# Patient Record
Sex: FEMALE | Race: WHITE | NOT HISPANIC OR LATINO | Employment: FULL TIME | URBAN - METROPOLITAN AREA
[De-identification: names, ages, dates, MRNs, and addresses within clinical notes are randomized per-mention and may not be internally consistent; named-entity substitution may affect disease eponyms.]

---

## 2017-01-13 RX ORDER — ESTRADIOL 0.1 MG/G
CREAM VAGINAL
Qty: 42.5 G | Refills: 0 | Status: SHIPPED | OUTPATIENT
Start: 2017-01-13 | End: 2017-02-08 | Stop reason: SDUPTHER

## 2017-01-18 DIAGNOSIS — J45.31 ASTHMA WITH ALLERGIC RHINITIS, MILD PERSISTENT, WITH ACUTE EXACERBATION: Chronic | ICD-10-CM

## 2017-01-18 RX ORDER — MONTELUKAST SODIUM 10 MG/1
TABLET ORAL
Qty: 30 TABLET | Refills: 11 | Status: SHIPPED | OUTPATIENT
Start: 2017-01-18 | End: 2017-12-18 | Stop reason: SDUPTHER

## 2017-02-06 ENCOUNTER — OFFICE VISIT (OUTPATIENT)
Dept: OBSTETRICS AND GYNECOLOGY | Facility: CLINIC | Age: 47
End: 2017-02-06
Payer: COMMERCIAL

## 2017-02-06 VITALS
HEIGHT: 64 IN | HEART RATE: 85 BPM | DIASTOLIC BLOOD PRESSURE: 82 MMHG | BODY MASS INDEX: 42.51 KG/M2 | SYSTOLIC BLOOD PRESSURE: 144 MMHG | WEIGHT: 249 LBS

## 2017-02-06 DIAGNOSIS — N76.3 CHRONIC VULVITIS: Primary | ICD-10-CM

## 2017-02-06 PROCEDURE — 99999 PR PBB SHADOW E&M-EST. PATIENT-LVL III: CPT | Mod: PBBFAC,,, | Performed by: OBSTETRICS & GYNECOLOGY

## 2017-02-06 PROCEDURE — 3077F SYST BP >= 140 MM HG: CPT | Mod: S$GLB,,, | Performed by: OBSTETRICS & GYNECOLOGY

## 2017-02-06 PROCEDURE — 3079F DIAST BP 80-89 MM HG: CPT | Mod: S$GLB,,, | Performed by: OBSTETRICS & GYNECOLOGY

## 2017-02-06 PROCEDURE — 99213 OFFICE O/P EST LOW 20 MIN: CPT | Mod: S$GLB,,, | Performed by: OBSTETRICS & GYNECOLOGY

## 2017-02-06 RX ORDER — MUPIROCIN 20 MG/G
OINTMENT TOPICAL
COMMUNITY
Start: 2016-11-07 | End: 2017-07-17 | Stop reason: SDUPTHER

## 2017-02-06 RX ORDER — TRIAMCINOLONE ACETONIDE 1 MG/G
OINTMENT TOPICAL 2 TIMES DAILY
Qty: 80 G | Refills: 0 | Status: SHIPPED | OUTPATIENT
Start: 2017-02-06 | End: 2017-02-16

## 2017-02-06 RX ORDER — EPINEPHRINE 0.3 MG/.3ML
INJECTION INTRAMUSCULAR
COMMUNITY
Start: 2016-11-07 | End: 2021-07-20 | Stop reason: SDUPTHER

## 2017-02-06 NOTE — LETTER
February 19, 2017      Sonny Nunez MD  [address]  [place] [address]           [facility] - OB/ GYN  [address] [place] [address] [address]  [place] [address]  Phone: [phone]          Patient: Ijeoma Issa   MR Number: [id number]   YOB: 1970   Date of Visit: 2/6/2017       Dear Dr. Sonny Nunez:    Thank you for referring Ijeoma Issa to me for evaluation. Attached you will find relevant portions of my assessment and plan of care.    If you have questions, please do not hesitate to call me. I look forward to following Ijeoma Issa along with you.    Sincerely,    Grace Encinas MD    Enclosure  CC:  No Recipients    If you would like to receive this communication electronically, please contact [email or web address] or [phone] to request more information on [employer] Link access.    For providers and/or their staff who would like to refer a patient to [facility], please contact us through our one-stop-shop provider referral line, [facility], at [phone].    If you feel you have received this communication in error or would no longer like to receive these types of communications, please e-mail [email or web address]

## 2017-02-08 RX ORDER — ESTRADIOL 0.1 MG/G
CREAM VAGINAL
Qty: 42.5 G | Refills: 0 | Status: SHIPPED | OUTPATIENT
Start: 2017-02-08 | End: 2017-07-05 | Stop reason: SDUPTHER

## 2017-04-12 RX ORDER — DOXEPIN HYDROCHLORIDE 25 MG/1
CAPSULE ORAL
Qty: 30 CAPSULE | Refills: 0 | Status: SHIPPED | OUTPATIENT
Start: 2017-04-12 | End: 2017-05-06 | Stop reason: SDUPTHER

## 2017-04-20 ENCOUNTER — DOCUMENTATION ONLY (OUTPATIENT)
Dept: FAMILY MEDICINE | Facility: CLINIC | Age: 47
End: 2017-04-20

## 2017-04-20 NOTE — PROGRESS NOTES
Pre-Visit Chart Review  For Appointment Scheduled on 04/25/2017    There are no preventive care reminders to display for this patient.

## 2017-04-25 ENCOUNTER — LAB VISIT (OUTPATIENT)
Dept: LAB | Facility: HOSPITAL | Age: 47
End: 2017-04-25
Attending: FAMILY MEDICINE
Payer: COMMERCIAL

## 2017-04-25 ENCOUNTER — OFFICE VISIT (OUTPATIENT)
Dept: FAMILY MEDICINE | Facility: CLINIC | Age: 47
End: 2017-04-25
Payer: COMMERCIAL

## 2017-04-25 ENCOUNTER — TELEPHONE (OUTPATIENT)
Dept: FAMILY MEDICINE | Facility: CLINIC | Age: 47
End: 2017-04-25

## 2017-04-25 VITALS
HEIGHT: 64 IN | HEART RATE: 91 BPM | SYSTOLIC BLOOD PRESSURE: 112 MMHG | DIASTOLIC BLOOD PRESSURE: 78 MMHG | WEIGHT: 242.5 LBS | BODY MASS INDEX: 41.4 KG/M2 | TEMPERATURE: 98 F

## 2017-04-25 DIAGNOSIS — R73.02 GLUCOSE INTOLERANCE (IMPAIRED GLUCOSE TOLERANCE): Chronic | ICD-10-CM

## 2017-04-25 DIAGNOSIS — Z71.85 IMMUNIZATION COUNSELING: ICD-10-CM

## 2017-04-25 DIAGNOSIS — J30.9 CHRONIC ALLERGIC RHINITIS: ICD-10-CM

## 2017-04-25 DIAGNOSIS — Z71.85 IMMUNIZATION COUNSELING: Primary | ICD-10-CM

## 2017-04-25 DIAGNOSIS — E66.01 MORBID OBESITY, UNSPECIFIED OBESITY TYPE: ICD-10-CM

## 2017-04-25 DIAGNOSIS — E28.2 POLYCYSTIC OVARIAN DISEASE: Chronic | ICD-10-CM

## 2017-04-25 PROCEDURE — 3074F SYST BP LT 130 MM HG: CPT | Mod: S$GLB,,, | Performed by: FAMILY MEDICINE

## 2017-04-25 PROCEDURE — 86765 RUBEOLA ANTIBODY: CPT

## 2017-04-25 PROCEDURE — 3078F DIAST BP <80 MM HG: CPT | Mod: S$GLB,,, | Performed by: FAMILY MEDICINE

## 2017-04-25 PROCEDURE — 99213 OFFICE O/P EST LOW 20 MIN: CPT | Mod: S$GLB,,, | Performed by: FAMILY MEDICINE

## 2017-04-25 PROCEDURE — 1160F RVW MEDS BY RX/DR IN RCRD: CPT | Mod: S$GLB,,, | Performed by: FAMILY MEDICINE

## 2017-04-25 PROCEDURE — 86735 MUMPS ANTIBODY: CPT

## 2017-04-25 PROCEDURE — 99999 PR PBB SHADOW E&M-EST. PATIENT-LVL III: CPT | Mod: PBBFAC,,, | Performed by: FAMILY MEDICINE

## 2017-04-25 PROCEDURE — 36415 COLL VENOUS BLD VENIPUNCTURE: CPT | Mod: PO

## 2017-04-25 PROCEDURE — 86762 RUBELLA ANTIBODY: CPT

## 2017-04-25 PROCEDURE — 86706 HEP B SURFACE ANTIBODY: CPT

## 2017-04-25 RX ORDER — MINOCYCLINE HYDROCHLORIDE 50 MG/1
50 TABLET ORAL DAILY
Qty: 30 TABLET | Refills: 3 | Status: SHIPPED | OUTPATIENT
Start: 2017-04-25 | End: 2017-07-17 | Stop reason: SDUPTHER

## 2017-04-25 RX ORDER — PHENTERMINE HYDROCHLORIDE 37.5 MG/1
37.5 TABLET ORAL
Qty: 30 TABLET | Refills: 4 | Status: SHIPPED | OUTPATIENT
Start: 2017-05-25 | End: 2017-07-17 | Stop reason: SDUPTHER

## 2017-04-25 RX ORDER — LEVOCETIRIZINE DIHYDROCHLORIDE 5 MG/1
5 TABLET, FILM COATED ORAL NIGHTLY
Qty: 90 TABLET | Refills: 6 | Status: SHIPPED | OUTPATIENT
Start: 2017-04-25 | End: 2018-02-15 | Stop reason: SDUPTHER

## 2017-04-25 RX ORDER — AZELASTINE HYDROCHLORIDE, FLUTICASONE PROPIONATE 137; 50 UG/1; UG/1
1 SPRAY, METERED NASAL 2 TIMES DAILY
Qty: 1 BOTTLE | Refills: 3 | Status: SHIPPED | OUTPATIENT
Start: 2017-04-25 | End: 2019-08-22

## 2017-04-25 RX ORDER — PHENTERMINE HYDROCHLORIDE 37.5 MG/1
37.5 TABLET ORAL
Qty: 30 TABLET | Refills: 4 | Status: SHIPPED | OUTPATIENT
Start: 2017-06-25 | End: 2017-07-17

## 2017-04-25 RX ORDER — PHENTERMINE HYDROCHLORIDE 37.5 MG/1
37.5 TABLET ORAL
Qty: 30 TABLET | Refills: 4 | Status: SHIPPED | OUTPATIENT
Start: 2017-04-25 | End: 2017-04-25 | Stop reason: SDUPTHER

## 2017-04-25 NOTE — TELEPHONE ENCOUNTER
Received call from pharmacy requesting clarification of Phentermine. Spoke to pharmacist who states she received prescriptions for patient for the months of April, May, and June with 4 refills on each prescriptions. Order clarified as 30 tablets 0 refills on each prescription.

## 2017-04-25 NOTE — PROGRESS NOTES
SUBJECTIVE:   47 y.o. female for annual routine care of her POC's and checkup.She has lost 10 pounds, more active, no dyspnea, No hemoptysis.  Current Outpatient Prescriptions   Medication Sig Dispense Refill    ADVAIR DISKUS 250-50 mcg/dose diskus inhaler INHALE 1 PUFF BY MOUTH TWICE DAILY 60 each 11    aspirin (ECOTRIN) 81 MG EC tablet Take 81 mg by mouth once daily.      azelastine (ASTELIN) 137 mcg (0.1 %) nasal spray 1 spray (137 mcg total) by Nasal route 2 (two) times daily. 30 mL 3    b complex vitamins capsule Take 1 capsule by mouth once daily.      biotin 1 mg tablet Take 1,000 mcg by mouth 3 (three) times daily.        CALCIUM CARBONATE/VITAMIN D3 (VITAMIN D-3 ORAL) Take by mouth.        CHROM VINCENT/BRINDALL CARTER (GARCINIA CAMBOGIA ORAL) Take by mouth.      DOC-Q-LACE 100 mg capsule TAKE ONE CAPSULE BY MOUTH TWICE DAILY (Patient taking differently: TAKE ONE CAPSULE BY MOUTH TWICE DAILY AS NEEDED) 30 capsule 0    doxepin (SINEQUAN) 25 MG capsule TAKE 1 CAPSULE(25 MG) BY MOUTH EVERY EVENING 30 capsule 0    EPIPEN 2-LUCIANO 0.3 mg/0.3 mL AtIn       ESTRACE 0.01 % (0.1 mg/gram) vaginal cream INSERT 1 GRAM VAGINALLY EVERY DAY FOR 2 WEEKS THEN USE VAGINALLY 2 TIMES A WEEK 42.5 g 0    estradiol (ESTRACE) 1 MG tablet Take 1 tablet (1 mg total) by mouth once daily. 30 tablet 11    etodolac (LODINE) 400 MG tablet Take 1 tablet (400 mg total) by mouth 2 (two) times daily. 30 tablet 0    FERROUS FUMARATE (IRON ORAL) Take by mouth once daily.      ketoconazole (NIZORAL) 2 % shampoo       levocetirizine (XYZAL) 5 MG tablet Take 1 tablet (5 mg total) by mouth every evening. 90 tablet 6    montelukast (SINGULAIR) 10 mg tablet TAKE 1 TABLET BY MOUTH DAILY 30 tablet 11    mupirocin (BACTROBAN) 2 % ointment       mv-min-FA-Yp-Lo-sdpxirm-lutein (CENTRUM) 0.4-162-18 mg Tab Take 1 tablet by mouth once daily.      omega-3 fatty acids-fish oil (FISH OIL OMEGA 3-6-9) 300-1,000 mg CpDR       omeprazole (PRILOSEC)  "40 MG capsule Take 1 capsule (40 mg total) by mouth every morning. 90 capsule 4    [START ON 5/25/2017] phentermine (ADIPEX-P) 37.5 mg tablet Take 1 tablet (37.5 mg total) by mouth before breakfast. 30 tablet 4    [START ON 6/25/2017] phentermine (ADIPEX-P) 37.5 mg tablet Take 1 tablet (37.5 mg total) by mouth before breakfast. 30 tablet 4    spironolactone (ALDACTONE) 25 MG tablet TAKE 1 TABLET(25 MG) BY MOUTH DAILY AS NEEDED 30 tablet 3    triamcinolone acetonide 0.1% (KENALOG) 0.1 % cream Apply topically 2 (two) times daily.      albuterol (PROAIR HFA) 90 mcg/actuation inhaler INHALE ONE PUFF BY MOUTH (INTO LUNGS) EVERY SIX HOURS AS NEEDED 8.5 g 3    azelastine-fluticasone 137-50 mcg/spray Spry nassal spray 1 spray by Each Nare route 2 (two) times daily. 1 Bottle 3    minocycline (DYNACIN) 50 MG Tab Take 1 tablet (50 mg total) by mouth once daily. 30 tablet 3     No current facility-administered medications for this visit.      Allergies: Nut flavor; Adhesive tape-silicones; and Latex   No LMP recorded. Patient has had a hysterectomy.    ROS:  Feeling well. No dyspnea or chest pain on exertion.  No abdominal pain, change in bowel habits, black or bloody stools.  No urinary tract symptoms. GYN ROS: no breast pain or new or enlarging lumps on self exam, no side effects of hormonal medications. No neurological complaints.    OBJECTIVE:   The patient appears well, alert, oriented x 3, in no distress.  /78 (BP Location: Right arm, Patient Position: Sitting, BP Method: Automatic)  Pulse 91  Temp 98.2 °F (36.8 °C) (Oral)   Ht 5' 4" (1.626 m)  Wt 110 kg (242 lb 8.1 oz)  BMI 41.63 kg/m2  ENT normal.  Neck supple. No adenopathy or thyromegaly. MICHELLE. Lungs are clear, good air entry, no wheezes, rhonchi or rales. S1 and S2 normal, no murmurs, regular rate and rhythm. Abdomen soft without tenderness, guarding, mass or organomegaly. Extremities show no edema, normal peripheral pulses. Neurological is normal, " no focal findings.      ASSESSMENT:   well woman  Immunization counseling  -     Rubella antibody, IgG; Future  -     Rubeola antibody IgG; Future  -     Mumps, IgG Screen; Future  -     Hepatitis B Surface Antibody, Qual/Quant; Future    Polycystic ovarian disease  -     Discontinue: phentermine (ADIPEX-P) 37.5 mg tablet; Take 1 tablet (37.5 mg total) by mouth before breakfast.  Dispense: 30 tablet; Refill: 4  -     phentermine (ADIPEX-P) 37.5 mg tablet; Take 1 tablet (37.5 mg total) by mouth before breakfast.  Dispense: 30 tablet; Refill: 4  -     phentermine (ADIPEX-P) 37.5 mg tablet; Take 1 tablet (37.5 mg total) by mouth before breakfast.  Dispense: 30 tablet; Refill: 4    Chronic allergic rhinitis  -     levocetirizine (XYZAL) 5 MG tablet; Take 1 tablet (5 mg total) by mouth every evening.  Dispense: 90 tablet; Refill: 6    Morbid obesity, unspecified obesity type  -     Discontinue: phentermine (ADIPEX-P) 37.5 mg tablet; Take 1 tablet (37.5 mg total) by mouth before breakfast.  Dispense: 30 tablet; Refill: 4  -     phentermine (ADIPEX-P) 37.5 mg tablet; Take 1 tablet (37.5 mg total) by mouth before breakfast.  Dispense: 30 tablet; Refill: 4  -     phentermine (ADIPEX-P) 37.5 mg tablet; Take 1 tablet (37.5 mg total) by mouth before breakfast.  Dispense: 30 tablet; Refill: 4    Glucose intolerance (impaired glucose tolerance)  -     Discontinue: phentermine (ADIPEX-P) 37.5 mg tablet; Take 1 tablet (37.5 mg total) by mouth before breakfast.  Dispense: 30 tablet; Refill: 4  -     phentermine (ADIPEX-P) 37.5 mg tablet; Take 1 tablet (37.5 mg total) by mouth before breakfast.  Dispense: 30 tablet; Refill: 4  -     phentermine (ADIPEX-P) 37.5 mg tablet; Take 1 tablet (37.5 mg total) by mouth before breakfast.  Dispense: 30 tablet; Refill: 4    Other orders  -     azelastine-fluticasone 137-50 mcg/spray Spry nassal spray; 1 spray by Each Nare route 2 (two) times daily.  Dispense: 1 Bottle; Refill: 3  -      minocycline (DYNACIN) 50 MG Tab; Take 1 tablet (50 mg total) by mouth once daily.  Dispense: 30 tablet; Refill: 3        PLAN: Patient readiness: acceptance and barriers:none    During the course of the visit the patient was educated and counseled about the following:     Obesity:   General weight loss/lifestyle modification strategies discussed (elicit support from others; identify saboteurs; non-food rewards, etc).    Goals: Obesity: Reduce calorie intake and BMI    Did patient meet goals/outcomes: Yes    The following self management tools provided: excercise log    Patient Instructions (the written plan) was given to the patient/family.     Time spent with patient: 30 minutes            mammogram  return annually or prn

## 2017-04-25 NOTE — TELEPHONE ENCOUNTER
----- Message from Jud Rosales sent at 4/25/2017  1:12 PM CDT -----  [facility]  Calling to verify refill on Rx Phentermine.     [id number] - [place], [place] - [address] AT [facility] [address]  [address]  [place] [address]  Phone: [phone] Fax: [phone]

## 2017-04-25 NOTE — MR AVS SNAPSHOT
[facility]  [address]  [place] [address]  Phone: [phone]  Fax: [phone]                  Ijeoma Issa   2017 11:20 AM   Office Visit    Description:  Female : 1970   Provider:  Sonny Nunez MD   Department:  [facility]           Reason for Visit     Medication Refill           Diagnoses this Visit        Comments    Immunization counseling    -  Primary     Polycystic ovarian disease         Chronic allergic rhinitis         Morbid obesity, unspecified obesity type         Glucose intolerance (impaired glucose tolerance)                To Do List           Future Appointments        Provider Department Dept Phone    2017 2:45 PM LAB, [facility] - [facility] [phone]    2017 8:40 AM Sonny Nunez MD [facility] [phone]      Goals (5 Years of Data)     None      Follow-Up and Disposition     Return in about 3 months (around 2017).       These Medications        Disp Refills Start End    levocetirizine (XYZAL) 5 MG tablet 90 tablet 6 2017     Take 1 tablet (5 mg total) by mouth every evening. - Oral    [facility] [address] [place] [address] [address] AT [facility] & [facility] Ph #: [phone]       azelastine-fluticasone 137-50 mcg/spray Spry nassal spray 1 Bottle 3 2017     1 spray by Each Nare route 2 (two) times daily. - Each Nare    [facility] [address] [place] [address] [facility] [address] AT [facility] Ph #: [phone]       minocycline (DYNACIN) 50 MG Tab 30 tablet 3 2017     Take 1 tablet (50 mg total) by mouth once daily. - Oral    Pharmacy: [facility] [address] [place] [address], [place] - [address] AT [facility] & [facility] Ph #: [phone]       phentermine (ADIPEX-P) 37.5 mg tablet 30 tablet 4 2017     Take 1 tablet (37.5 mg total) by mouth before breakfast. - Oral     Pharmacy: [facility] [address] [place], [place] - [address] AT [facility] & [facility] 43 Ph #: [phone]       phentermine (ADIPEX-P) 37.5 mg tablet 30 tablet 4 2017     Take 1 tablet (37.5 mg total) by mouth before breakfast. - Oral    Pharmacy: [facility] [address] [place], [place]  [address] AT [facility] & [facility] [address] Ph #: [phone]         [facility]     [facility] On Call Nurse Care Line -  Assistance  Unless otherwise directed by your provider, please contact [facility], our nurse care line that is available for  assistance.     Registered nurses in the [facility] provide: appointment scheduling, clinical advisement, health education, and other advisory services.  Call: [phone] (toll free)               Medications           Message regarding Medications     Verify the changes and/or additions to your medication regime listed below are the same as discussed with your clinician today.  If any of these changes or additions are incorrect, please notify your healthcare provider.        START taking these NEW medications        Refills    azelastine-fluticasone 137-50 mcg/spray Spry nassal spray 3    Si spray by Each Nare route 2 (two) times daily.    Class: Normal    Route: Each Nare    minocycline (DYNACIN) 50 MG Tab 3    Sig: Take 1 tablet (50 mg total) by mouth once daily.    Class: Normal    Route: Oral    phentermine (ADIPEX-P) 37.5 mg tablet 4    Starting on: 2017    Sig: Take 1 tablet (37.5 mg total) by mouth before breakfast.    Class: Print    Route: Oral      STOP taking these medications     triamcinolone (NASACORT) 55 mcg nasal inhaler 2 sprays by Nasal route once daily.           Verify that the below list of medications is an accurate representation of the medications you are currently taking.  If none reported, the list may be blank. If incorrect, please contact your healthcare provider. Carry this list  with you in case of emergency.           Current Medications     ADVAIR DISKUS 250-50 mcg/dose diskus inhaler INHALE 1 PUFF BY MOUTH TWICE DAILY    aspirin (ECOTRIN) 81 MG EC tablet Take 81 mg by mouth once daily.    azelastine (ASTELIN) 137 mcg (0.1 %) nasal spray 1 spray (137 mcg total) by Nasal route 2 (two) times daily.    b complex vitamins capsule Take 1 capsule by mouth once daily.    biotin 1 mg tablet Take 1,000 mcg by mouth 3 (three) times daily.      CALCIUM CARBONATE/VITAMIN D3 (VITAMIN D-3 ORAL) Take by mouth.      CHROM VINCENT/BRINDALL CARTER (GARCINIA CAMBOGIA ORAL) Take by mouth.    DOC-Q-LACE 100 mg capsule TAKE ONE CAPSULE BY MOUTH TWICE DAILY    doxepin (SINEQUAN) 25 MG capsule TAKE 1 CAPSULE(25 MG) BY MOUTH EVERY EVENING    EPIPEN 2-LUCIANO 0.3 mg/0.3 mL AtIn     ESTRACE 0.01 % (0.1 mg/gram) vaginal cream INSERT 1 GRAM VAGINALLY EVERY DAY FOR 2 WEEKS THEN USE VAGINALLY 2 TIMES A WEEK    estradiol (ESTRACE) 1 MG tablet Take 1 tablet (1 mg total) by mouth once daily.    etodolac (LODINE) 400 MG tablet Take 1 tablet (400 mg total) by mouth 2 (two) times daily.    FERROUS FUMARATE (IRON ORAL) Take by mouth once daily.    ketoconazole (NIZORAL) 2 % shampoo     levocetirizine (XYZAL) 5 MG tablet Take 1 tablet (5 mg total) by mouth every evening.    montelukast (SINGULAIR) 10 mg tablet TAKE 1 TABLET BY MOUTH DAILY    mupirocin (BACTROBAN) 2 % ointment     mv-min-FA-Tk-Yj-apjhvkx-lutein (CENTRUM) 0.4-162-18 mg Tab Take 1 tablet by mouth once daily.    omega-3 fatty acids-fish oil (FISH OIL OMEGA 3-6-9) 300-1,000 mg CpDR     omeprazole (PRILOSEC) 40 MG capsule Take 1 capsule (40 mg total) by mouth every morning.    phentermine (ADIPEX-P) 37.5 mg tablet Starting on May 25, 2017. Take 1 tablet (37.5 mg total) by mouth before breakfast.    phentermine (ADIPEX-P) 37.5 mg tablet Starting on Jun 25, 2017. Take 1 tablet (37.5 mg total) by mouth before breakfast.    spironolactone (ALDACTONE) 25 MG tablet TAKE 1  "TABLET(25 MG) BY MOUTH DAILY AS NEEDED    triamcinolone acetonide 0.1% (KENALOG) 0.1 % cream Apply topically 2 (two) times daily.    albuterol (PROAIR HFA) 90 mcg/actuation inhaler INHALE ONE PUFF BY MOUTH (INTO LUNGS) EVERY SIX HOURS AS NEEDED    azelastine-fluticasone 137-50 mcg/spray Spry nassal spray 1 spray by Each Nare route 2 (two) times daily.    minocycline (DYNACIN) 50 MG Tab Take 1 tablet (50 mg total) by mouth once daily.           Clinical Reference Information           Your Vitals Were     BP Pulse Temp Height Weight BMI    112/78 (BP Location: Right arm, Patient Position: Sitting, BP Method: Automatic) 91 98.2 °F (36.8 °C) (Oral) 5' 4" (1.626 m) 110 kg (242 lb 8.1 oz) 41.63 kg/m2      Blood Pressure          Most Recent Value    BP  112/78      Allergies as of 4/25/2017     Nut Flavor    Adhesive Tape-silicones    Latex      Immunizations Administered on Date of Encounter - 4/25/2017     None      Orders Placed During Today's Visit     Future Labs/Procedures Expected by Expires    Hepatitis B Surface Antibody, Qual/Quant  4/25/2017 6/24/2018    Mumps, IgG Screen  4/25/2017 6/24/2018    Rubella antibody, IgG  4/25/2017 6/24/2018    Rubeola antibody IgG  4/25/2017 6/24/2018      Language Assistance Services     ATTENTION: Language assistance services are available, free of charge. Please call [phone].      ATENCIÓN: Si habla best, tiene a nava disposición servicios gratuitos de asistencia lingüística. Llame al [phone].     [facility] Ý: N?u b?n nói Ti?ng Vi?t, có các d?ch v? h? tr? ngôn ng? mi?n phí dành cho b?n. G?i s? [phone].         [facility] complies with applicable Federal civil rights laws and does not discriminate on the basis of race, color, national origin, age, disability, or sex.        "

## 2017-04-26 LAB
MUMPS IGG INTERPRETATION: NEGATIVE
MUMPS IGG SCREEN: 0.82 ISR
RUBEOLA IGG ANTIBODY: 1.64 ISR
RUBEOLA INTERPRETATION: POSITIVE
RUBV IGG SER-ACNC: 13.2 IU/ML
RUBV IGG SER-IMP: REACTIVE

## 2017-04-27 LAB
HEP. B SURF AB, QUAL: NEGATIVE
HEP. B SURF AB, QUANT.: <3 MIU/ML

## 2017-05-04 ENCOUNTER — TELEPHONE (OUTPATIENT)
Dept: FAMILY MEDICINE | Facility: CLINIC | Age: 47
End: 2017-05-04

## 2017-05-04 DIAGNOSIS — Z23 NEED FOR HEPATITIS A AND B VACCINATION: Primary | ICD-10-CM

## 2017-05-04 NOTE — TELEPHONE ENCOUNTER
Received message from patient stating she received an e-mail stating she needed to call and schedule Hep injections. Writer confirmed with patient Hep vaccination is desired. Please place orders.

## 2017-05-04 NOTE — TELEPHONE ENCOUNTER
----- Message from Kathy Hinson sent at 5/3/2017  1:43 PM CDT -----  Contact: self  Patient called stating she received a message on her portal to schedule a Hep injection  Please call her at [phone] to schedule     Thanks

## 2017-05-04 NOTE — TELEPHONE ENCOUNTER
Lab appointment for Hep vaccine scheduled for 5-5-17. Patient agreed to appointment date and time.

## 2017-05-04 NOTE — TELEPHONE ENCOUNTER
----- Message from Wilmer Martinez sent at 5/4/2017  4:04 PM CDT -----  Contact: same  Unsuccessful call placed to pod.  Patient called in and stated she was returning a call to office.  Patient call back number is [phone]

## 2017-05-05 ENCOUNTER — CLINICAL SUPPORT (OUTPATIENT)
Dept: INTERNAL MEDICINE | Facility: CLINIC | Age: 47
End: 2017-05-05
Payer: COMMERCIAL

## 2017-05-05 DIAGNOSIS — Z23 NEED FOR HEPATITIS A AND B VACCINATION: ICD-10-CM

## 2017-05-05 PROCEDURE — 90471 IMMUNIZATION ADMIN: CPT | Mod: S$GLB,,, | Performed by: FAMILY MEDICINE

## 2017-05-05 PROCEDURE — 90746 HEPB VACCINE 3 DOSE ADULT IM: CPT | Mod: S$GLB,,, | Performed by: FAMILY MEDICINE

## 2017-05-05 NOTE — PATIENT INSTRUCTIONS

## 2017-05-05 NOTE — PROGRESS NOTES
Two person identification name, d.o.b with verbal feedback.  Aseptic technique used.  Administration #1 Hep B  vaccine to the  R Deltoid IM given.  Tolerated well.  waited 15 min.  VIS 2/2/12 given./mp

## 2017-05-06 RX ORDER — DOXEPIN HYDROCHLORIDE 25 MG/1
CAPSULE ORAL
Qty: 30 CAPSULE | Refills: 3 | Status: SHIPPED | OUTPATIENT
Start: 2017-05-06 | End: 2017-08-24 | Stop reason: SDUPTHER

## 2017-05-10 ENCOUNTER — OFFICE VISIT (OUTPATIENT)
Dept: FAMILY MEDICINE | Facility: CLINIC | Age: 47
End: 2017-05-10
Payer: COMMERCIAL

## 2017-05-10 VITALS
HEART RATE: 79 BPM | HEIGHT: 64 IN | WEIGHT: 242.75 LBS | TEMPERATURE: 99 F | BODY MASS INDEX: 41.44 KG/M2 | DIASTOLIC BLOOD PRESSURE: 79 MMHG | RESPIRATION RATE: 16 BRPM | SYSTOLIC BLOOD PRESSURE: 124 MMHG | OXYGEN SATURATION: 95 %

## 2017-05-10 DIAGNOSIS — Z28.39 IMMUNIZATION DEFICIENCY: ICD-10-CM

## 2017-05-10 DIAGNOSIS — Z02.1 PHYSICAL EXAM, PRE-EMPLOYMENT: Primary | ICD-10-CM

## 2017-05-10 DIAGNOSIS — Z11.1 SCREENING-PULMONARY TB: ICD-10-CM

## 2017-05-10 PROCEDURE — 99999 PR PBB SHADOW E&M-EST. PATIENT-LVL III: CPT | Mod: PBBFAC,,, | Performed by: PHYSICIAN ASSISTANT

## 2017-05-10 PROCEDURE — 90471 IMMUNIZATION ADMIN: CPT | Mod: S$GLB,,, | Performed by: PHYSICIAN ASSISTANT

## 2017-05-10 PROCEDURE — 90707 MMR VACCINE SC: CPT | Mod: S$GLB,,, | Performed by: PHYSICIAN ASSISTANT

## 2017-05-10 PROCEDURE — 1160F RVW MEDS BY RX/DR IN RCRD: CPT | Mod: S$GLB,,, | Performed by: PHYSICIAN ASSISTANT

## 2017-05-10 PROCEDURE — 90472 IMMUNIZATION ADMIN EACH ADD: CPT | Mod: S$GLB,,, | Performed by: PHYSICIAN ASSISTANT

## 2017-05-10 PROCEDURE — 3078F DIAST BP <80 MM HG: CPT | Mod: S$GLB,,, | Performed by: PHYSICIAN ASSISTANT

## 2017-05-10 PROCEDURE — 3074F SYST BP LT 130 MM HG: CPT | Mod: S$GLB,,, | Performed by: PHYSICIAN ASSISTANT

## 2017-05-10 PROCEDURE — 86580 TB INTRADERMAL TEST: CPT | Mod: S$GLB,,, | Performed by: PHYSICIAN ASSISTANT

## 2017-05-10 PROCEDURE — 99213 OFFICE O/P EST LOW 20 MIN: CPT | Mod: 25,S$GLB,, | Performed by: PHYSICIAN ASSISTANT

## 2017-05-10 PROCEDURE — 90716 VAR VACCINE LIVE SUBQ: CPT | Mod: S$GLB,,, | Performed by: PHYSICIAN ASSISTANT

## 2017-05-10 NOTE — PROGRESS NOTES
Subjective:       Patient ID: Ijeoma Issa is a 47 y.o. female.    Chief Complaint: Physical    HPI Comments: Ms. Issa comes to clinic today for pre employment physical. She recently had titers that revealed she did not have immunity to Hepatitis B nor mumps. The patient is also needing a varicella vaccine and Tb skin test required by her future employer. The patient denies recent illness and reports she has been feeling well. She has no complaints today. 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   HENT: Negative for postnasal drip, rhinorrhea and sinus pressure.    Eyes: Negative for visual disturbance.   Respiratory: Negative for cough and shortness of breath.    Cardiovascular: Negative for chest pain.   Gastrointestinal: Negative for abdominal distention and abdominal pain.   Genitourinary: Negative for difficulty urinating and dysuria.   Musculoskeletal: Negative for arthralgias and myalgias.   Neurological: Negative for headaches.   Hematological: Negative for adenopathy.   Psychiatric/Behavioral: The patient is not nervous/anxious.        Objective:      Physical Exam   Constitutional: She is oriented to person, place, and time.   HENT:   Mouth/Throat: Oropharynx is clear and moist. No oropharyngeal exudate.   Eyes: Conjunctivae are normal. Pupils are equal, round, and reactive to light.   Cardiovascular: Normal rate and regular rhythm.    Pulmonary/Chest: Effort normal and breath sounds normal. She has no wheezes.   Abdominal: Soft. Bowel sounds are normal. There is no tenderness.   Musculoskeletal: She exhibits no edema.   Lymphadenopathy:     She has no cervical adenopathy.   Neurological: She is alert and oriented to person, place, and time.   Skin: No erythema.   Psychiatric: Her behavior is normal.       Assessment:       1. Physical exam, pre-employment    2.  Need for MMRV (measles-mumps-rubella-varicella) vaccine    3. Screening-pulmonary TB        Plan:     Ijeoma was seen today for physical.    Diagnoses and all orders for this visit:    Physical exam, pre-employment  -     POCT TB Skin Test  -     Cancel: MMR / Varicella Combined Vaccine (SQ)    Screening-pulmonary TB  -     POCT TB Skin Test    Immunization deficiency  -     MMR Vaccine (SQ)  -     Varicella Vaccine (SQ)    Patient reports latex allergy is a localized rash where the latex touches her skin. Patient advised to contact clinic immediately if rash develops. Patient also advised to take benadryl if any rash or itching develops. Follow up in 2 days for Tb skin test read

## 2017-05-10 NOTE — PROGRESS NOTES
Two Patient identifiers used, Name and . VIS information given to patient and procedure was explained. Patient verbalized understanding. MMR administered SQ in the right posterior arm, Varivax administered SQ in the posterior left arm and Tubersol 0.01 ml applied intradermally in the right ventral forearm all using sterile technique.  No residual bleeding noted. Patient tolerated procedure well. Patient was scheduled to come in Friday afternoon to have her PPD interpreted. She was instructed to bring her paperwork back with her to have the PPD portion completed and signed. Patient verbalized understanding of all instructions.

## 2017-05-10 NOTE — MR AVS SNAPSHOT
[facility]  [address]  [place] [address]  Phone: [phone]  Fax: [phone]                  Ijeoma Issa   5/10/2017 4:40 PM   Office Visit    Description:  Female : 1970   Provider:  Disha Shaffer PA-C   Department:  [facility]           Reason for Visit     Physical           Diagnoses this Visit        Comments    Physical exam, pre-employment    -  Primary     Screening-pulmonary TB         Immunization deficiency                To Do List           Future Appointments        Provider Department Dept Phone    2017 3:00 PM Disha Shaffer PA-C [facility] [phone]    2017 9:00 AM ROSA RODRÍGUEZ MED [facility] [phone]    2017 8:40 AM Sonny Nunez MD [facility] [phone]      Goals (5 Years of Data)     None      [facility]     [facility] Care Line -  Assistance  Unless otherwise directed by your provider, please contact [facility], our nurse care line that is available for  assistance.     Registered nurses in the [facility] On Call Center provide: appointment scheduling, clinical advisement, health education, and other advisory services.  Call: [phone] (toll free)               Medications           Message regarding Medications     Verify the changes and/or additions to your medication regime listed below are the same as discussed with your clinician today.  If any of these changes or additions are incorrect, please notify your healthcare provider.             Verify that the below list of medications is an accurate representation of the medications you are currently taking.  If none reported, the list may be blank. If incorrect, please contact your healthcare provider. Carry this list with you in case of emergency.           Current Medications     ADVAIR DISKUS 250-50 mcg/dose diskus inhaler INHALE 1 PUFF BY MOUTH TWICE DAILY     albuterol (PROAIR HFA) 90 mcg/actuation inhaler INHALE ONE PUFF BY MOUTH (INTO LUNGS) EVERY SIX HOURS AS NEEDED    aspirin (ECOTRIN) 81 MG EC tablet Take 81 mg by mouth once daily.    azelastine (ASTELIN) 137 mcg (0.1 %) nasal spray 1 spray (137 mcg total) by Nasal route 2 (two) times daily.    azelastine-fluticasone 137-50 mcg/spray Spry nassal spray 1 spray by Each Nare route 2 (two) times daily.    b complex vitamins capsule Take 1 capsule by mouth once daily.    biotin 1 mg tablet Take 1,000 mcg by mouth 3 (three) times daily.      CALCIUM CARBONATE/VITAMIN D3 (VITAMIN D-3 ORAL) Take by mouth.      CHROM VINCENT/BRINDALL CARTER (GARCINIA CAMBOGIA ORAL) Take by mouth.    DOC-Q-LACE 100 mg capsule TAKE ONE CAPSULE BY MOUTH TWICE DAILY    doxepin (SINEQUAN) 25 MG capsule TAKE 1 CAPSULE(25 MG) BY MOUTH EVERY EVENING    EPIPEN 2-LUCIANO 0.3 mg/0.3 mL AtIn     ESTRACE 0.01 % (0.1 mg/gram) vaginal cream INSERT 1 GRAM VAGINALLY EVERY DAY FOR 2 WEEKS THEN USE VAGINALLY 2 TIMES A WEEK    estradiol (ESTRACE) 1 MG tablet Take 1 tablet (1 mg total) by mouth once daily.    etodolac (LODINE) 400 MG tablet Take 1 tablet (400 mg total) by mouth 2 (two) times daily.    FERROUS FUMARATE (IRON ORAL) Take by mouth once daily.    ketoconazole (NIZORAL) 2 % shampoo     levocetirizine (XYZAL) 5 MG tablet Take 1 tablet (5 mg total) by mouth every evening.    minocycline (DYNACIN) 50 MG Tab Take 1 tablet (50 mg total) by mouth once daily.    montelukast (SINGULAIR) 10 mg tablet TAKE 1 TABLET BY MOUTH DAILY    mupirocin (BACTROBAN) 2 % ointment     mv-min-FA-Ni-Ef-skqfzcx-lutein (CENTRUM) 0.4-162-18 mg Tab Take 1 tablet by mouth once daily.    omega-3 fatty acids-fish oil (FISH OIL OMEGA 3-6-9) 300-1,000 mg CpDR     omeprazole (PRILOSEC) 40 MG capsule Take 1 capsule (40 mg total) by mouth every morning.    phentermine (ADIPEX-P) 37.5 mg tablet Starting on May 25, 2017. Take 1 tablet (37.5 mg total) by mouth before breakfast.    phentermine  "(ADIPEX-P) 37.5 mg tablet Starting on Jun 25, 2017. Take 1 tablet (37.5 mg total) by mouth before breakfast.    spironolactone (ALDACTONE) 25 MG tablet TAKE 1 TABLET(25 MG) BY MOUTH DAILY AS NEEDED    triamcinolone acetonide 0.1% (KENALOG) 0.1 % cream Apply topically 2 (two) times daily.           Clinical Reference Information           Your Vitals Were     BP Pulse Temp Resp Height Weight    124/79 (BP Location: Right arm, Patient Position: Sitting, BP Method: Automatic) 79 98.8 °F (37.1 °C) (Oral) 16 5' 4" (1.626 m) 110.1 kg (242 lb 11.6 oz)    SpO2 BMI             95% 41.66 kg/m2         Blood Pressure          Most Recent Value    BP  124/79      Allergies as of 5/10/2017     Nut Flavor    Adhesive Tape-silicones    Latex      Immunizations Administered on Date of Encounter - 5/10/2017     Name Date Dose VIS Date Route    MMR 5/10/2017 0.5 mL 4/20/2012 Subcutaneous    PPD Test 5/10/2017 0.1 mL N/A Intradermal    Varicella  Incomplete 0.5 mL 3/13/2008 Subcutaneous      Orders Placed During Today's Visit      Normal Orders This Visit    MMR Vaccine (SQ)     POCT TB Skin Test     Varicella Vaccine (SQ)       Instructions      Walking for Fitness  Fitness walking has something for everyone, even people who are already fit. Walking is one of the safest ways to condition your body aerobically. It can boost energy, help you lose weight, and reduce stress.    Physical benefits  · Walking strengthens your heart and lungs, and tones your muscles.  · When walking, your feet land with less impact than in other sports. This reduces chances of muscle, bone, and joint injury.  · Regular walking improves your cholesterol levels and lowers your risk of heart disease. And it helps you control your blood sugar if you have diabetes.  · Walking is a weight-bearing activity, which helps maintain bone density. This can help prevent osteoporosis.  Personal rewards  · Taking walks can help you relax and manage stress. And fitness " walking may make you feel better about yourself.  · Walking can help you sleep better at night and make you less likely to be depressed.  · Regular walking may help maintain your memory as you get older.  · Walking is a great way to spend extra time with friends and family members. Be sure to invite your dog along!  Q&A about fitness walking  Q: Will walking keep me fit?  A: Yes. Regular walking at the right pace gives you all the benefits of other aerobic activities, such as jogging and swimming.  Q: Will walking help me lose weight and keep it off?  A: Yes. Per mile, walking can burn as many calories as jogging. Your health care provider can help work walking into your weight-loss plan.  Q: Is walking safe for my health?  A: Yes. Walking is safe if you have high blood pressure, diabetes, heart disease, or other conditions. Talk to your health care provider before you start.  Date Last Reviewed: 5/9/2015  © 5403-6292 [employer]. [address] [place] [address]. All rights reserved. This information is not intended as a substitute for professional medical care. Always follow your healthcare professional's instructions.            Weight Management: Getting Started  Healthy bodies come in all shapes and sizes. Not all bodies are made to be thin. For some people, a healthy weight is higher than the average weight listed on weight charts. Your healthcare provider can help you decide on a healthy weight for you.    Reasons to lose weight  Losing weight can help with some health problems, such as high blood pressure, heart disease, diabetes, sleep apnea, and arthritis. You may also feel more energy.  Set your long-term goal  Your goal doesn't even have to be a specific weight. You may decide on a fitness goal (such as being able to walk 10 miles a week), or a health goal (such as lowering your blood pressure). Choose a goal that is measurable and reasonable, so you know when you've reached it. A  goal of reaching a BMI of less than 25 is not always reasonable (or possible).   Make an action plan  Habits dont change overnight. Setting your goals too high can leave you feeling discouraged if you cant reach them. Be realistic. Choose one or two small changes you can make now. Set an action plan for how you are going to make these changes. When you can stick to this plan, keep making a few more small changes. Taking small steps will help you stay on the path to success.  Track your progress  Write down your goals. Then, keep a daily record of your progress. Write down what you eat and how active you are. This record lets you look back on how much youve done. It may also help when youre feeling frustrated. Reward yourself for success. Even if you dont reach every goal, give yourself credit for what you do get done.  Get support  Encouragement from others can help make losing weight easier. Ask your family members and friends for support. They may even want to join you. Also look to your healthcare provider, registered dietitian, and  for help. Your local hospital can give you more information about nutrition, exercise, and weight loss.  Date Last Reviewed: 1/31/2016  © 7768-5599 [employer]. [address] [place] [address], [place] [address]. All rights reserved. This information is not intended as a substitute for professional medical care. Always follow your healthcare professional's instructions.                 Language Assistance Services     ATTENTION: Language assistance services are available, free of charge. Please call [phone].      ATENCIÓN: Si habla español, tiene a nava disposición servicios gratuitos de asistencia lingüística. Llame al [phone].     [facility] Ý: N?u b?n nói Ti?ng Vi?t, có các d?ch v? h? tr? ngôn ng? mi?n phí dành cho b?n. G?i s? [phone].         [facility] complies with applicable Federal civil rights laws and does not  discriminate on the basis of race, color, national origin, age, disability, or sex.

## 2017-05-12 ENCOUNTER — CLINICAL SUPPORT (OUTPATIENT)
Dept: FAMILY MEDICINE | Facility: CLINIC | Age: 47
End: 2017-05-12
Payer: COMMERCIAL

## 2017-05-12 DIAGNOSIS — T50.Z95A IMMUNIZATION REACTION, INITIAL ENCOUNTER: Primary | ICD-10-CM

## 2017-05-12 DIAGNOSIS — Z11.1 SCREENING-PULMONARY TB: ICD-10-CM

## 2017-05-12 LAB
TB INDURATION - 48 HR READ: NORMAL 0MM
TB INDURATION - 72 HR READ: NORMAL 0MM
TB SKIN TEST - 48 HR READ: NORMAL
TB SKIN TEST - 72 HR READ: NORMAL

## 2017-05-12 PROCEDURE — 96372 THER/PROPH/DIAG INJ SC/IM: CPT | Mod: S$GLB,,, | Performed by: FAMILY MEDICINE

## 2017-05-12 PROCEDURE — 99999 PR PBB SHADOW E&M-EST. PATIENT-LVL II: CPT | Mod: PBBFAC,,, | Performed by: PHYSICIAN ASSISTANT

## 2017-05-12 RX ORDER — SULFAMETHOXAZOLE AND TRIMETHOPRIM 800; 160 MG/1; MG/1
1 TABLET ORAL 2 TIMES DAILY
Qty: 20 TABLET | Refills: 0 | Status: SHIPPED | OUTPATIENT
Start: 2017-05-12 | End: 2018-02-15

## 2017-05-12 RX ORDER — BETAMETHASONE SODIUM PHOSPHATE AND BETAMETHASONE ACETATE 3; 3 MG/ML; MG/ML
6 INJECTION, SUSPENSION INTRA-ARTICULAR; INTRALESIONAL; INTRAMUSCULAR; SOFT TISSUE
Status: COMPLETED | OUTPATIENT
Start: 2017-05-12 | End: 2017-05-12

## 2017-05-12 RX ADMIN — BETAMETHASONE SODIUM PHOSPHATE AND BETAMETHASONE ACETATE 6 MG: 3; 3 INJECTION, SUSPENSION INTRA-ARTICULAR; INTRALESIONAL; INTRAMUSCULAR; SOFT TISSUE at 02:05

## 2017-05-12 NOTE — NURSING
2 patient identifiers used (name & ). TB skin test placed to R FA.  .1cc intradermal with wheel present upon administration. Aseptic technique used. Slight spot bleeding noted at injection site. Read at 48 72 hours with 0mm induration, negative reading. 2 Patient identifiers used (name & ). Administered 6 mg Celestone IM. Patient tolerated well. No bleeding at insertion site noted. Pain scale 0/10. Allergies reviewed. Aseptic technique maintained.

## 2017-05-12 NOTE — PROGRESS NOTES
Subjective:       Patient ID: Ijeoma Issa is a 47 y.o. female.    Chief Complaint: PPD Reading    HPI  Review of Systems    Objective:      Physical Exam    Assessment:       1. Immunization reaction, initial encounter    2. Screening-pulmonary TB        Plan:   Ijeoma was seen today for ppd reading.    Diagnoses and all orders for this visit:    Immunization reaction, initial encounter  -     betamethasone acetate-betamethasone sodium phosphate injection 6 mg; Inject 1 mL (6 mg total) into the muscle one time.  -     sulfamethoxazole-trimethoprim 800-160mg (BACTRIM DS) 800-160 mg Tab; Take 1 tablet by mouth 2 (two) times daily.  Patient developed a reaction to immunization placed in the left arm (Varicella). Patient has been using ice backs and benadryl. Patient given celestone shot today and prescribed abx for possibly secondary bacterial infection. Patient encouraged to apply ice packs to area. Continue benadryl use and take ibuprofen for pain and swelling. Hold lodine if taking ibuprofen. Patient to follow up for wound check on Sunday.   Screening-pulmonary TB

## 2017-05-12 NOTE — MR AVS SNAPSHOT
[facility]  [address]  [place] [address]  Phone: [phone]  Fax: [phone]                  Ijeoma Issa   2017 3:00 PM   Clinical Support    Description:  Female : 1970   Provider:  Disha Shaffer PA-C   Department:  [facility]           Diagnoses this Visit        Comments    Immunization reaction, initial encounter    -  Primary            To Do List           Future Appointments        Provider Department Dept Phone    2017 3:00 PM Disha Shaffer PA-C [facility] [phone]    2017 9:00 AM M[facility] INT [facility] [phone]    2017 8:40 AM Sonny Nunez MD [facility] [phone]      Goals (5 Years of Data)     None       These Medications        Disp Refills Start End    sulfamethoxazole-trimethoprim 800-160mg (BACTRIM DS) 800-160 mg Tab 20 tablet 0 2017     Take 1 tablet by mouth 2 (two) times daily. - Oral    Pharmacy: [id number] - [place], [address] AT [facility] [address] Ph #: [phone]         [facility]     [facility] Care [facility] -  Assistance  Unless otherwise directed by your provider, please contact [facility], our nurse care line that is available for  assistance.     Registered nurses in the [facility] provide: appointment scheduling, clinical advisement, health education, and other advisory services.  Call: [phone] (toll free)               Medications           Message regarding Medications     Verify the changes and/or additions to your medication regime listed below are the same as discussed with your clinician today.  If any of these changes or additions are incorrect, please notify your healthcare provider.        START taking these NEW medications        Refills    sulfamethoxazole-trimethoprim 800-160mg (BACTRIM DS) 800-160 mg Tab 0    Sig:  Take 1 tablet by mouth 2 (two) times daily.    Class: Normal    Route: Oral      These medications were administered today        Dose Freq    betamethasone acetate-betamethasone sodium phosphate injection 6 mg 6 mg Clinic/HOD 1 time    Sig: Inject 1 mL (6 mg total) into the muscle one time.    Class: Normal    Route: Intramuscular    Cosign for Ordering: Required by Sonny Nunez MD           Verify that the below list of medications is an accurate representation of the medications you are currently taking.  If none reported, the list may be blank. If incorrect, please contact your healthcare provider. Carry this list with you in case of emergency.           Current Medications     ADVAIR DISKUS 250-50 mcg/dose diskus inhaler INHALE 1 PUFF BY MOUTH TWICE DAILY    albuterol (PROAIR HFA) 90 mcg/actuation inhaler INHALE ONE PUFF BY MOUTH (INTO LUNGS) EVERY SIX HOURS AS NEEDED    aspirin (ECOTRIN) 81 MG EC tablet Take 81 mg by mouth once daily.    azelastine (ASTELIN) 137 mcg (0.1 %) nasal spray 1 spray (137 mcg total) by Nasal route 2 (two) times daily.    azelastine-fluticasone 137-50 mcg/spray Spry nassal spray 1 spray by Each Nare route 2 (two) times daily.    b complex vitamins capsule Take 1 capsule by mouth once daily.    biotin 1 mg tablet Take 1,000 mcg by mouth 3 (three) times daily.      CALCIUM CARBONATE/VITAMIN D3 (VITAMIN D-3 ORAL) Take by mouth.      CHROM VINCENT/BRINDALL CARTER (GARCINIA CAMBOGIA ORAL) Take by mouth.    DOC-Q-LACE 100 mg capsule TAKE ONE CAPSULE BY MOUTH TWICE DAILY    doxepin (SINEQUAN) 25 MG capsule TAKE 1 CAPSULE(25 MG) BY MOUTH EVERY EVENING    EPIPEN 2-LUCIANO 0.3 mg/0.3 mL AtIn     ESTRACE 0.01 % (0.1 mg/gram) vaginal cream INSERT 1 GRAM VAGINALLY EVERY DAY FOR 2 WEEKS THEN USE VAGINALLY 2 TIMES A WEEK    estradiol (ESTRACE) 1 MG tablet Take 1 tablet (1 mg total) by mouth once daily.    etodolac (LODINE) 400 MG tablet Take 1 tablet (400 mg total) by mouth 2 (two) times daily.    FERROUS  FUMARATE (IRON ORAL) Take by mouth once daily.    ketoconazole (NIZORAL) 2 % shampoo     levocetirizine (XYZAL) 5 MG tablet Take 1 tablet (5 mg total) by mouth every evening.    minocycline (DYNACIN) 50 MG Tab Take 1 tablet (50 mg total) by mouth once daily.    montelukast (SINGULAIR) 10 mg tablet TAKE 1 TABLET BY MOUTH DAILY    mupirocin (BACTROBAN) 2 % ointment     mv-min-FA-Wr-Xp-rjtjtmw-lutein (CENTRUM) 0.4-162-18 mg Tab Take 1 tablet by mouth once daily.    omega-3 fatty acids-fish oil (FISH OIL OMEGA 3-6-9) 300-1,000 mg CpDR     omeprazole (PRILOSEC) 40 MG capsule Take 1 capsule (40 mg total) by mouth every morning.    phentermine (ADIPEX-P) 37.5 mg tablet Starting on May 25, 2017. Take 1 tablet (37.5 mg total) by mouth before breakfast.    phentermine (ADIPEX-P) 37.5 mg tablet Starting on Jun 25, 2017. Take 1 tablet (37.5 mg total) by mouth before breakfast.    spironolactone (ALDACTONE) 25 MG tablet TAKE 1 TABLET(25 MG) BY MOUTH DAILY AS NEEDED    sulfamethoxazole-trimethoprim 800-160mg (BACTRIM DS) 800-160 mg Tab Take 1 tablet by mouth 2 (two) times daily.    triamcinolone acetonide 0.1% (KENALOG) 0.1 % cream Apply topically 2 (two) times daily.           Clinical Reference Information           Allergies as of 5/12/2017     Nut Flavor    Latex    Adhesive Tape-silicones      Immunizations Administered on Date of Encounter - 5/12/2017     None      Administrations This Visit     betamethasone acetate-betamethasone sodium phosphate injection 6 mg     Admin Date Action Dose Route Administered By             05/12/2017 Given 6 mg Intramuscular Ermelinda Freeman LPN                      Instructions    Apply ice over the area of redness  Take ibuprofen and benadryl  Follow up on Sunday       Language Assistance Services     ATTENTION: Language assistance services are available, free of charge. Please call [phone].      ATENCIÓN: Si habla español, tiene a nava disposición servicios gratuitos de asistencia  lingüística. Daryl al [phone].     [facility] Ý: N?u b?n nói Ti?ng Vi?t, có các d?ch v? h? tr? ngôn ng? mi?n phí dành cho b?n. G?i s? [phone].         [place] complies with applicable Federal civil rights laws and does not discriminate on the basis of race, color, national origin, age, disability, or sex.

## 2017-05-14 ENCOUNTER — OFFICE VISIT (OUTPATIENT)
Dept: FAMILY MEDICINE | Facility: CLINIC | Age: 47
End: 2017-05-14
Payer: COMMERCIAL

## 2017-05-14 ENCOUNTER — TELEPHONE (OUTPATIENT)
Dept: FAMILY MEDICINE | Facility: CLINIC | Age: 47
End: 2017-05-14

## 2017-05-14 ENCOUNTER — DOCUMENTATION ONLY (OUTPATIENT)
Dept: FAMILY MEDICINE | Facility: CLINIC | Age: 47
End: 2017-05-14

## 2017-05-14 VITALS
HEART RATE: 68 BPM | SYSTOLIC BLOOD PRESSURE: 105 MMHG | HEIGHT: 64 IN | DIASTOLIC BLOOD PRESSURE: 74 MMHG | WEIGHT: 242.5 LBS | BODY MASS INDEX: 41.4 KG/M2 | TEMPERATURE: 98 F

## 2017-05-14 DIAGNOSIS — T78.40XS: Primary | ICD-10-CM

## 2017-05-14 PROCEDURE — 99213 OFFICE O/P EST LOW 20 MIN: CPT | Mod: S$GLB,,, | Performed by: PHYSICIAN ASSISTANT

## 2017-05-14 PROCEDURE — 1160F RVW MEDS BY RX/DR IN RCRD: CPT | Mod: S$GLB,,, | Performed by: PHYSICIAN ASSISTANT

## 2017-05-14 PROCEDURE — 3078F DIAST BP <80 MM HG: CPT | Mod: S$GLB,,, | Performed by: PHYSICIAN ASSISTANT

## 2017-05-14 PROCEDURE — 99999 PR PBB SHADOW E&M-EST. PATIENT-LVL III: CPT | Mod: PBBFAC,,, | Performed by: PHYSICIAN ASSISTANT

## 2017-05-14 PROCEDURE — 3074F SYST BP LT 130 MM HG: CPT | Mod: S$GLB,,, | Performed by: PHYSICIAN ASSISTANT

## 2017-05-14 RX ORDER — METHYLPREDNISOLONE 4 MG/1
TABLET ORAL
Qty: 1 PACKAGE | Refills: 0 | Status: SHIPPED | OUTPATIENT
Start: 2017-05-14 | End: 2017-07-17 | Stop reason: ALTCHOICE

## 2017-05-14 NOTE — PATIENT INSTRUCTIONS
Weight Management: Getting Started  Healthy bodies come in all shapes and sizes. Not all bodies are made to be thin. For some people, a healthy weight is higher than the average weight listed on weight charts. Your healthcare provider can help you decide on a healthy weight for you.    Reasons to lose weight  Losing weight can help with some health problems, such as high blood pressure, heart disease, diabetes, sleep apnea, and arthritis. You may also feel more energy.  Set your long-term goal  Your goal doesn't even have to be a specific weight. You may decide on a fitness goal (such as being able to walk 10 miles a week), or a health goal (such as lowering your blood pressure). Choose a goal that is measurable and reasonable, so you know when you've reached it. A goal of reaching a BMI of less than 25 is not always reasonable (or possible).   Make an action plan  Habits dont change overnight. Setting your goals too high can leave you feeling discouraged if you cant reach them. Be realistic. Choose one or two small changes you can make now. Set an action plan for how you are going to make these changes. When you can stick to this plan, keep making a few more small changes. Taking small steps will help you stay on the path to success.  Track your progress  Write down your goals. Then, keep a daily record of your progress. Write down what you eat and how active you are. This record lets you look back on how much youve done. It may also help when youre feeling frustrated. Reward yourself for success. Even if you dont reach every goal, give yourself credit for what you do get done.  Get support  Encouragement from others can help make losing weight easier. Ask your family members and friends for support. They may even want to join you. Also look to your healthcare provider, registered dietitian, and  for help. Your local hospital can give you more information about nutrition, exercise, and  weight loss.  Date Last Reviewed: 1/31/2016 © 2000-2016 [employer]. [address] [place] [address], [place] [address]. All rights reserved. This information is not intended as a substitute for professional medical care. Always follow your healthcare professional's instructions.        Walking for Fitness  Fitness walking has something for everyone, even people who are already fit. Walking is one of the safest ways to condition your body aerobically. It can boost energy, help you lose weight, and reduce stress.    Physical benefits  · Walking strengthens your heart and lungs, and tones your muscles.  · When walking, your feet land with less impact than in other sports. This reduces chances of muscle, bone, and joint injury.  · Regular walking improves your cholesterol levels and lowers your risk of heart disease. And it helps you control your blood sugar if you have diabetes.  · Walking is a weight-bearing activity, which helps maintain bone density. This can help prevent osteoporosis.  Personal rewards  · Taking walks can help you relax and manage stress. And fitness walking may make you feel better about yourself.  · Walking can help you sleep better at night and make you less likely to be depressed.  · Regular walking may help maintain your memory as you get older.  · Walking is a great way to spend extra time with friends and family members. Be sure to invite your dog along!  Q&A about fitness walking  Q: Will walking keep me fit?  A: Yes. Regular walking at the right pace gives you all the benefits of other aerobic activities, such as jogging and swimming.  Q: Will walking help me lose weight and keep it off?  A: Yes. Per mile, walking can burn as many calories as jogging. Your health care provider can help work walking into your weight-loss plan.  Q: Is walking safe for my health?  A: Yes. Walking is safe if you have high blood pressure, diabetes, heart disease, or other conditions. Talk to your health  care provider before you start.  Date Last Reviewed: 5/9/2015  © 0177-2297 [employer]. [address] [place] [address], [place] [address]. All rights reserved. This information is not intended as a substitute for professional medical care. Always follow your healthcare professional's instructions.

## 2017-05-14 NOTE — PROGRESS NOTES
Pre-Visit Chart Review  For Appointment Scheduled on 05/14/2017    There are no preventive care reminders to display for this patient.

## 2017-05-14 NOTE — PROGRESS NOTES
Subjective:       Patient ID: Ijeoma Issa is a 47 y.o. female.    Chief Complaint: Allergic Reaction    HPI   Patient is a 47  female presenting to the clinic for follow-up to allergic reaction to varicella vaccination on 5/10/17. Patient has been taking benadryl, bactrim ds, and was given a celestone injection on 5/12/17. She feels like the celestone shot helped, but they swelling, redness, and warmth still persists. She has also been applying ice. She denies any fever, chills, difficulty breathing.  Review of Systems   Constitutional: Negative for activity change, appetite change, chills, diaphoresis, fatigue and fever.   HENT: Negative for congestion, postnasal drip and rhinorrhea.    Respiratory: Negative.  Negative for cough, shortness of breath and wheezing.    Cardiovascular: Negative.  Negative for chest pain.   Gastrointestinal: Negative for abdominal pain, blood in stool, constipation, diarrhea, nausea and vomiting.   Genitourinary: Negative for dysuria, frequency, hematuria and urgency.   Musculoskeletal: Negative.    Skin: Positive for color change and rash. Negative for pallor and wound.   Neurological: Negative for dizziness and syncope.   Psychiatric/Behavioral: Negative for agitation, behavioral problems and confusion.       Objective:      Physical Exam   Constitutional: Vital signs are normal. She appears well-developed and well-nourished. No distress.   Cardiovascular: Normal rate, regular rhythm, S1 normal, S2 normal and normal heart sounds.  Exam reveals no gallop.    No murmur heard.  Pulses:       Radial pulses are 2+ on the right side, and 2+ on the left side.   <2sec cap refill fingers bilat     Pulmonary/Chest: Effort normal and breath sounds normal. No respiratory distress. She has no wheezes. She has no rhonchi.   Skin: Skin is warm and dry. She is not diaphoretic.        Appropriate skin turgor   Psychiatric: She has a normal mood and affect. Her speech is normal and  behavior is normal. Judgment and thought content normal. Cognition and memory are normal.       Assessment:       1. Allergic reaction caused by a drug, sequela    2. Body mass index 40.0-44.9, adult        Plan:       Ijeoma was seen today for allergic reaction.    Diagnoses and all orders for this visit:    Allergic reaction caused by a drug, sequela  Continue benadryl, ice packs  -     methylPREDNISolone (MEDROL DOSEPACK) 4 mg tablet; use as directed  Return to clinic if symptoms worsen or do not improve with treatment    Body mass index 40.0-44.9, adult  Patient readiness: acceptance and barriers:none    During the course of the visit the patient was educated and counseled about the following:     Obesity:   Regular aerobic exercise program discussed.    Goals: Obesity: Reduce calorie intake and BMI    Did patient meet goals/outcomes: No    The following self management tools provided: declined    Patient Instructions (the written plan) was given to the patient/family.     Time spent with patient: 15 minutes

## 2017-05-17 ENCOUNTER — DOCUMENTATION ONLY (OUTPATIENT)
Dept: FAMILY MEDICINE | Facility: CLINIC | Age: 47
End: 2017-05-17

## 2017-05-17 NOTE — PROGRESS NOTES
Pre-Visit Chart Review  For Appointment Scheduled on 05/19/17    There are no preventive care reminders to display for this patient.

## 2017-05-19 ENCOUNTER — CLINICAL SUPPORT (OUTPATIENT)
Dept: FAMILY MEDICINE | Facility: CLINIC | Age: 47
End: 2017-05-19
Payer: COMMERCIAL

## 2017-05-19 NOTE — PATIENT INSTRUCTIONS
Diabetes (General Information)  Diabetes is a long-term health problem. It means your body does not make enough insulin. Or it may mean that your body cannot use the insulin it makes. Insulin is a hormone in your body. It lets blood sugar (glucose) reach the cells in your body. All of your cells need glucose for fuel.  When you have diabetes, the glucose in your blood builds up because it cannot get into the cells. This buildup is called high blood sugar (hyperglycemia).  Your blood sugar level depends on several things. It depends on what kind of food you eat and how much of it you eat. It also depends on how much exercise you get, and how much insulin you have in your body. Eating too much of the wrong kinds of food or not taking diabetes medicine on time can cause high blood sugar. Infections can cause high blood sugar even if you are taking medicines correctly.  These things can also cause low blood sugar:  · Missing meals  · Not eating enough food  · Taking too much diabetes medicine  Diabetes can cause serious problems over time if you do not get treated. These problems include heart disease, stroke, kidney failure, and blindness. They also include nerve pain or loss of feeling in your legs and feet, and gangrene of the feet. By keeping your blood sugar under control you can prevent or delay these problems.  Normal blood sugar levels are 80 to 100 before a meal and less than 180 in the 1 to 2 hours after a meal.  Home care  Follow these guidelines when caring for yourself at home:  · Follow the diet your healthcare provider gives you. Take insulin or other diabetes medicine exactly as told to.  · Watch your blood sugar as you are told to. Keep a log of your results. This will help your provider change your medicines to keep your blood sugar under control.  · Try to reach your ideal weight. You may be able to cut back on or not have to take diabetes medicine if you eat the right foods and get exercise.  · Do  not smoke. Smoking worsens the effects of diabetes on your circulation. You are much more likely to have a heart attack if you have diabetes and you smoke.  · Take good care of your feet. If you have lost feeling in your feet, you may not see an injury or infection. Check your feet and between your toes at least once a week.  · Wear a medical alert bracelet or necklace, or carry a card in your wallet that says you have diabetes. This will help healthcare providers give you the right care if you get very ill and cannot tell them that you have diabetes.  Sick day plan  If you get a cold, the flu, or a bacterial or viral infection, take these steps:  · Look at your diabetes sick plan and call your healthcare provider as you were told to. You may need to call your provider right away if:  ¨ Your blood sugar is above 240 while taking your diabetes medicine  ¨ Your urine ketone levels are above normal or high  ¨ You have been vomiting more than 6 hours  ¨ You have trouble breathing or your breath ha s a fruity smell  ¨ You have a high fever  ¨ You have a fever for several days and you are not getting better  ¨ You get light-headed and are sleepier than usual  · Keep taking your diabetes pills (oral medicine) even if you have been vomiting and are feeling sick. Call your provider right away because you may need insulin to lower your blood sugar until you recover from your illness.  · Keep taking your insulin even if you have been vomiting and are feeling sick. Call your provider right away to ask if you need to change your insulin dose. This will depend on your blood sugar results.  · Check your blood sugar every 2 to 4 hours, or at least 4 times a day.  · Check your ketones often. If you are vomiting and having diarrhea, watch them more often.  · Do not skip meals. Try to eat small meals on a regular schedule. Do this even if you do not feel like eating.  · Drink water or other liquids that do not have caffeine or  calories. This will keep you from getting dehydrated. If you are nauseated or vomiting, takes small sips every 5 minutes. To prevent dehydration try to drink a cup (8 ounces) of fluids every hour while you are awake.  General care  Always bring a source of fast-acting sugar with you in case you have symptoms of low blood sugar (below 70). At the first sign of low blood sugar, eat or drink 15 to 20 grams of fast-acting sugar to raise your blood sugar. Examples are:  · 3 to 4 glucose tablets. You can buy these at most [employer].  · 4 ounces (1/2 cup) of regular (not diet) soft drinks  · 4 ounces (1/2 cup) of any fruit juice  · 8 ounces (1 cup) of milk  · 5 to 6 pieces of hard candy  · 1 tablespoon of honey  Check your blood sugar 15 minutes after treating yourself. If it is still below 70, take 15 to 20 more grams of fast-acting sugar. Test again in 15 minutes. If it returns to normal (70 or above), eat a snack or meal to keep your blood sugar in a safe range. If it stays low, call your doctor or go to an emergency room.  Follow-up care  Follow-up with your healthcare provider, or as advised. For more information about diabetes, visit the American Diabetes Association website at www.diabetes.org or call 962-470-6762.  When to seek medical advice  Call your healthcare provider right away if you have any of these symptoms of high blood sugar:  · Frequent urination  · Dizziness  · Drowsiness  · Thirst  · Headache  · Nausea or vomiting  · Abdominal pain  · Eyesight changes  · Fast breathing  · Confusion or loss of consciousness  Also call your provider right away if you have any of these signs of low blood sugar:  · Fatigue  · Headache  · Shakes  · Excess sweating  · Hunger  · Feeling anxious or restless  · Eyesight changes  · Drowsiness  · Weakness  · Confusion or loss of consciousness  Call 911  Call for emergency help right away if any of these occur:  · Chest pain or shortness of breath  · Dizziness or  fainting  · Weakness of an arm or leg or one side of the face  · Trouble speaking or seeing   Date Last Reviewed: 6/1/2016  © 1778-0134 [employer], [employer]. [address] [place] [address], [place] [address]. All rights reserved. This information is not intended as a substitute for professional medical care. Always follow your healthcare professional's instructions.

## 2017-05-19 NOTE — PROGRESS NOTES
Injection area redness, swelling, warmth completely resolved. She reports minor bluish discoloration, but I do not appreciate this. She completed last steroid this morning. No further management needed. She will let us know if anything worsens.

## 2017-05-30 RX ORDER — ESTRADIOL 1 MG/1
TABLET ORAL
Qty: 30 TABLET | Refills: 0 | Status: SHIPPED | OUTPATIENT
Start: 2017-05-30 | End: 2017-06-27 | Stop reason: SDUPTHER

## 2017-06-02 ENCOUNTER — CLINICAL SUPPORT (OUTPATIENT)
Dept: INTERNAL MEDICINE | Facility: CLINIC | Age: 47
End: 2017-06-02
Payer: COMMERCIAL

## 2017-06-02 DIAGNOSIS — Z23 NEED FOR HEPATITIS A AND B VACCINATION: ICD-10-CM

## 2017-06-02 PROCEDURE — 90471 IMMUNIZATION ADMIN: CPT | Mod: S$GLB,,,

## 2017-06-02 PROCEDURE — 90746 HEPB VACCINE 3 DOSE ADULT IM: CPT | Mod: S$GLB,,,

## 2017-06-02 NOTE — PROGRESS NOTES
Two person identification name, d.o.b with verbal feedback.  Aseptic technique used.  Administration #2 Hep B  vaccine to the  L Deltoid IM given.  Tolerated well.  waited 15 min.  VIS 2/2/12 given./mp

## 2017-06-08 ENCOUNTER — TELEPHONE (OUTPATIENT)
Dept: OBSTETRICS AND GYNECOLOGY | Facility: CLINIC | Age: 47
End: 2017-06-08

## 2017-06-08 NOTE — TELEPHONE ENCOUNTER
Patient was seen in February. She had hysterectomy as well. Does she still need an annual appointment?

## 2017-06-27 RX ORDER — ESTRADIOL 1 MG/1
TABLET ORAL
Qty: 30 TABLET | Refills: 0 | Status: SHIPPED | OUTPATIENT
Start: 2017-06-27 | End: 2017-07-05 | Stop reason: SDUPTHER

## 2017-07-05 ENCOUNTER — OFFICE VISIT (OUTPATIENT)
Dept: OBSTETRICS AND GYNECOLOGY | Facility: CLINIC | Age: 47
End: 2017-07-05
Payer: COMMERCIAL

## 2017-07-05 VITALS
SYSTOLIC BLOOD PRESSURE: 133 MMHG | WEIGHT: 245.56 LBS | BODY MASS INDEX: 41.92 KG/M2 | HEIGHT: 64 IN | DIASTOLIC BLOOD PRESSURE: 86 MMHG | HEART RATE: 84 BPM

## 2017-07-05 DIAGNOSIS — N95.1 MENOPAUSAL SYMPTOMS: ICD-10-CM

## 2017-07-05 DIAGNOSIS — Z12.31 ENCOUNTER FOR SCREENING MAMMOGRAM FOR BREAST CANCER: ICD-10-CM

## 2017-07-05 DIAGNOSIS — Z01.419 WELL WOMAN EXAM WITH ROUTINE GYNECOLOGICAL EXAM: Primary | ICD-10-CM

## 2017-07-05 PROCEDURE — 99999 PR PBB SHADOW E&M-EST. PATIENT-LVL III: CPT | Mod: PBBFAC,,, | Performed by: OBSTETRICS & GYNECOLOGY

## 2017-07-05 PROCEDURE — 99396 PREV VISIT EST AGE 40-64: CPT | Mod: S$GLB,,, | Performed by: OBSTETRICS & GYNECOLOGY

## 2017-07-05 RX ORDER — ESTRADIOL 0.1 MG/G
CREAM VAGINAL
Qty: 42.5 G | Refills: 6 | Status: SHIPPED | OUTPATIENT
Start: 2017-07-05 | End: 2018-09-05

## 2017-07-05 RX ORDER — ESTRADIOL 1 MG/1
1 TABLET ORAL DAILY
Qty: 90 TABLET | Refills: 3 | Status: SHIPPED | OUTPATIENT
Start: 2017-07-05 | End: 2018-09-05

## 2017-07-05 NOTE — PROGRESS NOTES
SUBJECTIVE:   47 y.o. female   for annual routine Pap and checkup. No LMP recorded. Patient has had a hysterectomy..  She has no unusual complaints.        Past Medical History:   Diagnosis Date    Abnormal mammogram     neg biospy    Allergic asthma     Anxiety     Arthritis     Dermatitis     Dr. Weil, Extremities    Diabetes     Diverticulosis of colon     GERD (gastroesophageal reflux disease)     Mass of right breast     Postmenopausal hormone replacement therapy     Tubal pregnancy     Uterine fibroid      Past Surgical History:   Procedure Laterality Date    BREAST BIOPSY  2012    rioght breast stereo     BREAST SURGERY  2012    stero biopsy dr [facility]     SECTION, LOW TRANSVERSE      CHOLECYSTECTOMY      HYSTERECTOMY      re: benign tumors per the patient    right ankle  10/10/2014    TOTAL ABDOMINAL HYSTERECTOMY W/ BILATERAL SALPINGOOPHORECTOMY      c appendectomy    TUBAL LIGATION       Social History     Social History    Marital status:      Spouse name: N/A    Number of children: N/A    Years of education: N/A     Occupational History     [employer]     Social History Main Topics    Smoking status: Never Smoker    Smokeless tobacco: Never Used    Alcohol use No      Comment: very seldom    Drug use: No    Sexual activity: Yes     Partners: Male     Birth control/ protection: Surgical, See Surgical Hx      Comment: hyst     Other Topics Concern    Not on file     Social History Narrative    No narrative on file     Family History   Problem Relation Age of Onset    Cancer Father      colon    Colon cancer Father     Diabetes Sister     Cancer Brother      stomach    Heart disease Brother     Heart disease Mother     Breast cancer Neg Hx     Ovarian cancer Neg Hx      OB History    Para Term  AB Living   2 2 2 0 0 2   SAB TAB Ectopic Multiple Live Births   0 0 0 0 2      # Outcome Date GA Lbr  Josh/2nd Weight Sex Delivery Anes PTL Lv   2 Term      CS-LTranv   MELANIE   1 Term      Vag-Spont   MELANIE            Current Outpatient Prescriptions   Medication Sig Dispense Refill    ADVAIR DISKUS 250-50 mcg/dose diskus inhaler INHALE 1 PUFF BY MOUTH TWICE DAILY 60 each 11    albuterol (PROAIR HFA) 90 mcg/actuation inhaler INHALE ONE PUFF BY MOUTH (INTO LUNGS) EVERY SIX HOURS AS NEEDED 8.5 g 3    aspirin (ECOTRIN) 81 MG EC tablet Take 81 mg by mouth once daily.      azelastine (ASTELIN) 137 mcg (0.1 %) nasal spray 1 spray (137 mcg total) by Nasal route 2 (two) times daily. 30 mL 3    azelastine-fluticasone 137-50 mcg/spray Spry nassal spray 1 spray by Each Nare route 2 (two) times daily. 1 Bottle 3    b complex vitamins capsule Take 1 capsule by mouth once daily.      biotin 1 mg tablet Take 1,000 mcg by mouth 3 (three) times daily.        CALCIUM CARBONATE/VITAMIN D3 (VITAMIN D-3 ORAL) Take by mouth.        CHROM VINCENT/BRINDALL CARTER (GARCINIA CAMBOGIA ORAL) Take by mouth.      DOC-Q-LACE 100 mg capsule TAKE ONE CAPSULE BY MOUTH TWICE DAILY (Patient taking differently: TAKE ONE CAPSULE BY MOUTH TWICE DAILY AS NEEDED) 30 capsule 0    doxepin (SINEQUAN) 25 MG capsule TAKE 1 CAPSULE(25 MG) BY MOUTH EVERY EVENING 30 capsule 3    EPIPEN 2-LUCIANO 0.3 mg/0.3 mL AtIn       ESTRACE 0.01 % (0.1 mg/gram) vaginal cream INSERT 1 GRAM VAGINALLY EVERY DAY FOR 2 WEEKS THEN USE VAGINALLY 2 TIMES A WEEK 42.5 g 0    estradiol (ESTRACE) 1 MG tablet TAKE 1 TABLET(1 MG) BY MOUTH EVERY DAY 30 tablet 0    etodolac (LODINE) 400 MG tablet Take 1 tablet (400 mg total) by mouth 2 (two) times daily. 30 tablet 0    FERROUS FUMARATE (IRON ORAL) Take by mouth once daily.      ketoconazole (NIZORAL) 2 % shampoo       levocetirizine (XYZAL) 5 MG tablet Take 1 tablet (5 mg total) by mouth every evening. 90 tablet 6    methylPREDNISolone (MEDROL DOSEPACK) 4 mg tablet use as directed 1 Package 0    minocycline (DYNACIN) 50 MG Tab Take 1  tablet (50 mg total) by mouth once daily. 30 tablet 3    montelukast (SINGULAIR) 10 mg tablet TAKE 1 TABLET BY MOUTH DAILY 30 tablet 11    mupirocin (BACTROBAN) 2 % ointment       mv-min-FA-Vv-Pl-myanpwa-lutein (CENTRUM) 0.4-162-18 mg Tab Take 1 tablet by mouth once daily.      omega-3 fatty acids-fish oil (FISH OIL OMEGA 3-6-9) 300-1,000 mg CpDR       omeprazole (PRILOSEC) 40 MG capsule Take 1 capsule (40 mg total) by mouth every morning. 90 capsule 4    phentermine (ADIPEX-P) 37.5 mg tablet Take 1 tablet (37.5 mg total) by mouth before breakfast. 30 tablet 4    phentermine (ADIPEX-P) 37.5 mg tablet Take 1 tablet (37.5 mg total) by mouth before breakfast. 30 tablet 4    spironolactone (ALDACTONE) 25 MG tablet TAKE 1 TABLET(25 MG) BY MOUTH DAILY AS NEEDED 30 tablet 3    sulfamethoxazole-trimethoprim 800-160mg (BACTRIM DS) 800-160 mg Tab Take 1 tablet by mouth 2 (two) times daily. 20 tablet 0    triamcinolone acetonide 0.1% (KENALOG) 0.1 % cream Apply topically 2 (two) times daily.       No current facility-administered medications for this visit.      Allergies: Nut flavor; Latex; Varicella vaccines; and Adhesive tape-silicones     ROS:  Constitutional: no weight loss, weight gain, fever, fatigue  Eyes:  No vision changes, glasses/contacts  ENT/Mouth: No ulcers, sinus problems, ears ringing, headache  Cardiovascular: No inability to lie flat, chest pain, exercise intolerance, swelling, heart palpitations  Respiratory: No wheezing, coughing blood, shortness of breath, or cough  Gastrointestinal: No diarrhea, bloody stool, nausea/vomiting, constipation, gas, hemorrhoids  Genitourinary: No blood in urine, painful urination, urgency of urination, frequency of urination, incomplete emptying, incontinence, abnormal bleeding, painful periods, heavy periods, vaginal discharge, vaginal odor, painful intercourse, sexual problems, bleeding after intercourse.  Musculoskeletal: No muscle weakness  Skin/Breast: No  "painful breasts, nipple discharge, masses, rash, ulcers  Neurological: No passing out, seizures, numbness, headache  Endocrine: No diabetes, hypothyroid, hyperthyroid, hot flashes, hair loss, abnormal hair growth, ance  Psychiatric: No depression, crying  Hematologic: No bruises, bleeding, swollen lymph nodes, anemia.      OBJECTIVE:   The patient appears well, alert, oriented x 3, in no distress.  /86   Pulse 84   Ht 5' 4" (1.626 m)   Wt 111.4 kg (245 lb 9.5 oz)   BMI 42.16 kg/m²   NECK: no thyromegaly, trachea midline  SKIN: no acne, striae, hirsutism  BREAST EXAM: breasts appear normal, no suspicious masses, no skin or nipple changes or axillary nodes  ABDOMEN: no hernias, masses, or hepatosplenomegaly  GENITALIA: normal external genitalia, no erythema, no discharge  URETHRA: normal urethra, normal urethral meatus  VAGINA: mucosal atrophy  CERVIX:  absent  UTERUS: uterus absent  ADNEXA: no mass, fullness, tenderness    ASSESSMENT and PLAN    Encounter for screening mammogram for breast cancer  -     Mammo Digital Screening Bilat with CAD; Future; Expected date: 07/05/2017      "

## 2017-07-10 ENCOUNTER — HOSPITAL ENCOUNTER (OUTPATIENT)
Dept: RADIOLOGY | Facility: CLINIC | Age: 47
Discharge: HOME | End: 2017-07-10
Attending: OBSTETRICS & GYNECOLOGY
Payer: COMMERCIAL

## 2017-07-10 DIAGNOSIS — Z12.31 ENCOUNTER FOR SCREENING MAMMOGRAM FOR BREAST CANCER: ICD-10-CM

## 2017-07-10 PROCEDURE — 77067 SCR MAMMO BI INCL CAD: CPT | Mod: 26,,, | Performed by: RADIOLOGY

## 2017-07-10 PROCEDURE — 77067 SCR MAMMO BI INCL CAD: CPT | Mod: TC

## 2017-07-10 PROCEDURE — 77063 BREAST TOMOSYNTHESIS BI: CPT | Mod: 26,,, | Performed by: RADIOLOGY

## 2017-07-12 ENCOUNTER — TELEPHONE (OUTPATIENT)
Dept: RADIOLOGY | Facility: HOSPITAL | Age: 47
End: 2017-07-12

## 2017-07-14 ENCOUNTER — DOCUMENTATION ONLY (OUTPATIENT)
Dept: FAMILY MEDICINE | Facility: CLINIC | Age: 47
End: 2017-07-14

## 2017-07-14 NOTE — PROGRESS NOTES
Pre-Visit Chart Review  For Appointment Scheduled on 07/17/2017    There are no preventive care reminders to display for this patient.

## 2017-07-17 ENCOUNTER — OFFICE VISIT (OUTPATIENT)
Dept: FAMILY MEDICINE | Facility: CLINIC | Age: 47
End: 2017-07-17
Payer: COMMERCIAL

## 2017-07-17 VITALS
SYSTOLIC BLOOD PRESSURE: 135 MMHG | HEIGHT: 64 IN | BODY MASS INDEX: 42.53 KG/M2 | TEMPERATURE: 98 F | HEART RATE: 87 BPM | DIASTOLIC BLOOD PRESSURE: 85 MMHG | WEIGHT: 249.13 LBS

## 2017-07-17 DIAGNOSIS — R73.02 GLUCOSE INTOLERANCE (IMPAIRED GLUCOSE TOLERANCE): Primary | Chronic | ICD-10-CM

## 2017-07-17 DIAGNOSIS — M79.89 LEG SWELLING: ICD-10-CM

## 2017-07-17 DIAGNOSIS — E28.2 POLYCYSTIC OVARIAN DISEASE: Chronic | ICD-10-CM

## 2017-07-17 DIAGNOSIS — E66.01 MORBID OBESITY, UNSPECIFIED OBESITY TYPE: ICD-10-CM

## 2017-07-17 PROCEDURE — 99999 PR PBB SHADOW E&M-EST. PATIENT-LVL III: CPT | Mod: PBBFAC,,, | Performed by: FAMILY MEDICINE

## 2017-07-17 PROCEDURE — 99213 OFFICE O/P EST LOW 20 MIN: CPT | Mod: S$GLB,,, | Performed by: FAMILY MEDICINE

## 2017-07-17 RX ORDER — MINOCYCLINE HYDROCHLORIDE 50 MG/1
50 TABLET ORAL DAILY
Qty: 30 TABLET | Refills: 3 | Status: SHIPPED | OUTPATIENT
Start: 2017-07-17 | End: 2017-12-07 | Stop reason: SDUPTHER

## 2017-07-17 RX ORDER — SPIRONOLACTONE 25 MG/1
25 TABLET ORAL EVERY OTHER DAY
Qty: 30 TABLET | Refills: 3 | Status: SHIPPED | OUTPATIENT
Start: 2017-07-17 | End: 2017-07-17 | Stop reason: SDUPTHER

## 2017-07-17 RX ORDER — MUPIROCIN 20 MG/G
OINTMENT TOPICAL DAILY
Qty: 90 G | Refills: 3 | Status: SHIPPED | OUTPATIENT
Start: 2017-07-17 | End: 2018-06-12 | Stop reason: SDUPTHER

## 2017-07-17 RX ORDER — PHENTERMINE HYDROCHLORIDE 37.5 MG/1
37.5 TABLET ORAL
Qty: 30 TABLET | Refills: 4 | Status: SHIPPED | OUTPATIENT
Start: 2017-07-17 | End: 2017-11-17

## 2017-07-17 RX ORDER — SPIRONOLACTONE 25 MG/1
TABLET ORAL
Qty: 45 TABLET | Refills: 3 | Status: SHIPPED | OUTPATIENT
Start: 2017-07-17 | End: 2018-02-15 | Stop reason: SDUPTHER

## 2017-07-17 NOTE — PROGRESS NOTES
Obesity: Patient complains of obesity. Patient cites health as reasons for wanting to lose weight.    Obesity History    Period of greatest weight gain: 10 lb during early adult years  Lowest adult weight: 205  Highest adult weight: 235        Current Exercise Habits cardiovascular workout on exercise equipment    Current Eating Habits  Number of regular meals per day: 2  Number of snacking episodes per day: 2  Who shops for food? patient  Who prepares food? patient  Who eats with patient? patient and father  Binge behavior?: no  Purge behavior? no  Anorexic behavior? no  Eating precipitated by stress? yes -   Guilt feelings associated with eating? no    Other Potential Contributing Factors  Use of alcohol: average n/adrinks/week  Use of medications that may cause weight gain estrogens (cream)  History of past abuse? none  Psych History: anxiety  Comorbidities: hypertension  Patient Active Problem List   Diagnosis    Mammogram abnormal    OA (osteoarthritis)    Acne, adult    Asthma with allergic rhinitis    GERD (gastroesophageal reflux disease)    Family history of early CAD, brother  with MI, age 60    Fatigue    Sleep disorder    Migraine headache    Palpitations    Stress, at home and work    Bacterial vaginosis    Bullous impetigo    Morbid obesity    Body mass index 40.0-44.9, adult    Polycystic ovarian disease    Ankle sprain    Glucose intolerance (impaired glucose tolerance)    Menopausal symptoms    Vaginal dryness, menopausal    Umbilical hernia     The patient appears alert, well appearing, and in no distress, oriented to person, place, and time, obese, acyanotic, in no respiratory distress and playful, active. ENT: ENT exam normal, no neck nodes or sinus tenderness and bilateral TM normal without fluid or infection. Chest:clear to auscultation, no wheezes, rales or rhonchi, symmetric air entry, no tachypnea, retractions or cyanosis. Heart sounds are normal. Abdomen soft,  nontender, no masses or organomegaly.old skin lesions.  Lab Results   Component Value Date    HGBA1C 5.2 12/29/2016     Lab Results   Component Value Date    CHOL 153 12/29/2016    CHOL 188 04/09/2015    CHOL 186 02/08/2014     Lab Results   Component Value Date    HDL 46 12/29/2016    HDL 50 04/09/2015    HDL 54 02/08/2014     Lab Results   Component Value Date    LDLCALC 84.4 12/29/2016    LDLCALC 118.6 04/09/2015    LDLCALC 110.8 02/08/2014     Lab Results   Component Value Date    TRIG 113 12/29/2016    TRIG 97 04/09/2015    TRIG 106 02/08/2014     Lab Results   Component Value Date    CHOLHDL 30.1 12/29/2016    CHOLHDL 26.6 04/09/2015    CHOLHDL 29.0 02/08/2014       Glucose intolerance (impaired glucose tolerance)  -     phentermine (ADIPEX-P) 37.5 mg tablet; Take 1 tablet (37.5 mg total) by mouth before breakfast.  Dispense: 30 tablet; Refill: 4    Body mass index 40.0-44.9, adult    Morbid obesity, unspecified obesity type  -     phentermine (ADIPEX-P) 37.5 mg tablet; Take 1 tablet (37.5 mg total) by mouth before breakfast.  Dispense: 30 tablet; Refill: 4    Polycystic ovarian disease  -     phentermine (ADIPEX-P) 37.5 mg tablet; Take 1 tablet (37.5 mg total) by mouth before breakfast.  Dispense: 30 tablet; Refill: 4    Leg swelling  -     spironolactone (ALDACTONE) 25 MG tablet; Take 1 tablet (25 mg total) by mouth every other day.  Dispense: 30 tablet; Refill: 3    Other orders  -     mupirocin (BACTROBAN) 2 % ointment; Apply topically once daily.  Dispense: 90 g; Refill: 3  -     minocycline (DYNACIN) 50 MG Tab; Take 1 tablet (50 mg total) by mouth once daily.  Dispense: 30 tablet; Refill: 3        Patient readiness: acceptance and barriers:readiness    During the course of the visit the patient was educated and counseled about the following:     Hypertension:   Dietary sodium restriction.  Regular aerobic exercise.  Check blood pressures daily and record.  Obesity:   General weight loss/lifestyle  modification strategies discussed (elicit support from others; identify saboteurs; non-food rewards, etc).    Goals: Hypertension: Reduce Blood Pressure and Obesity: Reduce calorie intake and BMI    Did patient meet goals/outcomes: Yes    The following self management tools provided: blood pressure log  excercise log    Patient Instructions (the written plan) was given to the patient/family.     Time spent with patient: 30 minutes

## 2017-07-17 NOTE — PATIENT INSTRUCTIONS
"  Risk Factors for Heart Disease  A risk factor is something that increases your chance of having heart disease. Heart disease (also called coronary artery disease) involves damage to the heart arteries. These blood vessels need to work well to provide the oxygen your heart needs to pump blood to the rest of your body. Things like smoking or high cholesterol levels can damage arteries. You cant control some risk factors, such as age and a family history of heart disease. But there are many things you can control to reduce your risk for heart disease.        Unhealthy cholesterol levels  Cholesterol is a fat-like substance in your blood. It can build up along the artery walls. This is called plaque. Over time, plaque narrows the arteries and reduces blood flow to your heart or brain. If a blood clot forms or a piece of the plaque breaks off, it can completely block the artery and cause a heart attack or stroke. Your risk of heart disease goes up if you have high levels of LDL ("bad") cholesterol or triglycerides (another fatty substance that can build up). Youre also at risk if you have low HDL cholesterol ("good") cholesterol. HDL helps clear the bad cholesterol away. You're at risk if you have:  HDL cholesterol 50 mg/dL or lower; LDL cholesterol 100 mg/dL; or triglycerides of 150 mg/dL or higher.  Smoking  This is the most important risk factor you can change. Smoking causes inflammation and damages the smooth muscle that lines the arteries making them less flexible. It also raises your blood pressure, causing further damage to the artery lining. Smoking also increases your risk that your blood may clot, block an artery, and cause a heart attack or stroke. Smoking also damages your lungs, which affects the delivery of oxygen to the body. If you smoke, you are 2 to 4 times more likely to develop coronary artery disease. If you smoke, it's never too late to help your heart. Ask your doctor about nicotine " replacement products and smoking cessation support.  High blood pressure  High blood pressure occurs when blood pushes too hard against artery walls. This causes damage to the artery walls and the formation of scar tissue as it heals. This makes the arteries stiff and weak. Plaque sticks to the scarred tissue narrowing and hardening the arteries. High blood pressure also causes your heart to work harder to get blood out to the body. High blood pressure raises your risk of heart attack, also known as acute myocardial infarction, or AMI, and especially stroke. The brain tissue is especially sensitive to high blood pressure damage. You're at risk if your blood pressure is 120/80 or higher.  Negative emotions  Chronic stress, pent-up anger, and other negative emotions have been linked to heart disease. This occurs because stress increases the levels of a hormone that increase the demand on your heart. Over time, these emotions could raise your heart disease risk.  Metabolic syndrome  This is caused by a combination of certain risk factors. It puts you at extra high risk of heart disease, stroke, and diabetes. You have metabolic syndrome if you have 3 or more of the following: low HDL cholesterol; high triglycerides; high blood pressure; high blood sugar; extra weight around the waist.  Diabetes  Diabetes occurs when you have high levels of sugar (glucose) in your blood. This can damage arteries if not kept under control. Having diabetes also makes you more likely to have a silent heart attack--one without any symptoms.  Excess weight  Excess weight makes other risk factors, such as diabetes, more likely. Excess weight around the waist or stomach increases your heart disease risk the most.  Lack of physical activity  When youre not active, youre more likely to develop diabetes, high blood pressure, high cholesterol levels, and excess weight.     Most people with heart disease have more than one risk factor.   Date Last  Reviewed: 3/28/2016  © 9558-2066 [employer]. [address] [place] [address], [place] [address]. All rights reserved. This information is not intended as a substitute for professional medical care. Always follow your healthcare professional's instructions.        Identifying Your Heart Risks  What are your risk factors?  A risk factor increases your chance of having heart disease. Some risk factors cant be controlled, such as age or family history of heart disease. But most others can be managed by making lifestyle changes and taking medicine. For each risk factor you reduce, your chance of heart attack and stroke goes down. And, the length and quality of life may go up.  You can make changes to manage the following risk factors.  Abnormal cholesterol levels  Abnormal levels of cholesterol can increase your risk of developing atherosclerotic cardiovascular disease (ASCVD), which can lead to a heart, stroke, or other problems. If your cholesterol levels are of concern, your healthcare provider will work with you to improve your cholesterol level. Lifestyle changes such as diet, exercise, and weight management can help improve your cholesterol level, but you may also need medicine.    High blood pressure  High blood pressure (hypertension) occurs when blood pushes too hard against artery walls as it flows through them. This damages the artery lining. In general, youre at risk if you have:  · Blood pressure of 120/80 or higher. Your doctor may prescribe a personal goal.  · Blood pressure of 140/90 is high blood pressure.  Smoking  This is the most important risk factor you can change. Smoking damages arteries and makes it easier for plaque to build up. Smokers are also at higher risk for blood clots (which can block arteries) and stroke. Youre at risk if you use any kind of tobacco or nicotine. This means:  · Cigarettes  · E-cigarettes  · Chew tobacco  · Cigars  · Pipe  Diabetes  This health problem leads to a  high level of sugar in your blood. It can damage the arteries if not kept under control. Diabetes makes you more likely to have a silent heart attack (one without symptoms). Youre at risk if:  · Your A1C is between 5.7 and 6.4. Once it reaches 6.5, you have diabetes.  Your healthcare provider will help you figure out what your A1C should be. Your target number will depend on your age, general health, and other factors. Your treatment plan may need changes if your current number is too high.  Excess weight  Being overweight makes other risk factors, such as high blood pressure and diabetes, more likely. Excess weight around the waist or stomach increases your heart disease risk the most. Youre at risk if your:  · Waist circumference is more than 35 inches (women) or 40 inches (men).  · Body mass index (BMI) is greater than 25.  Lack of physical activity  If youre not active, problems with diabetes, blood pressure, cholesterol, and weight are more likely. Youre at risk if:  · You exercise less than 40 minutes per day, on fewer than 3 to 4 days a week.  Stress and strong emotions  Stressful events and feelings can raise heart rate and blood pressure. Stress can also bring on feelings of depression, anxiety, and anger. These feelings do not directly lead to heart disease, but they do affect overall health and make quality of life worse.  Date Last Reviewed: 6/1/2013 © 2000-2016 [employer]. [address] [place] [address]. All rights reserved. This information is not intended as a substitute for professional medical care. Always follow your healthcare professional's instructions.

## 2017-07-18 ENCOUNTER — HOSPITAL ENCOUNTER (OUTPATIENT)
Dept: RADIOLOGY | Facility: HOSPITAL | Age: 47
Discharge: HOME | End: 2017-07-18
Attending: OBSTETRICS & GYNECOLOGY
Payer: COMMERCIAL

## 2017-07-18 DIAGNOSIS — R92.8 ABNORMAL MAMMOGRAM OF RIGHT BREAST: ICD-10-CM

## 2017-07-18 PROCEDURE — 77065 DX MAMMO INCL CAD UNI: CPT | Mod: 26,,, | Performed by: RADIOLOGY

## 2017-07-18 PROCEDURE — 77061 BREAST TOMOSYNTHESIS UNI: CPT | Mod: 26,,, | Performed by: RADIOLOGY

## 2017-07-18 PROCEDURE — 77061 BREAST TOMOSYNTHESIS UNI: CPT | Mod: TC

## 2017-08-25 RX ORDER — DOXEPIN HYDROCHLORIDE 25 MG/1
CAPSULE ORAL
Qty: 30 CAPSULE | Refills: 11 | Status: SHIPPED | OUTPATIENT
Start: 2017-08-25 | End: 2018-11-05 | Stop reason: SDUPTHER

## 2017-09-21 DIAGNOSIS — J30.9 CHRONIC ALLERGIC RHINITIS: ICD-10-CM

## 2017-09-21 DIAGNOSIS — K21.9 GASTROESOPHAGEAL REFLUX DISEASE, ESOPHAGITIS PRESENCE NOT SPECIFIED: ICD-10-CM

## 2017-09-21 RX ORDER — LEVOCETIRIZINE DIHYDROCHLORIDE 5 MG/1
TABLET, FILM COATED ORAL
Qty: 90 TABLET | Refills: 0 | Status: SHIPPED | OUTPATIENT
Start: 2017-09-21 | End: 2017-11-17 | Stop reason: SDUPTHER

## 2017-09-21 RX ORDER — OMEPRAZOLE 40 MG/1
CAPSULE, DELAYED RELEASE ORAL
Qty: 90 CAPSULE | Refills: 0 | Status: SHIPPED | OUTPATIENT
Start: 2017-09-21 | End: 2017-12-19 | Stop reason: SDUPTHER

## 2017-10-09 DIAGNOSIS — M79.89 LEG SWELLING: ICD-10-CM

## 2017-10-09 RX ORDER — SPIRONOLACTONE 25 MG/1
TABLET ORAL
Qty: 30 TABLET | Refills: 3 | Status: SHIPPED | OUTPATIENT
Start: 2017-10-09 | End: 2017-11-17 | Stop reason: SDUPTHER

## 2017-10-10 RX ORDER — ESTRADIOL 1 MG/1
TABLET ORAL
Qty: 90 TABLET | Refills: 2 | Status: SHIPPED | OUTPATIENT
Start: 2017-10-10 | End: 2017-11-17 | Stop reason: SDUPTHER

## 2017-10-26 ENCOUNTER — TELEPHONE (OUTPATIENT)
Dept: FAMILY MEDICINE | Facility: CLINIC | Age: 47
End: 2017-10-26

## 2017-10-26 DIAGNOSIS — R09.82 POST-NASAL DRIP: ICD-10-CM

## 2017-10-26 RX ORDER — AZELASTINE 1 MG/ML
1 SPRAY, METERED NASAL 2 TIMES DAILY
Qty: 30 ML | Refills: 3 | Status: SHIPPED | OUTPATIENT
Start: 2017-10-26 | End: 2018-02-15 | Stop reason: ALTCHOICE

## 2017-10-26 NOTE — TELEPHONE ENCOUNTER
Received fax from pharmacy stating [facility] requires prior authorization. Please advise.

## 2017-11-15 ENCOUNTER — DOCUMENTATION ONLY (OUTPATIENT)
Dept: FAMILY MEDICINE | Facility: CLINIC | Age: 47
End: 2017-11-15

## 2017-11-15 NOTE — PROGRESS NOTES
Pre-Visit Chart Review  For Appointment Scheduled on 11/17/17    Health Maintenance Due   Topic Date Due    Influenza Vaccine  08/01/2017

## 2017-11-17 ENCOUNTER — OFFICE VISIT (OUTPATIENT)
Dept: FAMILY MEDICINE | Facility: CLINIC | Age: 47
End: 2017-11-17
Payer: COMMERCIAL

## 2017-11-17 VITALS
HEIGHT: 64 IN | BODY MASS INDEX: 43.59 KG/M2 | DIASTOLIC BLOOD PRESSURE: 82 MMHG | WEIGHT: 255.31 LBS | TEMPERATURE: 99 F | HEART RATE: 98 BPM | SYSTOLIC BLOOD PRESSURE: 127 MMHG

## 2017-11-17 DIAGNOSIS — Z23 NEED FOR HEPATITIS A AND B VACCINATION: ICD-10-CM

## 2017-11-17 DIAGNOSIS — E66.01 MORBID OBESITY WITH BMI OF 40.0-44.9, ADULT: ICD-10-CM

## 2017-11-17 DIAGNOSIS — K21.9 GASTROESOPHAGEAL REFLUX DISEASE, ESOPHAGITIS PRESENCE NOT SPECIFIED: Chronic | ICD-10-CM

## 2017-11-17 DIAGNOSIS — Z23 NEED FOR INFLUENZA VACCINATION: ICD-10-CM

## 2017-11-17 DIAGNOSIS — R73.02 GLUCOSE INTOLERANCE (IMPAIRED GLUCOSE TOLERANCE): Primary | Chronic | ICD-10-CM

## 2017-11-17 PROCEDURE — 99213 OFFICE O/P EST LOW 20 MIN: CPT | Mod: S$GLB,,, | Performed by: PHYSICIAN ASSISTANT

## 2017-11-17 PROCEDURE — 90746 HEPB VACCINE 3 DOSE ADULT IM: CPT | Mod: S$GLB,,, | Performed by: FAMILY MEDICINE

## 2017-11-17 PROCEDURE — 90471 IMMUNIZATION ADMIN: CPT | Mod: S$GLB,,, | Performed by: FAMILY MEDICINE

## 2017-11-17 PROCEDURE — 90686 IIV4 VACC NO PRSV 0.5 ML IM: CPT | Mod: S$GLB,,, | Performed by: FAMILY MEDICINE

## 2017-11-17 PROCEDURE — 99999 PR PBB SHADOW E&M-EST. PATIENT-LVL V: CPT | Mod: PBBFAC,,, | Performed by: PHYSICIAN ASSISTANT

## 2017-11-17 PROCEDURE — 90472 IMMUNIZATION ADMIN EACH ADD: CPT | Mod: S$GLB,,, | Performed by: FAMILY MEDICINE

## 2017-11-17 NOTE — PROGRESS NOTES
Subjective:       Patient ID: Ijeoma Issa is a 47 y.o. female.    Chief Complaint: Follow-up    Ms. Issa comes to clinic today for follow up. The patient continues to take adipex but she has gained weight and not lost weight. The patient reports that she has had increased stress in her life at this time due to the death and illness of some family members. The patient reports that she will also not be able to work toward losing weight over the holidays. The patient agrees to stop adipex at this time. The patient is due for flu shot at this time. She is also due for her final Hep C immunization.       Review of Systems   Constitutional: Negative for activity change, appetite change and fever.   HENT: Negative for postnasal drip, rhinorrhea and sinus pressure.    Eyes: Negative for visual disturbance.   Respiratory: Negative for cough and shortness of breath.    Cardiovascular: Negative for chest pain.   Gastrointestinal: Negative for abdominal distention and abdominal pain.   Genitourinary: Negative for difficulty urinating and dysuria.   Musculoskeletal: Negative for arthralgias and myalgias.   Neurological: Negative for headaches.   Hematological: Negative for adenopathy.   Psychiatric/Behavioral: The patient is not nervous/anxious.        Objective:      Physical Exam   Constitutional: She is oriented to person, place, and time.   HENT:   Mouth/Throat: Oropharynx is clear and moist. No oropharyngeal exudate.   Eyes: Conjunctivae are normal. Pupils are equal, round, and reactive to light.   Cardiovascular: Normal rate and regular rhythm.    Pulmonary/Chest: Effort normal and breath sounds normal. She has no wheezes.   Abdominal: Soft. Bowel sounds are normal. She exhibits no distension. There is no tenderness.   Musculoskeletal: She exhibits no edema.   Lymphadenopathy:     She has no cervical adenopathy.   Neurological: She is alert and oriented to person, place, and time.   Skin: No erythema.    Psychiatric: Her behavior is normal.       Assessment:       1. Glucose intolerance (impaired glucose tolerance)    2. Gastroesophageal reflux disease, esophagitis presence not specified    3. Morbid obesity with BMI of 40.0-44.9, adult    4. Need for influenza vaccination    5. Need for hepatitis A and B vaccination        Plan:     Ijeoma was seen today for follow-up.    Diagnoses and all orders for this visit:    Glucose intolerance (impaired glucose tolerance)  High fiber, low carbohydrate diet  Increase exercise  Gastroesophageal reflux disease, esophagitis presence not specified  Avoid trigger foods  Stop eating 2-3 hours before bed  Morbid obesity with BMI of 40.0-44.9, adult  High fiber, low carbohydrate diet  Increase exercise as able  Hold adipex at this time as patient is not losing weight.   Need for influenza vaccination  Flu shot given.   Need for hepatitis A and B vaccination  -     Hepatitis B Vaccine (Adult) (IM)    Other orders  -     Influenza - Quadrivalent (3 years & older) (PF)

## 2017-11-17 NOTE — PROGRESS NOTES
Administered flu and #3 Hep B vaccine IM. Patient tolerated well. No bleeding at insertion site noted. Pain scale 0/10 with injection. 2 patient identifiers used (name, ), aseptic technique maintained.

## 2017-12-07 RX ORDER — MINOCYCLINE HYDROCHLORIDE 50 MG/1
TABLET ORAL
Qty: 30 TABLET | Refills: 4 | Status: SHIPPED | OUTPATIENT
Start: 2017-12-07 | End: 2018-05-09

## 2017-12-18 DIAGNOSIS — J45.31 ASTHMA WITH ALLERGIC RHINITIS, MILD PERSISTENT, WITH ACUTE EXACERBATION: Chronic | ICD-10-CM

## 2017-12-18 RX ORDER — MONTELUKAST SODIUM 10 MG/1
TABLET ORAL
Qty: 30 TABLET | Refills: 4 | Status: SHIPPED | OUTPATIENT
Start: 2017-12-18 | End: 2018-07-02 | Stop reason: SDUPTHER

## 2017-12-19 DIAGNOSIS — K21.9 GASTROESOPHAGEAL REFLUX DISEASE, ESOPHAGITIS PRESENCE NOT SPECIFIED: ICD-10-CM

## 2017-12-20 RX ORDER — OMEPRAZOLE 40 MG/1
CAPSULE, DELAYED RELEASE ORAL
Qty: 90 CAPSULE | Refills: 0 | Status: SHIPPED | OUTPATIENT
Start: 2017-12-20 | End: 2018-03-10 | Stop reason: SDUPTHER

## 2018-02-15 ENCOUNTER — OFFICE VISIT (OUTPATIENT)
Dept: FAMILY MEDICINE | Facility: CLINIC | Age: 48
End: 2018-02-15
Payer: COMMERCIAL

## 2018-02-15 ENCOUNTER — HOSPITAL ENCOUNTER (OUTPATIENT)
Dept: RADIOLOGY | Facility: CLINIC | Age: 48
Discharge: HOME | End: 2018-02-15
Attending: FAMILY MEDICINE
Payer: COMMERCIAL

## 2018-02-15 VITALS
TEMPERATURE: 98 F | HEART RATE: 81 BPM | HEIGHT: 64 IN | BODY MASS INDEX: 44.04 KG/M2 | SYSTOLIC BLOOD PRESSURE: 117 MMHG | DIASTOLIC BLOOD PRESSURE: 77 MMHG | WEIGHT: 257.94 LBS

## 2018-02-15 DIAGNOSIS — L70.9 ACNE, UNSPECIFIED ACNE TYPE: Primary | Chronic | ICD-10-CM

## 2018-02-15 DIAGNOSIS — J45.21 MILD INTERMITTENT ASTHMA WITH ALLERGIC RHINITIS WITH ACUTE EXACERBATION: Chronic | ICD-10-CM

## 2018-02-15 DIAGNOSIS — E78.2 MIXED HYPERLIPIDEMIA: ICD-10-CM

## 2018-02-15 DIAGNOSIS — J30.1 CHRONIC SEASONAL ALLERGIC RHINITIS DUE TO POLLEN: ICD-10-CM

## 2018-02-15 DIAGNOSIS — M79.89 LEG SWELLING: ICD-10-CM

## 2018-02-15 PROCEDURE — 71046 X-RAY EXAM CHEST 2 VIEWS: CPT | Mod: 26,,, | Performed by: RADIOLOGY

## 2018-02-15 PROCEDURE — 71046 X-RAY EXAM CHEST 2 VIEWS: CPT | Mod: TC,FY,PO

## 2018-02-15 PROCEDURE — 3008F BODY MASS INDEX DOCD: CPT | Mod: S$GLB,,, | Performed by: FAMILY MEDICINE

## 2018-02-15 PROCEDURE — 99999 PR PBB SHADOW E&M-EST. PATIENT-LVL III: CPT | Mod: PBBFAC,,, | Performed by: FAMILY MEDICINE

## 2018-02-15 PROCEDURE — 99214 OFFICE O/P EST MOD 30 MIN: CPT | Mod: S$GLB,,, | Performed by: FAMILY MEDICINE

## 2018-02-15 RX ORDER — ETODOLAC 400 MG/1
400 TABLET, FILM COATED ORAL 2 TIMES DAILY
Qty: 30 TABLET | Refills: 4 | Status: SHIPPED | OUTPATIENT
Start: 2018-02-15 | End: 2018-02-15 | Stop reason: SDUPTHER

## 2018-02-15 RX ORDER — ETODOLAC 400 MG/1
TABLET, FILM COATED ORAL
Qty: 180 TABLET | Refills: 4 | Status: SHIPPED | OUTPATIENT
Start: 2018-02-15 | End: 2019-06-26 | Stop reason: ALTCHOICE

## 2018-02-15 RX ORDER — SPIRONOLACTONE 25 MG/1
TABLET ORAL
Qty: 45 TABLET | Refills: 3 | Status: SHIPPED | OUTPATIENT
Start: 2018-02-15 | End: 2019-06-26

## 2018-02-15 RX ORDER — ALBUTEROL SULFATE 90 UG/1
AEROSOL, METERED RESPIRATORY (INHALATION)
Qty: 8.5 G | Refills: 3 | Status: SHIPPED | OUTPATIENT
Start: 2018-02-15 | End: 2019-06-26 | Stop reason: ALTCHOICE

## 2018-02-15 RX ORDER — LEVOCETIRIZINE DIHYDROCHLORIDE 5 MG/1
5 TABLET, FILM COATED ORAL NIGHTLY
Qty: 90 TABLET | Refills: 6 | Status: SHIPPED | OUTPATIENT
Start: 2018-02-15 | End: 2022-07-01 | Stop reason: ALTCHOICE

## 2018-02-16 RX ORDER — ESTRADIOL 0.1 MG/G
CREAM VAGINAL
Qty: 42.5 G | Refills: 1 | Status: SHIPPED | OUTPATIENT
Start: 2018-02-16 | End: 2018-04-07 | Stop reason: SDUPTHER

## 2018-02-24 ENCOUNTER — TELEPHONE (OUTPATIENT)
Dept: FAMILY MEDICINE | Facility: CLINIC | Age: 48
End: 2018-02-24

## 2018-02-24 DIAGNOSIS — J30.1 CHRONIC SEASONAL ALLERGIC RHINITIS DUE TO POLLEN: ICD-10-CM

## 2018-02-24 NOTE — TELEPHONE ENCOUNTER
Received fax from pharmacy stating insurance no longer covers Levocetirizine. Call placed to [facility] [employer] [facility] ([phone]) to initiate prior authorization. Advised this medication doesn't need prior authorization and is not covered. Alternatives are as follows: OTC Benadryl, Carbinoxamine, and Chlorpheniramine. Please advise.

## 2018-02-25 NOTE — PROGRESS NOTES
Subjective:       Patient ID: Ijeoma Issa is a 47 y.o. female.    Chief Complaint: post nasal drip and Sore Throat    Sore Throat    This is a new problem. The problem has been waxing and waning. There has been no fever. The pain is at a severity of 4/10. Associated symptoms include congestion, coughing, headaches and a hoarse voice. Pertinent negatives include no abdominal pain, ear discharge, ear pain, neck pain or shortness of breath.     Review of Systems   Constitutional: Positive for fatigue. Negative for chills, fever and unexpected weight change.   HENT: Positive for congestion, hoarse voice, postnasal drip, rhinorrhea, sneezing and sore throat. Negative for ear discharge, ear pain and sinus pressure.    Respiratory: Positive for cough. Negative for chest tightness, shortness of breath and wheezing.         HX asthma, in remission.   Cardiovascular: Negative for chest pain, palpitations and leg swelling.   Gastrointestinal: Negative for abdominal pain.   Musculoskeletal: Negative for arthralgias and neck pain.   Neurological: Positive for headaches. Negative for dizziness, syncope and light-headedness.       Patient Active Problem List   Diagnosis    Mammogram abnormal    OA (osteoarthritis)    Acne, adult    Asthma with allergic rhinitis    GERD (gastroesophageal reflux disease)    Family history of early CAD, brother  with MI, age 60    Fatigue    Sleep disorder    Migraine headache    Palpitations    Stress, at home and work    Bacterial vaginosis    Bullous impetigo    Morbid obesity    Body mass index 40.0-44.9, adult    Polycystic ovarian disease    Ankle sprain    Glucose intolerance (impaired glucose tolerance)    Menopausal symptoms    Vaginal dryness, menopausal    Umbilical hernia       Objective:      Physical Exam   Constitutional: She appears well-developed and well-nourished.   HENT:   Right Ear: Tympanic membrane and ear canal normal.   Left Ear: Tympanic  membrane and ear canal normal.   Nose: Mucosal edema and rhinorrhea present. Right sinus exhibits no maxillary sinus tenderness and no frontal sinus tenderness. Left sinus exhibits no maxillary sinus tenderness and no frontal sinus tenderness.   Mouth/Throat: Posterior oropharyngeal erythema present. No oropharyngeal exudate, posterior oropharyngeal edema or tonsillar abscesses.   Cardiovascular: Normal rate, regular rhythm and normal heart sounds.    Pulmonary/Chest: Effort normal and breath sounds normal.   Musculoskeletal:        Right ankle: She exhibits swelling.        Left ankle: She exhibits swelling.   Skin: Skin is warm and dry.   Psychiatric: She has a normal mood and affect.   Nursing note and vitals reviewed.      Lab Results   Component Value Date    WBC 7.66 09/08/2016    HGB 14.1 09/08/2016    HCT 42.2 09/08/2016     09/08/2016    CHOL 153 12/29/2016    TRIG 113 12/29/2016    HDL 46 12/29/2016    ALT 29 12/29/2016    AST 26 12/29/2016     12/29/2016    K 3.7 12/29/2016     12/29/2016    CREATININE 0.8 12/29/2016    BUN 11 12/29/2016    CO2 28 12/29/2016    TSH 2.625 12/29/2016    HGBA1C 5.2 12/29/2016    MICROALBUR 0.32 07/20/2012     The 10-year ASCVD risk score ([place] VAUGHN Jr., et al., 2013) is: 0.6%    Values used to calculate the score:      Age: 47 years      Sex: Female      Is Non- : No      Diabetic: No      Tobacco smoker: No      Systolic Blood Pressure: 117 mmHg      Is BP treated: No      HDL Cholesterol: 46 mg/dL      Total Cholesterol: 153 mg/dL    Assessment:       1. Acne, unspecified acne type    2. Mild intermittent asthma with allergic rhinitis with acute exacerbation    3. Chronic seasonal allergic rhinitis due to pollen    4. Leg swelling    5. Mixed hyperlipidemia        Plan:       Acne, unspecified acne type  -     spironolactone (ALDACTONE) 25 MG tablet; TAKE 1 TABLET(25 MG) BY MOUTH EVERY OTHER DAY  Dispense: 45 tablet; Refill:  3    Mild intermittent asthma with allergic rhinitis with acute exacerbation  -     albuterol (PROAIR HFA) 90 mcg/actuation inhaler; INHALE ONE PUFF BY MOUTH (INTO LUNGS) EVERY SIX HOURS AS NEEDED  Dispense: 8.5 g; Refill: 3  -     X-Ray Chest PA And Lateral; Future; Expected date: 02/15/2018  -     Comprehensive metabolic panel; Future; Expected date: 02/15/2018  -     CBC auto differential; Future; Expected date: 02/15/2018    Chronic seasonal allergic rhinitis due to pollen  -     levocetirizine (XYZAL) 5 MG tablet; Take 1 tablet (5 mg total) by mouth every evening.  Dispense: 90 tablet; Refill: 6    Leg swelling  -     spironolactone (ALDACTONE) 25 MG tablet; TAKE 1 TABLET(25 MG) BY MOUTH EVERY OTHER DAY  Dispense: 45 tablet; Refill: 3  -     Discontinue: etodolac (LODINE) 400 MG tablet; Take 1 tablet (400 mg total) by mouth 2 (two) times daily.  Dispense: 30 tablet; Refill: 4    Mixed hyperlipidemia  -     Lipid panel; Future; Expected date: 02/15/2018

## 2018-03-10 DIAGNOSIS — K21.9 GASTROESOPHAGEAL REFLUX DISEASE, ESOPHAGITIS PRESENCE NOT SPECIFIED: ICD-10-CM

## 2018-03-10 RX ORDER — OMEPRAZOLE 40 MG/1
CAPSULE, DELAYED RELEASE ORAL
Qty: 90 CAPSULE | Refills: 0 | Status: SHIPPED | OUTPATIENT
Start: 2018-03-10 | End: 2018-06-07 | Stop reason: SDUPTHER

## 2018-04-08 RX ORDER — ESTRADIOL 0.1 MG/G
CREAM VAGINAL
Qty: 42.5 G | Refills: 0 | Status: SHIPPED | OUTPATIENT
Start: 2018-04-08 | End: 2018-05-06 | Stop reason: SDUPTHER

## 2018-05-06 RX ORDER — ESTRADIOL 0.1 MG/G
CREAM VAGINAL
Qty: 42.5 G | Refills: 0 | Status: SHIPPED | OUTPATIENT
Start: 2018-05-06 | End: 2018-05-09 | Stop reason: SDUPTHER

## 2018-05-09 ENCOUNTER — TELEPHONE (OUTPATIENT)
Dept: FAMILY MEDICINE | Facility: CLINIC | Age: 48
End: 2018-05-09

## 2018-05-09 ENCOUNTER — LAB VISIT (OUTPATIENT)
Dept: LAB | Facility: HOSPITAL | Age: 48
End: 2018-05-09
Attending: FAMILY MEDICINE
Payer: COMMERCIAL

## 2018-05-09 ENCOUNTER — OFFICE VISIT (OUTPATIENT)
Dept: FAMILY MEDICINE | Facility: CLINIC | Age: 48
End: 2018-05-09
Payer: COMMERCIAL

## 2018-05-09 VITALS
HEART RATE: 81 BPM | WEIGHT: 261.69 LBS | BODY MASS INDEX: 44.68 KG/M2 | DIASTOLIC BLOOD PRESSURE: 75 MMHG | RESPIRATION RATE: 18 BRPM | SYSTOLIC BLOOD PRESSURE: 113 MMHG | HEIGHT: 64 IN | TEMPERATURE: 98 F

## 2018-05-09 DIAGNOSIS — E78.2 MIXED HYPERLIPIDEMIA: ICD-10-CM

## 2018-05-09 DIAGNOSIS — M79.604 RIGHT LEG PAIN: ICD-10-CM

## 2018-05-09 DIAGNOSIS — J45.21 MILD INTERMITTENT ASTHMA WITH ALLERGIC RHINITIS WITH ACUTE EXACERBATION: Chronic | ICD-10-CM

## 2018-05-09 DIAGNOSIS — M54.31 SCIATICA OF RIGHT SIDE: Primary | ICD-10-CM

## 2018-05-09 LAB
ALBUMIN SERPL BCP-MCNC: 3.6 G/DL
ALP SERPL-CCNC: 91 U/L
ALT SERPL W/O P-5'-P-CCNC: 27 U/L
ANION GAP SERPL CALC-SCNC: 11 MMOL/L
AST SERPL-CCNC: 28 U/L
BASOPHILS # BLD AUTO: 0.06 K/UL
BASOPHILS NFR BLD: 0.8 %
BILIRUB SERPL-MCNC: 0.6 MG/DL
BUN SERPL-MCNC: 14 MG/DL
CALCIUM SERPL-MCNC: 9.5 MG/DL
CHLORIDE SERPL-SCNC: 105 MMOL/L
CHOLEST SERPL-MCNC: 186 MG/DL
CHOLEST/HDLC SERPL: 3.7 {RATIO}
CO2 SERPL-SCNC: 26 MMOL/L
CREAT SERPL-MCNC: 0.9 MG/DL
D DIMER PPP IA.FEU-MCNC: 0.44 MG/L FEU
DIFFERENTIAL METHOD: ABNORMAL
EOSINOPHIL # BLD AUTO: 0.2 K/UL
EOSINOPHIL NFR BLD: 2.2 %
ERYTHROCYTE [DISTWIDTH] IN BLOOD BY AUTOMATED COUNT: 12.4 %
EST. GFR  (AFRICAN AMERICAN): >60 ML/MIN/1.73 M^2
EST. GFR  (NON AFRICAN AMERICAN): >60 ML/MIN/1.73 M^2
GLUCOSE SERPL-MCNC: 87 MG/DL
HCT VFR BLD AUTO: 44.2 %
HDLC SERPL-MCNC: 50 MG/DL
HDLC SERPL: 26.9 %
HGB BLD-MCNC: 13.7 G/DL
IMM GRANULOCYTES # BLD AUTO: 0.02 K/UL
IMM GRANULOCYTES NFR BLD AUTO: 0.3 %
LDLC SERPL CALC-MCNC: 110 MG/DL
LYMPHOCYTES # BLD AUTO: 2.8 K/UL
LYMPHOCYTES NFR BLD: 35.7 %
MCH RBC QN AUTO: 28.7 PG
MCHC RBC AUTO-ENTMCNC: 31 G/DL
MCV RBC AUTO: 93 FL
MONOCYTES # BLD AUTO: 0.6 K/UL
MONOCYTES NFR BLD: 7.7 %
NEUTROPHILS # BLD AUTO: 4.2 K/UL
NEUTROPHILS NFR BLD: 53.3 %
NONHDLC SERPL-MCNC: 136 MG/DL
NRBC BLD-RTO: 0 /100 WBC
PLATELET # BLD AUTO: 329 K/UL
PMV BLD AUTO: 11 FL
POTASSIUM SERPL-SCNC: 4.1 MMOL/L
PROT SERPL-MCNC: 7 G/DL
RBC # BLD AUTO: 4.77 M/UL
SODIUM SERPL-SCNC: 142 MMOL/L
TRIGL SERPL-MCNC: 130 MG/DL
WBC # BLD AUTO: 7.81 K/UL

## 2018-05-09 PROCEDURE — 99999 PR PBB SHADOW E&M-EST. PATIENT-LVL III: CPT | Mod: PBBFAC,,, | Performed by: FAMILY MEDICINE

## 2018-05-09 PROCEDURE — 99214 OFFICE O/P EST MOD 30 MIN: CPT | Mod: S$GLB,,, | Performed by: FAMILY MEDICINE

## 2018-05-09 PROCEDURE — 85025 COMPLETE CBC W/AUTO DIFF WBC: CPT

## 2018-05-09 PROCEDURE — 36415 COLL VENOUS BLD VENIPUNCTURE: CPT | Mod: PO

## 2018-05-09 PROCEDURE — 80061 LIPID PANEL: CPT

## 2018-05-09 PROCEDURE — 36415 COLL VENOUS BLD VENIPUNCTURE: CPT

## 2018-05-09 PROCEDURE — 85379 FIBRIN DEGRADATION QUANT: CPT

## 2018-05-09 PROCEDURE — 80053 COMPREHEN METABOLIC PANEL: CPT

## 2018-05-09 RX ORDER — METHYLPREDNISOLONE 4 MG/1
TABLET ORAL
Qty: 1 PACKAGE | Refills: 0 | Status: SHIPPED | OUTPATIENT
Start: 2018-05-09 | End: 2018-05-15 | Stop reason: ALTCHOICE

## 2018-05-09 RX ORDER — HYDROCODONE BITARTRATE AND ACETAMINOPHEN 7.5; 325 MG/1; MG/1
1 TABLET ORAL EVERY 4 HOURS PRN
Qty: 15 TABLET | Refills: 0 | Status: SHIPPED | OUTPATIENT
Start: 2018-05-09 | End: 2018-05-15 | Stop reason: ALTCHOICE

## 2018-05-09 NOTE — TELEPHONE ENCOUNTER
----- Message from Jodie Sotelo sent at 5/9/2018  3:52 PM CDT -----  Contact: Pt  Name of Who is Calling: Ijeoma      What is the request in detail: returning a call in regards to results      Can the clinic reply by [facility]:yes      What [facility] to [facility] [phone] (home)

## 2018-05-09 NOTE — TELEPHONE ENCOUNTER
Patient given results of D dimer, quantitative. Verbalized understanding. Patient requesting to know if she needed testing related to shingle/chicken pox. States during office visit she was advised her symptoms may be related to Shingles. Please advise.

## 2018-05-09 NOTE — PROGRESS NOTES
Subjective:       Patient ID: Ijeoma Issa is a 48 y.o. female.    Chief Complaint: No chief complaint on file.    Old rash, no new rash.  She has had sciatica in the past.  No CP or palpitations.  She did feel SOB for a short time a few days ago.      Leg Pain    There was no injury mechanism. The pain is present in the right thigh (Lateral aspect). The quality of the pain is described as burning and aching (and tingling). The pain is severe. The pain has been fluctuating (Less severe today than this AM) since onset. Pertinent negatives include no muscle weakness. The symptoms are aggravated by movement and palpation (Hurt a lot trying to get out of bed).     Review of Systems   Constitutional: Negative for fever.   Respiratory: Negative for shortness of breath.    Cardiovascular: Negative for chest pain.   Gastrointestinal: Negative for abdominal pain and nausea.   Skin: Negative for rash.   All other systems reviewed and are negative.      Objective:      Physical Exam   Constitutional: She appears well-developed. No distress.   Cardiovascular: Normal rate and regular rhythm.    No murmur heard.  Pulmonary/Chest: Effort normal and breath sounds normal.   Musculoskeletal:   EHL is intact but weaker on left than on right.  SLR - positive pain on right, not left.  Negative Errol's on right.  Trace edema on right lower leg.   Neurological:   Reflex Scores:       Patellar reflexes are 2+ on the right side and 2+ on the left side.     Assessment:       1. Sciatica of right side    2. Right leg pain        Plan:         Diagnoses and all orders for this visit:    Sciatica of right side    Right leg pain  -     D dimer, quantitative; Future    Other orders  -     methylPREDNISolone (MEDROL DOSEPACK) 4 mg tablet; use as directed  -     hydrocodone-acetaminophen 7.5-325mg (NORCO) 7.5-325 mg per tablet; Take 1 tablet by mouth every 4 (four) hours as needed for Pain.    She voiced concern re DVT so will check a  D-dimer.

## 2018-05-15 ENCOUNTER — OFFICE VISIT (OUTPATIENT)
Dept: FAMILY MEDICINE | Facility: CLINIC | Age: 48
End: 2018-05-15
Payer: COMMERCIAL

## 2018-05-15 VITALS
WEIGHT: 260.38 LBS | DIASTOLIC BLOOD PRESSURE: 93 MMHG | HEART RATE: 83 BPM | RESPIRATION RATE: 12 BRPM | HEIGHT: 64 IN | TEMPERATURE: 98 F | BODY MASS INDEX: 44.45 KG/M2 | OXYGEN SATURATION: 97 % | SYSTOLIC BLOOD PRESSURE: 140 MMHG

## 2018-05-15 DIAGNOSIS — M54.31 RIGHT SIDED SCIATICA: Primary | ICD-10-CM

## 2018-05-15 DIAGNOSIS — I10 ESSENTIAL HYPERTENSION: ICD-10-CM

## 2018-05-15 DIAGNOSIS — M79.2 NEUROPATHIC PAIN: ICD-10-CM

## 2018-05-15 DIAGNOSIS — E66.01 MORBID OBESITY WITH BMI OF 40.0-44.9, ADULT: ICD-10-CM

## 2018-05-15 PROCEDURE — 99214 OFFICE O/P EST MOD 30 MIN: CPT | Mod: S$GLB,,, | Performed by: PHYSICIAN ASSISTANT

## 2018-05-15 PROCEDURE — 99999 PR PBB SHADOW E&M-EST. PATIENT-LVL V: CPT | Mod: PBBFAC,,, | Performed by: PHYSICIAN ASSISTANT

## 2018-05-15 RX ORDER — GABAPENTIN 100 MG/1
100 CAPSULE ORAL 2 TIMES DAILY
Qty: 60 CAPSULE | Refills: 0 | Status: SHIPPED | OUTPATIENT
Start: 2018-05-15 | End: 2018-06-07 | Stop reason: SDUPTHER

## 2018-05-15 NOTE — PROGRESS NOTES
Subjective:       Patient ID: Ijeoma Issa is a 48 y.o. female.    Chief Complaint: Follow-up (3mth f/u)    Ms. Issa comes to clinic today for 3 month follow up. The patient continues to attempt to lose weight. The patient reports that she has cut out carbohydrates but she is not counting calories. The patient reports she was exercising before injuring her leg. She was recently treated for right sided sciatica by Dr. Zhong. The patient's blood pressure is elevated today. She reports that she ate crawfish yesterday. The patient has no additional complaints today.      Review of Systems   Constitutional: Negative for activity change, appetite change and fever.   HENT: Negative for postnasal drip, rhinorrhea and sinus pressure.    Eyes: Negative for visual disturbance.   Respiratory: Negative for cough and shortness of breath.    Cardiovascular: Negative for chest pain.   Gastrointestinal: Negative for abdominal distention and abdominal pain.   Genitourinary: Negative for difficulty urinating and dysuria.   Musculoskeletal: Positive for arthralgias and myalgias.   Neurological: Negative for headaches.   Hematological: Negative for adenopathy.   Psychiatric/Behavioral: The patient is not nervous/anxious.        Objective:      Physical Exam   Constitutional: She is oriented to person, place, and time.   Cardiovascular: Normal rate and regular rhythm.    Pulmonary/Chest: Effort normal and breath sounds normal. She has no wheezes.   Abdominal: Soft. Bowel sounds are normal. There is no tenderness.   Musculoskeletal: She exhibits no edema.   Neurological: She is alert and oriented to person, place, and time.   Skin: No erythema.   Psychiatric: Her behavior is normal.       Assessment:       1. Right sided sciatica    2. Neuropathic pain    3. Morbid obesity with BMI of 40.0-44.9, adult    4. Essential hypertension        Plan:       Ijeoma was seen today for follow-up.    Diagnoses and all orders for this  visit:    Right sided sciatica  -     gabapentin (NEURONTIN) 100 MG capsule; Take 1 capsule (100 mg total) by mouth 2 (two) times daily.  Consider PT  Neuropathic pain  -     gabapentin (NEURONTIN) 100 MG capsule; Take 1 capsule (100 mg total) by mouth 2 (two) times daily.    Morbid obesity with BMI of 40.0-44.9, adult  -     Ambulatory Referral to Bariatric Medicine  -     Ambulatory Referral to Nutrition Services    Essential hypertension  -     Ambulatory Referral to Bariatric Medicine  -     Ambulatory Referral to Nutrition Services  Not well controlled  Low salt diet  Follow up in 4 weeks or sooner if needed    Patient readiness: acceptance and barriers:none    During the course of the visit the patient was educated and counseled about the following:     Hypertension:   Medication: no change.  Dietary sodium restriction.  Regular aerobic exercise.  Obesity:   General weight loss/lifestyle modification strategies discussed (elicit support from others; identify saboteurs; non-food rewards, etc).  Informal exercise measures discussed, e.g. taking stairs instead of elevator.  Regular aerobic exercise program discussed.    Goals: Hypertension: Reduce Blood Pressure and Obesity: Reduce calorie intake and BMI    Did patient meet goals/outcomes: No    The following self management tools provided: declined    Patient Instructions (the written plan) was given to the patient/family.     Time spent with patient: 15 minutes    Barriers to medications present (no )    Adverse reactions to current medications (no)    Over the counter medications reviewed (Yes)

## 2018-05-15 NOTE — PATIENT INSTRUCTIONS

## 2018-05-18 ENCOUNTER — CLINICAL SUPPORT (OUTPATIENT)
Dept: BARIATRICS | Facility: CLINIC | Age: 48
End: 2018-05-18
Payer: COMMERCIAL

## 2018-05-18 DIAGNOSIS — E66.01 MORBID OBESITY WITH BMI OF 40.0-44.9, ADULT: ICD-10-CM

## 2018-05-18 DIAGNOSIS — E66.01 MORBID OBESITY: ICD-10-CM

## 2018-05-18 DIAGNOSIS — Z71.3 DIETARY COUNSELING: ICD-10-CM

## 2018-05-18 DIAGNOSIS — I10 ESSENTIAL HYPERTENSION: ICD-10-CM

## 2018-05-18 DIAGNOSIS — K21.9 GASTROESOPHAGEAL REFLUX DISEASE, ESOPHAGITIS PRESENCE NOT SPECIFIED: Chronic | ICD-10-CM

## 2018-05-18 PROCEDURE — 97802 MEDICAL NUTRITION INDIV IN: CPT | Mod: S$GLB,,, | Performed by: DIETITIAN, REGISTERED

## 2018-05-18 PROCEDURE — 99999 PR PBB SHADOW E&M-EST. PATIENT-LVL II: CPT | Mod: PBBFAC,,, | Performed by: DIETITIAN, REGISTERED

## 2018-05-21 VITALS — HEIGHT: 64 IN | WEIGHT: 258.38 LBS | BODY MASS INDEX: 44.11 KG/M2

## 2018-05-21 NOTE — PROGRESS NOTES
"Nutrition Assessment for Medical Nutrition Therapy    Referring professional: Dr. Nunez    Reason for MNT visit: Pt in for education and nutrition counseling regarding HTN, Obesity and Impaired glucose tolerance.     Medical History: Prediabetes, HTN and Obesity         Pertinent Medications: no DM meds    Labs: Reviewed     Ht: 5' 4" (1.626 m)     Wt: 117.2 kg (258 lb 6.1 oz)    BMI: Body mass index is 44.35 kg/m².    Estimated energy requirements: kcals:~ 1750 (25 kcal/ kg   of 70 kg ADJ BW) Protein:  56-70 grm protein( .8-1.0 g/kg ADJ BW)      Nutrition History       Weight status: currently stable    Meal patterns: 3 meals daily and 1-2 snacks daily    Carbohydrate: large intake of starches, large intake of sugars    Protein: inadequate,  lean sources and high-fat sources    Fruit: decreased volume, drinking juices    Vegetables: mostly starchy servings    Meal preparation/shopping: self    Nutrition Na: Significant sources, processed meats, snack foods, canned goods, restaurant meals    Nutrition beverages: mainly drinks water, drinks juices, drinks caffeine    Dining out: twice/ week     Smoking/alcohol: nonsmoker    Psychosocial/Economic:n/a    Motivation to change: High    MNT Recommendations:    · 30 grams carbohydrates per meal  · 15 grams carbohydrates per snack  · 7-8 oz protein daily  · Increase intake of fruits and vegetables  · Low fat diet  · Increase activity  · Limit snacking  · limit sodium    Plan:  · Reviewed  carbohydrate foods, portions and goals per meal and snack, food label strategies, low-fat guidelines, low-sodium guidelines, principles of energy metabolism with strategies to assist weight management, protein foods and portion goals  · Discussed  acceptable salt free seasonings list, basic carbohydrate counting pamphlet, importance of physical activity  · Provided  alternative meal planning resources, dining out strategies, food preparation strategies, grocery shopping guidelines, meal " plan and meal planning strategies, non-calorie containing beverage choices, snack suggestions, strategies for increasing fruits and vegetables at meals and snacks  · Referred to Baraitric Surgeon  For consult    Behavior goals: portion control, weight loss, label reading, sugar free drinks    Nutrition follow-up: written materials provided: My plate guidelines, Meal planning, label reading, will follow up as needed    Comprehension: good     Counseling time: 1 Hour

## 2018-06-04 RX ORDER — ESTRADIOL 0.1 MG/G
CREAM VAGINAL
Qty: 42.5 G | Refills: 0 | Status: SHIPPED | OUTPATIENT
Start: 2018-06-04 | End: 2018-07-02 | Stop reason: SDUPTHER

## 2018-06-07 DIAGNOSIS — M79.2 NEUROPATHIC PAIN: ICD-10-CM

## 2018-06-07 DIAGNOSIS — M54.31 RIGHT SIDED SCIATICA: ICD-10-CM

## 2018-06-07 DIAGNOSIS — K21.9 GASTROESOPHAGEAL REFLUX DISEASE, ESOPHAGITIS PRESENCE NOT SPECIFIED: ICD-10-CM

## 2018-06-07 RX ORDER — OMEPRAZOLE 40 MG/1
CAPSULE, DELAYED RELEASE ORAL
Qty: 90 CAPSULE | Refills: 0 | Status: SHIPPED | OUTPATIENT
Start: 2018-06-07 | End: 2018-09-02 | Stop reason: SDUPTHER

## 2018-06-07 RX ORDER — GABAPENTIN 100 MG/1
CAPSULE ORAL
Qty: 60 CAPSULE | Refills: 0 | Status: SHIPPED | OUTPATIENT
Start: 2018-06-07 | End: 2018-07-02 | Stop reason: SDUPTHER

## 2018-06-12 ENCOUNTER — OFFICE VISIT (OUTPATIENT)
Dept: FAMILY MEDICINE | Facility: CLINIC | Age: 48
End: 2018-06-12
Payer: COMMERCIAL

## 2018-06-12 VITALS
HEIGHT: 64 IN | SYSTOLIC BLOOD PRESSURE: 126 MMHG | HEART RATE: 81 BPM | DIASTOLIC BLOOD PRESSURE: 77 MMHG | OXYGEN SATURATION: 96 % | BODY MASS INDEX: 44.53 KG/M2 | WEIGHT: 260.81 LBS | TEMPERATURE: 98 F | RESPIRATION RATE: 144 BRPM

## 2018-06-12 DIAGNOSIS — R03.0 ELEVATED BLOOD PRESSURE READING WITHOUT DIAGNOSIS OF HYPERTENSION: Primary | ICD-10-CM

## 2018-06-12 DIAGNOSIS — M54.31 RIGHT SIDED SCIATICA: ICD-10-CM

## 2018-06-12 DIAGNOSIS — E66.01 MORBID OBESITY WITH BMI OF 40.0-44.9, ADULT: ICD-10-CM

## 2018-06-12 PROCEDURE — 99213 OFFICE O/P EST LOW 20 MIN: CPT | Mod: S$GLB,,, | Performed by: PHYSICIAN ASSISTANT

## 2018-06-12 PROCEDURE — 99999 PR PBB SHADOW E&M-EST. PATIENT-LVL V: CPT | Mod: PBBFAC,,, | Performed by: PHYSICIAN ASSISTANT

## 2018-06-12 RX ORDER — MUPIROCIN 20 MG/G
OINTMENT TOPICAL DAILY
Qty: 90 G | Refills: 3 | Status: SHIPPED | OUTPATIENT
Start: 2018-06-12 | End: 2023-04-03 | Stop reason: ALTCHOICE

## 2018-06-12 NOTE — PATIENT INSTRUCTIONS
Established High Blood Pressure    High blood pressure (hypertension) is a chronic disease. Often, healthcare providers dont know what causes it. But it can be caused by certain health conditions and medicines.  If you have high blood pressure, you may not have any symptoms. If you do have symptoms, they may include headache, dizziness, changes in your vision, chest pain, and shortness of breath. But even without symptoms, high blood pressure thats not treated raises your risk for heart attack and stroke. High blood pressure is a serious health risk and shouldnt be ignored.  A blood pressure reading is made up of two numbers: a higher number over a lower number. The top number is the systolic pressure. The bottom number is the diastolic pressure. A normal blood pressure is a systolic pressure of  less than 120 over a diastolic pressure of less than 80. You will see your blood pressure readings written together. For example, a person with a systolic pressure of 188 and a diastolic pressure of 78 will have 118/78 written in the medical record.  High blood pressure is when either the top number is 140 or higher, or the bottom number is 90 or higher. This must be the result when taking your blood pressure a number of times. The blood pressures between normal and high are called prehypertension.  Home care  If you have high blood pressure, you should do what is listed below to lower your blood pressure. If you are taking medicines for high blood pressure, these methods may reduce or end your need for medicines in the future.  · Begin a weight-loss program if you are overweight.  · Cut back on how much salt you get in your diet. Heres how to do this:  ¨ Dont eat foods that have a lot of salt. These include olives, pickles, smoked meats, and salted potato chips.  ¨ Dont add salt to your food at the table.  ¨ Use only small amounts of salt when cooking.  · Start an exercise program. Talk with your healthcare  provider about the type of exercise program that would be best for you. It doesn't have to be hard. Even brisk walking for 20 minutes 3 times a week is a good form of exercise.  · Dont take medicines that stimulate the heart. This includes many over-the-counter cold and sinus decongestant pills and sprays, as well as diet pills. Check the warnings about hypertension on the label. Before buying any over-the-counter medicines or supplements, always ask the pharmacist about the product's potential interaction with your high blood pressure and your high blood pressure medicines.  · Stimulants such as amphetamine or cocaine could be deadly for someone with high blood pressure. Never take these.  · Limit how much caffeine you get in your diet. Switch to caffeine-free products.  · Stop smoking. If you are a long-time smoker, this can be hard. Talk to your healthcare provider about medicines and nicotine replacement options to help you. Also, enroll in a stop-smoking program to make it more likely that you will quit for good.  · Learn how to handle stress. This is an important part of any program to lower blood pressure. Learn about relaxation methods like meditation, yoga, or biofeedback.  · If your provider prescribed medicines, take them exactly as directed. Missing doses may cause your blood pressure get out of control.  · If you miss a dose or doses, check with your healthcare provider or pharmacist about what to do.  · Consider buying an automatic blood pressure machine. Ask your provider for a recommendation. You can get one of these at most pharmacies.     The American Heart Association recommends the following guidelines for home blood pressure monitoring:  · Don't smoke or drink coffee for 30 minutes before taking your blood pressure.  · Go to the bathroom before the test.  · Relax for 5 minutes before taking the measurement.  · Sit with your back supported (don't sit on a couch or soft chair); keep your feet on  the floor uncrossed. Place your arm on a solid flat surface (like a table) with the upper part of the arm at heart level. Place the middle of the cuff directly above the eye of the elbow. Check the monitor's instruction manual for an illustration.  · Take multiple readings. When you measure, take 2 to 3 readings one minute apart and record all of the results.  · Take your blood pressure at the same time every day, or as your healthcare provider recommends.  · Record the date, time, and blood pressure reading.  · Take the record with you to your next medical appointment. If your blood pressure monitor has a built-in memory, simply take the monitor with you to your next appointment.  · Call your provider if you have several high readings. Don't be frightened by a single high blood pressure reading, but if you get several high readings, check in with your healthcare provider.  · Note: When blood pressure reaches a systolic (top number) of 180 or higher OR diastolic (bottom number) of 110 or higher, seek emergency medical treatment.  Follow-up care  You will need to see your healthcare provider regularly. This is to check your blood pressure and to make changes to your medicines. Make a follow-up appointment as directed. Bring the record of your home blood pressure readings to the appointment.  When to seek medical advice  Call your healthcare provider right away if any of these occur:  · Blood pressure reaches a systolic (upper number) of 180 or higher OR a diastolic (bottom number) of 110 or higher  · Chest pain or shortness of breath  · Severe headache  · Throbbing or rushing sound in the ears  · Nosebleed  · Sudden severe pain in your belly (abdomen)  · Extreme drowsiness, confusion, or fainting  · Dizziness or spinning sensation (vertigo)  · Weakness of an arm or leg or one side of the face  · You have problems speaking or seeing   Date Last Reviewed: 12/1/2016  © 8930-7116 [employer]. [address] [place] [address]  [place] [address]. All rights reserved. This information is not intended as a substitute for professional medical care. Always follow your healthcare professional's instructions.

## 2018-06-12 NOTE — PROGRESS NOTES
"Subjective:       Patient ID: Ijeoma Issa is a 48 y.o. female.    Chief Complaint: Follow-up (4wk f/u) and Sinus Problem    Ms. Issa comes to clinic today for 1 month follow up. Her blood pressure is now well controlled. The patient recently met with bariatric dietician. She was given diet recommendations. She was not able to see the bariatric surgeon due to insurance reasons. The patient reports that she is exercising regularly The patient does request a refill of mupirocin at this time. The patient reports that she is currently having a "break out". The patient continues to have some pain with her right sciatic nerve pain. This pain comes and goes. It is more severe when she is more active during the day.       Review of Systems   Constitutional: Negative for activity change, appetite change and fever.   HENT: Negative for postnasal drip, rhinorrhea and sinus pressure.    Eyes: Negative for visual disturbance.   Respiratory: Negative for cough and shortness of breath.    Cardiovascular: Negative for chest pain.   Gastrointestinal: Negative for abdominal distention and abdominal pain.   Genitourinary: Negative for difficulty urinating and dysuria.   Musculoskeletal: Positive for arthralgias and myalgias.   Skin: Positive for color change and wound.   Neurological: Negative for headaches.   Hematological: Negative for adenopathy.   Psychiatric/Behavioral: The patient is not nervous/anxious.        Objective:      Physical Exam   Constitutional: She is oriented to person, place, and time.   Cardiovascular: Normal rate and regular rhythm.    Pulmonary/Chest: Effort normal and breath sounds normal. She has no wheezes.   Abdominal: Soft. Bowel sounds are normal. There is no tenderness.   Musculoskeletal: She exhibits no edema.   Neurological: She is alert and oriented to person, place, and time.   Skin: There is erythema.   Erythematous open wounds present on chest and upper extremities    Psychiatric: " Her behavior is normal.       Assessment:       1. Elevated blood pressure reading without diagnosis of hypertension    2. Morbid obesity with BMI of 40.0-44.9, adult    3. Right sided sciatica        Plan:       Ijeoma was seen today for follow-up and sinus problem.    Diagnoses and all orders for this visit:    Elevated blood pressure reading without diagnosis of hypertension  Blood pressure now controlled  Low salt diet  Morbid obesity with BMI of 40.0-44.9, adult  Continue diet per dietician  Increase exercise  Right sided sciatica  Continue gabapentin  Patient advised that she should try physical therapy.   Other orders  -     mupirocin (BACTROBAN) 2 % ointment; Apply topically once daily.

## 2018-07-02 DIAGNOSIS — M79.2 NEUROPATHIC PAIN: ICD-10-CM

## 2018-07-02 DIAGNOSIS — J45.31 ASTHMA WITH ALLERGIC RHINITIS, MILD PERSISTENT, WITH ACUTE EXACERBATION: Chronic | ICD-10-CM

## 2018-07-02 DIAGNOSIS — M54.31 RIGHT SIDED SCIATICA: ICD-10-CM

## 2018-07-02 RX ORDER — GABAPENTIN 100 MG/1
CAPSULE ORAL
Qty: 60 CAPSULE | Refills: 0 | Status: SHIPPED | OUTPATIENT
Start: 2018-07-02 | End: 2018-07-29 | Stop reason: SDUPTHER

## 2018-07-04 RX ORDER — ESTRADIOL 0.1 MG/G
CREAM VAGINAL
Qty: 42.5 G | Refills: 4 | Status: SHIPPED | OUTPATIENT
Start: 2018-07-04 | End: 2018-12-27 | Stop reason: SDUPTHER

## 2018-07-04 RX ORDER — MONTELUKAST SODIUM 10 MG/1
TABLET ORAL
Qty: 30 TABLET | Refills: 11 | Status: SHIPPED | OUTPATIENT
Start: 2018-07-04 | End: 2019-07-02 | Stop reason: SDUPTHER

## 2018-07-29 DIAGNOSIS — M54.31 RIGHT SIDED SCIATICA: ICD-10-CM

## 2018-07-29 DIAGNOSIS — M79.2 NEUROPATHIC PAIN: ICD-10-CM

## 2018-07-30 RX ORDER — GABAPENTIN 100 MG/1
CAPSULE ORAL
Qty: 60 CAPSULE | Refills: 0 | Status: SHIPPED | OUTPATIENT
Start: 2018-07-30 | End: 2018-08-29 | Stop reason: SDUPTHER

## 2018-08-29 DIAGNOSIS — M79.2 NEUROPATHIC PAIN: ICD-10-CM

## 2018-08-29 DIAGNOSIS — M54.31 RIGHT SIDED SCIATICA: ICD-10-CM

## 2018-08-29 RX ORDER — GABAPENTIN 100 MG/1
CAPSULE ORAL
Qty: 60 CAPSULE | Refills: 0 | Status: SHIPPED | OUTPATIENT
Start: 2018-08-29 | End: 2018-10-02 | Stop reason: SDUPTHER

## 2018-08-31 ENCOUNTER — DOCUMENTATION ONLY (OUTPATIENT)
Dept: FAMILY MEDICINE | Facility: CLINIC | Age: 48
End: 2018-08-31

## 2018-08-31 NOTE — PROGRESS NOTES
Pre-Visit Chart Review  For Appointment Scheduled on 09/05/2018    Health Maintenance Due   Topic Date Due    Influenza Vaccine  08/01/2018

## 2018-09-02 DIAGNOSIS — K21.9 GASTROESOPHAGEAL REFLUX DISEASE, ESOPHAGITIS PRESENCE NOT SPECIFIED: ICD-10-CM

## 2018-09-04 RX ORDER — OMEPRAZOLE 40 MG/1
CAPSULE, DELAYED RELEASE ORAL
Qty: 90 CAPSULE | Refills: 0 | Status: SHIPPED | OUTPATIENT
Start: 2018-09-04 | End: 2018-11-30 | Stop reason: SDUPTHER

## 2018-09-05 ENCOUNTER — OFFICE VISIT (OUTPATIENT)
Dept: FAMILY MEDICINE | Facility: CLINIC | Age: 48
End: 2018-09-05
Payer: COMMERCIAL

## 2018-09-05 VITALS
BODY MASS INDEX: 44.72 KG/M2 | WEIGHT: 261.94 LBS | RESPIRATION RATE: 16 BRPM | HEART RATE: 82 BPM | HEIGHT: 64 IN | SYSTOLIC BLOOD PRESSURE: 125 MMHG | TEMPERATURE: 98 F | DIASTOLIC BLOOD PRESSURE: 78 MMHG

## 2018-09-05 DIAGNOSIS — N95.1 MENOPAUSAL VASOMOTOR SYNDROME: ICD-10-CM

## 2018-09-05 DIAGNOSIS — Z00.00 ROUTINE ADULT HEALTH MAINTENANCE: ICD-10-CM

## 2018-09-05 DIAGNOSIS — Z12.39 SCREENING FOR BREAST CANCER: ICD-10-CM

## 2018-09-05 DIAGNOSIS — M54.31 RIGHT SIDED SCIATICA: Primary | ICD-10-CM

## 2018-09-05 PROCEDURE — 99999 PR PBB SHADOW E&M-EST. PATIENT-LVL V: CPT | Mod: PBBFAC,,, | Performed by: PHYSICIAN ASSISTANT

## 2018-09-05 PROCEDURE — 99213 OFFICE O/P EST LOW 20 MIN: CPT | Mod: S$GLB,,, | Performed by: PHYSICIAN ASSISTANT

## 2018-09-05 RX ORDER — ESTRADIOL 1 MG/1
1 TABLET ORAL DAILY
Qty: 30 TABLET | Refills: 0 | Status: SHIPPED | OUTPATIENT
Start: 2018-09-05 | End: 2018-10-02 | Stop reason: SDUPTHER

## 2018-09-05 NOTE — PROGRESS NOTES
Subjective:       Patient ID: Ijeoma Issa is a 48 y.o. female.    Chief Complaint: Medication Refill (discomfort in back of throat swollen/redness )     The patient with right-sided sciatica diagnosed approximately 4 months ago presents for follow-up.  Patient states that she is taking gabapentin 100 mg once every 2-3 days as needed for increased pain.  She has been seeing a chiropractor and has had significant improvement in her pain.   She is requesting refill of estradiol for vasomotor syndrome since her total hysterectomy and bilateral oophorectomy.  She is due for well women exam and mammogram.  She has been without her HRT for about 3 months and is having increased hot flashes and mind fog.    Today she is complaining of   Throat discomfort.   She has not tried anything for the pain.  She denies fever.        Review of Systems   HENT: Positive for sore throat. Negative for congestion, ear pain, postnasal drip, rhinorrhea, sinus pressure and sinus pain.    Respiratory: Negative for cough, chest tightness, shortness of breath and wheezing.    Cardiovascular: Negative for chest pain, palpitations and leg swelling.   Psychiatric/Behavioral: Positive for decreased concentration. Negative for dysphoric mood and sleep disturbance. The patient is not nervous/anxious.        Objective:      Physical Exam   Constitutional: She is cooperative. She does not appear ill. No distress.   HENT:   Head: Normocephalic and atraumatic.   Right Ear: Tympanic membrane, external ear and ear canal normal.   Left Ear: Tympanic membrane, external ear and ear canal normal.   Mouth/Throat: Oropharynx is clear and moist and mucous membranes are normal.   Neck: No thyromegaly present.   Cardiovascular: Normal rate, regular rhythm and normal heart sounds.   Pulmonary/Chest: Effort normal and breath sounds normal.   Lymphadenopathy:        Head (right side): No tonsillar adenopathy present.        Head (left side): No tonsillar  adenopathy present.     She has no cervical adenopathy.   Neurological: She is alert.   Vitals reviewed.      Assessment:       1. Right sided sciatica    2. Menopausal vasomotor syndrome    3. Screening for breast cancer    4. Routine adult health maintenance        Plan:       Ijeoma was seen today for medication refill.    Diagnoses and all orders for this visit:    Right sided sciatica  Continue with Chiropractor  Gabapentin prn as prescribed    Menopausal vasomotor syndrome  30 day supply for estradiol   Screening for breast cancer  -     Mammo Digital Screening Bilat with CAD; Future    Routine adult health maintenance  -     Mammo Digital Screening Bilat with CAD; Future  -     Ambulatory referral to Gynecology

## 2018-09-06 RX ORDER — ESTRADIOL 1 MG/1
TABLET ORAL
Qty: 90 TABLET | Refills: 0 | OUTPATIENT
Start: 2018-09-06

## 2018-09-10 ENCOUNTER — TELEPHONE (OUTPATIENT)
Dept: FAMILY MEDICINE | Facility: CLINIC | Age: 48
End: 2018-09-10

## 2018-09-11 ENCOUNTER — OFFICE VISIT (OUTPATIENT)
Dept: FAMILY MEDICINE | Facility: CLINIC | Age: 48
End: 2018-09-11
Payer: COMMERCIAL

## 2018-09-11 ENCOUNTER — HOSPITAL ENCOUNTER (OUTPATIENT)
Dept: RADIOLOGY | Facility: CLINIC | Age: 48
Discharge: HOME | End: 2018-09-11
Attending: PHYSICIAN ASSISTANT
Payer: COMMERCIAL

## 2018-09-11 VITALS
DIASTOLIC BLOOD PRESSURE: 81 MMHG | SYSTOLIC BLOOD PRESSURE: 135 MMHG | WEIGHT: 263.69 LBS | BODY MASS INDEX: 45.02 KG/M2 | HEART RATE: 80 BPM | HEIGHT: 64 IN

## 2018-09-11 DIAGNOSIS — Z12.39 SCREENING FOR BREAST CANCER: ICD-10-CM

## 2018-09-11 DIAGNOSIS — N89.8 VAGINA ITCHING: ICD-10-CM

## 2018-09-11 DIAGNOSIS — N89.8 VAGINAL DISCHARGE: ICD-10-CM

## 2018-09-11 DIAGNOSIS — Z00.00 ROUTINE ADULT HEALTH MAINTENANCE: ICD-10-CM

## 2018-09-11 DIAGNOSIS — Z01.419 ENCOUNTER FOR WELL WOMAN EXAM: Primary | ICD-10-CM

## 2018-09-11 PROCEDURE — 77063 BREAST TOMOSYNTHESIS BI: CPT | Mod: 26,,, | Performed by: RADIOLOGY

## 2018-09-11 PROCEDURE — 77067 SCR MAMMO BI INCL CAD: CPT | Mod: 26,,, | Performed by: RADIOLOGY

## 2018-09-11 PROCEDURE — 87660 TRICHOMONAS VAGIN DIR PROBE: CPT

## 2018-09-11 PROCEDURE — 77063 BREAST TOMOSYNTHESIS BI: CPT | Mod: TC,PO

## 2018-09-11 PROCEDURE — 99999 PR PBB SHADOW E&M-EST. PATIENT-LVL IV: CPT | Mod: PBBFAC,,, | Performed by: NURSE PRACTITIONER

## 2018-09-11 PROCEDURE — 99396 PREV VISIT EST AGE 40-64: CPT | Mod: S$GLB,,, | Performed by: NURSE PRACTITIONER

## 2018-09-11 NOTE — LETTER
September 17, 2018      [facility]  [address] [place] [address]           [facility]  [address] [place] [address]  Phone: [phone]  Fax: [phone]          Patient: Ijeoma Issa   MR Number: [id number]   YOB: 1970   Date of Visit: 9/11/2018       Dear Gina Pritchard:    Thank you for referring Ijeoma Issa to me for evaluation. Attached you will find relevant portions of my assessment and plan of care.    If you have questions, please do not hesitate to call me. I look forward to following Ijeoma Issa along with you.    Sincerely,    Aleksandra Mtz, NP    Enclosure  CC:  No Recipients    If you would like to receive this communication electronically, please contact [email or web address] or [phone] to request more information on [employer] access.    For providers and/or their staff who would like to refer a patient to [facility], please contact us through our one-stop-shop provider referral line, [facility], at [phone].    If you feel you have received this communication in error or would no longer like to receive these types of communications, please e-mail [email or web address]

## 2018-09-12 ENCOUNTER — PATIENT MESSAGE (OUTPATIENT)
Dept: FAMILY MEDICINE | Facility: CLINIC | Age: 48
End: 2018-09-12

## 2018-09-12 LAB
CANDIDA RRNA VAG QL PROBE: NEGATIVE
G VAGINALIS RRNA GENITAL QL PROBE: NEGATIVE
T VAGINALIS RRNA GENITAL QL PROBE: NEGATIVE

## 2018-09-13 DIAGNOSIS — R92.8 ABNORMAL MAMMOGRAM: Primary | ICD-10-CM

## 2018-09-17 ENCOUNTER — TELEPHONE (OUTPATIENT)
Dept: RADIOLOGY | Facility: HOSPITAL | Age: 48
End: 2018-09-17

## 2018-09-17 NOTE — PROGRESS NOTES
Chief Complaint   Patient presents with    Well Woman     History of Present Illness: Ijeoma Issa is a 48 y.o. female that presents today 2018 for well gyn visit.  Pt presents today for well woman exam. ; /vaginal. Pt reports having hysterectomy/oophorectomy in  for fibroids. She is currently on estrace 1mg and using estrace vaginal cream as well. She does not need a refill at this time. Pt had mammogram done today. Pt also c/o vaginal itching x 4 days, as well as a yellow vaginal discharge. She denies any burning or odor. No other complaints or concerns noted.       Past Medical History:   Diagnosis Date    Abnormal mammogram     neg biospy    Allergic asthma     Anxiety     Arthritis     Dermatitis     Dr. Weil, Extremities    Diabetes     Diverticulosis of colon     GERD (gastroesophageal reflux disease)     Mass of right breast     Postmenopausal hormone replacement therapy     Tubal pregnancy     Uterine fibroid        Past Surgical History:   Procedure Laterality Date    BREAST BIOPSY Right 2012    benign     SECTION, LOW TRANSVERSE      CHOLECYSTECTOMY      COLONOSCOPY N/A 2014    [facility]    HYSTERECTOMY      re: benign tumors per the patient    REPAIR-HERNIA-UMBILICAL N/A 2015    [facility] OR    right ankle  10/10/2014    TOTAL ABDOMINAL HYSTERECTOMY W/ BILATERAL SALPINGOOPHORECTOMY      c appendectomy    TUBAL LIGATION         Family History   Problem Relation Age of Onset    Cancer Father         colon    Colon cancer Father     Diabetes Sister     Breast cancer Sister 28    Cancer Brother         stomach    Heart disease Brother     Heart disease Mother     Ovarian cancer Neg Hx        Social History     Socioeconomic History    Marital status:      Spouse name: None    Number of children: None    Years of education: None     Highest education level: None   Social Needs    Financial resource strain: None    Food insecurity - worry: None    Food insecurity - inability: None    Transportation needs - medical: None    Transportation needs - non-medical: None   Occupational History     Employer: [employer]   Tobacco Use    Smoking status: Never Smoker    Smokeless tobacco: Never Used   Substance and Sexual Activity    Alcohol use: Yes     Alcohol/week: 0.0 oz     Comment: very seldom    Drug use: No    Sexual activity: Yes     Partners: Male     Birth control/protection: Surgical, See Surgical Hx     Comment: hyst   Other Topics Concern    None   Social History Narrative    None       OB History    Para Term  AB Living   2 2 2 0 0 2   SAB TAB Ectopic Multiple Live Births   0 0 0 0 2      # Outcome Date GA Lbr Josh/2nd Weight Sex Delivery Anes PTL Lv   2 Term      CS-LTranv   MELANIE   1 Term      Vag-Spont   MELANIE          Current Outpatient Medications   Medication Sig Dispense Refill    ADVAIR DISKUS 250-50 mcg/dose diskus inhaler INHALE 1 PUFF BY MOUTH TWICE DAILY 60 each 11    albuterol (PROAIR HFA) 90 mcg/actuation inhaler INHALE ONE PUFF BY MOUTH (INTO LUNGS) EVERY SIX HOURS AS NEEDED 8.5 g 3    aspirin (ECOTRIN) 81 MG EC tablet Take 81 mg by mouth once daily.      azelastine-fluticasone 137-50 mcg/spray Spry nassal spray 1 spray by Each Nare route 2 (two) times daily. 1 Bottle 3    b complex vitamins capsule Take 1 capsule by mouth once daily.      CALCIUM CARBONATE/VITAMIN D3 (VITAMIN D-3 ORAL) Take by mouth.        CHROM VINCENT/BRINDALL CARTER (GARCINIA CAMBOGIA ORAL) Take by mouth.      DOC-Q-LACE 100 mg capsule TAKE ONE CAPSULE BY MOUTH TWICE DAILY (Patient taking differently: TAKE ONE CAPSULE BY MOUTH TWICE DAILY AS NEEDED) 30 capsule 0    doxepin (SINEQUAN) 25 MG capsule TAKE 1 CAPSULE(25 MG) BY MOUTH EVERY EVENING 30 capsule 11    EPIPEN 2-LUCIANO 0.3 mg/0.3 mL AtIn       estradiol (ESTRACE) 0.01  "% (0.1 mg/gram) vaginal cream INSERT 1 GRAM VAGINALLY EVERY DAY FOR 2 WEEKS THEN USE VAGINALLY 2 TIMES A WEEK 42.5 g 4    estradiol (ESTRACE) 1 MG tablet Take 1 tablet (1 mg total) by mouth once daily. 30 tablet 0    etodolac (LODINE) 400 MG tablet TAKE 1 TABLET(400 MG) BY MOUTH TWICE DAILY 180 tablet 4    FERROUS FUMARATE (IRON ORAL) Take by mouth once daily.      gabapentin (NEURONTIN) 100 MG capsule TAKE 1 CAPSULE(100 MG) BY MOUTH TWICE DAILY 60 capsule 0    ketoconazole (NIZORAL) 2 % shampoo       levocetirizine (XYZAL) 5 MG tablet Take 1 tablet (5 mg total) by mouth every evening. 90 tablet 6    montelukast (SINGULAIR) 10 mg tablet TAKE 1 TABLET BY MOUTH DAILY 30 tablet 11    mupirocin (BACTROBAN) 2 % ointment Apply topically once daily. 90 g 3    mv-min-FA-Ew-Ra-bnqumgn-lutein (CENTRUM) 0.4-162-18 mg Tab Take 1 tablet by mouth once daily.      omega-3 fatty acids-fish oil (FISH OIL OMEGA 3-6-9) 300-1,000 mg CpDR       omeprazole (PRILOSEC) 40 MG capsule TAKE 1 CAPSULE(40 MG) BY MOUTH EVERY MORNING 90 capsule 0    spironolactone (ALDACTONE) 25 MG tablet TAKE 1 TABLET(25 MG) BY MOUTH EVERY OTHER DAY 45 tablet 3    triamcinolone acetonide 0.1% (KENALOG) 0.1 % cream Apply topically 2 (two) times daily.       No current facility-administered medications for this visit.        Review of patient's allergies indicates:   Allergen Reactions    Nut flavor Anaphylaxis     Allergic to Cashew nuts    Latex Rash     Including in injections    Varicella vaccines      Red swelling/ lump develops at injection site    Adhesive tape-silicones Rash         Review of Symptoms:  GENERAL: Denies weight gain or weight loss. Feeling well overall.   SKIN: Denies rash or lesions.   HEAD: Denies head injury or headache.   ABDOMEN: No abdominal pain, constipation, diarrhea, nausea, vomiting or rectal bleeding.   URINARY: No frequency, dysuria, hematuria, or burning on urination.      /81   Pulse 80   Ht 5' 4" " (1.626 m)   Wt 119.6 kg (263 lb 10.7 oz)     Physical Exam:  APPEARANCE: Well nourished, well developed, in no acute distress.  SKIN: Normal skin turgor, no lesions.  RESPIRATORY: Normal respiratory effort with no retractions or use of accessory muscles  ABDOMEN: Soft. No tenderness or masses. No hepatosplenomegaly. No hernias.  BREASTS: Symmetrical, no skin changes or visible lesions. No palpable masses, nipple discharge or adenopathy bilaterally.  PELVIC: Normal external female genitalia without lesions. Normal hair distribution. Adequate perineal body, normal urethral meatus. Urethra with no masses.  Bladder nontender. Vagina moist and well rugated without lesions or discharge. No significant cystocele or rectocele.  Adnexa without masses or tenderness. Urethra and bladder normal.     ASSESSMENT/PLAN:  Encounter for well woman exam    Vagina itching  -     Vaginosis Screen by DNA Probe    Vaginal discharge      -Reinforced to avoid any scented products in the vaginal area such as bubble baths, bath bombs, scented soaps/body washes, douches, feminine washes, etc... Also wear cotton underwear and change out of wet/sweaty clothing as soon as possible. Pt verbalized understanding.      Patient was counseled today on Pap guidelines, recommendation for yearly exams, mammograms starting annually at age 40, Colonoscopy after the age of 50, Dexa Bone Scan and calcium and vitamin D supplementation in menopause and to see her PCP for other health maintenance.       Follow-up  RTC if symptoms worsen or do not improve  RTC in 1 year for well woman exam or as needed

## 2018-09-24 ENCOUNTER — HOSPITAL ENCOUNTER (OUTPATIENT)
Dept: RADIOLOGY | Facility: HOSPITAL | Age: 48
Discharge: HOME | End: 2018-09-24
Attending: PHYSICIAN ASSISTANT
Payer: COMMERCIAL

## 2018-09-24 DIAGNOSIS — R92.8 ABNORMAL MAMMOGRAM: ICD-10-CM

## 2018-09-24 PROCEDURE — 76642 ULTRASOUND BREAST LIMITED: CPT | Mod: 26,RT,, | Performed by: RADIOLOGY

## 2018-09-24 PROCEDURE — 77065 DX MAMMO INCL CAD UNI: CPT | Mod: TC

## 2018-09-24 PROCEDURE — 76642 ULTRASOUND BREAST LIMITED: CPT | Mod: TC,RT

## 2018-09-24 PROCEDURE — 77061 BREAST TOMOSYNTHESIS UNI: CPT | Mod: TC

## 2018-09-24 PROCEDURE — 77061 BREAST TOMOSYNTHESIS UNI: CPT | Mod: 26,,, | Performed by: RADIOLOGY

## 2018-09-24 PROCEDURE — 77065 DX MAMMO INCL CAD UNI: CPT | Mod: 26,,, | Performed by: RADIOLOGY

## 2018-10-02 DIAGNOSIS — M54.31 RIGHT SIDED SCIATICA: ICD-10-CM

## 2018-10-02 DIAGNOSIS — M79.2 NEUROPATHIC PAIN: ICD-10-CM

## 2018-10-02 RX ORDER — ESTRADIOL 1 MG/1
TABLET ORAL
Qty: 30 TABLET | Refills: 11 | Status: SHIPPED | OUTPATIENT
Start: 2018-10-02 | End: 2019-10-07 | Stop reason: SDUPTHER

## 2018-10-02 RX ORDER — GABAPENTIN 100 MG/1
CAPSULE ORAL
Qty: 60 CAPSULE | Refills: 0 | Status: SHIPPED | OUTPATIENT
Start: 2018-10-02 | End: 2018-11-05 | Stop reason: SDUPTHER

## 2018-11-05 DIAGNOSIS — M79.2 NEUROPATHIC PAIN: ICD-10-CM

## 2018-11-05 DIAGNOSIS — M54.31 RIGHT SIDED SCIATICA: ICD-10-CM

## 2018-11-05 RX ORDER — GABAPENTIN 100 MG/1
CAPSULE ORAL
Qty: 60 CAPSULE | Refills: 0 | Status: SHIPPED | OUTPATIENT
Start: 2018-11-05 | End: 2018-11-30 | Stop reason: SDUPTHER

## 2018-11-06 RX ORDER — DOXEPIN HYDROCHLORIDE 25 MG/1
CAPSULE ORAL
Qty: 30 CAPSULE | Refills: 1 | Status: SHIPPED | OUTPATIENT
Start: 2018-11-06 | End: 2018-12-27 | Stop reason: SDUPTHER

## 2018-11-30 DIAGNOSIS — M79.2 NEUROPATHIC PAIN: ICD-10-CM

## 2018-11-30 DIAGNOSIS — M54.31 RIGHT SIDED SCIATICA: ICD-10-CM

## 2018-11-30 DIAGNOSIS — K21.9 GASTROESOPHAGEAL REFLUX DISEASE, ESOPHAGITIS PRESENCE NOT SPECIFIED: ICD-10-CM

## 2018-11-30 RX ORDER — GABAPENTIN 100 MG/1
CAPSULE ORAL
Qty: 60 CAPSULE | Refills: 0 | Status: SHIPPED | OUTPATIENT
Start: 2018-11-30 | End: 2018-12-27 | Stop reason: SDUPTHER

## 2018-11-30 RX ORDER — OMEPRAZOLE 40 MG/1
CAPSULE, DELAYED RELEASE ORAL
Qty: 90 CAPSULE | Refills: 2 | Status: SHIPPED | OUTPATIENT
Start: 2018-11-30 | End: 2019-08-31 | Stop reason: SDUPTHER

## 2018-12-26 ENCOUNTER — NURSE TRIAGE (OUTPATIENT)
Dept: ADMINISTRATIVE | Facility: CLINIC | Age: 48
End: 2018-12-26

## 2018-12-26 ENCOUNTER — TELEPHONE (OUTPATIENT)
Dept: FAMILY MEDICINE | Facility: CLINIC | Age: 48
End: 2018-12-26

## 2018-12-26 ENCOUNTER — HOSPITAL ENCOUNTER (EMERGENCY)
Facility: HOSPITAL | Age: 48
Discharge: HOME | End: 2018-12-26
Attending: EMERGENCY MEDICINE
Payer: COMMERCIAL

## 2018-12-26 VITALS
WEIGHT: 235 LBS | BODY MASS INDEX: 40.34 KG/M2 | HEART RATE: 87 BPM | OXYGEN SATURATION: 98 % | DIASTOLIC BLOOD PRESSURE: 88 MMHG | SYSTOLIC BLOOD PRESSURE: 124 MMHG | TEMPERATURE: 99 F | RESPIRATION RATE: 18 BRPM

## 2018-12-26 DIAGNOSIS — J40 BRONCHITIS: ICD-10-CM

## 2018-12-26 DIAGNOSIS — R05.9 COUGH: ICD-10-CM

## 2018-12-26 DIAGNOSIS — J32.9 SINUSITIS, UNSPECIFIED CHRONICITY, UNSPECIFIED LOCATION: Primary | ICD-10-CM

## 2018-12-26 LAB
FLUAV AG SPEC QL IA: NEGATIVE
FLUBV AG SPEC QL IA: NEGATIVE
SPECIMEN SOURCE: NORMAL

## 2018-12-26 PROCEDURE — 87400 INFLUENZA A/B EACH AG IA: CPT

## 2018-12-26 PROCEDURE — 99284 EMERGENCY DEPT VISIT MOD MDM: CPT | Mod: 25

## 2018-12-26 PROCEDURE — 25000242 PHARM REV CODE 250 ALT 637 W/ HCPCS: Performed by: PHYSICIAN ASSISTANT

## 2018-12-26 PROCEDURE — 94640 AIRWAY INHALATION TREATMENT: CPT

## 2018-12-26 RX ORDER — IPRATROPIUM BROMIDE AND ALBUTEROL SULFATE 2.5; .5 MG/3ML; MG/3ML
3 SOLUTION RESPIRATORY (INHALATION)
Status: COMPLETED | OUTPATIENT
Start: 2018-12-26 | End: 2018-12-26

## 2018-12-26 RX ORDER — PREDNISONE 50 MG/1
50 TABLET ORAL DAILY
Qty: 5 TABLET | Refills: 0 | Status: SHIPPED | OUTPATIENT
Start: 2018-12-26 | End: 2018-12-31

## 2018-12-26 RX ORDER — AMOXICILLIN AND CLAVULANATE POTASSIUM 875; 125 MG/1; MG/1
1 TABLET, FILM COATED ORAL 2 TIMES DAILY
Qty: 14 TABLET | Refills: 0 | Status: SHIPPED | OUTPATIENT
Start: 2018-12-26 | End: 2019-06-26

## 2018-12-26 RX ADMIN — IPRATROPIUM BROMIDE AND ALBUTEROL SULFATE 3 ML: .5; 3 SOLUTION RESPIRATORY (INHALATION) at 06:12

## 2018-12-26 NOTE — TELEPHONE ENCOUNTER
Patient has existing appointment for date of 12-28-18 related to flu like symptoms. No earlier appointment noted in [facility]. Offered appointment on 12-27-18 in [facility], patient declined to schedule. States she may go to ER. Existing appointment for the date of 12-28-18 remains. Advised patient to call and cancel if she is seen at ER and appointment no longer needed.

## 2018-12-26 NOTE — TELEPHONE ENCOUNTER
----- Message from Madi Kenney sent at 12/26/2018  2:03 PM CST -----  Contact: Patient  Type:  Sooner Apoointment Request    Caller is requesting a sooner appointment.    Name of Caller:  Patient  When is the first available appointment?  12/28/18  Symptoms:  very bad cough/congestion; body aches  Best Call Back Number:  [phone]  Additional Information:  Inhaler seems to make coughing worse.

## 2018-12-26 NOTE — ED TRIAGE NOTES
Cough and chest burning since November. Reports symptoms are relieved with dayquil and nyquil but reports days after stopping symptoms return. Reports fever of 102 last night. Reports sore throat, body aches, and feels as if her inhaler doesn't help at all.

## 2018-12-26 NOTE — TELEPHONE ENCOUNTER
Pt reported sx's since Thanksgiving. Sx's will subside for a couple of days with use of otc meds but return. Has continued frequent cough productive of small amounts of thick green phlegm, has clear to green nasal discharge. Some SOB. Cough and sob seem worse with use of inhalers. Temp last pm 102.7 po. Body aches/pain, headache.     Reason for Disposition   Known COPD or other severe lung disease (i.e., bronchiectasis, cystic fibrosis, lung surgery) and worsening symptoms (i.e., increased sputum purulence or amount, increased breathing difficulty)    Protocols used: ST COUGH-A-[place]

## 2018-12-27 DIAGNOSIS — M54.31 RIGHT SIDED SCIATICA: ICD-10-CM

## 2018-12-27 DIAGNOSIS — M79.2 NEUROPATHIC PAIN: ICD-10-CM

## 2018-12-27 RX ORDER — GABAPENTIN 100 MG/1
CAPSULE ORAL
Qty: 60 CAPSULE | Refills: 0 | Status: SHIPPED | OUTPATIENT
Start: 2018-12-27 | End: 2019-01-20 | Stop reason: SDUPTHER

## 2018-12-27 NOTE — ED PROVIDER NOTES
"Encounter Date: 2018    SCRIBE #1 NOTE: IFrancia, am scribing for, and in the presence of, Kandice Romero PA-C.       History     Chief Complaint   Patient presents with    Cough     since thanksgiving    Nasal Congestion       Time seen by provider: 6:10 PM on 2018    Ijeoma Issa is a 48 y.o. female with pmhx of Psoriasis, Anxiety, DM, GERD, Allergic Asthma, and Arthritis who presents to the ED for evaluation of a cough. For about a month, the patient has been intermittently experiencing nasal congestion, headache, postnasal drip, and a cough. The patient has been taking Dayquil and Nyquil for about a week at a time. Whenever she stops taking these medications, her symptoms return within 2-3 days. She has also been compliant with her regimen of Singulair, Inhalers, and breathing treatments. The patient also endorses a few bouts of nausea, vomiting, and diarrhea throughout the month. Last night, she had a fever of 102.7F. She called her PCP, Dr. Nunez, today, who referred her to the ED so she could be seen more quickly. The patient reports that she has not had an "asthma episode" in months.   The patient denies any other symptoms at this time. SHx: Tubal Ligation, , Cholecystectomy, Hysterectomy, Right Breast Biopsy. Never Smoker. Allergies: Nut Flavor, Latex, Varicella Vaccines, Adhesive Tape.       The history is provided by the patient.     Review of patient's allergies indicates:   Allergen Reactions    Nut flavor Anaphylaxis     Allergic to Cashew nuts    Latex Rash     Including in injections    Varicella vaccines      Red swelling/ lump develops at injection site    Adhesive tape-silicones Rash     Past Medical History:   Diagnosis Date    Abnormal mammogram     neg biospy    Allergic asthma     Anxiety     Arthritis     Dermatitis     Dr. Weil, Extremities    Diabetes     Diverticulosis of colon     GERD (gastroesophageal reflux disease)     Mass " of right breast     Postmenopausal hormone replacement therapy     Tubal pregnancy     Uterine fibroid      Past Surgical History:   Procedure Laterality Date    BREAST BIOPSY Right 2012    benign     SECTION, LOW TRANSVERSE      CHOLECYSTECTOMY      COLONOSCOPY N/A 2014    [facility]    HYSTERECTOMY      re: benign tumors per the patient    REPAIR-HERNIA-UMBILICAL N/A 2015    [facility] OR    right ankle  10/10/2014    TOTAL ABDOMINAL HYSTERECTOMY W/ BILATERAL SALPINGOOPHORECTOMY      c appendectomy    TUBAL LIGATION       Family History   Problem Relation Age of Onset    Cancer Father         colon    Colon cancer Father     Diabetes Sister     Breast cancer Sister 28    Cancer Brother         stomach    Heart disease Brother     Heart disease Mother     Ovarian cancer Neg Hx      Social History     Tobacco Use    Smoking status: Never Smoker    Smokeless tobacco: Never Used   Substance Use Topics    Alcohol use: Yes     Alcohol/week: 0.0 oz     Comment: very seldom    Drug use: No     Review of Systems   Constitutional: Positive for fever. Negative for chills.   HENT: Positive for congestion and postnasal drip. Negative for facial swelling and trouble swallowing.    Eyes: Negative for discharge.   Respiratory: Positive for cough. Negative for chest tightness, shortness of breath and wheezing.    Cardiovascular: Negative for chest pain and palpitations.   Gastrointestinal: Positive for diarrhea, nausea and vomiting. Negative for abdominal pain.   Genitourinary: Negative for dysuria and hematuria.   Musculoskeletal: Negative for arthralgias, back pain, joint swelling, myalgias, neck pain and neck stiffness.   Skin: Negative for color change, pallor, rash and wound.   Neurological: Positive for headaches. Negative for dizziness, syncope, weakness, light-headedness and numbness.    Hematological: Does not bruise/bleed easily.   Psychiatric/Behavioral: The patient is not nervous/anxious.    All other systems reviewed and are negative.      Physical Exam     Initial Vitals [12/26/18 1522]   BP Pulse Resp Temp SpO2   (!) 146/69 93 20 98.6 °F (37 °C) 96 %      MAP       --         Physical Exam    Nursing note and vitals reviewed.  Constitutional: She appears well-developed and well-nourished. She is not diaphoretic. No distress.   HENT:   Head: Normocephalic and atraumatic.   Right Ear: External ear normal.   Left Ear: External ear normal.   Nose: Rhinorrhea present.   Nasal congestion and rhinorrhea noted.  Mild erythema noted to posterior oropharynx without edema or exudate. TTP noted to bilateral maxillary sinuses.     Eyes: Conjunctivae and EOM are normal. Pupils are equal, round, and reactive to light.   Neck: Normal range of motion. Neck supple.   Cardiovascular: Normal rate, regular rhythm, normal heart sounds and intact distal pulses. Exam reveals no gallop and no friction rub.    No murmur heard.  Pulmonary/Chest: Breath sounds normal. No respiratory distress. She has no wheezes. She has no rhonchi. She has no rales.   No wheezing noted upon auscultation bilaterally.    Abdominal: Soft. She exhibits no distension and no mass. There is no tenderness.   Musculoskeletal: Normal range of motion. She exhibits no edema or tenderness.   Lymphadenopathy:     She has no cervical adenopathy.   Neurological: She is alert and oriented to person, place, and time. She has normal strength. No cranial nerve deficit or sensory deficit.   Skin: Skin is warm and dry. No rash and no abscess noted. No erythema.   Psychiatric: She has a normal mood and affect.         ED Course   Procedures  Labs Reviewed   INFLUENZA A AND B ANTIGEN          Imaging Results          X-Ray Chest PA And Lateral (Final result)  Result time 12/26/18 15:44:15    Final result by Marcelina Bal MD (12/26/18 15:44:15)                  Impression:      No acute cardiopulmonary abnormality appreciated radiographically.  No interval detrimental change in the radiographic appearance of the chest when compared to the prior study.      Electronically signed by: Denton Bal MD  Date:    12/26/2018  Time:    15:44             Narrative:    EXAMINATION:  XR CHEST PA AND LATERAL    CLINICAL HISTORY:  Cough    TECHNIQUE:  PA and lateral views of the chest were performed.    COMPARISON:  Chest x-ray-02/15/2018    FINDINGS:  The cardiomediastinal silhouette is normal in appearance.  No pulmonary vascular congestion appreciated. No airspace consolidation or pulmonary mass. No significant volume of pleural fluid or pneumothorax. The bones are unremarkable for age.                                 Medical Decision Making:   History:   Old Medical Records: I decided to obtain old medical records.  Differential Diagnosis:   Influenza  Pneumonia  Strep pharyngitis  Meningitis  Viral syndrome  Sinusitis    Clinical Tests:   Lab Tests: Ordered and Reviewed  Radiological Study: Ordered and Reviewed       APC / Resident Notes:   Chest xray negative.  Influenza negative.  Symptoms consistent with bronchitis and sinusitis.  Will treat with Augmentin, since it's been persistent for a few weeks.  She is discharged home to follow-up with her PCP for re-evaluation and further treatment options.  Mom voices understanding and is agreeable to the plan.  She is given specific return precautions.          Scribe Attestation:   Scribe #1: I performed the above scribed service and the documentation accurately describes the services I performed. I attest to the accuracy of the note.    I, Kandice Romero PA-C, personally performed the services described in this documentation. All medical record entries made by the scribe were at my direction and in my presence.  I have reviewed the chart and agree that the record reflects my personal performance and is accurate and  complete. Kandice Romero PA-C.  12:49 AM 12/27/2018             Clinical Impression:     1. Sinusitis, unspecified chronicity, unspecified location    2. Cough    3. Bronchitis        Disposition:   Disposition: Discharged  Condition: Stable                        Kandice Romero PA-C  12/27/18 0049

## 2018-12-28 ENCOUNTER — OFFICE VISIT (OUTPATIENT)
Dept: FAMILY MEDICINE | Facility: CLINIC | Age: 48
End: 2018-12-28
Payer: COMMERCIAL

## 2018-12-28 VITALS
HEART RATE: 80 BPM | RESPIRATION RATE: 18 BRPM | DIASTOLIC BLOOD PRESSURE: 70 MMHG | TEMPERATURE: 99 F | OXYGEN SATURATION: 95 % | WEIGHT: 268.75 LBS | SYSTOLIC BLOOD PRESSURE: 109 MMHG | BODY MASS INDEX: 45.88 KG/M2 | HEIGHT: 64 IN

## 2018-12-28 DIAGNOSIS — J45.20 MILD INTERMITTENT ASTHMA WITHOUT COMPLICATION: ICD-10-CM

## 2018-12-28 DIAGNOSIS — H65.93 MIDDLE EAR EFFUSION, BILATERAL: ICD-10-CM

## 2018-12-28 DIAGNOSIS — J01.01 ACUTE RECURRENT MAXILLARY SINUSITIS: ICD-10-CM

## 2018-12-28 DIAGNOSIS — E66.01 MORBID OBESITY: ICD-10-CM

## 2018-12-28 DIAGNOSIS — R05.9 COUGH IN ADULT: ICD-10-CM

## 2018-12-28 DIAGNOSIS — J40 BRONCHITIS: Primary | ICD-10-CM

## 2018-12-28 PROCEDURE — 99214 OFFICE O/P EST MOD 30 MIN: CPT | Mod: S$GLB,,, | Performed by: NURSE PRACTITIONER

## 2018-12-28 PROCEDURE — 99999 PR PBB SHADOW E&M-EST. PATIENT-LVL V: CPT | Mod: PBBFAC,,, | Performed by: NURSE PRACTITIONER

## 2018-12-28 RX ORDER — BENZONATATE 100 MG/1
100 CAPSULE ORAL 3 TIMES DAILY PRN
Qty: 60 CAPSULE | Refills: 1 | Status: SHIPPED | OUTPATIENT
Start: 2018-12-28 | End: 2019-01-07

## 2018-12-28 RX ORDER — NEBULIZER AND COMPRESSOR
EACH MISCELLANEOUS
COMMUNITY
End: 2019-06-26

## 2018-12-28 RX ORDER — IPRATROPIUM BROMIDE AND ALBUTEROL SULFATE 2.5; .5 MG/3ML; MG/3ML
3 SOLUTION RESPIRATORY (INHALATION) EVERY 6 HOURS PRN
Qty: 1 BOX | Refills: 0 | Status: SHIPPED | OUTPATIENT
Start: 2018-12-28 | End: 2019-12-28

## 2018-12-28 RX ORDER — LORATADINE 10 MG/1
10 TABLET ORAL DAILY
Refills: 0 | COMMUNITY
Start: 2018-12-28 | End: 2019-06-26

## 2018-12-28 NOTE — PATIENT INSTRUCTIONS
Understanding Asthma    Asthma causes swelling and narrowing of the airways in your lungs. Medical experts are not exactly sure what causes asthma. It is believed to be caused by a mix of inherited and environmental factors.  Healthy lungs  Inside the lungs there are branching airways made of stretchy tissue. Each airway is wrapped with bands of muscle. The airways become more narrow as they go deeper into the lungs. The smallest airways end in clusters of tiny balloon-like air sacs (alveoli). These clusters are surrounded by blood vessels. When you breathe in (inhale), air enters the lungs. It travels down through the airways until it reaches the air sacs. When you breathe out (exhale), air travels up through the airways and out of the lungs. The airways produce mucus that traps particles you breathe in. Normally, the mucus is then swept out of the lungs by tiny hairs (cilia) that line the airways. The mucus is swallowed or coughed up.  What the lungs do  The air you inhale contains oxygen. When oxygen reaches the air sacs, it passes into the blood vessels surrounding the sacs. Your blood then delivers oxygen to all of your cells. As you exhale, carbon dioxide is removed in a similar way from the blood in the air sacs, and from the body.  When you have asthma  People with asthma have very sensitive airways. This means the airways react to certain things called triggers (such as pollen, dust, or smoke) and become swollen and narrowed. Inflammation makes the airways swollen and narrowed. This is a long-lasting (chronic) problem. The airways may not always be narrowed enough to notice breathing problems.  Symptoms of chronic inflammation:   · Coughing  · Chest tightness  · Shortness of breath  · Wheezing (a whistling noise, especially when breathing out)  · Low energy or feeling tired  In some people, over time chronic mild inflammation can lead to lasting (permanent) scarring of airways and loss of lung  function.  Moderate flare-ups  When sensitive airways are irritated by a trigger, the muscles around the airways tighten. The lining of the airways swells. Thick, sticky mucus increases and partly clogs the airways. All of this makes you work harder to keep breathing.  Symptoms of moderate flare-ups:  · Coughing, especially at night  · Getting tired or out of breath easily  · Wheezing  · Chest tightness  · Faster breathing when at rest  Severe flare-ups  Severe flare-ups are life-threatening. In a severe flare-up, the muscle tightening, swelling, and mucus production are even worse. Its very hard to breathe. Your body can't get enough oxygen and can't remove carbon dioxide. Waste gas is trapped in the alveoli, and gas exchange cant occur. The body is not getting enough oxygen. Without oxygen, body tissues, especially brain tissue, begin to get damaged. If this goes on for long, it can lead to severe brain damage or death.  Call 911 (or have someone call for you) if you have any of these symptoms and they are not relieved right away by taking your quick-relief medicine as prescribed:  · Severe trouble breathing  · Too short of breath to talk or walk  · Lips or fingers turning blue  · Feeling lightheaded or dizzy, as though you are about to pass out  · Peak flow less than 50% of your personal best, if you use peak flow monitoring  Asthma is a long-term condition. So its important to work with your healthcare provider to manage it. If you smoke, get help to quit. Know your triggers and figure out how to avoid them. Its also very important to take your medicines as directed. That means taking them even when you feel good.  Date Last Reviewed: 12/1/2016  © 6614-5328 [employer]. [address] [place] [address], [place] [address]. All rights reserved. This information is not intended as a substitute for professional medical care. Always follow your healthcare professional's instructions.

## 2018-12-28 NOTE — PROGRESS NOTES
Subjective:       Patient ID: Ijeoma Issa is a 48 y.o. female.    Chief Complaint: ER follow up    Patient is a 48 year old female who presents today for ER Follow-up. Patient was seen in the [facility] ER for Bronchitis and prescribed Augmentin for 7 days and prednisone for 5 days- mild relief. Patient has an history of Asthma since Childhood prescribed Albuterol inhaler and Advair Disk 1 puff at [facility]. Patient report symptoms started in late November. She had to use her rescue inhaler 6 times last week because of sickness. She has only taken prescribe medication for 2 today with mild relief. History and labs reviewed; questions and concerns addressed.      Cough   This is a new problem. The current episode started 1 to 4 weeks ago. The problem has been gradually improving. The cough is productive of sputum. Associated symptoms include ear congestion, headaches, nasal congestion, postnasal drip, a rash (chronic), rhinorrhea, shortness of breath and wheezing. Pertinent negatives include no chest pain, fever, heartburn or hemoptysis. Nothing aggravates the symptoms. Risk factors for lung disease include smoking/tobacco exposure and animal exposure. She has tried steroid inhaler, a beta-agonist inhaler and oral steroids for the symptoms. The treatment provided mild relief. Her past medical history is significant for asthma, bronchitis and environmental allergies.     Review of Systems   Constitutional: Negative for fever.   HENT: Positive for postnasal drip and rhinorrhea.    Respiratory: Positive for cough, shortness of breath and wheezing. Negative for hemoptysis.    Cardiovascular: Negative for chest pain.   Gastrointestinal: Positive for diarrhea. Negative for constipation and heartburn.   Genitourinary: Positive for frequency (chronic). Negative for difficulty urinating and urgency.   Musculoskeletal: Positive for back pain (chronic). Negative for neck pain.   Skin: Positive for rash  "(chronic).   Allergic/Immunologic: Positive for environmental allergies.   Neurological: Positive for headaches.   Hematological: Negative.    Psychiatric/Behavioral: Negative for agitation. The patient is not nervous/anxious.        Objective:      /70 (BP Location: Right arm, Patient Position: Sitting, BP Method: Large (Automatic))   Pulse 80   Temp 99 °F (37.2 °C) (Oral)   Resp 18   Ht 5' 4" (1.626 m)   Wt 121.9 kg (268 lb 11.9 oz)   SpO2 95%   BMI 46.13 kg/m²   Physical Exam   Constitutional: She is oriented to person, place, and time. She appears well-developed and well-nourished.   HENT:   Right Ear: A middle ear effusion is present.   Left Ear: A middle ear effusion is present.   Mouth/Throat: Posterior oropharyngeal erythema present.   Eyes: EOM are normal. Pupils are equal, round, and reactive to light.   Neck: Normal range of motion.   Cardiovascular: Normal rate, regular rhythm and normal heart sounds.   Pulmonary/Chest: Effort normal and breath sounds normal.   Abdominal: Soft. Bowel sounds are normal.   Musculoskeletal: Normal range of motion.   Neurological: She is alert and oriented to person, place, and time.   Skin: Skin is warm and dry.   Psychiatric: She has a normal mood and affect. Her behavior is normal. Judgment and thought content normal.       Assessment:       1. Bronchitis    2. Acute recurrent maxillary sinusitis    3. Mild intermittent asthma without complication    4. Morbid obesity    5. Body mass index 40.0-44.9, adult    6. Cough in adult    7. Middle ear effusion, bilateral        Plan:       Bronchitis  Continue Augmentin and prednison  Acute recurrent maxillary sinusitis  Continue Augmentin and prednison  Mild intermittent asthma without complication  -     albuterol-ipratropium (DUO-NEB) 2.5 mg-0.5 mg/3 mL nebulizer solution; Take 3 mLs by nebulization every 6 (six) hours as needed for Wheezing. Rescue  Dispense: 1 Box; Refill: 0    Morbid obesity   Low fat " diet   Regular excerise  Body mass index 40.0-44.9, adult   Low fat diet   Regular exercise  Cough in adult  -     benzonatate (TESSALON) 100 MG capsule; Take 1 capsule (100 mg total) by mouth 3 (three) times daily as needed for Cough.  Dispense: 60 capsule; Refill: 1    Middle ear effusion, bilateral  -     loratadine (CLARITIN) 10 mg tablet; Take 1 tablet (10 mg total) by mouth once daily.; Refill: 0      Patient readiness: acceptance and barriers:none    During the course of the visit the patient was educated and counseled about the following:     Hypertension:   Dietary sodium restriction.  Regular aerobic exercise.  Obesity:   Regular aerobic exercise program discussed.    Goals: Hypertension: Reduce Blood Pressure and Obesity: Reduce calorie intake and BMI    Did patient meet goals/outcomes: No    The following self management tools provided: declined    Patient Instructions (the written plan) was given to the patient/family.     Time spent with patient: 30 minutes    Barriers to medications present (no )    Adverse reactions to current medications (no)    Over the counter medications reviewed (yes)

## 2018-12-30 RX ORDER — DOXEPIN HYDROCHLORIDE 25 MG/1
CAPSULE ORAL
Qty: 30 CAPSULE | Refills: 0 | Status: SHIPPED | OUTPATIENT
Start: 2018-12-30 | End: 2019-01-25 | Stop reason: SDUPTHER

## 2018-12-30 RX ORDER — ESTRADIOL 0.1 MG/G
CREAM VAGINAL
Qty: 42.5 G | Refills: 0 | Status: SHIPPED | OUTPATIENT
Start: 2018-12-30 | End: 2019-01-25 | Stop reason: SDUPTHER

## 2019-01-20 DIAGNOSIS — M54.31 RIGHT SIDED SCIATICA: ICD-10-CM

## 2019-01-20 DIAGNOSIS — M79.2 NEUROPATHIC PAIN: ICD-10-CM

## 2019-01-21 RX ORDER — GABAPENTIN 100 MG/1
CAPSULE ORAL
Qty: 60 CAPSULE | Refills: 0 | Status: SHIPPED | OUTPATIENT
Start: 2019-01-21 | End: 2019-02-14 | Stop reason: SDUPTHER

## 2019-01-28 RX ORDER — ESTRADIOL 0.1 MG/G
CREAM VAGINAL
Qty: 42.5 G | Refills: 0 | Status: SHIPPED | OUTPATIENT
Start: 2019-01-28 | End: 2019-02-20 | Stop reason: SDUPTHER

## 2019-01-28 RX ORDER — DOXEPIN HYDROCHLORIDE 25 MG/1
CAPSULE ORAL
Qty: 30 CAPSULE | Refills: 11 | Status: SHIPPED | OUTPATIENT
Start: 2019-01-28 | End: 2019-12-29 | Stop reason: SDUPTHER

## 2019-02-14 DIAGNOSIS — M79.2 NEUROPATHIC PAIN: ICD-10-CM

## 2019-02-14 DIAGNOSIS — M54.31 RIGHT SIDED SCIATICA: ICD-10-CM

## 2019-02-14 RX ORDER — GABAPENTIN 100 MG/1
CAPSULE ORAL
Qty: 60 CAPSULE | Refills: 0 | Status: SHIPPED | OUTPATIENT
Start: 2019-02-14 | End: 2019-03-18 | Stop reason: SDUPTHER

## 2019-02-22 RX ORDER — ESTRADIOL 0.1 MG/G
CREAM VAGINAL
Qty: 42.5 G | Refills: 0 | Status: SHIPPED | OUTPATIENT
Start: 2019-02-22 | End: 2019-05-13 | Stop reason: SDUPTHER

## 2019-03-18 DIAGNOSIS — M79.2 NEUROPATHIC PAIN: ICD-10-CM

## 2019-03-18 DIAGNOSIS — M54.31 RIGHT SIDED SCIATICA: ICD-10-CM

## 2019-03-18 RX ORDER — GABAPENTIN 100 MG/1
CAPSULE ORAL
Qty: 60 CAPSULE | Refills: 0 | Status: SHIPPED | OUTPATIENT
Start: 2019-03-18 | End: 2019-04-17 | Stop reason: SDUPTHER

## 2019-04-17 DIAGNOSIS — M79.2 NEUROPATHIC PAIN: ICD-10-CM

## 2019-04-17 DIAGNOSIS — M54.31 RIGHT SIDED SCIATICA: ICD-10-CM

## 2019-04-17 RX ORDER — GABAPENTIN 100 MG/1
CAPSULE ORAL
Qty: 60 CAPSULE | Refills: 0 | Status: SHIPPED | OUTPATIENT
Start: 2019-04-17 | End: 2019-05-13 | Stop reason: SDUPTHER

## 2019-05-07 DIAGNOSIS — M54.31 RIGHT SIDED SCIATICA: ICD-10-CM

## 2019-05-07 DIAGNOSIS — M79.2 NEUROPATHIC PAIN: ICD-10-CM

## 2019-05-08 RX ORDER — GABAPENTIN 100 MG/1
CAPSULE ORAL
Qty: 60 CAPSULE | Refills: 0 | OUTPATIENT
Start: 2019-05-08

## 2019-05-13 DIAGNOSIS — M54.31 RIGHT SIDED SCIATICA: ICD-10-CM

## 2019-05-13 DIAGNOSIS — M79.2 NEUROPATHIC PAIN: ICD-10-CM

## 2019-05-13 DIAGNOSIS — N95.2 ATROPHY OF VAGINA: Primary | ICD-10-CM

## 2019-05-13 RX ORDER — ESTRADIOL 0.1 MG/G
1 CREAM VAGINAL
Qty: 8 G | Refills: 0 | Status: SHIPPED | OUTPATIENT
Start: 2019-05-13 | End: 2019-06-11 | Stop reason: SDUPTHER

## 2019-05-14 RX ORDER — GABAPENTIN 100 MG/1
100 CAPSULE ORAL 2 TIMES DAILY
Qty: 60 CAPSULE | Refills: 3 | Status: SHIPPED | OUTPATIENT
Start: 2019-05-14 | End: 2020-06-26

## 2019-06-11 DIAGNOSIS — N95.2 ATROPHY OF VAGINA: ICD-10-CM

## 2019-06-11 RX ORDER — ESTRADIOL 0.1 MG/G
CREAM VAGINAL
Qty: 42.5 G | Refills: 0 | Status: SHIPPED | OUTPATIENT
Start: 2019-06-11 | End: 2019-07-09 | Stop reason: SDUPTHER

## 2019-06-12 ENCOUNTER — PATIENT OUTREACH (OUTPATIENT)
Dept: ADMINISTRATIVE | Facility: HOSPITAL | Age: 49
End: 2019-06-12

## 2019-06-26 ENCOUNTER — OFFICE VISIT (OUTPATIENT)
Dept: FAMILY MEDICINE | Facility: CLINIC | Age: 49
End: 2019-06-26
Payer: COMMERCIAL

## 2019-06-26 VITALS
SYSTOLIC BLOOD PRESSURE: 120 MMHG | BODY MASS INDEX: 44.79 KG/M2 | DIASTOLIC BLOOD PRESSURE: 88 MMHG | OXYGEN SATURATION: 97 % | TEMPERATURE: 99 F | WEIGHT: 262.38 LBS | HEIGHT: 64 IN | HEART RATE: 92 BPM

## 2019-06-26 DIAGNOSIS — L70.9 ACNE, UNSPECIFIED ACNE TYPE: Chronic | ICD-10-CM

## 2019-06-26 DIAGNOSIS — L30.8 PRURITIC DERMATITIS: ICD-10-CM

## 2019-06-26 DIAGNOSIS — E66.01 SEVERE OBESITY (BMI 35.0-35.9 WITH COMORBIDITY): ICD-10-CM

## 2019-06-26 DIAGNOSIS — J40 BRONCHITIS: Primary | ICD-10-CM

## 2019-06-26 DIAGNOSIS — J45.901: ICD-10-CM

## 2019-06-26 DIAGNOSIS — M79.89 LEG SWELLING: ICD-10-CM

## 2019-06-26 PROCEDURE — 99999 PR PBB SHADOW E&M-EST. PATIENT-LVL IV: CPT | Mod: PBBFAC,,, | Performed by: FAMILY MEDICINE

## 2019-06-26 PROCEDURE — 99214 OFFICE O/P EST MOD 30 MIN: CPT | Mod: 25,S$GLB,, | Performed by: FAMILY MEDICINE

## 2019-06-26 PROCEDURE — 96372 PR INJECTION,THERAP/PROPH/DIAG2ST, IM OR SUBCUT: ICD-10-PCS | Mod: S$GLB,,, | Performed by: FAMILY MEDICINE

## 2019-06-26 PROCEDURE — 99214 PR OFFICE/OUTPT VISIT, EST, LEVL IV, 30-39 MIN: ICD-10-PCS | Mod: 25,S$GLB,, | Performed by: FAMILY MEDICINE

## 2019-06-26 PROCEDURE — 96372 THER/PROPH/DIAG INJ SC/IM: CPT | Mod: S$GLB,,, | Performed by: FAMILY MEDICINE

## 2019-06-26 PROCEDURE — 99999 PR PBB SHADOW E&M-EST. PATIENT-LVL IV: ICD-10-PCS | Mod: PBBFAC,,, | Performed by: FAMILY MEDICINE

## 2019-06-26 RX ORDER — PREDNISONE 10 MG/1
10 TABLET ORAL DAILY
Qty: 7 TABLET | Refills: 3 | Status: SHIPPED | OUTPATIENT
Start: 2019-06-26 | End: 2019-07-03

## 2019-06-26 RX ORDER — SPIRONOLACTONE 25 MG/1
TABLET ORAL
Qty: 90 TABLET | Refills: 3 | Status: SHIPPED | OUTPATIENT
Start: 2019-06-26 | End: 2020-04-21

## 2019-06-26 RX ORDER — ALBUTEROL SULFATE 90 UG/1
2 AEROSOL, METERED RESPIRATORY (INHALATION) EVERY 6 HOURS PRN
Qty: 18 G | Refills: 0 | Status: SHIPPED | OUTPATIENT
Start: 2019-06-26 | End: 2019-07-16 | Stop reason: SDUPTHER

## 2019-06-26 RX ORDER — LINCOMYCIN HYDROCHLORIDE 300 MG/ML
600 INJECTION, SOLUTION INTRAMUSCULAR; INTRAVENOUS; SUBCONJUNCTIVAL
Status: DISCONTINUED | OUTPATIENT
Start: 2019-06-26 | End: 2019-06-26

## 2019-06-26 RX ORDER — LINCOMYCIN HYDROCHLORIDE 300 MG/ML
600 INJECTION, SOLUTION INTRAMUSCULAR; INTRAVENOUS; SUBCONJUNCTIVAL ONCE
Status: COMPLETED | OUTPATIENT
Start: 2019-06-26 | End: 2019-06-26

## 2019-06-26 RX ORDER — DOXYCYCLINE HYCLATE 100 MG
100 TABLET ORAL 2 TIMES DAILY
Qty: 20 TABLET | Refills: 3 | Status: SHIPPED | OUTPATIENT
Start: 2019-06-26 | End: 2020-01-06

## 2019-06-26 RX ORDER — BETAMETHASONE SODIUM PHOSPHATE AND BETAMETHASONE ACETATE 3; 3 MG/ML; MG/ML
6 INJECTION, SUSPENSION INTRA-ARTICULAR; INTRALESIONAL; INTRAMUSCULAR; SOFT TISSUE ONCE
Status: COMPLETED | OUTPATIENT
Start: 2019-06-26 | End: 2019-06-26

## 2019-06-26 RX ORDER — AZELASTINE 1 MG/ML
1 SPRAY, METERED NASAL 2 TIMES DAILY
Qty: 30 ML | Refills: 3 | Status: SHIPPED | OUTPATIENT
Start: 2019-06-26 | End: 2020-01-06 | Stop reason: SDUPTHER

## 2019-06-26 RX ADMIN — LINCOMYCIN HYDROCHLORIDE 600 MG: 300 INJECTION, SOLUTION INTRAMUSCULAR; INTRAVENOUS; SUBCONJUNCTIVAL at 01:06

## 2019-06-26 RX ADMIN — BETAMETHASONE SODIUM PHOSPHATE AND BETAMETHASONE ACETATE 6 MG: 3; 3 INJECTION, SUSPENSION INTRA-ARTICULAR; INTRALESIONAL; INTRAMUSCULAR; SOFT TISSUE at 01:06

## 2019-06-26 NOTE — PROGRESS NOTES
2 Patent identifiers used (name and ). Administered 6mg Celestone IM. Patient tolerated well. No bleeding at insertion site noted. Pain scale 0/10. Aseptic technique maintained. 2 Patent identifiers used (name and ). Administered 600 mg Lincomycin IM. Patient tolerated well. No bleeding at insertion site noted. Pain scale 0/10. Aseptic technique maintained.

## 2019-06-26 NOTE — PATIENT INSTRUCTIONS

## 2019-06-26 NOTE — PROGRESS NOTES
Subjective:       Patient ID: Ijeoma Issa is a 49 y.o. female.    Chief Complaint: Cough and Rash  recurrent pustular rash, high estrogen/ partial response to antiandrogens.  She has a recurrent pruritus after more than 3 months of remission.    Asthma   She complains of chest tightness, cough, difficulty breathing, frequent throat clearing, hoarse voice, shortness of breath and wheezing. There is no sputum production. This is a recurrent problem. The current episode started 1 to 4 weeks ago. The problem has been gradually worsening. The cough is productive of sputum and barking. Associated symptoms include dyspnea on exertion, malaise/fatigue, myalgias and postnasal drip. Pertinent negatives include no appetite change, chest pain, ear congestion, ear pain, headaches, sneezing, sore throat or trouble swallowing. Her symptoms are aggravated by emotional stress, change in weather, pollen and strenuous activity. Her past medical history is significant for asthma.     Review of Systems   Constitutional: Positive for malaise/fatigue. Negative for activity change, appetite change, chills and diaphoresis.   HENT: Positive for hoarse voice and postnasal drip. Negative for ear pain, facial swelling, hearing loss, nosebleeds, sneezing, sore throat and trouble swallowing.    Eyes: Negative for photophobia, pain, discharge, redness, itching and visual disturbance.   Respiratory: Positive for cough, shortness of breath and wheezing. Negative for apnea, sputum production and chest tightness.    Cardiovascular: Positive for dyspnea on exertion. Negative for chest pain, palpitations and leg swelling.   Gastrointestinal: Negative.  Negative for abdominal distention, abdominal pain, anal bleeding, blood in stool and diarrhea.   Endocrine: Negative.  Negative for cold intolerance, heat intolerance, polydipsia, polyphagia and polyuria.   Genitourinary: Negative.  Negative for difficulty urinating, dysuria, frequency, genital  sores, hematuria, pelvic pain, urgency, vaginal bleeding and vaginal discharge.   Musculoskeletal: Positive for myalgias. Negative for back pain, gait problem, neck pain and neck stiffness.   Skin: Negative.  Negative for color change, pallor and rash.   Allergic/Immunologic: Negative.  Negative for environmental allergies and food allergies.   Neurological: Negative.  Negative for dizziness, tremors, seizures, syncope, speech difficulty, numbness and headaches.   Hematological: Negative for adenopathy.   Psychiatric/Behavioral: Negative for agitation, behavioral problems, confusion, decreased concentration, hallucinations, self-injury and sleep disturbance. The patient is not nervous/anxious and is not hyperactive.        Patient Active Problem List   Diagnosis    Mammogram abnormal    OA (osteoarthritis)    Acne, adult    Asthma with allergic rhinitis    GERD (gastroesophageal reflux disease)    Family history of early CAD, brother  with MI, age 60    Migraine headache    Palpitations    Stress, at home and work    Bacterial vaginosis    Bullous impetigo    Morbid obesity    Body mass index 40.0-44.9, adult    Polycystic ovarian disease    Glucose intolerance (impaired glucose tolerance)    Menopausal symptoms    Vaginal dryness, menopausal    Umbilical hernia       Objective:      Physical Exam   Constitutional: She is oriented to person, place, and time. She appears well-developed and well-nourished.   HENT:   Head: Normocephalic and atraumatic.   Right Ear: External ear normal.   Left Ear: External ear normal.   Nose: Mucosal edema and rhinorrhea present.   Mouth/Throat: No oropharyngeal exudate.   Eyes: Pupils are equal, round, and reactive to light. Conjunctivae and EOM are normal. Right eye exhibits no discharge. Left eye exhibits no discharge. No scleral icterus.   Neck: Normal range of motion. Neck supple. No JVD present. No tracheal deviation present. No thyromegaly present.    Cardiovascular: Normal rate, normal heart sounds and intact distal pulses. Exam reveals no gallop and no friction rub.   No murmur heard.  Pulmonary/Chest: No stridor. She is in respiratory distress. She has decreased breath sounds in the right lower field. She has wheezes in the right middle field. She has no rales. She exhibits no tenderness.   Abdominal: Soft. Bowel sounds are normal. She exhibits no distension and no mass. There is no tenderness. There is no rebound and no guarding.   Musculoskeletal: Normal range of motion. She exhibits no edema.   Lymphadenopathy:     She has no cervical adenopathy.   Neurological: She is alert and oriented to person, place, and time. She displays normal reflexes. No cranial nerve deficit. She exhibits normal muscle tone. Coordination normal.   Skin: Skin is dry. Rash noted. Rash is pustular and urticarial. She is not diaphoretic. There is erythema. No pallor.        Psychiatric: She has a normal mood and affect. Her behavior is normal. Judgment and thought content normal.   Vitals reviewed.      Lab Results   Component Value Date    WBC 7.81 05/09/2018    HGB 13.7 05/09/2018    HCT 44.2 05/09/2018     05/09/2018    CHOL 186 05/09/2018    TRIG 130 05/09/2018    HDL 50 05/09/2018    ALT 27 05/09/2018    AST 28 05/09/2018     05/09/2018    K 4.1 05/09/2018     05/09/2018    CREATININE 0.9 05/09/2018    BUN 14 05/09/2018    CO2 26 05/09/2018    TSH 2.625 12/29/2016    HGBA1C 5.2 12/29/2016    MICROALBUR 0.32 07/20/2012     The 10-year ASCVD risk score (Taco JOHANA Jr., et al., 2013) is: 1.3%    Values used to calculate the score:      Age: 49 years      Sex: Female      Is Non- : No      Diabetic: No      Tobacco smoker: No      Systolic Blood Pressure: 120 mmHg      Is BP treated: Yes      HDL Cholesterol: 50 mg/dL      Total Cholesterol: 186 mg/dL    Assessment:       1. Bronchitis    2. Moderate asthma with allergic rhinitis with acute  exacerbation, unspecified whether persistent    3. Leg swelling    4. Acne, unspecified acne type    5. Pruritic dermatitis        Plan:       Bronchitis  -     doxycycline (VIBRA-TABS) 100 MG tablet; Take 1 tablet (100 mg total) by mouth 2 (two) times daily.  Dispense: 20 tablet; Refill: 3  -     predniSONE (DELTASONE) 10 MG tablet; Take 1 tablet (10 mg total) by mouth once daily. for 7 days  Dispense: 7 tablet; Refill: 3  -     betamethasone acetate-betamethasone sodium phosphate injection 6 mg  -     VENTOLIN HFA 90 mcg/actuation inhaler; Inhale 2 puffs into the lungs every 6 (six) hours as needed for Wheezing. Rescue  Dispense: 18 g; Refill: 0    Moderate asthma with allergic rhinitis with acute exacerbation, unspecified whether persistent  -     doxycycline (VIBRA-TABS) 100 MG tablet; Take 1 tablet (100 mg total) by mouth 2 (two) times daily.  Dispense: 20 tablet; Refill: 3  -     predniSONE (DELTASONE) 10 MG tablet; Take 1 tablet (10 mg total) by mouth once daily. for 7 days  Dispense: 7 tablet; Refill: 3  -     betamethasone acetate-betamethasone sodium phosphate injection 6 mg  -     VENTOLIN HFA 90 mcg/actuation inhaler; Inhale 2 puffs into the lungs every 6 (six) hours as needed for Wheezing. Rescue  Dispense: 18 g; Refill: 0    Leg swelling  -     spironolactone (ALDACTONE) 25 MG tablet; TAKE 1 TABLET(25 MG) BY MOUTH daily  Dispense: 90 tablet; Refill: 3    Acne, unspecified acne type  -     spironolactone (ALDACTONE) 25 MG tablet; TAKE 1 TABLET(25 MG) BY MOUTH daily  Dispense: 90 tablet; Refill: 3    Pruritic dermatitis  -     spironolactone (ALDACTONE) 25 MG tablet; TAKE 1 TABLET(25 MG) BY MOUTH daily  Dispense: 90 tablet; Refill: 3  -     lincomycin injection 600 mg  -     Ambulatory referral to Dermatology    Other orders  -     azelastine (ASTELIN) 137 mcg (0.1 %) nasal spray; 1 spray (137 mcg total) by Nasal route 2 (two) times daily.  Dispense: 30 mL; Refill: 3      Patient readiness: acceptance  and barriers:none    PCM goal documentation:  Patient readiness: acceptance and barriers:readiness and social stressors  During the course of the visit the patient was educated and counseled about the following: :   diet  Obesity:   General weight loss/lifestyle modification strategies discussed (elicit support from others; identify saboteurs; non-food rewards, etc).  Behavioral treatment: Slim Fast.  Diet interventions: low calorie (1000 kCal/d) deficit diet, ovo-lactarian diet and qualitative changes (increase low-fat,  high-fiber foods).  Informal exercise measures discussed, e.g. taking stairs instead of elevator.  Regular aerobic exercise program discussed.  Medication: bulk-forming agents.  Goals: Obesity: Reduce calorie intake and BMI  Goal/Outcomes met:obesity  The following self management tools provided:none  Patient Instructions (the written plan) was given to the patient/family: Yes  Time spent with patient: 20 minutes    Patient with be reevaluated in 3 months or sooner prn    Greater than 50% of this visit was spent counseling as described in above documentation:Yes          30-minute visit. 35 minutes spent counseling patient on diet, exercise, and weight loss.  This has been fully explained to the patient, who indicates understanding.

## 2019-07-02 DIAGNOSIS — J45.31 ASTHMA WITH ALLERGIC RHINITIS, MILD PERSISTENT, WITH ACUTE EXACERBATION: Chronic | ICD-10-CM

## 2019-07-02 RX ORDER — MONTELUKAST SODIUM 10 MG/1
TABLET ORAL
Qty: 30 TABLET | Refills: 3 | Status: SHIPPED | OUTPATIENT
Start: 2019-07-02 | End: 2019-08-22 | Stop reason: SDUPTHER

## 2019-07-09 DIAGNOSIS — N95.2 ATROPHY OF VAGINA: ICD-10-CM

## 2019-07-10 RX ORDER — ESTRADIOL 0.1 MG/G
CREAM VAGINAL
Qty: 42.5 G | Refills: 0 | Status: SHIPPED | OUTPATIENT
Start: 2019-07-10 | End: 2019-08-11 | Stop reason: SDUPTHER

## 2019-07-15 ENCOUNTER — INITIAL CONSULT (OUTPATIENT)
Dept: DERMATOLOGY | Facility: CLINIC | Age: 49
End: 2019-07-15
Payer: COMMERCIAL

## 2019-07-15 ENCOUNTER — TELEPHONE (OUTPATIENT)
Dept: PSYCHIATRY | Facility: CLINIC | Age: 49
End: 2019-07-15

## 2019-07-15 VITALS — HEIGHT: 64 IN | WEIGHT: 262.38 LBS | BODY MASS INDEX: 44.79 KG/M2

## 2019-07-15 DIAGNOSIS — T14.8XXA EXCORIATION: Primary | ICD-10-CM

## 2019-07-15 DIAGNOSIS — D17.9 LIPOMA, UNSPECIFIED SITE: ICD-10-CM

## 2019-07-15 DIAGNOSIS — L90.5 SCARS: ICD-10-CM

## 2019-07-15 DIAGNOSIS — L73.9 FOLLICULITIS: ICD-10-CM

## 2019-07-15 DIAGNOSIS — L70.9 ACNE, UNSPECIFIED ACNE TYPE: ICD-10-CM

## 2019-07-15 DIAGNOSIS — L29.9 ITCH: ICD-10-CM

## 2019-07-15 PROCEDURE — 99203 OFFICE O/P NEW LOW 30 MIN: CPT | Mod: S$GLB,,, | Performed by: DERMATOLOGY

## 2019-07-15 PROCEDURE — 99999 PR PBB SHADOW E&M-EST. PATIENT-LVL III: ICD-10-PCS | Mod: PBBFAC,,, | Performed by: DERMATOLOGY

## 2019-07-15 PROCEDURE — 99999 PR PBB SHADOW E&M-EST. PATIENT-LVL III: CPT | Mod: PBBFAC,,, | Performed by: DERMATOLOGY

## 2019-07-15 PROCEDURE — 99203 PR OFFICE/OUTPT VISIT, NEW, LEVL III, 30-44 MIN: ICD-10-PCS | Mod: S$GLB,,, | Performed by: DERMATOLOGY

## 2019-07-15 RX ORDER — PERMETHRIN 50 MG/G
CREAM TOPICAL ONCE
Qty: 60 G | Refills: 1 | Status: SHIPPED | OUTPATIENT
Start: 2019-07-15 | End: 2019-07-15

## 2019-07-15 NOTE — PROGRESS NOTES
Subjective:       Patient ID:  Ijeoma Issa is a 49 y.o. female who presents for   Chief Complaint   Patient presents with    Rash     diffuse     New Pt here to day for diffuse rash. Pt states that it usually starts with a fever blister on her lip and then the rash spreads to the rest of her body. She has treated with clindamycin, TAC, voltarec gel, bactriban, doxycycline, prednisolne and lincomycin.     Saw Matty and derm in [place].    Had skin bx done before.        Rash         Review of Systems   Constitutional: Negative for fever, chills and fatigue.   HENT: Positive for congestion. Negative for sore throat.    Respiratory: Positive for cough. Negative for shortness of breath.    Gastrointestinal: Negative for nausea and vomiting.   Musculoskeletal: Negative for arthralgias.   Skin: Positive for itching (sometimes), rash and wears hat. Negative for daily sunscreen use and activity-related sunscreen use.   Psychiatric/Behavioral: Positive for anxiety and high stress.   Hematologic/Lymphatic: Bruises/bleeds easily.        Objective:    Physical Exam   Constitutional: She appears well-developed and well-nourished.   Eyes: No conjunctival no injection.   Cardiovascular: There is no local extremity swelling.     Neurological: She is alert and oriented to person, place, and time.   Psychiatric: She has a normal mood and affect.   Skin:   Areas Examined (abnormalities noted in diagram):   Head / Face Inspection Performed  Neck Inspection Performed  Abdomen Inspection Performed  RUE Inspected  LUE Inspection Performed  RLE Inspected  LLE Inspection Performed  Nails and Digits Inspection Performed              Diagram Legend     Erythematous scaling macule/papule c/w actinic keratosis       Vascular papule c/w angioma      Pigmented verrucoid papule/plaque c/w seborrheic keratosis      Yellow umbilicated papule c/w sebaceous hyperplasia      Irregularly shaped tan macule c/w lentigo     1-2 mm smooth  white papules consistent with Milia      Movable subcutaneous cyst with punctum c/w epidermal inclusion cyst      Subcutaneous movable cyst c/w pilar cyst      Firm pink to brown papule c/w dermatofibroma      Pedunculated fleshy papule(s) c/w skin tag(s)      Evenly pigmented macule c/w junctional nevus     Mildly variegated pigmented, slightly irregular-bordered macule c/w mildly atypical nevus      Flesh colored to evenly pigmented papule c/w intradermal nevus       Pink pearly papule/plaque c/w basal cell carcinoma      Erythematous hyperkeratotic cursted plaque c/w SCC      Surgical scar with no sign of skin cancer recurrence      Open and closed comedones      Inflammatory papules and pustules      Verrucoid papule consistent consistent with wart     Erythematous eczematous patches and plaques     Dystrophic onycholytic nail with subungual debris c/w onychomycosis     Umbilicated papule    Erythematous-base heme-crusted tan verrucoid plaque consistent with inflamed seborrheic keratosis     Erythematous Silvery Scaling Plaque c/w Psoriasis     See annotation      Assessment / Plan:        Excoriation  Discussed with patient the etiology and pathogenesis of the disease or skin lesion(s) and possible treatments and aggravators.    Reviewed with patient different treatment options and associated risks.  Old records and/or outside records, notes, or pathology reports reviewed and discussed with the patient.  Biopsy from 2017:    A - RIGHT FOREARM  DERMAL SCAR; CHRONICALLY INFLAMED HAIR FOLLICLE; FEATURES OF PRURIGO NODULARIs     Told patient to stop all products and make up.  Discussed that less is more right now.  Patient to wash with glycerin bar soap and avoid hot water.  Avoid fragrances.  Patient may need patch testing if index of suspicion is high and patient continues to flare with rashes.  No hot water bathing reviewed.  Discussed with patient to use organic coconut oil or pure shea butter at least daily  for moisturization for the body and organic jojoba oil at least daily for the face.    Need to address stress!  Suspect underlying stress and anxiety.    Folliculitis  No active signs now.    Acne, unspecified acne type  On aldactone per primary.    Lipoma, unspecified site  Discussed with patient the etiology and pathogenesis of the disease or skin lesion(s) and possible treatments and aggravators.      Scars   Discussed with the patient the risk of color scars or hyperpigmentation that could take months to resolve.    Itch  Minimal per pt but can do optional elimite x 1 as this was never done.  Proper application of medications and or care for affected area(s) and condition(s) reviewed.  Reviewed with patient different treatment options and associated risks.    Other orders  -     Ambulatory Referral to Psychiatry  -     permethrin (ELIMITE) 5 % cream; Apply topically once. Apply neck to toes-- rinse off 8 hours later (in morning).Refill with one tube to apply week later for 1 dose  Dispense: 60 g; Refill: 1             Follow up if symptoms worsen or fail to improve.

## 2019-07-15 NOTE — LETTER
July 15, 2019      Sonny Nunez MD  [address]  [facility] [address]           [address], [address]  [facility] [address]  Phone: [phone]          Patient: Ijeoma Issa   MR Number: [id number]   YOB: 1970   Date of Visit: 7/15/2019       Dear Dr. Sonny Nunez:    Thank you for referring Ijeoma Issa to me for evaluation. Attached you will find relevant portions of my assessment and plan of care.    If you have questions, please do not hesitate to call me. I look forward to following Ijeoma Issa along with you.    Sincerely,    Marvin Stevenson MD    Enclosure  CC:  No Recipients    If you would like to receive this communication electronically, please contact [email or web address] or [phone] to request more information on [email or web address] Link access.    For providers and/or their staff who would like to refer a patient to [facility], please contact us through our one-stop-shop provider referral line, [facility], at [phone].    If you feel you have received this communication in error or would no longer like to receive these types of communications, please e-mail [email or web address]

## 2019-07-15 NOTE — TELEPHONE ENCOUNTER
----- Message from Agueda Christina LPN sent at 7/15/2019 11:02 AM CDT -----  Referral in for psych.  Patient needs appt for first available for counseling for stress management.

## 2019-07-16 ENCOUNTER — OFFICE VISIT (OUTPATIENT)
Dept: PSYCHIATRY | Facility: CLINIC | Age: 49
End: 2019-07-16
Payer: COMMERCIAL

## 2019-07-16 DIAGNOSIS — J40 BRONCHITIS: ICD-10-CM

## 2019-07-16 DIAGNOSIS — F43.9 STRESS: Primary | Chronic | ICD-10-CM

## 2019-07-16 DIAGNOSIS — J45.901: ICD-10-CM

## 2019-07-16 PROCEDURE — 90791 PSYCH DIAGNOSTIC EVALUATION: CPT | Mod: S$GLB,,, | Performed by: SOCIAL WORKER

## 2019-07-16 PROCEDURE — 90791 PR PSYCHIATRIC DIAGNOSTIC EVALUATION: ICD-10-PCS | Mod: S$GLB,,, | Performed by: SOCIAL WORKER

## 2019-07-16 PROCEDURE — 99999 PR PBB SHADOW E&M-EST. PATIENT-LVL III: ICD-10-PCS | Mod: PBBFAC,,, | Performed by: SOCIAL WORKER

## 2019-07-16 PROCEDURE — 99999 PR PBB SHADOW E&M-EST. PATIENT-LVL III: CPT | Mod: PBBFAC,,, | Performed by: SOCIAL WORKER

## 2019-07-16 RX ORDER — ALBUTEROL SULFATE 90 UG/1
AEROSOL, METERED RESPIRATORY (INHALATION)
Qty: 18 G | Refills: 0 | Status: SHIPPED | OUTPATIENT
Start: 2019-07-16 | End: 2019-08-11 | Stop reason: SDUPTHER

## 2019-07-16 NOTE — PROGRESS NOTES
"Psychiatry Initial Visit (PhD/LCSW)  Diagnostic Interview - CPT 66136    Date: 2019    Site: [place] [id number] - PSYCHIATRY  [facility] [place]    Referral source: Marvin Stevenson MD    Clinical status of patient: Outpatient    Ijeoma Issa, a 49 y.o. female, for initial evaluation visit.  Met with patient.    Chief complaint/reason for encounter: anxiety    History of present illness: Reviewed chart. Referred by dermatologist for possible psychosomatic skin lesions secondary to stress in life. Mom  two years ago. Last of 8 sibs, 4 boys, 4 girls. Executor, with sister, of estate, which is quite complicated, and source of stress. Trying to be "good girl," make everyone happy, essesntially impossible. Delivery Agent works offshore following  career in . Kids 25 and son 22, just , trying to get him set up in household. Discussed ways of handling estate, mainly to let herself off hook of making everyone happy.    Pain: noncontributory    Symptoms:   · Mood: depressed mood, weight gain, worthlessness/guilt and poor concentration  · Anxiety: excessive anxiety/worry, restlessness/keyed up and irritability  · Substance abuse: denied  · Cognitive functioning: denied  · Health behaviors: noncontributory    Psychiatric history: none     Medical history:   Past Medical History:   Diagnosis Date    Abnormal mammogram     neg biospy    Allergic asthma     Anxiety     Arthritis     Dermatitis     Dr. Weil, Extremities    Diabetes     Diverticulosis of colon     GERD (gastroesophageal reflux disease)     Mass of right breast     Postmenopausal hormone replacement therapy     Tubal pregnancy     Uterine fibroid        Family history of psychiatric illness:   Family History   Problem Relation Age of Onset    Cancer Father         colon    Colon cancer Father     Diabetes Sister     Breast cancer Sister 28    Cancer Brother         stomach    Heart disease Brother  "    Heart disease Mother     Ovarian cancer Neg Hx        Social history (marriage, employment, etc.):   Social History     Tobacco Use    Smoking status: Never Smoker    Smokeless tobacco: Never Used   Substance Use Topics    Alcohol use: Yes     Alcohol/week: 0.0 oz     Comment: very seldom    Drug use: No       Current medications and drug reactions (include OTC, herbal): see medication list     Strengths and liabilities: Strength: Patient accepts guidance/feedback, Strength: Patient is expressive/articulate., Strength: Patient is intelligent., Strength: Patient is motivated for change., Strength: Patient has positive support network., Strength: Patient has reasonable judgment., Strength: Patient is stable.    Current Evaluation:     Mental Status Exam:  General Appearance:  unremarkable, age appropriate, overweight   Speech: normal tone, normal rate, normal pitch, normal volume      Level of Cooperation: cooperative      Thought Processes: normal and logical   Mood: anxious      Thought Content: normal, no suicidality, no homicidality, delusions, or paranoia   Affect: congruent and appropriate   Orientation: Oriented x3   Memory: recent >  intact   Attention Span & Concentration: intact   Fund of General Knowledge: intact and appropriate to age and level of education   Abstract Reasoning: interpretation of similarities was abstract   Judgment & Insight: good     Language  intact     Diagnostic Impression - Plan:       ICD-10-CM ICD-9-CM   1. Stress, at home and work F43.9 V62.89       Plan:individual psychotherapy and medication management by physician    Return to Clinic: 1 week    Length of Service (minutes): 45

## 2019-07-30 ENCOUNTER — OFFICE VISIT (OUTPATIENT)
Dept: PSYCHIATRY | Facility: CLINIC | Age: 49
End: 2019-07-30
Payer: COMMERCIAL

## 2019-07-30 DIAGNOSIS — F43.9 STRESS: Primary | Chronic | ICD-10-CM

## 2019-07-30 PROCEDURE — 90834 PR PSYCHOTHERAPY W/PATIENT, 45 MIN: ICD-10-PCS | Mod: S$GLB,,, | Performed by: SOCIAL WORKER

## 2019-07-30 PROCEDURE — 99999 PR PBB SHADOW E&M-EST. PATIENT-LVL III: CPT | Mod: PBBFAC,,, | Performed by: SOCIAL WORKER

## 2019-07-30 PROCEDURE — 99999 PR PBB SHADOW E&M-EST. PATIENT-LVL III: ICD-10-PCS | Mod: PBBFAC,,, | Performed by: SOCIAL WORKER

## 2019-07-30 PROCEDURE — 90834 PSYTX W PT 45 MINUTES: CPT | Mod: S$GLB,,, | Performed by: SOCIAL WORKER

## 2019-07-30 NOTE — PROGRESS NOTES
Individual Psychotherapy (PhD/LCSW)    7/30/2019    Site:  [place] [address] - PSYCHIATRY  [facility] [place]          Therapeutic Intervention: Met with patient.  Outpatient - Supportive psychotherapy 45 min - CPT Code 92258 and Outpatient - Interactive psychotherapy 45 min - CPT code 31849    Chief complaint/reason for encounter: anxiety     Interval history and content of current session: Reviewed chart. Sister worse, in SNF in [place], 3-6 mos to live. Feeling reduced stress, ^exercise, much better. Reviewed areas of life. Will in hands of , will take 6-12 mos for probate to happen, not worried about it. Not working now, discussed what she might like to do, but happy taking time off to relax, deal with loss and stress. Skin better. Wants to leave door open to return if needed, but sees no need now.    Treatment plan:  · Target symptoms: adjustment  · Why chosen therapy is appropriate versus another modality: relevant to diagnosis, patient responds to this modality, evidence based practice  · Outcome monitoring methods: self-report, observation, checklist/rating scale  · Therapeutic intervention type: insight oriented psychotherapy, supportive psychotherapy, interactive psychotherapy    Risk parameters:  Patient reports no suicidal ideation  Patient reports no homicidal ideation  Patient reports no self-injurious behavior  Patient reports no violent behavior    Verbal deficits: None    Patient's response to intervention:  The patient's response to intervention is accepting.    Progress toward goals and other mental status changes:  The patient's progress toward goals is excellent.    Diagnosis:   1. Stress, at home and work        Plan:  Patient will continue to reduce stress, return as needed.    Return to clinic: as needed    Length of Service (minutes): 45 minutes

## 2019-08-11 DIAGNOSIS — J45.901: ICD-10-CM

## 2019-08-11 DIAGNOSIS — J40 BRONCHITIS: ICD-10-CM

## 2019-08-11 DIAGNOSIS — N95.2 ATROPHY OF VAGINA: ICD-10-CM

## 2019-08-13 RX ORDER — ALBUTEROL SULFATE 90 UG/1
AEROSOL, METERED RESPIRATORY (INHALATION)
Qty: 18 G | Refills: 0 | Status: SHIPPED | OUTPATIENT
Start: 2019-08-13 | End: 2022-05-13

## 2019-08-13 RX ORDER — ESTRADIOL 0.1 MG/G
CREAM VAGINAL
Qty: 42.5 G | Refills: 0 | Status: SHIPPED | OUTPATIENT
Start: 2019-08-13 | End: 2019-09-26 | Stop reason: SDUPTHER

## 2019-08-22 ENCOUNTER — OFFICE VISIT (OUTPATIENT)
Dept: FAMILY MEDICINE | Facility: CLINIC | Age: 49
End: 2019-08-22
Payer: COMMERCIAL

## 2019-08-22 VITALS
TEMPERATURE: 99 F | HEIGHT: 66 IN | DIASTOLIC BLOOD PRESSURE: 74 MMHG | OXYGEN SATURATION: 97 % | HEART RATE: 73 BPM | WEIGHT: 267 LBS | SYSTOLIC BLOOD PRESSURE: 134 MMHG | BODY MASS INDEX: 42.91 KG/M2 | RESPIRATION RATE: 17 BRPM

## 2019-08-22 DIAGNOSIS — R73.02 GLUCOSE INTOLERANCE (IMPAIRED GLUCOSE TOLERANCE): Primary | Chronic | ICD-10-CM

## 2019-08-22 DIAGNOSIS — R05.9 COUGH: ICD-10-CM

## 2019-08-22 DIAGNOSIS — E28.2 POLYCYSTIC OVARIAN DISEASE: Chronic | ICD-10-CM

## 2019-08-22 DIAGNOSIS — J45.31 ASTHMA WITH ALLERGIC RHINITIS, MILD PERSISTENT, WITH ACUTE EXACERBATION: Chronic | ICD-10-CM

## 2019-08-22 DIAGNOSIS — E66.01 MORBID OBESITY WITH BMI OF 40.0-44.9, ADULT: ICD-10-CM

## 2019-08-22 DIAGNOSIS — J45.20 MILD INTERMITTENT ASTHMA WITH ALLERGIC RHINITIS WITHOUT COMPLICATION: Chronic | ICD-10-CM

## 2019-08-22 PROCEDURE — 99214 PR OFFICE/OUTPT VISIT, EST, LEVL IV, 30-39 MIN: ICD-10-PCS | Mod: S$GLB,,, | Performed by: PHYSICIAN ASSISTANT

## 2019-08-22 PROCEDURE — 99999 PR PBB SHADOW E&M-EST. PATIENT-LVL V: ICD-10-PCS | Mod: PBBFAC,,, | Performed by: PHYSICIAN ASSISTANT

## 2019-08-22 PROCEDURE — 99214 OFFICE O/P EST MOD 30 MIN: CPT | Mod: S$GLB,,, | Performed by: PHYSICIAN ASSISTANT

## 2019-08-22 PROCEDURE — 99999 PR PBB SHADOW E&M-EST. PATIENT-LVL V: CPT | Mod: PBBFAC,,, | Performed by: PHYSICIAN ASSISTANT

## 2019-08-22 RX ORDER — MONTELUKAST SODIUM 10 MG/1
10 TABLET ORAL DAILY
Qty: 30 TABLET | Refills: 3 | Status: SHIPPED | OUTPATIENT
Start: 2019-08-22 | End: 2022-05-11 | Stop reason: SDUPTHER

## 2019-08-22 RX ORDER — BENZONATATE 200 MG/1
200 CAPSULE ORAL 3 TIMES DAILY PRN
Qty: 30 CAPSULE | Refills: 0 | Status: SHIPPED | OUTPATIENT
Start: 2019-08-22 | End: 2019-09-01

## 2019-08-22 NOTE — PROGRESS NOTES
Subjective:       Patient ID: Ijeoma Issa is a 49 y.o. female.    Chief Complaint: Follow-up and Cough (1 week )    Ms. Issa comes to clinic today for routine follow up. The patient reports overall she is doing well. She does complain of dry cough. She denies fever, chills, or sinus pressure. The patient has been taking her asthma medication for the cough, but otherwise, she has not been taking any medication the cough.     Cough   This is a recurrent problem. The current episode started in the past 7 days. The problem has been waxing and waning. The problem occurs every few minutes. The cough is non-productive and productive of purulent sputum. Associated symptoms include postnasal drip and a sore throat. Pertinent negatives include no chest pain, ear pain, fever, headaches, myalgias, rash, rhinorrhea, shortness of breath or wheezing. The symptoms are aggravated by nothing, dust, exercise and lying down. She has tried OTC cough suppressant, a beta-agonist inhaler, body position changes, cool air, leukotriene antagonists, oral steroids, rest and steroid inhaler for the symptoms. The treatment provided moderate relief. Her past medical history is significant for asthma, bronchitis and environmental allergies. There is no history of bronchiectasis, COPD, emphysema or pneumonia.     Review of Systems   Constitutional: Negative for activity change, appetite change, fatigue and fever.   HENT: Positive for postnasal drip and sore throat. Negative for congestion, ear pain and rhinorrhea.    Eyes: Negative for pain, itching and visual disturbance.   Respiratory: Positive for cough. Negative for shortness of breath and wheezing.    Cardiovascular: Negative for chest pain.   Gastrointestinal: Negative for abdominal distention, abdominal pain, constipation, diarrhea and nausea.   Genitourinary: Negative for difficulty urinating, dysuria, frequency, hematuria and urgency.   Musculoskeletal: Negative for  arthralgias, back pain, myalgias and neck pain.   Skin: Negative for color change, pallor and rash.   Allergic/Immunologic: Positive for environmental allergies.   Neurological: Negative for dizziness, syncope and headaches.   Hematological: Negative for adenopathy.   Psychiatric/Behavioral: Negative for behavioral problems. The patient is not nervous/anxious.        Objective:      Physical Exam   Constitutional: She is oriented to person, place, and time. Vital signs are normal. She appears well-nourished. No distress.   HENT:   Head: Normocephalic.   Mouth/Throat: Oropharynx is clear and moist. No oropharyngeal exudate.   Eyes: Pupils are equal, round, and reactive to light. Conjunctivae are normal.   Neck: Normal range of motion. No thyromegaly present.   Cardiovascular: Normal rate and regular rhythm. Exam reveals no gallop and no friction rub.   No murmur heard.  Pulmonary/Chest: Effort normal and breath sounds normal. No respiratory distress. She has no wheezes.   Abdominal: Soft. Bowel sounds are normal. There is no tenderness.   Musculoskeletal: Normal range of motion. She exhibits no edema.   Lymphadenopathy:     She has no cervical adenopathy.   Neurological: She is alert and oriented to person, place, and time.   Skin: Skin is warm and dry.   Psychiatric: She has a normal mood and affect.       Assessment:       1. Glucose intolerance (impaired glucose tolerance)    2. Polycystic ovarian disease    3. Mild intermittent asthma with allergic rhinitis without complication    4. Asthma with allergic rhinitis, mild persistent, with acute exacerbation    5. Cough    6. Morbid obesity with BMI of 40.0-44.9, adult        Plan:   Ijeoma was seen today for follow-up and cough.    Diagnoses and all orders for this visit:    Glucose intolerance (impaired glucose tolerance)  -     CBC auto differential; Future  -     Hemoglobin A1c; Future  -     Comprehensive metabolic panel; Future  -     Lipid panel; Future  Low  carbohydrate, high fiber diet  Increase exercise as able  Polycystic ovarian disease  Continue current medication  Mild intermittent asthma with allergic rhinitis without complication  -     montelukast (SINGULAIR) 10 mg tablet; Take 1 tablet (10 mg total) by mouth once daily.    Asthma with allergic rhinitis, mild persistent, with acute exacerbation  -     montelukast (SINGULAIR) 10 mg tablet; Take 1 tablet (10 mg total) by mouth once daily.    Cough  -     benzonatate (TESSALON) 200 MG capsule; Take 1 capsule (200 mg total) by mouth 3 (three) times daily as needed.    Morbid obesity with BMI of 40.0-44.9, adult  Low carbohydrate, high fiber diet  Increase exercise as able    Patient readiness: acceptance and barriers:none    During the course of the visit the patient was educated and counseled about the following:     Obesity:   General weight loss/lifestyle modification strategies discussed (elicit support from others; identify saboteurs; non-food rewards, etc).  Informal exercise measures discussed, e.g. taking stairs instead of elevator.  Regular aerobic exercise program discussed.    Goals: Obesity: Reduce calorie intake and BMI    Did patient meet goals/outcomes: Yes    The following self management tools provided: declined    Patient Instructions (the written plan) was given to the patient/family.     Time spent with patient: 30 minutes    Barriers to medications present (no )    Adverse reactions to current medications (no)    Over the counter medications reviewed (Yes)

## 2019-08-31 DIAGNOSIS — K21.9 GASTROESOPHAGEAL REFLUX DISEASE, ESOPHAGITIS PRESENCE NOT SPECIFIED: ICD-10-CM

## 2019-09-02 RX ORDER — OMEPRAZOLE 40 MG/1
CAPSULE, DELAYED RELEASE ORAL
Qty: 90 CAPSULE | Refills: 3 | Status: SHIPPED | OUTPATIENT
Start: 2019-09-02 | End: 2020-08-20

## 2019-09-26 DIAGNOSIS — N95.2 ATROPHY OF VAGINA: ICD-10-CM

## 2019-09-26 RX ORDER — ESTRADIOL 0.1 MG/G
CREAM VAGINAL
Qty: 42.5 G | Refills: 4 | Status: SHIPPED | OUTPATIENT
Start: 2019-09-26 | End: 2023-06-30

## 2019-10-07 RX ORDER — ESTRADIOL 1 MG/1
TABLET ORAL
Qty: 30 TABLET | Refills: 0 | Status: SHIPPED | OUTPATIENT
Start: 2019-10-07 | End: 2019-11-05 | Stop reason: SDUPTHER

## 2019-11-04 ENCOUNTER — HOSPITAL ENCOUNTER (EMERGENCY)
Facility: HOSPITAL | Age: 49
Discharge: HOME | End: 2019-11-04
Attending: EMERGENCY MEDICINE
Payer: COMMERCIAL

## 2019-11-04 VITALS
HEART RATE: 92 BPM | SYSTOLIC BLOOD PRESSURE: 126 MMHG | DIASTOLIC BLOOD PRESSURE: 71 MMHG | RESPIRATION RATE: 18 BRPM | BODY MASS INDEX: 42.75 KG/M2 | HEIGHT: 66 IN | TEMPERATURE: 98 F | WEIGHT: 266 LBS | OXYGEN SATURATION: 95 %

## 2019-11-04 DIAGNOSIS — J06.9 VIRAL URI WITH COUGH: Primary | ICD-10-CM

## 2019-11-04 LAB
INFLUENZA A, MOLECULAR: NEGATIVE
INFLUENZA B, MOLECULAR: NEGATIVE
SPECIMEN SOURCE: NORMAL

## 2019-11-04 PROCEDURE — 87502 INFLUENZA DNA AMP PROBE: CPT

## 2019-11-04 PROCEDURE — 99283 EMERGENCY DEPT VISIT LOW MDM: CPT | Mod: 25

## 2019-11-04 PROCEDURE — 25000003 PHARM REV CODE 250: Performed by: PHYSICIAN ASSISTANT

## 2019-11-04 RX ORDER — IBUPROFEN 600 MG/1
600 TABLET ORAL
Status: COMPLETED | OUTPATIENT
Start: 2019-11-04 | End: 2019-11-04

## 2019-11-04 RX ORDER — FLUTICASONE PROPIONATE 50 MCG
1 SPRAY, SUSPENSION (ML) NASAL 2 TIMES DAILY PRN
Qty: 15 G | Refills: 0 | Status: SHIPPED | OUTPATIENT
Start: 2019-11-04 | End: 2020-01-06 | Stop reason: SDUPTHER

## 2019-11-04 RX ADMIN — IBUPROFEN 600 MG: 600 TABLET ORAL at 11:11

## 2019-11-04 NOTE — ED PROVIDER NOTES
"Encounter Date: 11/4/2019    SCRIBE #1 NOTE: I, Dang Coon, am scribing for, and in the presence of, Francia Louie PA-C.       History     Chief Complaint   Patient presents with    Cough     " my son has flu b "    Chills       Time seen by provider: 10:52 AM on 11/04/2019    Ijeoma Issa is a 49 y.o. female who presents to the ED with complaints of cough, congestion, runny nose, chills, fever, and SOB. Congestion started x2 weeks ago. Other symptoms started a few days ago. She endorses son recently tested positive for flu B. She also reports having sick contact with other kids due to being a . The patient reports cough is occasionally productive with green phlegm. She reports having a fever of 100.2 TMAXF recently. She last took Tylenol Cold & Flu containing Dextromethorphan HBr 10 mg, Guaifenesin 200 mg, and Phenylephrine HCI 5 mg. The patient denies fever this morning. She has used an inhaler this morning with little to no relief. The patient also used a breathing treatment last night and this morning with little to no relief. The patient has no other medical concerns or complaints at this moment. She denies onset of any other new symptoms currently. PMHx includes DM and GERD. SHx includes hysterectomy. NKDA.    The history is provided by the patient.     Review of patient's allergies indicates:   Allergen Reactions    Cashew nut Anaphylaxis     Allergic to Cashew nuts    Nut flavor Anaphylaxis     Allergic to Cashew nuts    Latex Rash     Including in injections    Varicella vaccines      Red swelling/ lump develops at injection site    Adhesive tape-silicones Rash    Tapentadol Rash     Past Medical History:   Diagnosis Date    Abnormal mammogram     neg biospy    Allergic asthma     Anxiety     Arthritis     Dermatitis     Dr. Weil, Extremities    Diabetes     Diverticulosis of colon     GERD (gastroesophageal reflux disease)     Mass of right breast     " Postmenopausal hormone replacement therapy     Tubal pregnancy     Uterine fibroid      Past Surgical History:   Procedure Laterality Date    BREAST BIOPSY Right 2012    benign     SECTION, LOW TRANSVERSE      CHOLECYSTECTOMY      HYSTERECTOMY      re: benign tumors per the patient    right ankle  10/10/2014    TOTAL ABDOMINAL HYSTERECTOMY W/ BILATERAL SALPINGOOPHORECTOMY      c appendectomy    TUBAL LIGATION       Family History   Problem Relation Age of Onset    Cancer Father         colon    Colon cancer Father     Diabetes Sister     Breast cancer Sister 28    Cancer Brother         stomach    Heart disease Brother     Heart disease Mother     Ovarian cancer Neg Hx      Social History     Tobacco Use    Smoking status: Never Smoker    Smokeless tobacco: Never Used   Substance Use Topics    Alcohol use: Yes     Alcohol/week: 0.0 standard drinks     Frequency: Never     Comment: very seldom    Drug use: No     Review of Systems   Constitutional: Positive for chills and fever. Negative for activity change and fatigue.   HENT: Positive for congestion, rhinorrhea and sore throat.    Respiratory: Positive for cough and shortness of breath.    Cardiovascular: Negative for chest pain, palpitations and leg swelling.   Gastrointestinal: Negative for abdominal pain, nausea and vomiting.   Musculoskeletal: Negative for joint swelling and neck pain.   Skin: Negative for pallor and rash.   Neurological: Negative for syncope and headaches.   Hematological: Does not bruise/bleed easily.   Psychiatric/Behavioral: The patient is not nervous/anxious.        Physical Exam     Initial Vitals [19 0954]   BP Pulse Resp Temp SpO2   (!) 147/84 (!) 117 18 98 °F (36.7 °C) 98 %      MAP       --         Vitals:    19 0954 19 1126   BP: (!) 147/84 126/71   Pulse: (!) 117 92   Resp: 18    Temp: 98 °F (36.7 °C)    TempSrc: Oral    SpO2: 98% 95%   Weight: 120.7 kg (266 lb)    Height:  "5' 6" (1.676 m)        Physical Exam    Nursing note and vitals reviewed.  Constitutional: She appears well-developed and well-nourished.  Non-toxic appearance.   HENT:   Head: Atraumatic.   Right Ear: External ear normal.   Left Ear: External ear normal.   Nose: No epistaxis.   Mouth/Throat: Oropharynx is clear and moist. No oropharyngeal exudate.   Eyes: Conjunctivae and EOM are normal. Pupils are equal, round, and reactive to light.   Neck: Normal range of motion. Neck supple.   Cardiovascular: Normal rate, regular rhythm and normal heart sounds.   Pulmonary/Chest: Breath sounds normal. She has no wheezes. She has no rhonchi.   Lymphadenopathy:     She has no cervical adenopathy.   Neurological: She is alert and oriented to person, place, and time.   Skin: No rash noted.         ED Course   Procedures  Labs Reviewed   INFLUENZA A & B BY MOLECULAR          Imaging Results          X-Ray Chest PA And Lateral (Final result)  Result time 11/04/19 11:15:50    Final result by Calvin Valdovinos MD (11/04/19 11:15:50)                 Impression:      Negative chest.  No significant change.      Electronically signed by: Calvin Valdovinos MD  Date:    11/04/2019  Time:    11:15             Narrative:    EXAMINATION:  XR CHEST PA AND LATERAL    CLINICAL HISTORY:  productive cough;    TECHNIQUE:  PA and lateral views of the chest were performed.    COMPARISON:  12/26/2018    FINDINGS:  The cardiomediastinal silhouette is within normal limits.  The lungs are well expanded without consolidation or pleural effusion.                                 Medical Decision Making:   History:   Old Medical Records: I decided to obtain old medical records.  Clinical Tests:   Lab Tests: Reviewed and Ordered  Radiological Study: Reviewed and Ordered        Medications   ibuprofen tablet 600 mg (600 mg Oral Given 11/4/19 1104)       APC / Resident Notes:   The patient appears to have the flu or viral URI.  Influenza swab is negative.   " Based upon the history and physical exam the patient does not appear to have a serious bacterial infection such as pneumonia, otitis media, bacterial sinusitis, strep pharyngitis, parapharyngeal or peritonsillar abscess, meningitis.  Chest x-ray is negative for acute or infectious process.  I do not think the patient needs antibiotics as her illness likely has a viral etiology.    Patient appears very well and is stable for discharge.  I have prescribed flonase for her to take in addition to over the counter medications for symptom control.   I have instructed the patient to hydrate.   She is given ER return precautions and advised to follow up with PCP within 1 week for ER follow exam.     I discussed the care of this pt with my supervising MD.         Scribe Attestation:   Scribe #1: I performed the above scribed service and the documentation accurately describes the services I performed. I attest to the accuracy of the note.    Attending Attestation:     Physician Attestation Statement for NP/PA:   I discussed this assessment and plan of this patient with the NP/PA, but I did not personally examine the patient. The face to face encounter was performed by the NP/PA.    Other NP/PA Attestation Additions:      Medical Decision Making: Ijeoma Issa is a 49 y.o. female presenting with likely viral URI.  Influenza testing and chest x-ray done at patient's request.  No sign of pneumonia.  I do not think antibiotics are indicated.  No indication for antivirals.  No increased work of breathing with her low suspicion for other emergent, life-threatening process such as ACS, CHF, PE.  I not think other ED diagnostic testing is indicated.  Follow up with PCP.           I, Francia Louie, personally performed the services described in this documentation. All medical record entries made by the scribe were at my direction and in my presence.  I have reviewed the chart and agree that the record reflects my personal  performance and is accurate and complete. Francia Louie, SHANTHI.  6:15 PM 11/04/2019               Clinical Impression:       ICD-10-CM ICD-9-CM   1. Viral URI with cough J06.9 465.9    B97.89          Disposition:   Disposition: Discharged  Condition: Stable                        GENESIS Enciso  11/04/19 1819

## 2019-11-05 RX ORDER — ESTRADIOL 1 MG/1
TABLET ORAL
Qty: 30 TABLET | Refills: 1 | Status: SHIPPED | OUTPATIENT
Start: 2019-11-05 | End: 2020-01-07 | Stop reason: SDUPTHER

## 2019-11-14 DIAGNOSIS — J45.31 ASTHMA WITH ALLERGIC RHINITIS, MILD PERSISTENT, WITH ACUTE EXACERBATION: Chronic | ICD-10-CM

## 2019-11-14 RX ORDER — MONTELUKAST SODIUM 10 MG/1
TABLET ORAL
Qty: 30 TABLET | Refills: 3 | Status: SHIPPED | OUTPATIENT
Start: 2019-11-14 | End: 2020-05-21

## 2019-11-16 ENCOUNTER — LAB VISIT (OUTPATIENT)
Dept: LAB | Facility: HOSPITAL | Age: 49
End: 2019-11-16
Attending: FAMILY MEDICINE
Payer: COMMERCIAL

## 2019-11-16 DIAGNOSIS — R73.02 GLUCOSE INTOLERANCE (IMPAIRED GLUCOSE TOLERANCE): Chronic | ICD-10-CM

## 2019-11-16 LAB
ALBUMIN SERPL BCP-MCNC: 3.6 G/DL (ref 3.5–5.2)
ALP SERPL-CCNC: 88 U/L (ref 55–135)
ALT SERPL W/O P-5'-P-CCNC: 14 U/L (ref 10–44)
ANION GAP SERPL CALC-SCNC: 7 MMOL/L (ref 8–16)
AST SERPL-CCNC: 16 U/L (ref 10–40)
BASOPHILS # BLD AUTO: 0.04 K/UL (ref 0–0.2)
BASOPHILS NFR BLD: 0.5 % (ref 0–1.9)
BILIRUB SERPL-MCNC: 0.4 MG/DL (ref 0.1–1)
BUN SERPL-MCNC: 16 MG/DL (ref 6–20)
CALCIUM SERPL-MCNC: 9 MG/DL (ref 8.7–10.5)
CHLORIDE SERPL-SCNC: 105 MMOL/L (ref 95–110)
CHOLEST SERPL-MCNC: 176 MG/DL (ref 120–199)
CHOLEST/HDLC SERPL: 3.5 {RATIO} (ref 2–5)
CO2 SERPL-SCNC: 28 MMOL/L (ref 23–29)
CREAT SERPL-MCNC: 0.7 MG/DL (ref 0.5–1.4)
DIFFERENTIAL METHOD: ABNORMAL
EOSINOPHIL # BLD AUTO: 0.1 K/UL (ref 0–0.5)
EOSINOPHIL NFR BLD: 1.7 % (ref 0–8)
ERYTHROCYTE [DISTWIDTH] IN BLOOD BY AUTOMATED COUNT: 12.2 % (ref 11.5–14.5)
EST. GFR  (AFRICAN AMERICAN): >60 ML/MIN/1.73 M^2
EST. GFR  (NON AFRICAN AMERICAN): >60 ML/MIN/1.73 M^2
ESTIMATED AVG GLUCOSE: 105 MG/DL (ref 68–131)
GLUCOSE SERPL-MCNC: 83 MG/DL (ref 70–110)
HBA1C MFR BLD HPLC: 5.3 % (ref 4–5.6)
HCT VFR BLD AUTO: 44.1 % (ref 37–48.5)
HDLC SERPL-MCNC: 50 MG/DL (ref 40–75)
HDLC SERPL: 28.4 % (ref 20–50)
HGB BLD-MCNC: 13.8 G/DL (ref 12–16)
IMM GRANULOCYTES # BLD AUTO: 0.02 K/UL (ref 0–0.04)
IMM GRANULOCYTES NFR BLD AUTO: 0.2 % (ref 0–0.5)
LDLC SERPL CALC-MCNC: 102.4 MG/DL (ref 63–159)
LYMPHOCYTES # BLD AUTO: 1.4 K/UL (ref 1–4.8)
LYMPHOCYTES NFR BLD: 17.2 % (ref 18–48)
MCH RBC QN AUTO: 29.1 PG (ref 27–31)
MCHC RBC AUTO-ENTMCNC: 31.3 G/DL (ref 32–36)
MCV RBC AUTO: 93 FL (ref 82–98)
MONOCYTES # BLD AUTO: 0.6 K/UL (ref 0.3–1)
MONOCYTES NFR BLD: 7.1 % (ref 4–15)
NEUTROPHILS # BLD AUTO: 5.9 K/UL (ref 1.8–7.7)
NEUTROPHILS NFR BLD: 73.3 % (ref 38–73)
NONHDLC SERPL-MCNC: 126 MG/DL
NRBC BLD-RTO: 0 /100 WBC
PLATELET # BLD AUTO: 268 K/UL (ref 150–350)
PMV BLD AUTO: 11.5 FL (ref 9.2–12.9)
POTASSIUM SERPL-SCNC: 3.9 MMOL/L (ref 3.5–5.1)
PROT SERPL-MCNC: 6.9 G/DL (ref 6–8.4)
RBC # BLD AUTO: 4.74 M/UL (ref 4–5.4)
SODIUM SERPL-SCNC: 140 MMOL/L (ref 136–145)
TRIGL SERPL-MCNC: 118 MG/DL (ref 30–150)
WBC # BLD AUTO: 8.03 K/UL (ref 3.9–12.7)

## 2019-11-16 PROCEDURE — 83036 HEMOGLOBIN GLYCOSYLATED A1C: CPT

## 2019-11-16 PROCEDURE — 80053 COMPREHEN METABOLIC PANEL: CPT

## 2019-11-16 PROCEDURE — 36415 COLL VENOUS BLD VENIPUNCTURE: CPT | Mod: PO

## 2019-11-16 PROCEDURE — 80061 LIPID PANEL: CPT

## 2019-11-16 PROCEDURE — 85025 COMPLETE CBC W/AUTO DIFF WBC: CPT

## 2019-11-17 ENCOUNTER — CLINICAL SUPPORT (OUTPATIENT)
Dept: URGENT CARE | Facility: CLINIC | Age: 49
End: 2019-11-17
Payer: COMMERCIAL

## 2019-11-17 VITALS
WEIGHT: 216 LBS | TEMPERATURE: 102 F | HEART RATE: 112 BPM | RESPIRATION RATE: 14 BRPM | BODY MASS INDEX: 34.86 KG/M2 | SYSTOLIC BLOOD PRESSURE: 147 MMHG | DIASTOLIC BLOOD PRESSURE: 97 MMHG | OXYGEN SATURATION: 94 %

## 2019-11-17 DIAGNOSIS — R50.9 FEVER, UNSPECIFIED FEVER CAUSE: Primary | ICD-10-CM

## 2019-11-17 DIAGNOSIS — J18.9 PNEUMONIA OF LEFT LUNG DUE TO INFECTIOUS ORGANISM, UNSPECIFIED PART OF LUNG: ICD-10-CM

## 2019-11-17 DIAGNOSIS — M54.9 BACK PAIN, UNSPECIFIED BACK LOCATION, UNSPECIFIED BACK PAIN LATERALITY, UNSPECIFIED CHRONICITY: ICD-10-CM

## 2019-11-17 LAB
BILIRUB UR QL STRIP: NEGATIVE
CTP QC/QA: YES
CTP QC/QA: YES
FLUAV AG NPH QL: NEGATIVE
FLUBV AG NPH QL: NEGATIVE
GLUCOSE UR QL STRIP: NEGATIVE
KETONES UR QL STRIP: POSITIVE
LEUKOCYTE ESTERASE UR QL STRIP: NEGATIVE
PH, POC UA: 5.5
POC BLOOD, URINE: POSITIVE
POC NITRATES, URINE: NEGATIVE
PROT UR QL STRIP: POSITIVE
S PYO RRNA THROAT QL PROBE: NEGATIVE
SP GR UR STRIP: 1.02 (ref 1–1.03)
UROBILINOGEN UR STRIP-ACNC: NORMAL (ref 0.1–1.1)

## 2019-11-17 PROCEDURE — 94640 AIRWAY INHALATION TREATMENT: CPT | Mod: S$GLB,,, | Performed by: NURSE PRACTITIONER

## 2019-11-17 PROCEDURE — 81003 URINALYSIS AUTO W/O SCOPE: CPT | Mod: QW,S$GLB,, | Performed by: NURSE PRACTITIONER

## 2019-11-17 PROCEDURE — 99204 PR OFFICE/OUTPT VISIT, NEW, LEVL IV, 45-59 MIN: ICD-10-PCS | Mod: 25,S$GLB,, | Performed by: NURSE PRACTITIONER

## 2019-11-17 PROCEDURE — 87880 STREP A ASSAY W/OPTIC: CPT | Mod: QW,,, | Performed by: NURSE PRACTITIONER

## 2019-11-17 PROCEDURE — 71046 X-RAY EXAM CHEST 2 VIEWS: CPT | Mod: S$GLB,,, | Performed by: NURSE PRACTITIONER

## 2019-11-17 PROCEDURE — 71046 PR XRAY, CHEST, 2 VIEWS: ICD-10-PCS | Mod: S$GLB,,, | Performed by: NURSE PRACTITIONER

## 2019-11-17 PROCEDURE — 87880 POCT RAPID STREP A: ICD-10-PCS | Mod: QW,,, | Performed by: NURSE PRACTITIONER

## 2019-11-17 PROCEDURE — 94640 PR INHAL RX, AIRWAY OBST/DX SPUTUM INDUCT: ICD-10-PCS | Mod: S$GLB,,, | Performed by: NURSE PRACTITIONER

## 2019-11-17 PROCEDURE — 87804 POCT INFLUENZA A/B: ICD-10-PCS | Mod: 59,QW,, | Performed by: NURSE PRACTITIONER

## 2019-11-17 PROCEDURE — 81003 POCT URINALYSIS, DIPSTICK, AUTOMATED, W/O SCOPE: ICD-10-PCS | Mod: QW,S$GLB,, | Performed by: NURSE PRACTITIONER

## 2019-11-17 PROCEDURE — 96372 THER/PROPH/DIAG INJ SC/IM: CPT | Mod: S$GLB,,, | Performed by: NURSE PRACTITIONER

## 2019-11-17 PROCEDURE — 99204 OFFICE O/P NEW MOD 45 MIN: CPT | Mod: 25,S$GLB,, | Performed by: NURSE PRACTITIONER

## 2019-11-17 PROCEDURE — 96372 PR INJECTION,THERAP/PROPH/DIAG2ST, IM OR SUBCUT: ICD-10-PCS | Mod: S$GLB,,, | Performed by: NURSE PRACTITIONER

## 2019-11-17 PROCEDURE — 87804 INFLUENZA ASSAY W/OPTIC: CPT | Mod: QW,,, | Performed by: NURSE PRACTITIONER

## 2019-11-17 RX ORDER — PREDNISONE 20 MG/1
40 TABLET ORAL DAILY
Qty: 10 TABLET | Refills: 0 | Status: SHIPPED | OUTPATIENT
Start: 2019-11-17 | End: 2019-11-22

## 2019-11-17 RX ORDER — IBUPROFEN 200 MG
400 TABLET ORAL
Status: COMPLETED | OUTPATIENT
Start: 2019-11-17 | End: 2019-11-17

## 2019-11-17 RX ORDER — ONDANSETRON 4 MG/1
4 TABLET, ORALLY DISINTEGRATING ORAL
Status: COMPLETED | OUTPATIENT
Start: 2019-11-17 | End: 2019-11-17

## 2019-11-17 RX ORDER — DEXAMETHASONE SODIUM PHOSPHATE 4 MG/ML
8 INJECTION, SOLUTION INTRA-ARTICULAR; INTRALESIONAL; INTRAMUSCULAR; INTRAVENOUS; SOFT TISSUE
Status: COMPLETED | OUTPATIENT
Start: 2019-11-17 | End: 2019-11-17

## 2019-11-17 RX ORDER — ALBUTEROL SULFATE 90 UG/1
2 AEROSOL, METERED RESPIRATORY (INHALATION) EVERY 6 HOURS PRN
Qty: 18 G | Refills: 0 | Status: SHIPPED | OUTPATIENT
Start: 2019-11-17 | End: 2022-05-13

## 2019-11-17 RX ORDER — ALBUTEROL SULFATE 0.83 MG/ML
2.5 SOLUTION RESPIRATORY (INHALATION)
Status: COMPLETED | OUTPATIENT
Start: 2019-11-17 | End: 2019-11-17

## 2019-11-17 RX ORDER — AZITHROMYCIN 250 MG/1
TABLET, FILM COATED ORAL
Qty: 6 TABLET | Refills: 0 | Status: SHIPPED | OUTPATIENT
Start: 2019-11-17 | End: 2020-01-06

## 2019-11-17 RX ORDER — IPRATROPIUM BROMIDE 0.5 MG/2.5ML
0.5 SOLUTION RESPIRATORY (INHALATION)
Status: COMPLETED | OUTPATIENT
Start: 2019-11-17 | End: 2019-11-17

## 2019-11-17 RX ADMIN — Medication 400 MG: at 01:11

## 2019-11-17 RX ADMIN — IPRATROPIUM BROMIDE 0.5 MG: 0.5 SOLUTION RESPIRATORY (INHALATION) at 01:11

## 2019-11-17 RX ADMIN — ONDANSETRON 4 MG: 4 TABLET, ORALLY DISINTEGRATING ORAL at 01:11

## 2019-11-17 RX ADMIN — DEXAMETHASONE SODIUM PHOSPHATE 8 MG: 4 INJECTION, SOLUTION INTRA-ARTICULAR; INTRALESIONAL; INTRAMUSCULAR; INTRAVENOUS; SOFT TISSUE at 01:11

## 2019-11-17 RX ADMIN — ALBUTEROL SULFATE 2.5 MG: 0.83 SOLUTION RESPIRATORY (INHALATION) at 01:11

## 2019-11-17 NOTE — PATIENT INSTRUCTIONS
Follow up with PCP this week as scheduled. Repeat chest Xray in 6-8 weeks.     Pneumonia (Adult)  Pneumonia is an infection deep within the lungs. It is in the small air sacs (alveoli). Pneumonia may be caused by a virus or bacteria. Pneumonia caused by bacteria is usually treated with an antibiotic. Severe cases may need to be treated in the hospital. Milder cases can be treated at home. Symptoms usually start to get better during the first 2 days of treatment.    Home care  Follow these guidelines when caring for yourself at home:  · Rest at home for the first 2 to 3 days, or until you feel stronger. Dont let yourself get overly tired when you go back to your activities.  · Stay away from cigarette smoke - yours or other peoples.  · You may use acetaminophen or ibuprofen to control fever or pain, unless another medicine was prescribed. If you have chronic liver or kidney disease, talk with your healthcare provider before using these medicines. Also talk with your provider if youve had a stomach ulcer or gastrointestinal bleeding. Dont give aspirin to anyone younger than 18 years of age who is ill with a fever. It may cause severe liver damage.  · Your appetite may be poor, so a light diet is fine.  · Drink 6 to 8 glasses of fluids every day to make sure you are getting enough fluids. Beverages can include water, sport drinks, sodas without caffeine, juices, tea, or soup. Fluids will help loosen secretions in the lung. This will make it easier for you to cough up the phlegm (sputum). If you also have heart or kidney disease, check with your healthcare provider before you drink extra fluids.  · Take antibiotic medicine prescribed until it is all gone, even if you are feeling better after a few days.  Follow-up care  Follow up with your healthcare provider in the next 2 to 3 days, or as advised. This is to be sure the medicine is helping you get better.  If you are 65 or older, you should get a pneumococcal  vaccine and a yearly flu (influenza) shot. You should also get these vaccines if you have chronic lung disease like asthma, emphysema, or COPD. Recently, a second type of pneumonia vaccine has become available for everyone over 65 years old. This is in addition to the previous vaccine. Ask your provider about this.  When to seek medical advice  Call your healthcare provider right away if any of these occur:  · You dont get better within the first 48 hours of treatment  · Shortness of breath gets worse  · Rapid breathing (more than 25 breaths per minute)  · Coughing up blood  · Chest pain gets worse with breathing  · Fever of 100.4°F (38°C) or higher that doesnt get better with fever medicine  · Weakness, dizziness, or fainting that gets worse  · Thirst or dry mouth that gets worse  · Sinus pain, headache, or a stiff neck  · Chest pain not caused by coughing  Date Last Reviewed: 1/1/2017  © 4396-1814 [employer]. [address] [place] [address]. All rights reserved. This information is not intended as a substitute for professional medical care. Always follow your healthcare professional's instructions.        Fever Control (Adult)  A fever is a normal reaction of your body to an illness. The temperature itself usually isnt harmful.  It actually helps your body fight infections. You dont need to treat a fever unless you feel very uncomfortable.   Home care  Follow these tips to take care of yourself at home:  · If you feel warm, check your temperature.  · Dress in light clothing. This will help you lose extra body heat through your skin. The fever will go up if you wear extra layers or wrap in blankets.  · Fever causes your body to lose water through evaporation. Drink plenty of fluids. These include water, juice, clear sodas, ginger ale, or lemonade.  Fever medicines  You can take acetaminophen every 4 to 6 hours if:  · You feel very uncomfortable  · Your oral temperature is 100.4ºF (38ºC)  or higher  If you cant take or keep down oral medicine, ask your pharmacist for acetaminophen suppositories. You dont need a prescription for these.  If the fever doesnt get better within 1 hour after you take acetaminophen, take ibuprofen. If this works, keep taking the ibuprofen every 6 to 8 hours.  Note: If you have chronic liver or kidney disease, talk with your healthcare provider before taking these medicines. Also talk with your provider if you ever had a stomach ulcer or GI (gastrointestinal) bleeding.  If either medicine alone doesnt keep the fever down, you may switch off between the 2 medicines every 3 to 4 hours. But do this only if your healthcare provider has told you to. For example, take ibuprofen. Wait 3 hours. Then take acetaminophen. Wait 3 hours. Take ibuprofen, and so on. Follow your providers instructions exactly.  Note: Do not give aspirin to anyone younger than age 19 who is ill with a fever. Aspirin can cause serious side effects such as liver damage and Reye syndrome. Although rare, Reye syndrome is a very serious illness usually found in children younger than age 15. The syndrome is closely linked to the use of aspirin or aspirin-containing medicine during viral infection.  Follow-up care  Follow up with your healthcare provider if you don't get better after 48 hours.  When to seek medical advice  Call your healthcare provider right away if any of these occur:  · Fever, as directed by your healthcare provider, or:  ¨ Fever of 100.4°F (38°C) or above lasting for 24 to 48 hours  ¨ Fever lasting more than 3 days, even without other symptoms  ¨ Fever that happens after visiting a foreign country  ¨ Fever that happens within a month after visiting a country with malaria. Malaria is a serious illness. A fever can still be malaria even if you took medicine to prevent it. The medicine does not work in all cases.  · Confusion or trouble thinking  · Headache or stiff neck  · Flat, small,  purplish red spots on your skin  · Low blood pressure  · Fast heart rate  · Fast (rapid) breathing  · You are pregnant  · You just had surgery, another medical procedure, or were just discharged from the hospital  · Use of medicines that suppress the immune system (immunosuppressants). These include Prednisone, cancer medicines, and organ transplant rejection medicines. If you are not sure about whether your medicines suppress your immune system, ask your healthcare provider.  Call 911  Someone should call 911 if you:  · Are having trouble breathing or shortness of breath  · Are unresponsive  Important reminder  Call your healthcare provider if you get a fever after visiting a place where infectious diseases are common. Many people  a cold or other virus while traveling. This usually goes away without a problem. But, some places have more serious diseases. Fever with certain other symptoms may mean you have a serious illness. Symptoms to watch for include diarrhea, skin rashes, insect bites, and skin boils, or infections. Your provider may ask you:  · What you did on your trip  · How long you were there  · Where you stayed (hotel, native house, tent)  · What you ate and drank  · If you were bitten by insects or other bugs  · If you swam in freshwater  · If you had sex or got a tattoo or piercing while you were there  Check the Centers for Disease Control and Prevention to get more information about specific infectious diseases in the areas you have traveled.  Date Last Reviewed: 1/1/2017  © 1139-5550 [employer]. [address] [place] [address], [place] [address]. All rights reserved. This information is not intended as a substitute for professional medical care. Always follow your healthcare professional's instructions.

## 2019-11-17 NOTE — PROGRESS NOTES
"Subjective:       Patient ID: Ijeoma Issa is a 49 y.o. female.    Vitals:  weight is 98 kg (216 lb). Her temperature is 101.8 °F (38.8 °C) (abnormal). Her blood pressure is 147/97 (abnormal) and her pulse is 112 (abnormal). Her respiration is 14 and oxygen saturation is 94% (abnormal).     Chief Complaint: Follow-up    Patient complains of cough since 11/4/19. States she was seen in the ER on 11/4/19 for cough and diagnosed with URI. States yesterday she started having fever and right ear pain. 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 States she has been using her inhaler and albuterol neb at home. Patient also complains of right flank pain.         Follow-up   The current episode started 1 to 4 weeks ago (approx 2 weeks on and off, fever started yestersay ). The problem occurs constantly. Associated symptoms include congestion, coughing and a fever. Pertinent negatives include no abdominal pain, arthralgias, chest pain, chills, fatigue, headaches, joint swelling, myalgias, nausea, rash, sore throat, vertigo, vomiting or weakness. Associated symptoms comments: Lumbar Back pain, chills, non productive cough and productive cough ( thick ) and fever 101.8. Nothing aggravates the symptoms. Treatments tried: sudafed, tessolon perals and flonase nasal spray and albuerol nha  The treatment provided no relief.       Constitution: Positive for fever. Negative for chills and fatigue.   HENT: Positive for congestion. Negative for sore throat.    Neck: Negative for painful lymph nodes.   Cardiovascular: Negative for chest pain and leg swelling.   Eyes: Negative for double vision and blurred vision.   Respiratory: Positive for cough and wheezing. Negative for shortness of breath.    Gastrointestinal: Negative for abdominal pain, nausea, vomiting and diarrhea.   Endocrine: negative.   Genitourinary: Negative for dysuria, frequency, urgency and history of kidney " stones.   Musculoskeletal: Negative for joint pain, joint swelling, muscle cramps and muscle ache.   Skin: Negative for color change, pale, rash and bruising.   Allergic/Immunologic: Negative for seasonal allergies.   Neurological: Negative for dizziness, history of vertigo, light-headedness, passing out and headaches.   Hematologic/Lymphatic: Negative for swollen lymph nodes.   Psychiatric/Behavioral: Negative for nervous/anxious, sleep disturbance and depression. The patient is not nervous/anxious.        Objective:      Physical Exam   Constitutional: She is oriented to person, place, and time. She appears well-developed and well-nourished. She is cooperative.  Non-toxic appearance. She does not appear ill. No distress.   HENT:   Head: Normocephalic and atraumatic.   Right Ear: Hearing, tympanic membrane, external ear and ear canal normal.   Left Ear: Hearing, tympanic membrane, external ear and ear canal normal.   Nose: Nose normal. No mucosal edema, rhinorrhea or nasal deformity. No epistaxis. Right sinus exhibits no maxillary sinus tenderness and no frontal sinus tenderness. Left sinus exhibits no maxillary sinus tenderness and no frontal sinus tenderness.   Mouth/Throat: Uvula is midline, oropharynx is clear and moist and mucous membranes are normal. No trismus in the jaw. Normal dentition. No uvula swelling. No posterior oropharyngeal erythema.   Eyes: Conjunctivae and lids are normal. Right eye exhibits no discharge. Left eye exhibits no discharge. No scleral icterus.   Neck: Trachea normal, normal range of motion, full passive range of motion without pain and phonation normal. Neck supple.   Cardiovascular: Normal rate, regular rhythm, normal heart sounds, intact distal pulses and normal pulses.   Pulmonary/Chest: Effort normal and breath sounds normal. No respiratory distress.   Abdominal: Soft. Normal appearance and bowel sounds are normal. She exhibits no distension, no pulsatile midline mass and no  mass. There is no tenderness.   Musculoskeletal: Normal range of motion. She exhibits no edema or deformity.   Neurological: She is alert and oriented to person, place, and time. She exhibits normal muscle tone. Coordination normal.   Skin: Skin is warm, dry, intact, not diaphoretic and not pale.   Psychiatric: She has a normal mood and affect. Her speech is normal and behavior is normal. Judgment and thought content normal. Cognition and memory are normal.   Nursing note and vitals reviewed.        Assessment:       1. Fever, unspecified fever cause    2. Back pain, unspecified back location, unspecified back pain laterality, unspecified chronicity    3. Pneumonia of left lung due to infectious organism, unspecified part of lung        Plan:       Rapid strep negative. Influenza negative.     CXR: focal airspace opacity at the left hilum consistent with pneumonia. Advised patient for repeat CXR in 6-8 weeks.     Advised patient to go to the ER for any worsening of symptoms. Sats increased from 94% to 96% after the breathing treatment. Patient reports she was feeling better after the treatment.     Fever, unspecified fever cause  -     POCT rapid strep A  -     POCT Influenza A/B  -     X-Ray Chest PA And Lateral; Future; Expected date: 11/17/2019    Back pain, unspecified back location, unspecified back pain laterality, unspecified chronicity  -     POCT Urinalysis, Dipstick, Automated, W/O Scope    Pneumonia of left lung due to infectious organism, unspecified part of lung    Other orders  -     ibuprofen tablet 400 mg  -     dexamethasone injection 8 mg  -     albuterol nebulizer solution 2.5 mg  -     ipratropium 0.02 % nebulizer solution 0.5 mg  -     ondansetron disintegrating tablet 4 mg  -     azithromycin (ZITHROMAX) 250 MG tablet; Take 2 tablets (500 mg) on  Day 1,  followed by 1 tablet (250 mg) once daily on Days 2 through 5.  Dispense: 6 tablet; Refill: 0  -     albuterol (PROVENTIL HFA) 90 mcg/actuation  inhaler; Inhale 2 puffs into the lungs every 6 (six) hours as needed for Wheezing. Rescue  Dispense: 18 g; Refill: 0  -     predniSONE (DELTASONE) 20 MG tablet; Take 2 tablets (40 mg total) by mouth once daily. for 5 days  Dispense: 10 tablet; Refill: 0

## 2019-11-17 NOTE — LETTER
November 17, 2019      [facility]  [address]  [place] [address]  Phone: [phone]       Patient: Ijeoma Issa   YOB: 1970  Date of Visit: 11/17/2019    To Whom It May Concern:    Ronan Issa  was at [facility] on 11/17/2019. She may return to work/school on 11/22/19 with no restrictions. If you have any questions or concerns, or if I can be of further assistance, please do not hesitate to contact me.    Sincerely,    Malka Moulton, NP

## 2019-11-21 ENCOUNTER — OFFICE VISIT (OUTPATIENT)
Dept: FAMILY MEDICINE | Facility: CLINIC | Age: 49
End: 2019-11-21
Payer: COMMERCIAL

## 2019-11-21 VITALS
TEMPERATURE: 98 F | RESPIRATION RATE: 16 BRPM | HEART RATE: 82 BPM | DIASTOLIC BLOOD PRESSURE: 74 MMHG | OXYGEN SATURATION: 97 % | WEIGHT: 258.81 LBS | SYSTOLIC BLOOD PRESSURE: 134 MMHG | BODY MASS INDEX: 41.59 KG/M2 | HEIGHT: 66 IN

## 2019-11-21 DIAGNOSIS — J18.9 PNEUMONIA OF LEFT LUNG DUE TO INFECTIOUS ORGANISM, UNSPECIFIED PART OF LUNG: Primary | ICD-10-CM

## 2019-11-21 DIAGNOSIS — N39.3 STRESS INCONTINENCE: ICD-10-CM

## 2019-11-21 PROCEDURE — 99213 PR OFFICE/OUTPT VISIT, EST, LEVL III, 20-29 MIN: ICD-10-PCS | Mod: S$GLB,,, | Performed by: NURSE PRACTITIONER

## 2019-11-21 PROCEDURE — 99999 PR PBB SHADOW E&M-EST. PATIENT-LVL V: ICD-10-PCS | Mod: PBBFAC,,, | Performed by: NURSE PRACTITIONER

## 2019-11-21 PROCEDURE — 99999 PR PBB SHADOW E&M-EST. PATIENT-LVL V: CPT | Mod: PBBFAC,,, | Performed by: NURSE PRACTITIONER

## 2019-11-21 PROCEDURE — 99213 OFFICE O/P EST LOW 20 MIN: CPT | Mod: S$GLB,,, | Performed by: NURSE PRACTITIONER

## 2019-11-21 RX ORDER — IPRATROPIUM BROMIDE AND ALBUTEROL SULFATE 2.5; .5 MG/3ML; MG/3ML
3 SOLUTION RESPIRATORY (INHALATION) EVERY 6 HOURS PRN
Qty: 1 BOX | Refills: 0 | Status: SHIPPED | OUTPATIENT
Start: 2019-11-21 | End: 2022-05-13 | Stop reason: SDUPTHER

## 2019-11-21 RX ORDER — AMOXICILLIN AND CLAVULANATE POTASSIUM 875; 125 MG/1; MG/1
1 TABLET, FILM COATED ORAL EVERY 12 HOURS
Qty: 14 TABLET | Refills: 0 | Status: SHIPPED | OUTPATIENT
Start: 2019-11-21 | End: 2019-11-28

## 2019-11-21 NOTE — PROGRESS NOTES
Subjective:       Patient ID: Ijeoma Issa is a 49 y.o. female.    Chief Complaint: Follow-up (3 month )    HPI   Patient is a 49-year-old female who presents today for follow-up from ED visit an urgent care visits.  She presented to the ED on November 4th with complaints of cough,congestion, runny, nose, fever and shortness of breath that had been ongoing for 2 weeks.  Her son had recently tested positive for flu B at the time.  She is a  so is in contact with sick  Kids.  Flu swab and chest x-ray in the emergency room were negative.  Patient was diagnosed with a viral URI and sent home with supportive measures.  On 11/17 patient presented to [place] urgent care with continued shortness of breath, returned fever, and wheezing, and right ear pain.  T-max of 101.8°.  Chest x-ray demonstrated pneumonia of the left lung.  Her O2 sats improved following breathing treatments.  She was given azithromycin, nebulizer, steroid injections, prednisone, and asked to follow up with her PCP.  She is completing her antibiotics as of yesterday.  She is still taking the oral steroids and using nebulizer.  She still had a fever of 102 ° F yesterday.  She still reports some shortness of breath.  Motrin and Tylenol are helping to control her fever.  She is afebrile today, she is not taking Motrin or Tylenol as of yet today.  She still has cough that is productive of thick green mucus.    Vitals:    11/21/19 0957   BP: 134/74   Pulse: 82   Resp: 16   Temp: 97.9 °F (36.6 °C)     Review of Systems   Constitutional: Positive for chills, fatigue and fever.   HENT: Positive for ear pain. Negative for congestion, sinus pressure, sinus pain and sore throat.    Eyes: Negative for pain, discharge and itching.   Respiratory: Positive for cough, shortness of breath and wheezing.    Cardiovascular: Negative for chest pain and palpitations.   Gastrointestinal: Positive for vomiting (from coughing). Negative for diarrhea  and nausea.   Genitourinary: Negative for dysuria.   Musculoskeletal: Negative for myalgias.   Skin: Negative for color change, pallor and rash.   Neurological: Negative for dizziness, light-headedness and headaches.   Psychiatric/Behavioral: Negative for dysphoric mood.       Objective:      Physical Exam   Constitutional: She is oriented to person, place, and time. She appears well-developed and well-nourished. She appears ill.   HENT:   Head: Normocephalic and atraumatic.   Right Ear: A middle ear effusion is present.   Left Ear: A middle ear effusion is present.   Nose: Nose normal. Right sinus exhibits no maxillary sinus tenderness and no frontal sinus tenderness. Left sinus exhibits no maxillary sinus tenderness and no frontal sinus tenderness.   Mouth/Throat: No posterior oropharyngeal edema or posterior oropharyngeal erythema.   Eyes: Pupils are equal, round, and reactive to light. Conjunctivae and lids are normal.   Neck: Full passive range of motion without pain.   Cardiovascular: Normal rate, S1 normal, S2 normal and normal heart sounds.   Pulmonary/Chest: Effort normal. She has no decreased breath sounds. She has no wheezes. She has no rhonchi. She has no rales.   Lymphadenopathy:     She has no cervical adenopathy.   Neurological: She is alert and oriented to person, place, and time.   Skin: Skin is warm, dry and intact.   Psychiatric: She has a normal mood and affect. Her speech is normal and behavior is normal. Cognition and memory are normal.       Assessment & Plan:       Pneumonia of left lung due to infectious organism, unspecified part of lung  -     albuterol-ipratropium (DUO-NEB) 2.5 mg-0.5 mg/3 mL nebulizer solution; Take 3 mLs by nebulization every 6 (six) hours as needed for Wheezing. Rescue  Dispense: 1 Box; Refill: 0  -     amoxicillin-clavulanate 875-125mg (AUGMENTIN) 875-125 mg per tablet; Take 1 tablet by mouth every 12 (twelve) hours. for 7 days  Dispense: 14 tablet; Refill: 0        -      continue steroid        -     continue nebulizer        -     take plain Mucinex twice daily with a large glass of water        -     follow-up in 1 week or sooner if needed  Stress incontinence  -     Ambulatory referral to Urology        -     Patient reports ongoing problem that she was to see urology for in the past and has put off        Follow up in about 8 days (around 11/29/2019) for follow up pneumonia .

## 2019-11-21 NOTE — PATIENT INSTRUCTIONS
Increase fluids, rest, vitmain C  Continue steroids  Start new antibiotics  Continue nebulizer  Plain Mucinex twice daily with large glass of water  Follow up next Friday

## 2019-11-29 ENCOUNTER — OFFICE VISIT (OUTPATIENT)
Dept: FAMILY MEDICINE | Facility: CLINIC | Age: 49
End: 2019-11-29
Payer: COMMERCIAL

## 2019-11-29 VITALS
HEART RATE: 75 BPM | BODY MASS INDEX: 42.24 KG/M2 | TEMPERATURE: 98 F | WEIGHT: 262.81 LBS | HEIGHT: 66 IN | DIASTOLIC BLOOD PRESSURE: 60 MMHG | OXYGEN SATURATION: 97 % | SYSTOLIC BLOOD PRESSURE: 114 MMHG

## 2019-11-29 DIAGNOSIS — J18.9 PNEUMONIA OF LEFT LUNG DUE TO INFECTIOUS ORGANISM, UNSPECIFIED PART OF LUNG: Primary | ICD-10-CM

## 2019-11-29 PROCEDURE — 99999 PR PBB SHADOW E&M-EST. PATIENT-LVL III: ICD-10-PCS | Mod: PBBFAC,,, | Performed by: NURSE PRACTITIONER

## 2019-11-29 PROCEDURE — 99999 PR PBB SHADOW E&M-EST. PATIENT-LVL III: CPT | Mod: PBBFAC,,, | Performed by: NURSE PRACTITIONER

## 2019-11-29 PROCEDURE — 99213 OFFICE O/P EST LOW 20 MIN: CPT | Mod: S$GLB,,, | Performed by: NURSE PRACTITIONER

## 2019-11-29 PROCEDURE — 99213 PR OFFICE/OUTPT VISIT, EST, LEVL III, 20-29 MIN: ICD-10-PCS | Mod: S$GLB,,, | Performed by: NURSE PRACTITIONER

## 2019-11-29 RX ORDER — MINOCYCLINE HYDROCHLORIDE 50 MG/1
CAPSULE ORAL
COMMUNITY
End: 2020-01-06

## 2019-11-29 RX ORDER — TRIAMCINOLONE ACETONIDE 1 MG/G
CREAM TOPICAL
COMMUNITY
End: 2024-02-23 | Stop reason: ALTCHOICE

## 2019-11-29 RX ORDER — AMOXICILLIN AND CLAVULANATE POTASSIUM 875; 125 MG/1; MG/1
1 TABLET, FILM COATED ORAL EVERY 12 HOURS
Qty: 6 TABLET | Refills: 0 | Status: SHIPPED | OUTPATIENT
Start: 2019-11-29 | End: 2019-12-02

## 2019-11-29 NOTE — PROGRESS NOTES
Subjective:       Patient ID: Ijeoma Issa is a 49 y.o. female.    Chief Complaint: Follow-up (Pneumonia, Pt states she still feels the same) and Other (Coughing subsided but coughing up flem)    HPI   Ms. Issa is a 50 yo female who presents today for one week follow up.  She presented to the ED on November 4th with complaints of cough,congestion, runny, nose, fever and shortness of breath that had been ongoing for 2 weeks.  Her son had recently tested positive for flu B at the time.  She is a  so is in contact with sick  Kids.  Flu swab and chest x-ray in the emergency room were negative.  Patient was diagnosed with a viral URI and sent home with supportive measures.  On 11/17 patient presented to [place] urgent care with continued shortness of breath, returned fever, and wheezing, and right ear pain.  T-max of 101.8°.  Chest x-ray demonstrated pneumonia of the left lung.  Her O2 sats improved following breathing treatments.  She was given azithromycin, nebulizer, steroid injections, prednisone, and asked to follow up with her PCP. She still had a fever of 102 ° F one week ago.  She was prescribed a course of augmentin and asked to continue mucinex, nebulizer, and inhaler as needed.  Today she reports that she has been afebrile.  There is still a productive cough with green-yellow sputum.  She still feels a PND.    Vitals:    11/29/19 0822   BP: 114/60   Pulse: 75   Temp: 98.4 °F (36.9 °C)     Review of Systems   Constitutional: Positive for fatigue. Negative for chills and fever.   HENT: Positive for congestion, postnasal drip and sinus pressure.    Eyes: Negative for pain, discharge and itching.   Respiratory: Positive for cough. Negative for shortness of breath and wheezing.    Cardiovascular: Negative for chest pain and palpitations.   Gastrointestinal: Negative for diarrhea, nausea and vomiting.   Genitourinary: Negative for dysuria.   Musculoskeletal: Negative for myalgias.    Skin: Negative for color change, pallor and rash.   Neurological: Negative for dizziness, light-headedness and headaches.   Psychiatric/Behavioral: Negative for dysphoric mood.       Objective:      Physical Exam   Constitutional: She is oriented to person, place, and time. She appears well-developed and well-nourished.   HENT:   Head: Normocephalic and atraumatic.   Eyes: Pupils are equal, round, and reactive to light. Conjunctivae are normal. Right eye exhibits no discharge. Left eye exhibits no discharge.   Neck: Normal range of motion. Neck supple. No thyromegaly present.   Cardiovascular: Normal rate, regular rhythm, normal heart sounds and intact distal pulses.   Pulmonary/Chest: Breath sounds normal. No respiratory distress. She has no wheezes.   Abdominal: Soft. Bowel sounds are normal. She exhibits no distension. There is no tenderness. There is no rebound.   Musculoskeletal: Normal range of motion. She exhibits no edema or deformity.   Lymphadenopathy:     She has no cervical adenopathy.   Neurological: She is alert and oriented to person, place, and time. No cranial nerve deficit or sensory deficit.   Skin: Skin is warm and dry. No rash noted.   Psychiatric: She has a normal mood and affect.       Assessment & Plan:       Pneumonia of left lung due to infectious organism, unspecified part of lung  -     amoxicillin-clavulanate 875-125mg (AUGMENTIN) 875-125 mg per tablet; Take 1 tablet by mouth every 12 (twelve) hours. for 3 days  Dispense: 6 tablet; Refill: 0    -Improving but not resolved.  Take additional antibiotics as directed.  Increase fluids, rest, vitamin C.  Continue mucinex twice daily with a large glass of water.  Continue negulizer and inhaler.   -Follow up in one week or sooner PRN       No follow-ups on file.

## 2019-11-29 NOTE — LETTER
November 29, 2019      [facility]  [address] [place] [address]  Phone: [phone]  Fax: [phone]       Patient: Ijeoma Issa   YOB: 1970  Date of Visit: 11/29/2019    To Whom It May Concern:    Ronan Issa  was at [facility] on 11/29/2019. She may return to work/school on 12/4/2019 with no restrictions. If you have any questions or concerns, or if I can be of further assistance, please do not hesitate to contact me.    Sincerely,    Puja Bolton, NP

## 2019-12-02 ENCOUNTER — TELEPHONE (OUTPATIENT)
Dept: FAMILY MEDICINE | Facility: CLINIC | Age: 49
End: 2019-12-02

## 2019-12-02 NOTE — LETTER
December 2, 2019      [facility]  [address] [place] [address]  Phone: [phone]  Fax: [phone]       Patient: Ijeoma Issa   YOB: 1970  Date of Visit: 12/02/2019    To Whom It May Concern:    Ronan Issa  was at [facility] on 11/29/2019. She may return to work/school on 12/9/2019 with no restrictions. If you have any questions or concerns, or if I can be of further assistance, please do not hesitate to contact me.    Sincerely,    Puja Bolton, NP

## 2019-12-02 NOTE — TELEPHONE ENCOUNTER
Called patient and she says she has pneumonia and would like a note excusing her for this week from work, the 2nd through the 6th, and will return to work on the 9th of December. Patient does not feel well enough to return to work yet. Patients  will come and  letter today.

## 2019-12-02 NOTE — TELEPHONE ENCOUNTER
----- Message from Conchita Zapata sent at 12/2/2019  9:10 AM CST -----  Contact: pt [phone]  Patient called and asked if you will give her a DrGus Excuse to be off from work this whole week the dates needs from Dec 2 to Dec 7th and return back to work on Dec 9th next Monday. Please call when ready to be picked up today. Thank you

## 2019-12-06 ENCOUNTER — OFFICE VISIT (OUTPATIENT)
Dept: FAMILY MEDICINE | Facility: CLINIC | Age: 49
End: 2019-12-06
Payer: COMMERCIAL

## 2019-12-06 VITALS
HEIGHT: 66 IN | DIASTOLIC BLOOD PRESSURE: 76 MMHG | HEART RATE: 102 BPM | WEIGHT: 265 LBS | OXYGEN SATURATION: 97 % | SYSTOLIC BLOOD PRESSURE: 110 MMHG | BODY MASS INDEX: 42.59 KG/M2 | TEMPERATURE: 98 F

## 2019-12-06 DIAGNOSIS — R19.7 DIARRHEA, UNSPECIFIED TYPE: ICD-10-CM

## 2019-12-06 DIAGNOSIS — J18.9 PNEUMONIA OF LEFT LUNG DUE TO INFECTIOUS ORGANISM, UNSPECIFIED PART OF LUNG: Primary | ICD-10-CM

## 2019-12-06 PROCEDURE — 99999 PR PBB SHADOW E&M-EST. PATIENT-LVL IV: ICD-10-PCS | Mod: PBBFAC,,, | Performed by: NURSE PRACTITIONER

## 2019-12-06 PROCEDURE — 99213 PR OFFICE/OUTPT VISIT, EST, LEVL III, 20-29 MIN: ICD-10-PCS | Mod: S$GLB,,, | Performed by: NURSE PRACTITIONER

## 2019-12-06 PROCEDURE — 99999 PR PBB SHADOW E&M-EST. PATIENT-LVL IV: CPT | Mod: PBBFAC,,, | Performed by: NURSE PRACTITIONER

## 2019-12-06 PROCEDURE — 99213 OFFICE O/P EST LOW 20 MIN: CPT | Mod: S$GLB,,, | Performed by: NURSE PRACTITIONER

## 2019-12-06 NOTE — LETTER
December 6, 2019      [facility]  [address] [place] [address]  Phone: [phone]  Fax: [phone]       Patient: Ijeoma Issa   YOB: 1970  Date of Visit: 12/06/2019    To Whom It May Concern:    Ronan Issa  was at [facility] on 12/06/2019. She may return to work/school on 12/9/19 with no restrictions. If you have any questions or concerns, or if I can be of further assistance, please do not hesitate to contact me.    Sincerely,    Puja Bolton, NP

## 2019-12-06 NOTE — PROGRESS NOTES
Subjective:       Patient ID: Ijeoma Issa is a 49 y.o. female.    Chief Complaint: follow up pneumonia    HPI   Ms. Issa is a 50 yo female who presents today for one week follow up.  She presented to the ED on November 4th with complaints of cough,congestion, runny, nose, fever and shortness of breath that had been ongoing for 2 weeks.  Her son had recently tested positive for flu B at the time.  She is a  so is in contact with sick  Kids.  Flu swab and chest x-ray in the emergency room were negative.  Patient was diagnosed with a viral URI and sent home with supportive measures.  On 11/17 patient presented to [place] urgent care with continued shortness of breath, returned fever, and wheezing, and right ear pain.  T-max of 101.8°.  Chest x-ray demonstrated pneumonia of the left lung.  Her O2 sats improved following breathing treatments.  She was given azithromycin, nebulizer, steroid injections, prednisone, and asked to follow up with her PCP. She still had a fever of 102 ° F one week ago.  She was prescribed a course of augmentin and asked to continue mucinex, nebulizer, and inhaler as needed.  Last week she reported that she has been afebrile.  There was still a productive cough with green-yellow sputum.  Antibiotics were extended for three more days.  Today she reports that she is feeling better overall.  The cough is still present but is not as bad as previously.  Her energy level is improved. She remains afebrile.  She completed the extra course of antibiotics.  She has been having some diarrhea as well, however, some of her stools are somewhat formed.      Vitals:    12/06/19 1041   BP: 110/76   Pulse: 102   Temp: 98.4 °F (36.9 °C)     Review of Systems   Constitutional: Negative for chills, fatigue, fever and unexpected weight change.   HENT: Negative for congestion, hearing loss, nosebleeds, postnasal drip, sinus pressure, sinus pain and sore throat.    Eyes: Negative for  pain, discharge, redness and visual disturbance.   Respiratory: Positive for cough (improving). Negative for chest tightness, shortness of breath and wheezing.    Cardiovascular: Negative for chest pain and palpitations.   Gastrointestinal: Negative for abdominal pain, constipation, diarrhea, nausea and vomiting.   Endocrine: Negative for cold intolerance and heat intolerance.   Musculoskeletal: Negative for back pain.   Neurological: Negative for dizziness, syncope, light-headedness and headaches.   Psychiatric/Behavioral: Negative for agitation and dysphoric mood. The patient is not nervous/anxious.        Objective:      Physical Exam   Constitutional: She is oriented to person, place, and time. She appears well-developed and well-nourished.   HENT:   Head: Normocephalic and atraumatic.   Eyes: Pupils are equal, round, and reactive to light. Conjunctivae are normal. Right eye exhibits no discharge. Left eye exhibits no discharge.   Neck: Normal range of motion. Neck supple. No thyromegaly present.   Cardiovascular: Normal rate, regular rhythm, normal heart sounds and intact distal pulses.   Pulmonary/Chest: Breath sounds normal. No respiratory distress. She has no wheezes.   Abdominal: Soft.   Musculoskeletal: Normal range of motion. She exhibits no edema or deformity.   Lymphadenopathy:     She has no cervical adenopathy.   Neurological: She is alert and oriented to person, place, and time. No cranial nerve deficit or sensory deficit.   Skin: Skin is warm and dry. No rash noted.   Psychiatric: She has a normal mood and affect.       Assessment & Plan:       Pneumonia of left lung due to infectious organism, unspecified part of lung  -     X-Ray Chest PA And Lateral; Future; Expected date: 01/03/2020        -     Improving, repeat chest xray in a few weeks for confirmation of resolution         -     If symptoms reoccur, notify provider   Diarrhea, unspecified type  -Encouraged patient to take daily  probiotic  -If persists notify office         No follow-ups on file.

## 2019-12-29 RX ORDER — DOXEPIN HYDROCHLORIDE 25 MG/1
CAPSULE ORAL
Qty: 30 CAPSULE | Refills: 11 | Status: SHIPPED | OUTPATIENT
Start: 2019-12-29 | End: 2020-12-20 | Stop reason: SDUPTHER

## 2020-01-03 ENCOUNTER — HOSPITAL ENCOUNTER (OUTPATIENT)
Dept: RADIOLOGY | Facility: CLINIC | Age: 50
Discharge: HOME | End: 2020-01-03
Attending: NURSE PRACTITIONER
Payer: COMMERCIAL

## 2020-01-03 DIAGNOSIS — J18.9 PNEUMONIA OF LEFT LUNG DUE TO INFECTIOUS ORGANISM, UNSPECIFIED PART OF LUNG: ICD-10-CM

## 2020-01-03 PROCEDURE — 71046 X-RAY EXAM CHEST 2 VIEWS: CPT | Mod: 26,,, | Performed by: RADIOLOGY

## 2020-01-03 PROCEDURE — 71046 X-RAY EXAM CHEST 2 VIEWS: CPT | Mod: TC,FY,PO

## 2020-01-03 PROCEDURE — 71046 XR CHEST PA AND LATERAL: ICD-10-PCS | Mod: 26,,, | Performed by: RADIOLOGY

## 2020-01-06 ENCOUNTER — OFFICE VISIT (OUTPATIENT)
Dept: FAMILY MEDICINE | Facility: CLINIC | Age: 50
End: 2020-01-06
Payer: COMMERCIAL

## 2020-01-06 VITALS
BODY MASS INDEX: 42.73 KG/M2 | TEMPERATURE: 98 F | HEART RATE: 90 BPM | OXYGEN SATURATION: 97 % | SYSTOLIC BLOOD PRESSURE: 120 MMHG | WEIGHT: 265.88 LBS | DIASTOLIC BLOOD PRESSURE: 78 MMHG | HEIGHT: 66 IN | RESPIRATION RATE: 19 BRPM

## 2020-01-06 DIAGNOSIS — J32.9 RECURRENT SINUS INFECTIONS: Primary | ICD-10-CM

## 2020-01-06 PROCEDURE — 3008F BODY MASS INDEX DOCD: CPT | Mod: S$GLB,,, | Performed by: PHYSICIAN ASSISTANT

## 2020-01-06 PROCEDURE — 99213 OFFICE O/P EST LOW 20 MIN: CPT | Mod: S$GLB,,, | Performed by: PHYSICIAN ASSISTANT

## 2020-01-06 PROCEDURE — 99999 PR PBB SHADOW E&M-EST. PATIENT-LVL V: ICD-10-PCS | Mod: PBBFAC,,, | Performed by: PHYSICIAN ASSISTANT

## 2020-01-06 PROCEDURE — 99213 PR OFFICE/OUTPT VISIT, EST, LEVL III, 20-29 MIN: ICD-10-PCS | Mod: S$GLB,,, | Performed by: PHYSICIAN ASSISTANT

## 2020-01-06 PROCEDURE — 3008F PR BODY MASS INDEX (BMI) DOCUMENTED: ICD-10-PCS | Mod: S$GLB,,, | Performed by: PHYSICIAN ASSISTANT

## 2020-01-06 PROCEDURE — 99999 PR PBB SHADOW E&M-EST. PATIENT-LVL V: CPT | Mod: PBBFAC,,, | Performed by: PHYSICIAN ASSISTANT

## 2020-01-06 RX ORDER — FLUTICASONE PROPIONATE 50 MCG
1 SPRAY, SUSPENSION (ML) NASAL 2 TIMES DAILY PRN
Qty: 15 G | Refills: 0 | Status: SHIPPED | OUTPATIENT
Start: 2020-01-06 | End: 2020-01-31

## 2020-01-06 RX ORDER — ESTRADIOL 1 MG/1
TABLET ORAL
Qty: 30 TABLET | Refills: 1 | Status: CANCELLED | OUTPATIENT
Start: 2020-01-06

## 2020-01-06 RX ORDER — AZELASTINE 1 MG/ML
1 SPRAY, METERED NASAL 2 TIMES DAILY
Qty: 30 ML | Refills: 3 | Status: SHIPPED | OUTPATIENT
Start: 2020-01-06 | End: 2024-03-28

## 2020-01-06 NOTE — PROGRESS NOTES
Subjective:       Patient ID: Ijeoma Issa is a 49 y.o. female.    Chief Complaint: Cough; Sore Throat; Otalgia; and Chest Congestion    Cough   This is a new problem. The current episode started more than 1 month ago. The problem has been unchanged. The problem occurs constantly. The cough is productive of purulent sputum. Associated symptoms include nasal congestion, postnasal drip, a sore throat and wheezing. Pertinent negatives include no chest pain, chills, fever, headaches, myalgias, rhinorrhea or shortness of breath. Treatments tried: mucinex, ibuprofen, increased water. Her past medical history is significant for pneumonia. recently treated for pneumonia. Repeat CXR normal     Review of patient's allergies indicates:   Allergen Reactions    Cashew nut Anaphylaxis     Allergic to Cashew nuts    Nut flavor Anaphylaxis     Allergic to Cashew nuts    Latex Rash     Including in injections    Varicella vaccines      Red swelling/ lump develops at injection site    Adhesive tape-silicones Rash    Tapentadol Rash         Current Outpatient Medications:     ADVAIR DISKUS 250-50 mcg/dose diskus inhaler, INHALE 1 PUFF BY MOUTH TWICE DAILY, Disp: 60 each, Rfl: 11    AFLURIA QD 2019-20,3YR UP,,PF, 60 mcg (15 mcg x 4)/0.5 mL Syrg, , Disp: , Rfl: 0    albuterol (PROVENTIL HFA) 90 mcg/actuation inhaler, Inhale 2 puffs into the lungs every 6 (six) hours as needed for Wheezing. Rescue, Disp: 18 g, Rfl: 0    albuterol-ipratropium (DUO-NEB) 2.5 mg-0.5 mg/3 mL nebulizer solution, Take 3 mLs by nebulization every 6 (six) hours as needed for Wheezing. Rescue, Disp: 1 Box, Rfl: 0    azelastine (ASTELIN) 137 mcg (0.1 %) nasal spray, 1 spray (137 mcg total) by Nasal route 2 (two) times daily., Disp: 30 mL, Rfl: 3    b complex vitamins capsule, Take 1 capsule by mouth once daily., Disp: , Rfl:     CALCIUM CARBONATE/VITAMIN D3 (VITAMIN D-3 ORAL), Take by mouth.  , Disp: , Rfl:     CHROM VINCENT/BRINDALL BERRY  (GARCINIA CAMBOGIA ORAL), Take by mouth., Disp: , Rfl:     DOC-Q-LACE 100 mg capsule, TAKE ONE CAPSULE BY MOUTH TWICE DAILY (Patient taking differently: TAKE ONE CAPSULE BY MOUTH TWICE DAILY AS NEEDED), Disp: 30 capsule, Rfl: 0    doxepin (SINEQUAN) 25 MG capsule, TAKE 1 CAPSULE(25 MG) BY MOUTH EVERY EVENING, Disp: 30 capsule, Rfl: 11    EPIPEN 2-LUCIANO 0.3 mg/0.3 mL AtIn, , Disp: , Rfl:     estradiol (ESTRACE) 0.01 % (0.1 mg/gram) vaginal cream, PLACE 1 GRAM VAGINALLY TWICE A WEEK, Disp: 42.5 g, Rfl: 4    estradiol (ESTRACE) 1 MG tablet, TAKE 1 TABLET(1 MG) BY MOUTH EVERY DAY, Disp: 30 tablet, Rfl: 1    FERROUS FUMARATE (IRON ORAL), Take by mouth once daily., Disp: , Rfl:     fluticasone propionate (FLONASE) 50 mcg/actuation nasal spray, 1 spray (50 mcg total) by Each Nostril route 2 (two) times daily as needed for Rhinitis., Disp: 15 g, Rfl: 0    gabapentin (NEURONTIN) 100 MG capsule, Take 1 capsule (100 mg total) by mouth 2 (two) times daily., Disp: 60 capsule, Rfl: 3    ketoconazole (NIZORAL) 2 % shampoo, , Disp: , Rfl:     levocetirizine (XYZAL) 5 MG tablet, Take 1 tablet (5 mg total) by mouth every evening., Disp: 90 tablet, Rfl: 6    montelukast (SINGULAIR) 10 mg tablet, Take 1 tablet (10 mg total) by mouth once daily., Disp: 30 tablet, Rfl: 3    montelukast (SINGULAIR) 10 mg tablet, TAKE 1 TABLET BY MOUTH DAILY, Disp: 30 tablet, Rfl: 3    mupirocin (BACTROBAN) 2 % ointment, Apply topically once daily., Disp: 90 g, Rfl: 3    mv-min-FA-Tk-Ey-dbqwxlb-lutein (CENTRUM) 0.4-162-18 mg Tab, Take 1 tablet by mouth once daily., Disp: , Rfl:     omega-3 fatty acids-fish oil (FISH OIL OMEGA 3-6-9) 300-1,000 mg CpDR, , Disp: , Rfl:     omeprazole (PRILOSEC) 40 MG capsule, TAKE 1 CAPSULE(40 MG) BY MOUTH EVERY MORNING, Disp: 90 capsule, Rfl: 3    spironolactone (ALDACTONE) 25 MG tablet, TAKE 1 TABLET(25 MG) BY MOUTH daily, Disp: 90 tablet, Rfl: 3    triamcinolone acetonide 0.1% (KENALOG) 0.1 % cream, Apply  topically., Disp: , Rfl:     VENTOLIN HFA 90 mcg/actuation inhaler, INHALE 2 PUFFS INTO THE LUNGS EVERY 6 HOURS AS NEEDED FOR WHEEZING, Disp: 18 g, Rfl: 0    Lab Results   Component Value Date    WBC 8.03 11/16/2019    HGB 13.8 11/16/2019    HCT 44.1 11/16/2019     11/16/2019    CHOL 176 11/16/2019    TRIG 118 11/16/2019    HDL 50 11/16/2019    ALT 14 11/16/2019    AST 16 11/16/2019     11/16/2019    K 3.9 11/16/2019     11/16/2019    CREATININE 0.7 11/16/2019    BUN 16 11/16/2019    CO2 28 11/16/2019    TSH 2.625 12/29/2016    HGBA1C 5.3 11/16/2019    MICROALBUR 0.32 07/20/2012       Review of Systems   Constitutional: Positive for activity change. Negative for appetite change, chills and fever.   HENT: Positive for congestion, postnasal drip, sinus pressure, sinus pain and sore throat. Negative for rhinorrhea.    Eyes: Negative for visual disturbance.   Respiratory: Positive for cough and wheezing. Negative for shortness of breath.    Cardiovascular: Negative for chest pain.   Gastrointestinal: Negative for abdominal distention and abdominal pain.   Genitourinary: Negative for difficulty urinating and dysuria.   Musculoskeletal: Negative for arthralgias and myalgias.   Neurological: Negative for headaches.   Hematological: Negative for adenopathy.   Psychiatric/Behavioral: The patient is not nervous/anxious.        Objective:      Physical Exam   Constitutional: She is oriented to person, place, and time.   HENT:   Mouth/Throat: No oropharyngeal exudate.   Posterior oropharynx mildly erythematous. Maxillary sinuses TTP  Nasal turbinates edematous   Eyes: Pupils are equal, round, and reactive to light. Conjunctivae are normal.   Cardiovascular: Normal rate and regular rhythm.   Pulmonary/Chest: Effort normal and breath sounds normal. She has no wheezes.   Abdominal: Soft. Bowel sounds are normal. There is no tenderness.   Musculoskeletal: She exhibits no edema.   Lymphadenopathy:     She has no  cervical adenopathy.   Neurological: She is alert and oriented to person, place, and time.   Skin: No erythema.   Psychiatric: Her behavior is normal.       Assessment:       1. Recurrent sinus infections        Plan:     Ijeoma was seen today for cough, sore throat, otalgia and chest congestion.    Diagnoses and all orders for this visit:    Recurrent sinus infections  -     CT Sinuses without Contrast; Future    Other orders  -     Cancel: estradiol (ESTRACE) 1 MG tablet  -     azelastine (ASTELIN) 137 mcg (0.1 %) nasal spray; 1 spray (137 mcg total) by Nasal route 2 (two) times daily.  -     fluticasone propionate (FLONASE) 50 mcg/actuation nasal spray; 1 spray (50 mcg total) by Each Nostril route 2 (two) times daily as needed for Rhinitis.

## 2020-01-07 RX ORDER — ESTRADIOL 1 MG/1
TABLET ORAL
Qty: 30 TABLET | Refills: 1 | Status: SHIPPED | OUTPATIENT
Start: 2020-01-07 | End: 2020-03-01 | Stop reason: SDUPTHER

## 2020-01-07 RX ORDER — ESTRADIOL 1 MG/1
TABLET ORAL
Qty: 30 TABLET | Refills: 1 | OUTPATIENT
Start: 2020-01-07

## 2020-01-09 ENCOUNTER — HOSPITAL ENCOUNTER (OUTPATIENT)
Dept: RADIOLOGY | Facility: HOSPITAL | Age: 50
Discharge: HOME | End: 2020-01-09
Attending: PHYSICIAN ASSISTANT | Admitting: PHYSICIAN ASSISTANT
Payer: COMMERCIAL

## 2020-01-09 DIAGNOSIS — J32.9 RECURRENT SINUS INFECTIONS: ICD-10-CM

## 2020-01-09 PROCEDURE — 70486 CT SINUSES WITHOUT CONTRAST: ICD-10-PCS | Mod: 26,,, | Performed by: RADIOLOGY

## 2020-01-09 PROCEDURE — 70486 CT MAXILLOFACIAL W/O DYE: CPT | Mod: 26,,, | Performed by: RADIOLOGY

## 2020-01-09 PROCEDURE — 70486 CT MAXILLOFACIAL W/O DYE: CPT | Mod: TC

## 2020-01-13 DIAGNOSIS — J32.0 CHRONIC MAXILLARY SINUSITIS: Primary | ICD-10-CM

## 2020-01-21 ENCOUNTER — OFFICE VISIT (OUTPATIENT)
Dept: OTOLARYNGOLOGY | Facility: CLINIC | Age: 50
End: 2020-01-21
Payer: COMMERCIAL

## 2020-01-21 VITALS — BODY MASS INDEX: 43.01 KG/M2 | HEIGHT: 66 IN | WEIGHT: 267.63 LBS

## 2020-01-21 DIAGNOSIS — J30.9 ALLERGIC RHINITIS, UNSPECIFIED SEASONALITY, UNSPECIFIED TRIGGER: ICD-10-CM

## 2020-01-21 DIAGNOSIS — J32.0 CHRONIC MAXILLARY SINUSITIS: Primary | ICD-10-CM

## 2020-01-21 PROCEDURE — 99204 PR OFFICE/OUTPT VISIT, NEW, LEVL IV, 45-59 MIN: ICD-10-PCS | Mod: S$GLB,,, | Performed by: OTOLARYNGOLOGY

## 2020-01-21 PROCEDURE — 3008F PR BODY MASS INDEX (BMI) DOCUMENTED: ICD-10-PCS | Mod: S$GLB,,, | Performed by: OTOLARYNGOLOGY

## 2020-01-21 PROCEDURE — 3008F BODY MASS INDEX DOCD: CPT | Mod: S$GLB,,, | Performed by: OTOLARYNGOLOGY

## 2020-01-21 PROCEDURE — 99999 PR PBB SHADOW E&M-EST. PATIENT-LVL III: CPT | Mod: PBBFAC,,, | Performed by: OTOLARYNGOLOGY

## 2020-01-21 PROCEDURE — 99999 PR PBB SHADOW E&M-EST. PATIENT-LVL III: ICD-10-PCS | Mod: PBBFAC,,, | Performed by: OTOLARYNGOLOGY

## 2020-01-21 PROCEDURE — 99204 OFFICE O/P NEW MOD 45 MIN: CPT | Mod: S$GLB,,, | Performed by: OTOLARYNGOLOGY

## 2020-01-21 NOTE — PATIENT INSTRUCTIONS
Endoscopic Sinus Surgery  The sinuses are hollow areas formed by the bones of the face. Normally, a thin layer of mucus drains from the sinuses into the nose. If the drainage path is blocked, problems such as infection can result. Endoscopic sinus surgery can be done to help clear blockages. The surgeon uses a thin, lighted tube (endoscope) that is put into your nose. The tube lets the doctor see and operate inside your nose and sinuses.     Straightening the septum       Removing polyps         Opening the ethmoid sinuses       Clearing the outflow pathway      Straightening the septum  The septum is a piece of cartilage and bone that runs straight down the inside of the nose. It divides the nose into two sides.  A deviated septum is crooked instead of straight. A crooked septum can cause breathing problems. To fix a deviated septum, the doctor reshapes or trims the cartilage and bone. There is enough septum left for the nose to hold its shape. But the air has more space to move in and out of the nose. This improves your breathing.  Removing polyps  Polyps are small growths. They can grow in both the nose and sinuses. The surgeon may use different methods to remove them. Often, the surgeon uses special tools to remove polyps without harming nearby tissues.  Opening the ethmoid sinuses  The ethmoid sinuses are made up of many small air spaces, like a honeycomb. Like the other sinuses, the ethmoids have a lining that makes mucus. In some cases the drainage path is blocked. The doctor may open the thin walls of bone that separate the air spaces. This creates a passage for mucus to drain more easily.  Clearing the major outflow pathway of the sinuses  The osteomeatal complex is a term for a major outflow tract of your sinuses. Similar to a traffic jam, when this area becomes blocked, you may get symptoms in your maxillary, ethmoid, and frontal sinuses. Opening this area is a primary step in most sinus surgeries. The  uncinate process is a small piece of bone and tissue in the sinuses. It forms an outlet for part of the sinuses. If this tissue is swollen (inflamed), it will block drainage of mucus. The doctor may remove the uncinate process so that mucus can drain.  Date Last Reviewed: 10/1/2016  © 2352-5575 [employer]. [address] [place] [address]. All rights reserved. This information is not intended as a substitute for professional medical care. Always follow your healthcare professional's instructions.          Nasal Surgery: Turbinate Surgery     During surgery, bone or mucous membrane may be removed from enlarged turbinates.   Youre scheduled to have nasal surgery. The type of nasal surgery youre having is called turbinate surgery. Read on to learn more about what to expect during this surgery.  What are the turbinates?  The turbinates are located on the inside of each side of your nose. They are curved bony ridges that are lined with mucous membrane. Mucous membrane is thin tissue that also lines the insides of your sinuses and throat. It makes sticky mucus that helps clean the air in the nose of dust and other small particles. The turbinates and mucous membrane warm and moisten the air you breathe through your nose.  What to expect during turbinate surgery  This surgery fixes a blockage caused by enlarged turbinates. The surgeon makes cuts (incisions) inside the nose under the lower turbinate. The surgeon may use a laser to do this. He or she may remove extra bone to make the turbinate smaller. Sometimes the surgeon also removes excess mucous membrane.  Risks and possible complications  As with any surgery, nasal surgery has some risks. These include a slight risk of bleeding and infection. Your doctor will discuss any other risks and complications with you.   After turbinate surgery  After turbinate surgery, youll be taken to a recovery area or to your hospital room. Your experience may be  as follows:  · You may have packing or a plastic splint inside your nose if you had a septoplasty or sinus surgery along with the turbinate surgery.  · You may also have bandages (dressings) on the outside of your nose.  · Its normal to have some mucus and blood drain from your nose. Until packing is removed, you may have to breathe through your mouth.  · Expect some throat dryness and irritation. This is normal after general anesthesia or having a tube down your throat.  · Pain medicine will be prescribed as needed. Dont take medicine containing aspirin or ibuprofen. These can increase bleeding.  Follow-up  Youll need to follow up with your doctor within a week after your surgery. Here is what to expect:  · Any packing, splint, or dressings will probably be removed. You may feel slight discomfort and bleed a little when this is done.  · After the splint or packing is removed, youll most likely breathe better than you did before surgery.  · You may have minor numbness, pain, swelling, and a little stiffness under the tip of the nose.  · In a few days, the inside of your nose may swell and briefly block your breathing. Or a scab or crust may block breathing for a short time. Leave the scab alone. Your doctor can remove it. Using a saline solution in your nose can help reduce and remove crusts.  · Contact your healthcare provider if you have any questions, concerns, or unusual symptoms when you get home.  Date Last Reviewed: 11/1/2016  © 9625-4603 [employer]. [address] [place] [address], [place] [address]. All rights reserved. This information is not intended as a substitute for professional medical care. Always follow your healthcare professional's instructions.

## 2020-01-21 NOTE — PROGRESS NOTES
Subjective:       Patient ID: Ijeoma Issa is a 49 y.o. female.    Chief Complaint: Sinus Problem    Ijeoma is here for sinus/nasal complaints. Symptoms have been present for months.   Baseline is poor. She has been on multiple rounds of antibiotics without sig improvement.  Post-nasal drainage: yes  Pressure: yes, maxillary and ethmoid  Congestion: yes, left side  Decreased sense of smell: no  Therapies tried: Flonase, saline, oral AH, antibiotics    Allergy testing: yes, remotely = she does have allergic symptoms including itchy nose and rhinorrhea  History of asthma: no  Anticoagulation: none   Pertinent meds: as above  Pertinent medical issues: none  Latex, cashew, nut allergy    Social History     Tobacco Use   Smoking Status Never Smoker   Smokeless Tobacco Never Used     Social History     Substance and Sexual Activity   Alcohol Use Yes    Alcohol/week: 0.0 standard drinks    Frequency: Never    Comment: very seldom        Review of Systems   Constitutional: Negative for activity change and appetite change.   Eyes: Negative for discharge.   Respiratory: Negative for difficulty breathing and wheezing   Cardiovascular: Negative for chest pain.   Gastrointestinal: Negative for abdominal distention and abdominal pain.   Endocrine: Negative for cold intolerance and heat intolerance.   Genitourinary: Negative for dysuria.   Musculoskeletal: Negative for gait problem and joint swelling.   Skin: Negative for color change and pallor.   Neurological: Negative for syncope and weakness.   Psychiatric/Behavioral: Negative for agitation and confusion.     Objective:        Constitutional:   She is oriented to person, place, and time. She appears well-developed and well-nourished. She appears alert. She is active. Normal speech.      Head:  Normocephalic and atraumatic. Head is without TMJ tenderness. No scars. Salivary glands normal.  Facial strength is normal.      Ears:    Right Ear: No drainage or swelling.  No middle ear effusion.   Left Ear: No drainage or swelling.  No middle ear effusion.     Nose:  No mucosal edema, rhinorrhea or sinus tenderness. Turbinate hypertrophy.      Mouth/Throat  Oropharynx clear and moist without lesions or asymmetry, normal uvula midline and mirror exam normal. Normal dentition. No uvula swelling, lacerations or trismus. No oropharyngeal exudate. Tonsillar erythema, tonsillar exudate.      Neck:  Full range of motion with neck supple and no adenopathy. Thyroid tenderness is present. No tracheal deviation, no edema, no erythema, normal range of motion, no stridor, no crepitus and no neck rigidity present. No thyroid mass present.     Cardiovascular:   Intact distal pulses and normal pulses.      Pulmonary/Chest:   Effort normal and breath sounds normal. No stridor.     Psychiatric:   Her speech is normal and behavior is normal. Her mood appears not anxious. Her affect is not labile.     Neurological:   She is alert and oriented to person, place, and time. No sensory deficit.     Skin:   No abrasions, lacerations, lesions, or rashes. No abrasion and no bruising noted.         Tests / Results:  I personally reviewed the CT SInus and my findings reveal:   Obstruction of bilateral OMC with mucosal thickening maxillary and Jaime cells obstructing  Scant patchy ethmoid thickening   Scant floor of right sphenoid thickening    Assessment:       1. Chronic maxillary sinusitis    2. Allergic rhinitis, unspecified seasonality, unspecified trigger          Plan:         Continue Flonase and saline  Discussed options, including ITR and bilateral maxillary antrostomy  May need to consider allergy treatment / re-workup in future    I discussed at length the risk of nasal /  sinus surgery with the patient including, but not limited to, recurrence/persistence of symptoms, bleeding, infection, tearing abnormality, septal perforation, tooth or cheek numbness, vision changes, orbital injury, CSF leak and  changes in smell.  The patient had opportunity to ask questions and I answered all of them to their satisfaction.  We will proceed as planned.    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 She will follow-up in 3 months for re-exam.

## 2020-01-21 NOTE — LETTER
January 22, 2020      Disha Shaffer PA-C  [address] [facility] [address]           [place]  [address] [place] [address]  Phone: [phone]  Fax: [phone]          Patient: Ijeoma Issa   MR Number: [id number]   YOB: 1970   Date of Visit: 1/21/2020       Dear Disha Shaffer:    Thank you for referring Ijeoma Issa to me for evaluation. Attached you will find relevant portions of my assessment and plan of care.    If you have questions, please do not hesitate to call me. I look forward to following Ijeoma Issa along with you.    Sincerely,    Syed Gregg MD    Enclosure  CC:  No Recipients    If you would like to receive this communication electronically, please contact [email or web address] or [phone] to request more information on [employer] access.    For providers and/or their staff who would like to refer a patient to [facility], please contact us through our one-stop-shop provider referral line, [facility], at [phone].    If you feel you have received this communication in error or would no longer like to receive these types of communications, please e-mail [email or web address]

## 2020-01-31 RX ORDER — FLUTICASONE PROPIONATE 50 MCG
SPRAY, SUSPENSION (ML) NASAL
Qty: 16 G | Refills: 2 | Status: SHIPPED | OUTPATIENT
Start: 2020-01-31 | End: 2020-04-21

## 2020-02-29 DIAGNOSIS — Z12.31 SCREENING MAMMOGRAM FOR HIGH-RISK PATIENT: Primary | ICD-10-CM

## 2020-03-01 RX ORDER — ESTRADIOL 1 MG/1
TABLET ORAL
Qty: 30 TABLET | Refills: 0 | Status: SHIPPED | OUTPATIENT
Start: 2020-03-01 | End: 2020-03-22

## 2020-03-09 ENCOUNTER — PATIENT OUTREACH (OUTPATIENT)
Dept: ADMINISTRATIVE | Facility: HOSPITAL | Age: 50
End: 2020-03-09

## 2020-03-09 NOTE — PROGRESS NOTES
Chart review completed 03/09/2020.  Care Everywhere updates requested and reviewed.  Immunizations reconciled. Media reviewed.     PAP EXCLUSION - HYSTERECTOMY       Detail Level: Detailed

## 2020-03-22 RX ORDER — ESTRADIOL 1 MG/1
TABLET ORAL
Qty: 30 TABLET | Refills: 3 | Status: SHIPPED | OUTPATIENT
Start: 2020-03-22 | End: 2020-07-21

## 2020-04-21 DIAGNOSIS — L30.8 PRURITIC DERMATITIS: ICD-10-CM

## 2020-04-21 DIAGNOSIS — L70.9 ACNE, UNSPECIFIED ACNE TYPE: Chronic | ICD-10-CM

## 2020-04-21 DIAGNOSIS — M79.89 LEG SWELLING: ICD-10-CM

## 2020-04-21 RX ORDER — FLUTICASONE PROPIONATE 50 MCG
SPRAY, SUSPENSION (ML) NASAL
Qty: 16 G | Refills: 2 | Status: SHIPPED | OUTPATIENT
Start: 2020-04-21 | End: 2020-07-20

## 2020-04-21 RX ORDER — SPIRONOLACTONE 25 MG/1
TABLET ORAL
Qty: 90 TABLET | Refills: 3 | Status: SHIPPED | OUTPATIENT
Start: 2020-04-21 | End: 2021-04-16

## 2020-05-09 ENCOUNTER — PATIENT MESSAGE (OUTPATIENT)
Dept: FAMILY MEDICINE | Facility: CLINIC | Age: 50
End: 2020-05-09

## 2020-05-09 ENCOUNTER — NURSE TRIAGE (OUTPATIENT)
Dept: ADMINISTRATIVE | Facility: CLINIC | Age: 50
End: 2020-05-09

## 2020-05-09 NOTE — TELEPHONE ENCOUNTER
Spoke with patient and she states she broke out on her face, buttocks and legs. States it is a rash and some spots have puss. States she has a h/o allergies and asthma. Also there is a lump on the R side of her face. States it is slightly difficult to swallow. Disposition per protocol is ED now. States she will go to ED now and bring her Epi pen with her.     Reason for Disposition   Rash looks like large or small blisters (i.e., fluid filled bubbles or sacs on the skin)    Additional Information   Negative: [1] Life-threatening reaction (anaphylaxis) in the past to similar substance (e.g., food, insect bite/sting, chemical, etc.) AND [2] < 2 hours since exposure   Negative: Shock suspected (e.g., cold/pale/clammy skin, too weak to stand, low BP, rapid pulse)   Negative: [1] Sudden onset of rash (within last 2 hours) AND [2] difficulty with breathing or swallowing   Negative: Difficult to awaken or acting confused (e.g., disoriented, slurred speech)   Negative: [1] Purple or blood-colored spots or dots AND [2] fever   Negative: Sounds like a life-threatening emergency to the triager   Negative: Insect bites suspected   Negative: Swimmer's itch suspected   Negative: Sunburn suspected   Negative: Hives suspected   Negative: [1] Chickenpox suspected AND [2] known exposure to chickenpox in past 3 weeks   Negative: [1] Drug rash suspected AND [2] started taking new medicine within last 2 weeks(Exception: antihistamine, eye drops, ear drops, decongestant or other OTC cough/cold medicines)   Negative: [1] Widespread rash AND [2] bright red, sunburn-like AND [3] current tampon use or nasal packing   Negative: [1] Widespread rash AND [2] bright red, sunburn-like AND [3] wound infection or recent surgery   Negative: [1] Bright red skin AND [2] peels off in sheets   Negative: Stiff neck (unable to touch chin to chest)   Negative: Fever   Negative: Joint pain or swelling    Protocols used: RASH OR REDNESS -  [id number]-A-AH

## 2020-05-21 DIAGNOSIS — J45.31 ASTHMA WITH ALLERGIC RHINITIS, MILD PERSISTENT, WITH ACUTE EXACERBATION: Chronic | ICD-10-CM

## 2020-05-21 RX ORDER — MONTELUKAST SODIUM 10 MG/1
TABLET ORAL
Qty: 30 TABLET | Refills: 3 | Status: SHIPPED | OUTPATIENT
Start: 2020-05-21 | End: 2020-09-18

## 2020-06-26 ENCOUNTER — OFFICE VISIT (OUTPATIENT)
Dept: FAMILY MEDICINE | Facility: CLINIC | Age: 50
End: 2020-06-26
Payer: COMMERCIAL

## 2020-06-26 VITALS
BODY MASS INDEX: 42.62 KG/M2 | HEART RATE: 84 BPM | RESPIRATION RATE: 18 BRPM | DIASTOLIC BLOOD PRESSURE: 86 MMHG | SYSTOLIC BLOOD PRESSURE: 126 MMHG | HEIGHT: 66 IN | TEMPERATURE: 97 F | OXYGEN SATURATION: 97 % | WEIGHT: 265.19 LBS

## 2020-06-26 DIAGNOSIS — R73.02 GLUCOSE INTOLERANCE (IMPAIRED GLUCOSE TOLERANCE): Primary | ICD-10-CM

## 2020-06-26 DIAGNOSIS — G43.C0 PERIODIC HEADACHE SYNDROME, NOT INTRACTABLE: Chronic | ICD-10-CM

## 2020-06-26 DIAGNOSIS — E66.01 MORBID OBESITY WITH BMI OF 40.0-44.9, ADULT: ICD-10-CM

## 2020-06-26 PROCEDURE — 99999 PR PBB SHADOW E&M-EST. PATIENT-LVL IV: CPT | Mod: PBBFAC,,, | Performed by: FAMILY MEDICINE

## 2020-06-26 PROCEDURE — 99999 PR PBB SHADOW E&M-EST. PATIENT-LVL IV: ICD-10-PCS | Mod: PBBFAC,,, | Performed by: FAMILY MEDICINE

## 2020-06-26 PROCEDURE — 99213 OFFICE O/P EST LOW 20 MIN: CPT | Mod: S$GLB,,, | Performed by: FAMILY MEDICINE

## 2020-06-26 PROCEDURE — 99213 PR OFFICE/OUTPT VISIT, EST, LEVL III, 20-29 MIN: ICD-10-PCS | Mod: S$GLB,,, | Performed by: FAMILY MEDICINE

## 2020-06-26 RX ORDER — GABAPENTIN 300 MG/1
300 CAPSULE ORAL 3 TIMES DAILY
Qty: 90 CAPSULE | Refills: 4 | Status: SHIPPED | OUTPATIENT
Start: 2020-06-26 | End: 2022-05-13

## 2020-06-26 RX ORDER — PIMECROLIMUS 10 MG/G
CREAM TOPICAL 2 TIMES DAILY
COMMUNITY
End: 2024-02-23 | Stop reason: ALTCHOICE

## 2020-06-26 RX ORDER — CLOBETASOL PROPIONATE 0.5 MG/G
1 CREAM TOPICAL 2 TIMES DAILY
COMMUNITY

## 2020-06-26 RX ORDER — MINERAL OIL
180 ENEMA (ML) RECTAL DAILY
COMMUNITY

## 2020-06-26 NOTE — PATIENT INSTRUCTIONS

## 2020-07-10 ENCOUNTER — HOSPITAL ENCOUNTER (OUTPATIENT)
Dept: RADIOLOGY | Facility: CLINIC | Age: 50
Discharge: HOME | End: 2020-07-10
Attending: FAMILY MEDICINE
Payer: COMMERCIAL

## 2020-07-10 DIAGNOSIS — Z12.31 SCREENING MAMMOGRAM FOR HIGH-RISK PATIENT: ICD-10-CM

## 2020-07-10 PROCEDURE — 77067 SCR MAMMO BI INCL CAD: CPT | Mod: TC,PO

## 2020-07-10 PROCEDURE — 77067 MAMMO DIGITAL SCREENING BILAT WITH TOMOSYNTHESIS_CAD: ICD-10-PCS | Mod: 26,,, | Performed by: RADIOLOGY

## 2020-07-10 PROCEDURE — 77063 BREAST TOMOSYNTHESIS BI: CPT | Mod: 26,,, | Performed by: RADIOLOGY

## 2020-07-10 PROCEDURE — 77067 SCR MAMMO BI INCL CAD: CPT | Mod: 26,,, | Performed by: RADIOLOGY

## 2020-07-10 PROCEDURE — 77063 MAMMO DIGITAL SCREENING BILAT WITH TOMOSYNTHESIS_CAD: ICD-10-PCS | Mod: 26,,, | Performed by: RADIOLOGY

## 2020-07-21 RX ORDER — ESTRADIOL 1 MG/1
TABLET ORAL
Qty: 30 TABLET | Refills: 3 | Status: SHIPPED | OUTPATIENT
Start: 2020-07-21 | End: 2020-11-17

## 2020-07-22 NOTE — PROGRESS NOTES
Subjective:       Patient ID: Ijeoma sIsa is a 50 y.o. female.    Chief Complaint: Annual Exam    Problems with weight, since youth. She has a poor diet, and sedentary activity.    Migraine   This is a chronic problem. The current episode started more than 1 year ago. The problem occurs daily. The problem has been unchanged. The pain is located in the frontal and parietal region. The pain radiates to the right neck. The quality of the pain is described as aching. The pain is at a severity of 5/10. The pain is moderate. Associated symptoms include dizziness, muscle aches, nausea, neck pain, numbness and scalp tenderness. Pertinent negatives include no abdominal pain, abnormal behavior, anorexia, back pain, blurred vision, coughing, drainage, ear pain, facial sweating, fever, hearing loss, insomnia, loss of balance, sore throat, swollen glands or tingling.     Review of Systems   Constitutional: Negative for fever.   HENT: Negative for ear pain, hearing loss and sore throat.    Eyes: Negative for blurred vision.   Respiratory: Negative for cough.    Gastrointestinal: Positive for nausea. Negative for abdominal pain and anorexia.   Musculoskeletal: Positive for neck pain. Negative for back pain.   Neurological: Positive for dizziness, light-headedness, numbness and headaches. Negative for tingling and loss of balance.   Psychiatric/Behavioral: Positive for confusion and decreased concentration. The patient is nervous/anxious. The patient does not have insomnia.        Patient Active Problem List   Diagnosis    Mammogram abnormal    OA (osteoarthritis)    Acne, adult    Asthma with allergic rhinitis    GERD (gastroesophageal reflux disease)    Family history of early CAD, brother  with MI, age 60    Migraine headache    Palpitations    Stress, at home and work    Bacterial vaginosis    Bullous impetigo    Morbid obesity    Body mass index 40.0-44.9, adult    Polycystic ovarian disease     Glucose intolerance (impaired glucose tolerance)    Menopausal symptoms    Vaginal dryness, menopausal    Umbilical hernia       Objective:      Physical Exam  Vitals signs reviewed.   Constitutional:       General: She is in acute distress.      Appearance: She is well-developed. She is obese. She is not diaphoretic.   HENT:      Head: Normocephalic and atraumatic.      Right Ear: External ear normal.      Left Ear: External ear normal.      Nose: Nose normal.      Mouth/Throat:      Pharynx: No oropharyngeal exudate.   Eyes:      General: No scleral icterus.        Right eye: No discharge.         Left eye: No discharge.      Conjunctiva/sclera: Conjunctivae normal.      Pupils: Pupils are equal, round, and reactive to light.   Neck:      Musculoskeletal: Normal range of motion and neck supple. Pain with movement and spinous process tenderness present.      Thyroid: No thyromegaly.      Vascular: No JVD.      Trachea: No tracheal deviation.        Comments: Tenderness at C3-T1 bilateral. Neg for paresthesias, neg Spilger's, no muscle atrophy.  Cardiovascular:      Rate and Rhythm: Normal rate.      Heart sounds: Normal heart sounds. No murmur. No friction rub. No gallop.    Pulmonary:      Effort: Pulmonary effort is normal. No respiratory distress.      Breath sounds: No stridor. No wheezing or rales.   Chest:      Chest wall: No tenderness.   Abdominal:      General: Bowel sounds are normal. There is no distension.      Palpations: Abdomen is soft. There is no mass.      Tenderness: There is no abdominal tenderness. There is no guarding or rebound.   Musculoskeletal: Normal range of motion.   Lymphadenopathy:      Cervical: No cervical adenopathy.   Skin:     General: Skin is dry.      Coloration: Skin is not pale.      Findings: No erythema or rash.   Neurological:      Mental Status: She is alert and oriented to person, place, and time.      Cranial Nerves: No cranial nerve deficit.      Sensory: Sensation  is intact.      Motor: Motor function is intact. No abnormal muscle tone.      Coordination: Coordination is intact. Coordination normal.      Deep Tendon Reflexes: Reflexes normal.   Psychiatric:         Attention and Perception: She is inattentive.         Mood and Affect: Mood is depressed.         Behavior: Behavior normal.         Thought Content: Thought content normal.         Judgment: Judgment normal.         Lab Results   Component Value Date    WBC 8.03 11/16/2019    HGB 13.8 11/16/2019    HCT 44.1 11/16/2019     11/16/2019    CHOL 176 11/16/2019    TRIG 118 11/16/2019    HDL 50 11/16/2019    ALT 14 11/16/2019    AST 16 11/16/2019     07/10/2020    K 4.1 07/10/2020     07/10/2020    CREATININE 0.9 07/10/2020    BUN 13 07/10/2020    CO2 25 07/10/2020    TSH 2.625 12/29/2016    HGBA1C 5.3 11/16/2019    MICROALBUR 0.32 07/20/2012     The 10-year ASCVD risk score (Tacoquan VAUGHN Jr., et al., 2013) is: 1%    Values used to calculate the score:      Age: 50 years      Sex: Female      Is Non- : No      Diabetic: No      Tobacco smoker: No      Systolic Blood Pressure: 126 mmHg      Is BP treated: No      HDL Cholesterol: 50 mg/dL      Total Cholesterol: 176 mg/dL  Prophylaxis therapy to prevent or decrease headaches.  Assessment:       1. Glucose intolerance (impaired glucose tolerance)    2. Periodic headache syndrome, not intractable    3. Morbid obesity with BMI of 40.0-44.9, adult        Plan:       Glucose intolerance (impaired glucose tolerance)  -     Basic metabolic panel; Future; Expected date: 06/26/2020    Periodic headache syndrome, not intractable  -     gabapentin (NEURONTIN) 300 MG capsule; Take 1 capsule (300 mg total) by mouth 3 (three) times daily.  Dispense: 90 capsule; Refill: 4    Morbid obesity with BMI of 40.0-44.9, adult      Patient readiness: acceptance and barriers:social stressors, environmental, economic, household issues and poor sleep  habits    During the course of the visit the patient was educated and counseled about the following:     Obesity:   General weight loss/lifestyle modification strategies discussed (elicit support from others; identify saboteurs; non-food rewards, etc).  Behavioral treatment: WW.  Diet interventions: low calorie (1000 kCal/d) deficit diet and ovo-lactarian diet.  Informal exercise measures discussed, e.g. taking stairs instead of elevator.  Regular aerobic exercise program discussed.    Goals: Obesity: Reduce calorie intake and BMI    Did patient meet goals/outcomes: No    The following self management tools provided: diet dairy and exercise log.    Patient Instructions (the written plan) was given to the patient/family.     Time spent with patient: 30 minutes    Barriers to medications present (no )    Adverse reactions to current medications (yes)    Over the counter medications reviewed (Yes)        30-minute visit. 20 minutes spent counseling patient on diet, exercise, and weight loss.  This has been fully explained to the patient, who indicates understanding.    Discussed health maintenance guidelines appropriate for age.

## 2020-10-12 ENCOUNTER — HOSPITAL ENCOUNTER (EMERGENCY)
Facility: HOSPITAL | Age: 50
Discharge: HOME | End: 2020-10-12
Attending: EMERGENCY MEDICINE
Payer: COMMERCIAL

## 2020-10-12 ENCOUNTER — TELEPHONE (OUTPATIENT)
Dept: FAMILY MEDICINE | Facility: CLINIC | Age: 50
End: 2020-10-12

## 2020-10-12 VITALS
WEIGHT: 260 LBS | HEART RATE: 69 BPM | DIASTOLIC BLOOD PRESSURE: 89 MMHG | RESPIRATION RATE: 18 BRPM | HEIGHT: 64 IN | BODY MASS INDEX: 44.39 KG/M2 | SYSTOLIC BLOOD PRESSURE: 146 MMHG | OXYGEN SATURATION: 98 %

## 2020-10-12 DIAGNOSIS — R05.9 COUGH: ICD-10-CM

## 2020-10-12 DIAGNOSIS — R42 LIGHTHEADEDNESS: Primary | ICD-10-CM

## 2020-10-12 DIAGNOSIS — R11.0 NAUSEA: ICD-10-CM

## 2020-10-12 DIAGNOSIS — I10 HYPERTENSION: ICD-10-CM

## 2020-10-12 DIAGNOSIS — J02.9 SORE THROAT: ICD-10-CM

## 2020-10-12 LAB
ALBUMIN SERPL BCP-MCNC: 3.7 G/DL (ref 3.5–5.2)
ALP SERPL-CCNC: 90 U/L (ref 55–135)
ALT SERPL W/O P-5'-P-CCNC: 18 U/L (ref 10–44)
ANION GAP SERPL CALC-SCNC: 15 MMOL/L (ref 8–16)
AST SERPL-CCNC: 22 U/L (ref 10–40)
BASOPHILS # BLD AUTO: 0.07 K/UL (ref 0–0.2)
BASOPHILS NFR BLD: 1.2 % (ref 0–1.9)
BILIRUB SERPL-MCNC: 0.4 MG/DL (ref 0.1–1)
BILIRUB UR QL STRIP: NEGATIVE
BNP SERPL-MCNC: 16 PG/ML (ref 0–99)
BUN SERPL-MCNC: 17 MG/DL (ref 6–20)
CALCIUM SERPL-MCNC: 9.1 MG/DL (ref 8.7–10.5)
CHLORIDE SERPL-SCNC: 104 MMOL/L (ref 95–110)
CLARITY UR: CLEAR
CO2 SERPL-SCNC: 24 MMOL/L (ref 23–29)
COLOR UR: YELLOW
CREAT SERPL-MCNC: 0.7 MG/DL (ref 0.5–1.4)
CTP QC/QA: YES
DIFFERENTIAL METHOD: NORMAL
EOSINOPHIL # BLD AUTO: 0.2 K/UL (ref 0–0.5)
EOSINOPHIL NFR BLD: 2.5 % (ref 0–8)
ERYTHROCYTE [DISTWIDTH] IN BLOOD BY AUTOMATED COUNT: 12.3 % (ref 11.5–14.5)
EST. GFR  (AFRICAN AMERICAN): >60 ML/MIN/1.73 M^2
EST. GFR  (NON AFRICAN AMERICAN): >60 ML/MIN/1.73 M^2
GLUCOSE SERPL-MCNC: 96 MG/DL (ref 70–110)
GLUCOSE UR QL STRIP: NEGATIVE
GROUP A STREP, MOLECULAR: NEGATIVE
HCT VFR BLD AUTO: 40.6 % (ref 37–48.5)
HGB BLD-MCNC: 13.5 G/DL (ref 12–16)
HGB UR QL STRIP: ABNORMAL
IMM GRANULOCYTES # BLD AUTO: 0.02 K/UL (ref 0–0.04)
IMM GRANULOCYTES NFR BLD AUTO: 0.3 % (ref 0–0.5)
KETONES UR QL STRIP: NEGATIVE
LEUKOCYTE ESTERASE UR QL STRIP: NEGATIVE
LYMPHOCYTES # BLD AUTO: 1.6 K/UL (ref 1–4.8)
LYMPHOCYTES NFR BLD: 26.8 % (ref 18–48)
MCH RBC QN AUTO: 29.6 PG (ref 27–31)
MCHC RBC AUTO-ENTMCNC: 33.3 G/DL (ref 32–36)
MCV RBC AUTO: 89 FL (ref 82–98)
MONOCYTES # BLD AUTO: 0.4 K/UL (ref 0.3–1)
MONOCYTES NFR BLD: 7.4 % (ref 4–15)
NEUTROPHILS # BLD AUTO: 3.7 K/UL (ref 1.8–7.7)
NEUTROPHILS NFR BLD: 61.8 % (ref 38–73)
NITRITE UR QL STRIP: NEGATIVE
NRBC BLD-RTO: 0 /100 WBC
PH UR STRIP: 8 [PH] (ref 5–8)
PLATELET # BLD AUTO: 243 K/UL (ref 150–350)
PMV BLD AUTO: 10.9 FL (ref 9.2–12.9)
POTASSIUM SERPL-SCNC: 4 MMOL/L (ref 3.5–5.1)
PROT SERPL-MCNC: 7.2 G/DL (ref 6–8.4)
PROT UR QL STRIP: NEGATIVE
RBC # BLD AUTO: 4.56 M/UL (ref 4–5.4)
SARS-COV-2 RDRP RESP QL NAA+PROBE: NEGATIVE
SODIUM SERPL-SCNC: 143 MMOL/L (ref 136–145)
SP GR UR STRIP: 1.01 (ref 1–1.03)
TROPONIN I SERPL DL<=0.01 NG/ML-MCNC: <0.006 NG/ML (ref 0–0.03)
URN SPEC COLLECT METH UR: ABNORMAL
UROBILINOGEN UR STRIP-ACNC: NEGATIVE EU/DL
WBC # BLD AUTO: 5.97 K/UL (ref 3.9–12.7)

## 2020-10-12 PROCEDURE — 93005 ELECTROCARDIOGRAM TRACING: CPT

## 2020-10-12 PROCEDURE — 85025 COMPLETE CBC W/AUTO DIFF WBC: CPT

## 2020-10-12 PROCEDURE — 63600175 PHARM REV CODE 636 W HCPCS: Performed by: EMERGENCY MEDICINE

## 2020-10-12 PROCEDURE — U0002 COVID-19 LAB TEST NON-CDC: HCPCS | Performed by: EMERGENCY MEDICINE

## 2020-10-12 PROCEDURE — 93010 ELECTROCARDIOGRAM REPORT: CPT | Mod: ,,, | Performed by: INTERNAL MEDICINE

## 2020-10-12 PROCEDURE — 96361 HYDRATE IV INFUSION ADD-ON: CPT

## 2020-10-12 PROCEDURE — 81003 URINALYSIS AUTO W/O SCOPE: CPT

## 2020-10-12 PROCEDURE — 36415 COLL VENOUS BLD VENIPUNCTURE: CPT

## 2020-10-12 PROCEDURE — 99285 EMERGENCY DEPT VISIT HI MDM: CPT | Mod: 25

## 2020-10-12 PROCEDURE — 80053 COMPREHEN METABOLIC PANEL: CPT

## 2020-10-12 PROCEDURE — 87651 STREP A DNA AMP PROBE: CPT

## 2020-10-12 PROCEDURE — 83880 ASSAY OF NATRIURETIC PEPTIDE: CPT

## 2020-10-12 PROCEDURE — 93010 EKG 12-LEAD: ICD-10-PCS | Mod: ,,, | Performed by: INTERNAL MEDICINE

## 2020-10-12 PROCEDURE — 84484 ASSAY OF TROPONIN QUANT: CPT

## 2020-10-12 PROCEDURE — 96374 THER/PROPH/DIAG INJ IV PUSH: CPT

## 2020-10-12 PROCEDURE — 25000003 PHARM REV CODE 250: Performed by: EMERGENCY MEDICINE

## 2020-10-12 RX ORDER — IBUPROFEN 800 MG/1
800 TABLET ORAL EVERY 6 HOURS PRN
Qty: 20 TABLET | Refills: 0 | Status: SHIPPED | OUTPATIENT
Start: 2020-10-12 | End: 2020-10-15

## 2020-10-12 RX ORDER — BENZONATATE 100 MG/1
100 CAPSULE ORAL 3 TIMES DAILY PRN
Qty: 20 CAPSULE | Refills: 0 | Status: SHIPPED | OUTPATIENT
Start: 2020-10-12 | End: 2020-10-19

## 2020-10-12 RX ORDER — ONDANSETRON 2 MG/ML
4 INJECTION INTRAMUSCULAR; INTRAVENOUS
Status: COMPLETED | OUTPATIENT
Start: 2020-10-12 | End: 2020-10-12

## 2020-10-12 RX ORDER — ONDANSETRON 4 MG/1
4 TABLET, FILM COATED ORAL EVERY 6 HOURS PRN
Qty: 12 TABLET | Refills: 0 | Status: SHIPPED | OUTPATIENT
Start: 2020-10-12 | End: 2022-05-13

## 2020-10-12 RX ADMIN — ONDANSETRON 4 MG: 2 INJECTION INTRAMUSCULAR; INTRAVENOUS at 12:10

## 2020-10-12 RX ADMIN — SODIUM CHLORIDE 1000 ML: 0.9 INJECTION, SOLUTION INTRAVENOUS at 11:10

## 2020-10-12 NOTE — Clinical Note
"Ijeoma"Maci Issa was seen and treated in our emergency department on 10/12/2020.  She may return to work on 10/14/2020.       If you have any questions or concerns, please don't hesitate to call.      Fabien Dias MD"

## 2020-10-12 NOTE — PROGRESS NOTES
Problem: Falls - Risk of:  Goal: Will remain free from falls  Description: Will remain free from falls  10/12/2020 1049 by Elfego Pacheco RN  Outcome: Ongoing  10/12/2020 0315 by Rj Clark RN  Outcome: Ongoing  Goal: Absence of physical injury  Description: Absence of physical injury  10/12/2020 1049 by Elfego Pacheco RN  Outcome: Ongoing  10/12/2020 0315 by Rj Clark RN  Outcome: Ongoing     Problem: Infection:  Goal: Will remain free from infection  Description: Will remain free from infection  10/12/2020 1049 by Elfego Pacheco RN  Outcome: Ongoing  10/12/2020 0315 by Rj Clark RN  Outcome: Ongoing     Problem: Safety:  Goal: Free from accidental physical injury  Description: Free from accidental physical injury  10/12/2020 1049 by Elfego Pacheco RN  Outcome: Ongoing  10/12/2020 0315 by Rj Clark RN  Outcome: Ongoing  Goal: Free from intentional harm  Description: Free from intentional harm  10/12/2020 1049 by Elfego Pacheco RN  Outcome: Ongoing  10/12/2020 0315 by Rj Clark RN  Outcome: Ongoing     Problem: Daily Care:  Goal: Daily care needs are met  Description: Daily care needs are met  10/12/2020 1049 by Elfego Pacheco RN  Outcome: Ongoing  10/12/2020 0315 by Rj Clark RN  Outcome: Ongoing     Problem: Pain:  Goal: Patient's pain/discomfort is manageable  Description: Patient's pain/discomfort is manageable  10/12/2020 1049 by Elfego Pacheco RN  Outcome: Ongoing  10/12/2020 0315 by Rj Clark RN  Outcome: Ongoing  Goal: Pain level will decrease  Description: Pain level will decrease  10/12/2020 1049 by Elfego Pacheco RN  Outcome: Ongoing  10/12/2020 0315 by Rj Clark RN  Outcome: Ongoing  Goal: Control of acute pain  Description: Control of acute pain  10/12/2020 1049 by Elfego Pacheco RN  Outcome: Ongoing  10/12/2020 0315 by Rj Clark RN  Outcome: Ongoing  Goal: Control of chronic pain  Description: Control of chronic pain  10/12/2020 1049 by Elfego Pacheco RN  Outcome: SUBJECTIVE:   46 y.o. female  complains of vulvar erythema noted vaginal irritation for several days. No LMP recorded. Patient has had a hysterectomy..  She describes her periods as none.  She describes other symptoms as erythema of vaginal area.  She is sexually active.  She uses no method for contraception.    ROS:  GENERAL: No fever, chills, fatigability or weight loss.  VULVAR: No pain, no lesions and no itching.  VAGINAL: No relaxation, no itching, no discharge, no abnormal bleeding and no lesions.  ABDOMEN: No abdominal pain. Denies nausea. Denies vomiting. No diarrhea. No constipation  BREAST: Denies pain. No lumps. No discharge.  URINARY: No incontinence, no nocturia, no frequency and no dysuria.  CARDIOVASCULAR: No chest pain. No shortness of breath. No leg cramps.  NEUROLOGICAL: No headaches. No vision changes.        Vitals:    17 1533   BP: (!) 144/82   Pulse: 85         OBJECTIVE:   She appears well, afebrile.  Abdomen: benign, soft, nontender, no masses.  VULVA: Normal external female genitalia, normal urethra, normal urethral meatus  VAGINA:no lesions  CERVIXabsent cervix  UTERUSuterus absent  ADNEXAnot evaluated    Urine dipstick: not done.    ASSESSMENT:   nonspecific vaginitis    PLAN:   Follow up all cultures  Treatment: abstain from coitus during course of treatment  ROV prn if symptoms persist or worsen.   Ongoing  10/12/2020 0315 by Abdi Clarke RN  Outcome: Ongoing     Problem: Skin Integrity:  Goal: Skin integrity will stabilize  Description: Skin integrity will stabilize  10/12/2020 1049 by Peng Hanks RN  Outcome: Ongoing  10/12/2020 0315 by Abdi Clarke RN  Outcome: Ongoing     Problem: Discharge Planning:  Goal: Patients continuum of care needs are met  Description: Patients continuum of care needs are met  10/12/2020 1049 by Peng Hanks RN  Outcome: Ongoing  10/12/2020 0315 by Abdi Clarke RN  Outcome: Ongoing     Problem: Sensory:  Goal: General experience of comfort will improve  Description: General experience of comfort will improve  10/12/2020 1049 by Peng Hanks RN  Outcome: Ongoing  10/12/2020 0315 by Abdi Clarke RN  Outcome: Ongoing     Problem: Urinary Elimination:  Goal: Signs and symptoms of infection will decrease  Description: Signs and symptoms of infection will decrease  10/12/2020 1049 by Peng Hanks RN  Outcome: Ongoing  10/12/2020 0315 by Abdi Clarke RN  Outcome: Ongoing  Goal: Ability to reestablish a normal urinary elimination pattern will improve - after catheter removal  Description: Ability to reestablish a normal urinary elimination pattern will improve  10/12/2020 1049 by Peng Hanks RN  Outcome: Ongoing  10/12/2020 0315 by Abdi Clarke RN  Outcome: Ongoing  Goal: Complications related to the disease process, condition or treatment will be avoided or minimized  Description: Complications related to the disease process, condition or treatment will be avoided or minimized  10/12/2020 1049 by Peng Hanks RN  Outcome: Ongoing  10/12/2020 0315 by Abdi Clarke RN  Outcome: Ongoing     Problem: Skin Integrity:  Goal: Will show no infection signs and symptoms  Description: Will show no infection signs and symptoms  10/12/2020 1049 by Peng Hanks RN  Outcome: Ongoing  10/12/2020 0315 by Abdi Clarke RN  Outcome: Ongoing  Goal: Absence of new skin breakdown  Description: Absence of new skin breakdown  10/12/2020 1049 by Diomedes Recinos RN  Outcome: Ongoing  10/12/2020 0315 by Valarie Valencia RN  Outcome: Ongoing

## 2020-10-12 NOTE — ED PROVIDER NOTES
"Encounter Date: 10/12/2020    SCRIBE #1 NOTE: ISpring, mena scribing for, and in the presence of, Fabien Dias MD.       History     Chief Complaint   Patient presents with    Dizziness    Hypertension    Sore Throat       Time seen by provider: 10:06 AM on 10/12/2020    Ijeoma Issa is a 50 y.o. female with a PMHx of DM who presents to the ED with the feeling of being "unbalanced" and light headed. She is also complaining of a sore throat. Symptoms began this morning. The patient denies fever, chest pain, abdominal pain, or any other symptoms at this time. No significant PSHx.       The history is provided by the patient.     Review of patient's allergies indicates:   Allergen Reactions    Cashew nut Anaphylaxis     Allergic to Cashew nuts    Nut flavor Anaphylaxis     Allergic to Cashew nuts    Latex Rash     Including in injections    Varicella vaccines      Red swelling/ lump develops at injection site    Adhesive tape-silicones Rash    Tapentadol Rash     Past Medical History:   Diagnosis Date    Abnormal mammogram     neg biospy    Allergic asthma     Anxiety     Arthritis     Dermatitis     Dr. Weil, Extremities    Diabetes     Diverticulosis of colon     GERD (gastroesophageal reflux disease)     Mass of right breast     Postmenopausal hormone replacement therapy     Tubal pregnancy     Uterine fibroid      Past Surgical History:   Procedure Laterality Date    BREAST BIOPSY Right 2012    benign     SECTION, LOW TRANSVERSE      CHOLECYSTECTOMY      HYSTERECTOMY      re: benign tumors per the patient    right ankle  10/10/2014    TOTAL ABDOMINAL HYSTERECTOMY W/ BILATERAL SALPINGOOPHORECTOMY      c appendectomy    TUBAL LIGATION       Family History   Problem Relation Age of Onset    Cancer Father         colon    Colon cancer Father     Diabetes Sister     Breast cancer Sister 28    Cancer Brother         stomach    Heart disease " Brother     Heart disease Mother     Ovarian cancer Neg Hx      Social History     Tobacco Use    Smoking status: Never Smoker    Smokeless tobacco: Never Used   Substance Use Topics    Alcohol use: Yes     Alcohol/week: 0.0 standard drinks     Frequency: Never     Comment: very seldom    Drug use: No     Review of Systems   Constitutional: Negative for fever.   HENT: Positive for sore throat.    Eyes: Negative for visual disturbance.   Respiratory: Negative for wheezing.    Cardiovascular: Negative for chest pain.   Gastrointestinal: Negative for abdominal pain.   Genitourinary: Negative for dysuria.   Musculoskeletal: Negative for joint swelling.   Skin: Negative for rash.   Neurological: Positive for light-headedness.   Hematological: Does not bruise/bleed easily.   Psychiatric/Behavioral: Negative for confusion.       Physical Exam     Initial Vitals [10/12/20 1001]   BP Pulse Resp Temp SpO2   (!) 184/91 81 18 -- 99 %      MAP       --         Physical Exam    Nursing note and vitals reviewed.  Constitutional: She appears well-nourished. She is Obese .   HENT:   Head: Normocephalic and atraumatic.   Eyes: Conjunctivae and EOM are normal.   Neck: Normal range of motion. Neck supple. No thyroid mass present.   Cardiovascular: Normal rate, regular rhythm and normal heart sounds. Exam reveals no gallop and no friction rub.    No murmur heard.  Pulmonary/Chest: Breath sounds normal. She has no wheezes. She has no rhonchi. She has no rales.   Abdominal: Soft. Normal appearance and bowel sounds are normal. There is no abdominal tenderness.   Neurological: She is alert and oriented to person, place, and time. She has normal strength. No cranial nerve deficit or sensory deficit.   Skin: Skin is warm and dry. No rash noted. No erythema.   Psychiatric: She has a normal mood and affect. Her speech is normal. Cognition and memory are normal.         ED Course   Procedures  Labs Reviewed   URINALYSIS, REFLEX TO URINE  CULTURE - Abnormal; Notable for the following components:       Result Value    Occult Blood UA Trace (*)     All other components within normal limits    Narrative:     Specimen Source->Urine   GROUP A STREP, MOLECULAR   COMPREHENSIVE METABOLIC PANEL   CBC W/ AUTO DIFFERENTIAL   B-TYPE NATRIURETIC PEPTIDE   TROPONIN I   SARS-COV-2 RNA AMPLIFICATION, QUAL   SARS-COV-2 RDRP GENE    Narrative:     This test utilizes isothermal nucleic acid amplification   technology to detect the SARS-CoV-2 RdRp nucleic acid segment.   The analytical sensitivity (limit of detection) is 125 genome   equivalents/mL.   A POSITIVE result implies infection with the SARS-CoV-2 virus;   the patient is presumed to be contagious.     A NEGATIVE result means that SARS-CoV-2 nucleic acids are not   present above the limit of detection. A NEGATIVE result should be   treated as presumptive. It does not rule out the possibility of   COVID-19 and should not be the sole basis for treatment decisions.   If COVID-19 is strongly suspected based on clinical and exposure   history, re-testing using an alternate molecular assay should be   considered.   This test is only for use under the Food and Drug   Administration s Emergency Use Authorization (EUA).   Commercial kits are provided by [employer].   Performance characteristics of the EUA have been independently   verified by [facility] Department of   Pathology and Laboratory Medicine.   _________________________________________________________________   The authorized Fact Sheet for Healthcare Providers and the authorized Fact   Sheet for Patients of the ID NOW COVID-19 are available on the FDA   website:     https://www.fda.gov/media/654521/download  https://www.fda.gov/media/842282/download         EKG Readings: (Independently Interpreted)   Initial Reading: No STEMI. Rhythm: Normal Sinus Rhythm. Heart Rate: 78. ST Segments: Normal ST Segments. T Waves: Normal. Axis: Normal.    Minimal voltage criteria LVH     ECG Results          EKG 12-lead (In process)  Result time 10/12/20 10:27:55    In process by Interface, Lab In [facility] (10/12/20 10:27:55)                 Narrative:    Test Reason : I10,    Vent. Rate : 078 BPM     Atrial Rate : 078 BPM     P-R Int : 124 ms          QRS Dur : 076 ms      QT Int : 382 ms       P-R-T Axes : 055 -07 022 degrees     QTc Int : 435 ms    Normal sinus rhythm  Minimal voltage criteria for LVH, may be normal variant  Borderline Abnormal ECG  When compared with ECG of 02-MAY-2016 15:57,  No significant change was found    Referred By: [email or web address]   SELF           Confirmed By:                             Imaging Results          X-Ray Chest AP Portable (Final result)  Result time 10/12/20 11:00:58    Final result by Rosa M Anguiano MD (10/12/20 11:00:58)                 Impression:      No acute cardiopulmonary disease.      Electronically signed by: Rosa M Anguiano MD  Date:    10/12/2020  Time:    11:00             Narrative:    EXAMINATION:  XR CHEST AP PORTABLE    CLINICAL HISTORY:  Cough;    TECHNIQUE:  Single frontal view of the chest was performed.    COMPARISON:  01/03/2020    FINDINGS:  The heart is not enlarged.  The lungs are clear of infiltrate.  There is no pleural effusion.  No significant change.                                 Medical Decision Making:   History:   Old Medical Records: I decided to obtain old medical records.  Independently Interpreted Test(s):   I have ordered and independently interpreted EKG Reading(s) - see prior notes  Clinical Tests:   Lab Tests: Reviewed and Ordered  Radiological Study: Ordered and Reviewed  Medical Tests: Ordered and Reviewed  ED Management:  Pt was interviewed and examined emergently. Lightheadedness is brief and currently resolved PTA. Labs largely unremarkable and I have very susp for a central or dangerous cardiac cause. The patient appears to have a viral upper respiratory infection.  Based upon  the history and physical exam the patient does not appear to have a serious bacterial infection such as pneumonia, sepsis, otitis media, bacterial sinusitis, strep pharyngitis, parapharyngeal or peritonsillar abscess, meningitis.  Patient appears very well and I have given specific return precautions.  The patient can take over the counter medications and does not appear to need antibiotics at this time. Asked to f/u with here PCP asap or return to the ED immediately for any new, worsening or concerning symptoms.              Scribe Attestation:   Scribe #1: I performed the above scribed service and the documentation accurately describes the services I performed. I attest to the accuracy of the note.    I, Dr. Fabien Dias, personally performed the services described in this documentation. All medical record entries made by the scribe were at my direction and in my presence.  I have reviewed the chart and agree that the record reflects my personal performance and is accurate and complete. Fabien Dias MD.  8:23 AM 10/14/2020                    Clinical Impression:       ICD-10-CM ICD-9-CM   1. Lightheadedness  R42 780.4   2. Hypertension  I10 401.9   3. Sore throat  J02.9 462   4. Cough  R05 786.2   5. Nausea  R11.0 787.02                      Disposition:   Disposition: Discharged  Condition: Stable     ED Disposition Condition    Discharge Stable        ED Prescriptions     Medication Sig Dispense Start Date End Date Auth. Provider    ondansetron (ZOFRAN) 4 MG tablet Take 1 tablet (4 mg total) by mouth every 6 (six) hours as needed for Nausea. 12 tablet 10/12/2020  Fabien Dias MD    ibuprofen (ADVIL,MOTRIN) 800 MG tablet Take 1 tablet (800 mg total) by mouth every 6 (six) hours as needed for Pain. 20 tablet 10/12/2020 10/15/2020 Fabien Dias MD    benzonatate (TESSALON) 100 MG capsule Take 1 capsule (100 mg total) by mouth 3 (three) times daily as needed for Cough. 20 capsule 10/12/2020 10/19/2020 Fabien  SHAUN Dias MD        Follow-up Information     Follow up With Specialties Details Why Contact Info    Sonny Nunez MD Family Medicine Schedule an appointment as soon as possible for a visit   [address] [facility] [address]  [phone]                                         Fabien Dias MD  10/14/20 0826

## 2020-10-13 NOTE — TELEPHONE ENCOUNTER
----- Message from Kandice Fuentes sent at 10/12/2020  3:13 PM CDT -----  Regarding: Sooner Appt  Contact: patient  Type:  Sooner Appointment Request    Name of Caller:  Patient    When is the first available appointment?  10/22    Symptoms:  ER F/U  Dizziness, hypertension, sore throat, light headedness, cough, nausea.  Er diagnosed with upper respiratory infection & hypertension, covid negative     Best Call Back Number:  [phone]    Additional Information:   requesting to be seen this week by Dr. Nunez.

## 2020-10-15 ENCOUNTER — OFFICE VISIT (OUTPATIENT)
Dept: FAMILY MEDICINE | Facility: CLINIC | Age: 50
End: 2020-10-15
Payer: COMMERCIAL

## 2020-10-15 VITALS
RESPIRATION RATE: 18 BRPM | SYSTOLIC BLOOD PRESSURE: 118 MMHG | DIASTOLIC BLOOD PRESSURE: 60 MMHG | WEIGHT: 262.13 LBS | TEMPERATURE: 98 F | HEIGHT: 64 IN | HEART RATE: 75 BPM | BODY MASS INDEX: 44.75 KG/M2 | OXYGEN SATURATION: 95 %

## 2020-10-15 DIAGNOSIS — R09.82 POST-NASAL DRIP: ICD-10-CM

## 2020-10-15 DIAGNOSIS — E66.01 MORBID OBESITY WITH BMI OF 40.0-44.9, ADULT: ICD-10-CM

## 2020-10-15 DIAGNOSIS — R05.9 COUGH: ICD-10-CM

## 2020-10-15 DIAGNOSIS — Z09 FOLLOW-UP EXAMINATION: Primary | ICD-10-CM

## 2020-10-15 DIAGNOSIS — J30.89 ENVIRONMENTAL AND SEASONAL ALLERGIES: ICD-10-CM

## 2020-10-15 PROCEDURE — 99213 OFFICE O/P EST LOW 20 MIN: CPT | Mod: S$GLB,,, | Performed by: FAMILY MEDICINE

## 2020-10-15 PROCEDURE — 99999 PR PBB SHADOW E&M-EST. PATIENT-LVL V: ICD-10-PCS | Mod: PBBFAC,,, | Performed by: FAMILY MEDICINE

## 2020-10-15 PROCEDURE — 99213 PR OFFICE/OUTPT VISIT, EST, LEVL III, 20-29 MIN: ICD-10-PCS | Mod: S$GLB,,, | Performed by: FAMILY MEDICINE

## 2020-10-15 PROCEDURE — 3008F PR BODY MASS INDEX (BMI) DOCUMENTED: ICD-10-PCS | Mod: S$GLB,,, | Performed by: FAMILY MEDICINE

## 2020-10-15 PROCEDURE — 3008F BODY MASS INDEX DOCD: CPT | Mod: S$GLB,,, | Performed by: FAMILY MEDICINE

## 2020-10-15 PROCEDURE — 99999 PR PBB SHADOW E&M-EST. PATIENT-LVL V: CPT | Mod: PBBFAC,,, | Performed by: FAMILY MEDICINE

## 2020-10-15 NOTE — LETTER
October 15, 2020      [facility]  [address] [address]  Phone: [phone]  Fax: [phone]       Patient: Ijeoma Issa   YOB: 1970  Date of Visit: 10/15/2020    To Whom It May Concern:      Ronan Issa  was at [facility] on 10/15/2020. Patient  may return to work/school on October 16, 2020 with no restrictions. If you have any questions or concerns, or if I can be of further assistance, please do not hesitate to contact me.      Sincerely,          Kenzie Edmondson NP/ bm

## 2020-10-15 NOTE — PROGRESS NOTES
Subjective:       Patient ID: Ijeoma Issa is a 50 y.o. female.    Chief Complaint: Follow-up (emergency room follow. ) and Cough    This patient is new to me.  Mrs Raphael presents to the clinic today for an ED follow up for elevated blood pressure. Patient states she does not normally have elevated blood pressure. Patient states she was feeling light headed and having back pain that includes her sciatic pain. Patient states she has medication for this but does not like to take it when she is at work. Patient educated on stretches and heat for sciatic pain.   Patient also has complaints of cough. Patient states she got tessalon pearls in the ED and this has helped. Patient educated on allergies and post nasal drip that cause a cough. Patient educated to continue allergy medication and nasal sprays as directed.   Patient educated on plan of care, verbalized understanding.    Cough  This is a new problem. The current episode started in the past 7 days. The problem has been unchanged. The problem occurs every few hours. The cough is productive of sputum. Associated symptoms include postnasal drip. Pertinent negatives include no chest pain, chills, ear congestion, ear pain, fever, headaches, heartburn, hemoptysis, myalgias, nasal congestion, rash, rhinorrhea, sore throat, shortness of breath, sweats, weight loss or wheezing. Nothing aggravates the symptoms. She has tried prescription cough suppressant for the symptoms. The treatment provided moderate relief. Her past medical history is significant for environmental allergies.     Review of Systems   Constitutional: Negative for chills, fever and weight loss.   HENT: Positive for postnasal drip. Negative for ear pain, rhinorrhea and sore throat.    Respiratory: Positive for cough. Negative for hemoptysis, shortness of breath and wheezing.    Cardiovascular: Negative for chest pain.   Gastrointestinal: Negative for heartburn.   Musculoskeletal: Negative for  myalgias.   Skin: Negative for rash.   Allergic/Immunologic: Positive for environmental allergies.   Neurological: Negative for headaches.       Patient Active Problem List   Diagnosis    Mammogram abnormal    OA (osteoarthritis)    Acne, adult    Asthma with allergic rhinitis    GERD (gastroesophageal reflux disease)    Family history of early CAD, brother  with MI, age 60    Migraine headache    Palpitations    Stress, at home and work    Bacterial vaginosis    Bullous impetigo    Morbid obesity    Body mass index 40.0-44.9, adult    Polycystic ovarian disease    Glucose intolerance (impaired glucose tolerance)    Menopausal symptoms    Vaginal dryness, menopausal    Umbilical hernia       Objective:      Physical Exam    Lab Results   Component Value Date    WBC 5.97 10/12/2020    HGB 13.5 10/12/2020    HCT 40.6 10/12/2020     10/12/2020    CHOL 176 2019    TRIG 118 2019    HDL 50 2019    ALT 18 10/12/2020    AST 22 10/12/2020     10/12/2020    K 4.0 10/12/2020     10/12/2020    CREATININE 0.7 10/12/2020    BUN 17 10/12/2020    CO2 24 10/12/2020    TSH 2.625 2016    HGBA1C 5.3 2019    MICROALBUR 0.32 2012     The 10-year ASCVD risk score (Tacoquan VAUGHN Jr., et al., 2013) is: 0.9%    Values used to calculate the score:      Age: 50 years      Sex: Female      Is Non- : No      Diabetic: No      Tobacco smoker: No      Systolic Blood Pressure: 118 mmHg      Is BP treated: No      HDL Cholesterol: 50 mg/dL      Total Cholesterol: 176 mg/dL    Assessment:       1. Follow-up examination    2. Morbid obesity with BMI of 40.0-44.9, adult    3. Environmental and seasonal allergies    4. Post-nasal drip    5. Cough        Plan:       Ijeoma was seen today for follow-up and cough.    Diagnoses and all orders for this visit:    Follow-up examination  Blood pressure stable  Continue medications as prescribed.  Follow up with  PCP    Morbid obesity with BMI of 40.0-44.9, adult  Continue healthy diet and increase exercise as tolerated.  Continue medications as prescribed.  Follow up with PCP    Environmental and seasonal allergies / Post-nasal drip / Cough  Continue medications as prescribed.  Continue medications as prescribed.  Follow up with PCP      Follow up if symptoms worsen or fail to improve.      2 b and girl

## 2020-11-13 ENCOUNTER — OFFICE VISIT (OUTPATIENT)
Dept: URGENT CARE | Facility: CLINIC | Age: 50
End: 2020-11-13
Payer: COMMERCIAL

## 2020-11-13 VITALS
OXYGEN SATURATION: 99 % | WEIGHT: 262 LBS | HEART RATE: 69 BPM | HEIGHT: 64 IN | DIASTOLIC BLOOD PRESSURE: 78 MMHG | BODY MASS INDEX: 44.73 KG/M2 | TEMPERATURE: 98 F | SYSTOLIC BLOOD PRESSURE: 137 MMHG

## 2020-11-13 DIAGNOSIS — L01.00 IMPETIGO: Primary | ICD-10-CM

## 2020-11-13 DIAGNOSIS — J40 BRONCHITIS: ICD-10-CM

## 2020-11-13 PROCEDURE — 3008F PR BODY MASS INDEX (BMI) DOCUMENTED: ICD-10-PCS | Mod: S$GLB,,, | Performed by: NURSE PRACTITIONER

## 2020-11-13 PROCEDURE — 99214 OFFICE O/P EST MOD 30 MIN: CPT | Mod: S$GLB,,, | Performed by: NURSE PRACTITIONER

## 2020-11-13 PROCEDURE — 3008F BODY MASS INDEX DOCD: CPT | Mod: S$GLB,,, | Performed by: NURSE PRACTITIONER

## 2020-11-13 PROCEDURE — 99214 PR OFFICE/OUTPT VISIT, EST, LEVL IV, 30-39 MIN: ICD-10-PCS | Mod: S$GLB,,, | Performed by: NURSE PRACTITIONER

## 2020-11-13 RX ORDER — MUPIROCIN 20 MG/G
OINTMENT TOPICAL 2 TIMES DAILY
Qty: 15 G | Refills: 0 | Status: SHIPPED | OUTPATIENT
Start: 2020-11-13 | End: 2022-07-01 | Stop reason: SDUPTHER

## 2020-11-13 RX ORDER — DEXCHLORPHENIRAMINE MALEATE, DEXTROMETHORPHAN HBR, PHENYLEPHRINE HCL 1; 10; 5 MG/5ML; MG/5ML; MG/5ML
5 SYRUP ORAL EVERY 4 HOURS PRN
Qty: 120 ML | Refills: 0 | Status: SHIPPED | OUTPATIENT
Start: 2020-11-13 | End: 2022-05-13

## 2020-11-13 RX ORDER — DOXYCYCLINE 100 MG/1
100 CAPSULE ORAL DAILY
Qty: 7 CAPSULE | Refills: 0 | Status: SHIPPED | OUTPATIENT
Start: 2020-11-13 | End: 2020-11-20

## 2020-11-13 RX ORDER — ALBUTEROL SULFATE 90 UG/1
2 AEROSOL, METERED RESPIRATORY (INHALATION) EVERY 6 HOURS PRN
Qty: 18 G | Refills: 0 | Status: SHIPPED | OUTPATIENT
Start: 2020-11-13 | End: 2021-11-13

## 2020-11-14 NOTE — PATIENT INSTRUCTIONS
"  Impetigo  Impetigo is a common bacterial infection of the skin that can appear on many parts of the body. It can happen to anyone, of any age, but is more common in children. For this reason, it used to be called "school sores."  Causes  Its normal to get scrapes on your body from activity or from scratching your skin. The skin normally has bacteria on it. Sometimes an impetigo infection can start on healthy skin. But it usually starts when there is an injury to the skin, or break in the skin. Although nothing usually happens, the bacteria normally on the skin can cause infection. This is the most common way people get impetigo.  Impetigo is very contagious. So once there is an infection, it needs to be treated so it doesn't get worse, spread to other areas, or to other people. Impetigo can easily be passed to other family members, friends, schoolmates, or co-workers, through scratching, rubbing, or touching an infected area. Common causes include:  · After a cold  · Bites  · From another infected person  · Injury to skin  · Insect bites  · Other skin problems that are infected, such as eczema  · Scratches  Symptoms  There is often a skin injury like a scratch, scrape, or insect bite that may have gone unnoticed or been ignored before the infection began. Symptoms of impetigo include:  · Red, inflamed area or rash  · One or many red bumps  · Bumps that turn into blisters filled with yellow fluid or pus  · Blisters break or leak causing honey-colored crusting or scabbing over the area  · Skin sores that spread to other surrounding areas  Home care  The following guidelines will help you care for your infection at home.  Wound care  · Trim fingernails and cover sores with an adhesive bandage, if needed, to prevent scratching. Picking at the sores may leave a scar.  · If the infection is on or around your lips, don't lick or chew on the sores. This will make the infection worse.  · If a bandage or dressing is used, " you can put a nonstick dressing over it.  · Wash your hands and your childs hands often. This will avoid spreading the infection to other parts of the body and to other people. Do not share the infected persons washcloths, towels, pillows, sheets, or clothes with others. Wash these items in hot water before using again.  · Clean the area several times a day. You dont want to scrub the area. The best way to do this is to soak the sores in warm, soapy water until they get soft enough to be wiped away. This will help remove the crust that forms from the dried liquid. In areas that you cant soak, like the mouth or face, you can put a clean, warm washcloth over the infected are for 5 to 10 minutes at a time, until the scabs soften enough to remove.  Medicines  · You can use over-the-counter medicine as directed based on age and weight for pain, fever, fussiness, or discomfort, unless another medicine was prescribed. In infants ages 6 months and older, you may use ibuprofen as well as acetaminophen. You can alternate them, or use both together. They work differently and are a different class of medicines, so taking them together is not an overdose. If you or your child has chronic liver or kidney disease or ever had a stomach ulcer or gastrointestinal bleeding, talk with your healthcare provider before using these medicines. Also talk with your healthcare provider if your child is taking blood-thinner medicines.  · Do not give aspirin to your child. Aspirin should never be used in children ages 18 and younger who is ill with a fever. It may cause severe disease or death.   · Impetigo can often be cured with topical creams. Apply these as directed by your healthcare provider.  · If you were given oral antibiotics, take them until they are used up. It is important to finish the antibiotics even if the wound looks better to make sure the infection has cleared.  Follow-up care  Follow up with your healthcare provider if  the sores continue to spread after 3 days of treatment. It will take about 7 to 10 days to heal completely.  Your child should stay out of school until completing 2 full days of antibiotic treatment.  When to seek medical advice  Call your healthcare provider right away if any of the following occur:  · Fever of 100.4°F (38°C) or higher, or as directed  · Increased amounts of fluid or pus coming from the sores  · Increasing number of sores or spreading areas of redness after 2 days of treatment with antibiotics  · Increasing swelling or pain  · Loss of appetite or vomiting  · Unusual drowsiness, weakness, or change in behavior  Date Last Reviewed: 8/1/2016 © 2000-2017 [employer]. [address] [place] [address], [place] [address]. All rights reserved. This information is not intended as a substitute for professional medical care. Always follow your healthcare professional's instructions.        What Is Acute Bronchitis?  Acute bronchitis is when the airways in your lungs (bronchial tubes) become red and swollen (inflamed). It is usually caused by a viral infection. But it can also occur because of a bacteria or allergen. Symptoms include a cough that produces yellow or greenish mucus and can last for days or sometimes weeks.  Inside healthy lungs    Air travels in and out of the lungs through the airways. The linings of these airways produce sticky mucus. This mucus traps particles that enter the lungs. Tiny structures called cilia then sweep the particles out of the airways.     Healthy airway: Airways are normally open. Air moves in and out easily.      Healthy cilia: Tiny, hairlike cilia sweep mucus and particles up and out of the airways.   Lungs with bronchitis  Bronchitis often occurs with a cold or the flu virus. The airways become inflamed (red and swollen). There is a deep hacking cough from the extra mucus. Other symptoms may include:  · Wheezing  · Chest discomfort  · Shortness of breath  · Mild  fever  A second infection, this time due to bacteria, may then occur. And airways irritated by allergens or smoke are more likely to get infected.        Inflamed airway: Inflammation and extra mucus narrow the airway, causing shortness of breath.      Impaired cilia: Extra mucus impairs cilia, causing congestion and wheezing. Smoking makes the problem worse.   Making a diagnosis  A physical exam, health history, and certain tests help your healthcare provider make the diagnosis.  Health history  Your healthcare provider will ask you about your symptoms.  The exam  Your provider listens to your chest for signs of congestion. He or she may also check your ears, nose, and throat.  Possible tests  · A sputum test for bacteria. This requires a sample of mucus from your lungs.  · A nasal or throat swab. This tests to see if you have a bacterial infection.  · A chest X-ray. This is done if your healthcare provider thinks you have pneumonia.  · Tests to check for an underlying condition. Other tests may be done to check for things such as allergies, asthma, or COPD (chronic obstructive pulmonary disease). You may need to see a specialist for more lung function testing.  Treating a cough  The main treatment for bronchitis is easing symptoms. Avoiding smoke, allergens, and other things that trigger coughing can often help. If the infection is bacterial, you may be given antibiotics. During the illness, it's important to get plenty of sleep. To ease symptoms:  · Dont smoke. Also avoid secondhand smoke.  · Use a humidifier. Or try breathing in steam from a hot shower. This may help loosen mucus.  · Drink a lot of water and juice. They can soothe the throat and may help thin mucus.  · Sit up or use extra pillows when in bed. This helps to lessen coughing and congestion.  · Ask your provider about using medicine. Ask about using cough medicine, pain and fever medicine, or a decongestant.  Antibiotics  Most cases of bronchitis  are caused by cold or flu viruses. They dont need antibiotics to treat them, even if your mucus is thick and green or yellow. Antibiotics dont treat viral illness and antibiotics have not been shown to have any benefit in cases of acute bronchitis. Taking antibiotics when they are not needed increases your risk of getting an infection later that is antibiotic-resistant. Antibiotics can also cause severe cases of diarrhea that require other antibiotics to treat.  It is important that you accept your healthcare provider's opinion to not use antibiotics. Your provider will prescribe antibiotics if the infection is caused by bacteria. If they are prescribed:  · Take all of the medicine. Take the medicine until it is used up, even if symptoms have improved. If you dont, the bronchitis may come back.  · Take the medicines as directed. For instance, some medicines should be taken with food.  · Ask about side effects. Ask your provider or pharmacist what side effects are common, and what to do about them.  Follow-up care  You should see your provider again in 2 to 3 weeks. By this time, symptoms should have improved. An infection that lasts longer may mean you have a more serious problem.  Prevention  · Avoid tobacco smoke. If you smoke, quit. Stay away from smoky places. Ask friends and family not to smoke around you, or in your home or car.  · Get checked for allergies.  · Ask your provider about getting a yearly flu shot. Also ask about pneumococcal or pneumonia shots.  · Wash your hands often. This helps reduce the chance of picking up viruses that cause colds and flu.  Call your healthcare provider if:  · Symptoms worsen, or you have new symptoms  · Breathing problems worsen or  become severe  · Symptoms dont get better within a week, or within 3 days of taking antibiotics   Date Last Reviewed: 2/1/2017  © 3883-6420 [employer]. [address] [place] [address], [place] [address]. All rights reserved. This  information is not intended as a substitute for professional medical care. Always follow your healthcare professional's instructions.

## 2020-11-14 NOTE — PROGRESS NOTES
"Subjective:       Patient ID: Ijeoma Issa is a 50 y.o. female.    Vitals:  height is 5' 4" (1.626 m) and weight is 118.8 kg (262 lb). Her oral temperature is 98.1 °F (36.7 °C). Her blood pressure is 137/78 and her pulse is 69. Her oxygen saturation is 99%.     Chief Complaint: Rash (face/neck)    Patient complains or itching rash/sores to her face and productive green cough for a few days. Denies sob, fever, chest pain, nausea, vomiting, diarrhea, general malaise. Reports she concerned that she may have chicken pox because she was exposed to it from her daughter in law 4-5 weeks ago. Reports she did have chicken pox as a child.       Constitution: Negative for chills, fatigue and fever.   HENT: Negative for congestion and sore throat.    Neck: Negative for painful lymph nodes.   Cardiovascular: Negative for chest pain and leg swelling.   Eyes: Negative for double vision and blurred vision.   Respiratory: Positive for cough. Negative for shortness of breath.    Gastrointestinal: Negative for abdominal pain, nausea, vomiting and diarrhea.   Endocrine: negative.   Genitourinary: Negative for dysuria, frequency, urgency and history of kidney stones.   Musculoskeletal: Negative for joint pain, joint swelling, muscle cramps and muscle ache.   Skin: Positive for rash. Negative for color change, pale and bruising.   Allergic/Immunologic: Positive for itching. Negative for seasonal allergies.   Neurological: Negative for dizziness, history of vertigo, light-headedness, passing out and headaches.   Hematologic/Lymphatic: Negative for swollen lymph nodes.   Psychiatric/Behavioral: Negative for nervous/anxious, sleep disturbance and depression. The patient is not nervous/anxious.        Objective:      Physical Exam   Constitutional: She is oriented to person, place, and time. She appears well-developed. She is cooperative.  Non-toxic appearance. She does not appear ill. No distress.   HENT:   Head: Normocephalic and " atraumatic.   Ears:   Right Ear: Hearing, tympanic membrane, external ear and ear canal normal.   Left Ear: Hearing, tympanic membrane, external ear and ear canal normal.   Nose: Nose normal. No mucosal edema, rhinorrhea or nasal deformity. No epistaxis. Right sinus exhibits no maxillary sinus tenderness and no frontal sinus tenderness. Left sinus exhibits no maxillary sinus tenderness and no frontal sinus tenderness.   Mouth/Throat: Uvula is midline, oropharynx is clear and moist and mucous membranes are normal. No trismus in the jaw. Normal dentition. No uvula swelling. No posterior oropharyngeal erythema.   Eyes: Conjunctivae and lids are normal. Right eye exhibits no discharge. Left eye exhibits no discharge. No scleral icterus.   Neck: Trachea normal, normal range of motion, full passive range of motion without pain and phonation normal. Neck supple.   Cardiovascular: Normal rate, regular rhythm, normal heart sounds and normal pulses.   Pulmonary/Chest: Effort normal and breath sounds normal. No respiratory distress.   Abdominal: Soft. Normal appearance and bowel sounds are normal. She exhibits no distension, no pulsatile midline mass and no mass. There is no abdominal tenderness.   Musculoskeletal: Normal range of motion.         General: No deformity.   Neurological: She is alert and oriented to person, place, and time. She exhibits normal muscle tone. Coordination normal. GCS eye subscore is 4. GCS verbal subscore is 5. GCS motor subscore is 6.   Skin: Skin is warm, dry, intact, not diaphoretic, not pale and rash.         Comments: Small, mutiple open sores with some honey colored crusting to her face, back, and bilateral arms. No vesicles noted.  Psychiatric: Her speech is normal and behavior is normal. Judgment and thought content normal.   Nursing note and vitals reviewed.        Assessment:       1. Impetigo    2. Bronchitis        Plan:       Rash does not appear to be chicken pox. Advised patient to  return to clinic if no improvement of symptoms    Impetigo    Bronchitis    Other orders  -     mupirocin (BACTROBAN) 2 % ointment; Apply topically 2 (two) times daily.  Dispense: 15 g; Refill: 0  -     doxycycline (VIBRAMYCIN) 100 MG Cap; Take 1 capsule (100 mg total) by mouth once daily. for 7 days  Dispense: 7 capsule; Refill: 0  -     albuterol (PROVENTIL HFA) 90 mcg/actuation inhaler; Inhale 2 puffs into the lungs every 6 (six) hours as needed for Wheezing. Rescue  Dispense: 18 g; Refill: 0  -     dexchlorphen-phenylephrine-DM (POLYTUSSIN DM) 1-5-10 mg/5 mL Syrp; Take 5 mLs by mouth every 4 (four) hours as needed.  Dispense: 120 mL; Refill: 0

## 2020-12-03 ENCOUNTER — PATIENT MESSAGE (OUTPATIENT)
Dept: FAMILY MEDICINE | Facility: CLINIC | Age: 50
End: 2020-12-03

## 2020-12-03 ENCOUNTER — OFFICE VISIT (OUTPATIENT)
Dept: URGENT CARE | Facility: CLINIC | Age: 50
End: 2020-12-03
Payer: COMMERCIAL

## 2020-12-03 VITALS — DIASTOLIC BLOOD PRESSURE: 82 MMHG | HEART RATE: 73 BPM | TEMPERATURE: 98 F | SYSTOLIC BLOOD PRESSURE: 139 MMHG

## 2020-12-03 DIAGNOSIS — Z03.818 ENCOUNTER FOR OBSERVATION FOR SUSPECTED EXPOSURE TO OTHER BIOLOGICAL AGENTS RULED OUT: ICD-10-CM

## 2020-12-03 PROCEDURE — 99214 PR OFFICE/OUTPT VISIT, EST, LEVL IV, 30-39 MIN: ICD-10-PCS | Mod: S$GLB,,, | Performed by: EMERGENCY MEDICINE

## 2020-12-03 PROCEDURE — 99214 OFFICE O/P EST MOD 30 MIN: CPT | Mod: S$GLB,,, | Performed by: EMERGENCY MEDICINE

## 2020-12-03 PROCEDURE — U0003 INFECTIOUS AGENT DETECTION BY NUCLEIC ACID (DNA OR RNA); SEVERE ACUTE RESPIRATORY SYNDROME CORONAVIRUS 2 (SARS-COV-2) (CORONAVIRUS DISEASE [COVID-19]), AMPLIFIED PROBE TECHNIQUE, MAKING USE OF HIGH THROUGHPUT TECHNOLOGIES AS DESCRIBED BY CMS-2020-01-R: HCPCS

## 2020-12-03 NOTE — LETTER
"Ijeoma "Maci Issa was seen and treated in our emergency department on 12/3/2020.     COVID-19 is present in our communities across the state. There is limited testing for COVID at this time, so not all patients can be tested. In this situation, your employee meets the following criteria:    Ijeoma Issa has met the criteria for COVID-19 testing based upon symptoms, travel, and/or potential exposure. The test has been completed and is pending results at this time. During this time the employee is not able to work and should be quarantined per the Centers for Disease Control timelines.     If you have any questions or concerns, or if I can be of further assistance, please do not hesitate to contact me.    Sincerely,       Wen DE LA ROSA    "

## 2020-12-03 NOTE — PROGRESS NOTES
Subjective:       Patient ID: Ijeoma Issa is a 50 y.o. female.    Chief Complaint: No chief complaint on file.    Patient presents today for COVID testing. She reports that she has a cough and PND. These are chronic issues for her. She states that she was recently seen and tested in the ED.  She tested negative in the ED. Her doctor suggested she have a send out lab because they are more accurate. Denies f/c/n/v/d.     Review of Systems   Constitutional: Negative for chills, fatigue and fever.   HENT: Positive for postnasal drip. Negative for congestion.    Respiratory: Positive for cough. Negative for shortness of breath.    Cardiovascular: Negative for chest pain.   Gastrointestinal: Negative for abdominal pain, diarrhea, nausea and vomiting.   Genitourinary: Negative for dysuria.   Musculoskeletal: Negative for myalgias and neck stiffness.   Neurological: Negative for dizziness, light-headedness and headaches.       Objective:      Vitals:    12/03/20 1531   BP: 139/82   Pulse: 73   Temp: 98.2 °F (36.8 °C)     Physical Exam  Constitutional:       General: She is not in acute distress.     Appearance: Normal appearance. She is normal weight. She is not ill-appearing.   HENT:      Head: Normocephalic and atraumatic.      Right Ear: External ear normal.      Left Ear: External ear normal.   Eyes:      General: No scleral icterus.  Cardiovascular:      Rate and Rhythm: Normal rate and regular rhythm.      Heart sounds: Normal heart sounds.   Pulmonary:      Effort: Pulmonary effort is normal.      Breath sounds: Normal breath sounds.   Skin:     Capillary Refill: Capillary refill takes less than 2 seconds.   Neurological:      Mental Status: She is alert and oriented to person, place, and time.   Psychiatric:         Mood and Affect: Mood normal.         Behavior: Behavior normal.         Thought Content: Thought content normal.         Judgment: Judgment normal.         Assessment:       1. Encounter for  observation for suspected exposure to other biological agents ruled out        Plan:       Encounter for observation for suspected exposure to other biological agents ruled out  -     COVID-19 Routine Screening    Further recommendations to follow after above.    No follow-ups on file.

## 2020-12-04 LAB — SARS-COV-2 RNA RESP QL NAA+PROBE: NOT DETECTED

## 2020-12-07 ENCOUNTER — TELEPHONE (OUTPATIENT)
Dept: URGENT CARE | Facility: CLINIC | Age: 50
End: 2020-12-07

## 2020-12-07 NOTE — TELEPHONE ENCOUNTER
----- Message from BERRY Sharma sent at 12/6/2020  9:52 AM CST -----  covid test negative, please notify patient

## 2020-12-30 ENCOUNTER — LAB VISIT (OUTPATIENT)
Dept: LAB | Facility: HOSPITAL | Age: 50
End: 2020-12-30
Attending: NURSE PRACTITIONER
Payer: COMMERCIAL

## 2020-12-30 ENCOUNTER — OFFICE VISIT (OUTPATIENT)
Dept: FAMILY MEDICINE | Facility: CLINIC | Age: 50
End: 2020-12-30
Payer: COMMERCIAL

## 2020-12-30 VITALS
WEIGHT: 257.5 LBS | TEMPERATURE: 98 F | HEART RATE: 73 BPM | DIASTOLIC BLOOD PRESSURE: 82 MMHG | HEIGHT: 64 IN | BODY MASS INDEX: 43.96 KG/M2 | SYSTOLIC BLOOD PRESSURE: 124 MMHG

## 2020-12-30 DIAGNOSIS — R21 RASH: ICD-10-CM

## 2020-12-30 DIAGNOSIS — R21 RASH: Primary | ICD-10-CM

## 2020-12-30 LAB
BASOPHILS # BLD AUTO: 0.06 K/UL (ref 0–0.2)
BASOPHILS NFR BLD: 0.9 % (ref 0–1.9)
DIFFERENTIAL METHOD: ABNORMAL
EOSINOPHIL # BLD AUTO: 0.2 K/UL (ref 0–0.5)
EOSINOPHIL NFR BLD: 2.5 % (ref 0–8)
ERYTHROCYTE [DISTWIDTH] IN BLOOD BY AUTOMATED COUNT: 12.6 % (ref 11.5–14.5)
HCT VFR BLD AUTO: 43.9 % (ref 37–48.5)
HGB BLD-MCNC: 13.6 G/DL (ref 12–16)
IMM GRANULOCYTES # BLD AUTO: 0.02 K/UL (ref 0–0.04)
IMM GRANULOCYTES NFR BLD AUTO: 0.3 % (ref 0–0.5)
LYMPHOCYTES # BLD AUTO: 2 K/UL (ref 1–4.8)
LYMPHOCYTES NFR BLD: 29.5 % (ref 18–48)
MCH RBC QN AUTO: 28.4 PG (ref 27–31)
MCHC RBC AUTO-ENTMCNC: 31 G/DL (ref 32–36)
MCV RBC AUTO: 92 FL (ref 82–98)
MONOCYTES # BLD AUTO: 0.5 K/UL (ref 0.3–1)
MONOCYTES NFR BLD: 7.8 % (ref 4–15)
NEUTROPHILS # BLD AUTO: 4 K/UL (ref 1.8–7.7)
NEUTROPHILS NFR BLD: 59 % (ref 38–73)
NRBC BLD-RTO: 0 /100 WBC
PLATELET # BLD AUTO: 303 K/UL (ref 150–350)
PMV BLD AUTO: 11.3 FL (ref 9.2–12.9)
RBC # BLD AUTO: 4.79 M/UL (ref 4–5.4)
WBC # BLD AUTO: 6.78 K/UL (ref 3.9–12.7)

## 2020-12-30 PROCEDURE — 3008F PR BODY MASS INDEX (BMI) DOCUMENTED: ICD-10-PCS | Mod: S$GLB,,, | Performed by: NURSE PRACTITIONER

## 2020-12-30 PROCEDURE — 99214 PR OFFICE/OUTPT VISIT, EST, LEVL IV, 30-39 MIN: ICD-10-PCS | Mod: S$GLB,,, | Performed by: NURSE PRACTITIONER

## 2020-12-30 PROCEDURE — 1126F AMNT PAIN NOTED NONE PRSNT: CPT | Mod: S$GLB,,, | Performed by: NURSE PRACTITIONER

## 2020-12-30 PROCEDURE — 99214 OFFICE O/P EST MOD 30 MIN: CPT | Mod: S$GLB,,, | Performed by: NURSE PRACTITIONER

## 2020-12-30 PROCEDURE — 3008F BODY MASS INDEX DOCD: CPT | Mod: S$GLB,,, | Performed by: NURSE PRACTITIONER

## 2020-12-30 PROCEDURE — 1126F PR PAIN SEVERITY QUANTIFIED, NO PAIN PRESENT: ICD-10-PCS | Mod: S$GLB,,, | Performed by: NURSE PRACTITIONER

## 2020-12-30 PROCEDURE — 99999 PR PBB SHADOW E&M-EST. PATIENT-LVL III: CPT | Mod: PBBFAC,,, | Performed by: NURSE PRACTITIONER

## 2020-12-30 PROCEDURE — 86592 SYPHILIS TEST NON-TREP QUAL: CPT

## 2020-12-30 PROCEDURE — 36415 COLL VENOUS BLD VENIPUNCTURE: CPT | Mod: PO

## 2020-12-30 PROCEDURE — 99999 PR PBB SHADOW E&M-EST. PATIENT-LVL III: ICD-10-PCS | Mod: PBBFAC,,, | Performed by: NURSE PRACTITIONER

## 2020-12-30 PROCEDURE — 85025 COMPLETE CBC W/AUTO DIFF WBC: CPT

## 2020-12-30 NOTE — PROGRESS NOTES
This dictation has been generated using Modal Fluency Dictation some phonetic errors may occur. Please contact author for clarification if needed.     Problem List Items Addressed This Visit     None      Visit Diagnoses     Rash    -  Primary    Relevant Orders    CBC Auto Differential    RPR          Orders Placed This Encounter    CBC Auto Differential    RPR     RASH.  HAS BEEN TREATED FOR BACTERIAL SOURCES AND ALLERGIC REACTION/AXILLA.  CHECK CBC RPR AS ABOVE.  NEEDS TO FOLLOW-UP WITH DERMATOLOGIST DR. BARBARA [place].     No follow-ups on file.    ________________________________________________________________  ________________________________________________________________      Chief Complaint   Patient presents with    Rash     History of present illness  This 50 y.o. presents today for complaint of RASH.  Onset of symptoms November 13th.  She went over to [place] urgent care.  She had a rash on her face that time they treated her with doxycycline for impetigo.  She states that they also gave her shot.  Patient notes that symptoms improved until completion of antibiotics and then she relapse about a week later.  Since that time she has had Elidel, Bactroban, clobetasol, and the previously mention doxycycline.  Notes relapse of symptoms.  Elidel and clobetasol are not helping at this time.  She does note itching at times.  Rashes spread.  It is now present on the trunk and extremities.  Some of it does overlay some of the joints.  She states that they do bust and drain.  Has not seen Dermatology or specialist.  She had put in a call to someone without an answer the last week.  Review of systems  No fever chills  No new arthralgias myalgias  No nausea vomiting diarrhea  Past medical surgical social history reviewed.   20+ years.  Patient new to me.  Follows with in the clinic  Past Medical History:   Diagnosis Date    Abnormal mammogram     neg biospy    Allergic asthma     Anxiety      Arthritis     Dermatitis     Dr. Weil, Extremities    Diabetes     Diverticulosis of colon     GERD (gastroesophageal reflux disease)     Mass of right breast     Postmenopausal hormone replacement therapy     Tubal pregnancy     Uterine fibroid        Past Surgical History:   Procedure Laterality Date    BREAST BIOPSY Right 2012    benign     SECTION, LOW TRANSVERSE      CHOLECYSTECTOMY      HYSTERECTOMY      re: benign tumors per the patient    right ankle  10/10/2014    TOTAL ABDOMINAL HYSTERECTOMY W/ BILATERAL SALPINGOOPHORECTOMY      c appendectomy    TUBAL LIGATION         Family History   Problem Relation Age of Onset    Cancer Father         colon    Colon cancer Father     Diabetes Sister     Breast cancer Sister 28    Cancer Brother         stomach    Heart disease Brother     Heart disease Mother     Ovarian cancer Neg Hx        Social History     Socioeconomic History    Marital status:      Spouse name: Not on file    Number of children: Not on file    Years of education: Not on file    Highest education level: Not on file   Occupational History     Employer: [employer]   Social Needs    Financial resource strain: Not very hard    Food insecurity     Worry: Never true     Inability: Never true    Transportation needs     Medical: No     Non-medical: No   Tobacco Use    Smoking status: Never Smoker    Smokeless tobacco: Never Used   Substance and Sexual Activity    Alcohol use: Yes     Alcohol/week: 0.0 standard drinks     Frequency: Monthly or less     Drinks per session: Patient refused     Binge frequency: Less than monthly     Comment: very seldom    Drug use: No    Sexual activity: Yes     Partners: Male     Birth control/protection: Surgical, See Surgical Hx     Comment: hyst   Lifestyle    Physical activity     Days per week: 5 days     Minutes per session: 60 min    Stress: Only a little   Relationships    Social  connections     Talks on phone: More than three times a week     Gets together: Never     Attends Jehovah's witness service: Not on file     Active member of club or organization: Yes     Attends meetings of clubs or organizations: More than 4 times per year     Relationship status:    Other Topics Concern    Are you pregnant or think you may be? Not Asked    Breast-feeding Not Asked   Social History Narrative    Not on file       Current Outpatient Medications   Medication Sig Dispense Refill    ADVAIR DISKUS 250-50 mcg/dose diskus inhaler INHALE 1 PUFF BY MOUTH TWICE DAILY 60 each 11    AFLURIA QD 2019-20,3YR UP,,PF, 60 mcg (15 mcg x 4)/0.5 mL Syrg   0    albuterol (PROVENTIL HFA) 90 mcg/actuation inhaler Inhale 2 puffs into the lungs every 6 (six) hours as needed for Wheezing. Rescue 18 g 0    azelastine (ASTELIN) 137 mcg (0.1 %) nasal spray 1 spray (137 mcg total) by Nasal route 2 (two) times daily. 30 mL 3    b complex vitamins capsule Take 1 capsule by mouth once daily.      CALCIUM CARBONATE/VITAMIN D3 (VITAMIN D-3 ORAL) Take by mouth.        CHROM VINCENT/BRINDALL CARTER (GARCINIA CAMBOGIA ORAL) Take by mouth.      clobetasoL (TEMOVATE) 0.05 % cream Apply topically 2 (two) times daily.      dexchlorphen-phenylephrine-DM (POLYTUSSIN DM) 1-5-10 mg/5 mL Syrp Take 5 mLs by mouth every 4 (four) hours as needed. 120 mL 0    DOC-Q-LACE 100 mg capsule TAKE ONE CAPSULE BY MOUTH TWICE DAILY (Patient taking differently: TAKE ONE CAPSULE BY MOUTH TWICE DAILY AS NEEDED) 30 capsule 0    doxepin (SINEQUAN) 25 MG capsule TAKE 1 CAPSULE(25 MG) BY MOUTH EVERY EVENING 30 capsule 11    EPIPEN 2-LUCIANO 0.3 mg/0.3 mL AtIn       estradiol (ESTRACE) 0.01 % (0.1 mg/gram) vaginal cream PLACE 1 GRAM VAGINALLY TWICE A WEEK 42.5 g 4    estradioL (ESTRACE) 1 MG tablet TAKE 1 TABLET(1 MG) BY MOUTH EVERY DAY 30 tablet 3    FERROUS FUMARATE (IRON ORAL) Take by mouth once daily.      fexofenadine (ALLEGRA) 180 MG tablet Take 180  mg by mouth 2 hours after meals and at bedtime.      fluticasone propionate (FLONASE) 50 mcg/actuation nasal spray SHAKE LIQUID AND USE 1 SPRAY(50 MCG) IN EACH NOSTRIL TWICE DAILY AS NEEDED FOR RHINITIS 16 g 2    gabapentin (NEURONTIN) 300 MG capsule Take 1 capsule (300 mg total) by mouth 3 (three) times daily. 90 capsule 4    ketoconazole (NIZORAL) 2 % shampoo       montelukast (SINGULAIR) 10 mg tablet TAKE 1 TABLET BY MOUTH DAILY 30 tablet 3    mupirocin (BACTROBAN) 2 % ointment Apply topically once daily. 90 g 3    mupirocin (BACTROBAN) 2 % ointment Apply topically 2 (two) times daily. 15 g 0    mv-min-FA-Ww-Gg-odzhnbd-lutein (CENTRUM) 0.4-162-18 mg Tab Take 1 tablet by mouth once daily.      omega-3 fatty acids-fish oil (FISH OIL OMEGA 3-6-9) 300-1,000 mg CpDR       omeprazole (PRILOSEC) 40 MG capsule TAKE 1 CAPSULE(40 MG) BY MOUTH EVERY MORNING 90 capsule 3    ondansetron (ZOFRAN) 4 MG tablet Take 1 tablet (4 mg total) by mouth every 6 (six) hours as needed for Nausea. 12 tablet 0    pimecrolimus (ELIDEL) 1 % cream Apply topically 2 (two) times daily.      spironolactone (ALDACTONE) 25 MG tablet TAKE 1 TABLET BY MOUTH DAILY 90 tablet 3    triamcinolone acetonide 0.1% (KENALOG) 0.1 % cream Apply topically.      VENTOLIN HFA 90 mcg/actuation inhaler INHALE 2 PUFFS INTO THE LUNGS EVERY 6 HOURS AS NEEDED FOR WHEEZING 18 g 0    albuterol (PROVENTIL HFA) 90 mcg/actuation inhaler Inhale 2 puffs into the lungs every 6 (six) hours as needed for Wheezing. Rescue 18 g 0    albuterol-ipratropium (DUO-NEB) 2.5 mg-0.5 mg/3 mL nebulizer solution Take 3 mLs by nebulization every 6 (six) hours as needed for Wheezing. Rescue 1 Box 0    levocetirizine (XYZAL) 5 MG tablet Take 1 tablet (5 mg total) by mouth every evening. 90 tablet 6    montelukast (SINGULAIR) 10 mg tablet Take 1 tablet (10 mg total) by mouth once daily. 30 tablet 3     No current facility-administered medications for this visit.        Review of  patient's allergies indicates:   Allergen Reactions    Cashew nut Anaphylaxis     Allergic to Cashew nuts    Nut flavor Anaphylaxis     Allergic to Cashew nuts    Latex Rash     Including in injections    Varicella vaccines      Red swelling/ lump develops at injection site    Adhesive tape-silicones Rash    Tapentadol Rash       Physical examination  Vitals Reviewed\  Vitals:    12/30/20 1024   BP: 124/82   Pulse: 73   Temp: 98.1 °F (36.7 °C)     Weight: 116.8 kg (257 lb 8 oz)     Gen. Well-dressed well-nourished   Skin warm dry and intact.  Erythematous rash with some scaling noted.  Macular rash present on the face chest and extremities.  No linear pattern noted.  No drainage observed.  No crusting noted.  Chest.  Respirations are even unlabored.  Lungs are clear to auscultation.  Cardiac regular rate and rhythm.  No chest wall adenopathy noted.  Neuro. Awake alert oriented x4.  Normal judgment and cognition noted.  Extremities no clubbing cyanosis or edema noted.     Call or return to clinic prn if these symptoms worsen or fail to improve as anticipated.

## 2020-12-31 LAB — RPR SER QL: NORMAL

## 2021-02-07 ENCOUNTER — OFFICE VISIT (OUTPATIENT)
Dept: URGENT CARE | Facility: CLINIC | Age: 51
End: 2021-02-07
Payer: COMMERCIAL

## 2021-02-07 VITALS
TEMPERATURE: 98 F | HEIGHT: 64 IN | SYSTOLIC BLOOD PRESSURE: 143 MMHG | DIASTOLIC BLOOD PRESSURE: 86 MMHG | RESPIRATION RATE: 16 BRPM | HEART RATE: 83 BPM | BODY MASS INDEX: 43.71 KG/M2 | WEIGHT: 256 LBS | OXYGEN SATURATION: 98 %

## 2021-02-07 DIAGNOSIS — J02.9 SORE THROAT: ICD-10-CM

## 2021-02-07 DIAGNOSIS — Z20.822 ENCOUNTER FOR LABORATORY TESTING FOR COVID-19 VIRUS: ICD-10-CM

## 2021-02-07 DIAGNOSIS — Z20.822 COVID-19 VIRUS NOT DETECTED: ICD-10-CM

## 2021-02-07 DIAGNOSIS — J45.21 MILD INTERMITTENT ASTHMA WITH EXACERBATION: Primary | ICD-10-CM

## 2021-02-07 DIAGNOSIS — M79.10 MYALGIA: ICD-10-CM

## 2021-02-07 DIAGNOSIS — J30.9 ALLERGIC RHINITIS, UNSPECIFIED SEASONALITY, UNSPECIFIED TRIGGER: ICD-10-CM

## 2021-02-07 DIAGNOSIS — R05.9 COUGH: ICD-10-CM

## 2021-02-07 LAB
CTP QC/QA: YES
SARS-COV-2 RDRP RESP QL NAA+PROBE: NEGATIVE

## 2021-02-07 PROCEDURE — 3008F PR BODY MASS INDEX (BMI) DOCUMENTED: ICD-10-PCS | Mod: S$GLB,,, | Performed by: NURSE PRACTITIONER

## 2021-02-07 PROCEDURE — 99214 OFFICE O/P EST MOD 30 MIN: CPT | Mod: 25,S$GLB,, | Performed by: NURSE PRACTITIONER

## 2021-02-07 PROCEDURE — 96372 THER/PROPH/DIAG INJ SC/IM: CPT | Mod: S$GLB,,, | Performed by: NURSE PRACTITIONER

## 2021-02-07 PROCEDURE — U0002: ICD-10-PCS | Mod: QW,S$GLB,, | Performed by: NURSE PRACTITIONER

## 2021-02-07 PROCEDURE — U0002 COVID-19 LAB TEST NON-CDC: HCPCS | Mod: QW,S$GLB,, | Performed by: NURSE PRACTITIONER

## 2021-02-07 PROCEDURE — 3008F BODY MASS INDEX DOCD: CPT | Mod: S$GLB,,, | Performed by: NURSE PRACTITIONER

## 2021-02-07 PROCEDURE — 96372 PR INJECTION,THERAP/PROPH/DIAG2ST, IM OR SUBCUT: ICD-10-PCS | Mod: S$GLB,,, | Performed by: NURSE PRACTITIONER

## 2021-02-07 PROCEDURE — 99214 PR OFFICE/OUTPT VISIT, EST, LEVL IV, 30-39 MIN: ICD-10-PCS | Mod: 25,S$GLB,, | Performed by: NURSE PRACTITIONER

## 2021-02-07 RX ORDER — DEXAMETHASONE SODIUM PHOSPHATE 4 MG/ML
8 INJECTION, SOLUTION INTRA-ARTICULAR; INTRALESIONAL; INTRAMUSCULAR; INTRAVENOUS; SOFT TISSUE
Status: COMPLETED | OUTPATIENT
Start: 2021-02-07 | End: 2021-02-07

## 2021-02-07 RX ORDER — ALBUTEROL SULFATE 0.83 MG/ML
2.5 SOLUTION RESPIRATORY (INHALATION) EVERY 6 HOURS PRN
Qty: 1 BOX | Refills: 0 | Status: SHIPPED | OUTPATIENT
Start: 2021-02-07 | End: 2022-02-07

## 2021-02-07 RX ORDER — PREDNISONE 20 MG/1
20 TABLET ORAL 2 TIMES DAILY
Qty: 10 TABLET | Refills: 0 | Status: SHIPPED | OUTPATIENT
Start: 2021-02-07 | End: 2021-02-12

## 2021-02-07 RX ADMIN — DEXAMETHASONE SODIUM PHOSPHATE 8 MG: 4 INJECTION, SOLUTION INTRA-ARTICULAR; INTRALESIONAL; INTRAMUSCULAR; INTRAVENOUS; SOFT TISSUE at 11:02

## 2021-03-17 RX ORDER — ESTRADIOL 1 MG/1
TABLET ORAL
Qty: 30 TABLET | Refills: 1 | Status: SHIPPED | OUTPATIENT
Start: 2021-03-17 | End: 2021-05-18

## 2021-04-16 DIAGNOSIS — M79.89 LEG SWELLING: ICD-10-CM

## 2021-04-16 DIAGNOSIS — L70.9 ACNE, UNSPECIFIED ACNE TYPE: Chronic | ICD-10-CM

## 2021-04-16 DIAGNOSIS — L30.8 PRURITIC DERMATITIS: ICD-10-CM

## 2021-04-16 RX ORDER — SPIRONOLACTONE 25 MG/1
TABLET ORAL
Qty: 90 TABLET | Refills: 1 | Status: SHIPPED | OUTPATIENT
Start: 2021-04-16 | End: 2021-10-13

## 2021-05-16 DIAGNOSIS — J45.31 ASTHMA WITH ALLERGIC RHINITIS, MILD PERSISTENT, WITH ACUTE EXACERBATION: Chronic | ICD-10-CM

## 2021-05-18 RX ORDER — MONTELUKAST SODIUM 10 MG/1
TABLET ORAL
Qty: 30 TABLET | Refills: 5 | Status: SHIPPED | OUTPATIENT
Start: 2021-05-18 | End: 2021-11-12

## 2021-05-18 RX ORDER — ESTRADIOL 1 MG/1
TABLET ORAL
Qty: 30 TABLET | Refills: 5 | Status: SHIPPED | OUTPATIENT
Start: 2021-05-18 | End: 2021-11-12

## 2021-06-08 ENCOUNTER — TELEPHONE (OUTPATIENT)
Dept: FAMILY MEDICINE | Facility: CLINIC | Age: 51
End: 2021-06-08

## 2021-06-08 DIAGNOSIS — Z12.39 ENCOUNTER FOR SCREENING FOR MALIGNANT NEOPLASM OF BREAST, UNSPECIFIED SCREENING MODALITY: Primary | ICD-10-CM

## 2021-06-09 ENCOUNTER — TELEPHONE (OUTPATIENT)
Dept: FAMILY MEDICINE | Facility: CLINIC | Age: 51
End: 2021-06-09

## 2021-06-11 ENCOUNTER — TELEPHONE (OUTPATIENT)
Dept: FAMILY MEDICINE | Facility: CLINIC | Age: 51
End: 2021-06-11

## 2021-06-15 ENCOUNTER — OFFICE VISIT (OUTPATIENT)
Dept: FAMILY MEDICINE | Facility: CLINIC | Age: 51
End: 2021-06-15
Payer: COMMERCIAL

## 2021-06-15 VITALS
WEIGHT: 266.13 LBS | SYSTOLIC BLOOD PRESSURE: 132 MMHG | DIASTOLIC BLOOD PRESSURE: 70 MMHG | RESPIRATION RATE: 17 BRPM | HEART RATE: 78 BPM | OXYGEN SATURATION: 96 % | TEMPERATURE: 99 F | HEIGHT: 64 IN | BODY MASS INDEX: 45.43 KG/M2

## 2021-06-15 DIAGNOSIS — N63.10 LUMP OF RIGHT BREAST: ICD-10-CM

## 2021-06-15 DIAGNOSIS — N64.4 BREAST PAIN IN FEMALE: Primary | ICD-10-CM

## 2021-06-15 PROCEDURE — 99999 PR PBB SHADOW E&M-EST. PATIENT-LVL V: CPT | Mod: PBBFAC,,, | Performed by: PHYSICIAN ASSISTANT

## 2021-06-15 PROCEDURE — 3008F BODY MASS INDEX DOCD: CPT | Mod: S$GLB,,, | Performed by: PHYSICIAN ASSISTANT

## 2021-06-15 PROCEDURE — 1125F AMNT PAIN NOTED PAIN PRSNT: CPT | Mod: S$GLB,,, | Performed by: PHYSICIAN ASSISTANT

## 2021-06-15 PROCEDURE — 99999 PR PBB SHADOW E&M-EST. PATIENT-LVL V: ICD-10-PCS | Mod: PBBFAC,,, | Performed by: PHYSICIAN ASSISTANT

## 2021-06-15 PROCEDURE — 99214 PR OFFICE/OUTPT VISIT, EST, LEVL IV, 30-39 MIN: ICD-10-PCS | Mod: S$GLB,,, | Performed by: PHYSICIAN ASSISTANT

## 2021-06-15 PROCEDURE — 99214 OFFICE O/P EST MOD 30 MIN: CPT | Mod: S$GLB,,, | Performed by: PHYSICIAN ASSISTANT

## 2021-06-15 PROCEDURE — 1125F PR PAIN SEVERITY QUANTIFIED, PAIN PRESENT: ICD-10-PCS | Mod: S$GLB,,, | Performed by: PHYSICIAN ASSISTANT

## 2021-06-15 PROCEDURE — 3008F PR BODY MASS INDEX (BMI) DOCUMENTED: ICD-10-PCS | Mod: S$GLB,,, | Performed by: PHYSICIAN ASSISTANT

## 2021-06-15 RX ORDER — CLINDAMYCIN HYDROCHLORIDE 300 MG/1
300 CAPSULE ORAL EVERY 6 HOURS
COMMUNITY
End: 2022-05-13

## 2021-06-17 ENCOUNTER — HOSPITAL ENCOUNTER (OUTPATIENT)
Dept: RADIOLOGY | Facility: HOSPITAL | Age: 51
Discharge: HOME | End: 2021-06-17
Attending: PHYSICIAN ASSISTANT
Payer: COMMERCIAL

## 2021-06-17 DIAGNOSIS — N64.4 BREAST PAIN IN FEMALE: ICD-10-CM

## 2021-06-17 DIAGNOSIS — N63.10 LUMP OF RIGHT BREAST: ICD-10-CM

## 2021-06-17 PROCEDURE — 77066 MAMMO DIGITAL DIAGNOSTIC BILAT WITH TOMO: ICD-10-PCS | Mod: 26,,, | Performed by: RADIOLOGY

## 2021-06-17 PROCEDURE — 77062 BREAST TOMOSYNTHESIS BI: CPT | Mod: 26,,, | Performed by: RADIOLOGY

## 2021-06-17 PROCEDURE — 77066 DX MAMMO INCL CAD BI: CPT | Mod: TC

## 2021-06-17 PROCEDURE — 76642 ULTRASOUND BREAST LIMITED: CPT | Mod: 26,,, | Performed by: RADIOLOGY

## 2021-06-17 PROCEDURE — 76642 US BREAST BILATERAL LIMITED: ICD-10-PCS | Mod: 26,,, | Performed by: RADIOLOGY

## 2021-06-17 PROCEDURE — 76642 ULTRASOUND BREAST LIMITED: CPT | Mod: TC,50

## 2021-06-17 PROCEDURE — 77066 DX MAMMO INCL CAD BI: CPT | Mod: 26,,, | Performed by: RADIOLOGY

## 2021-06-17 PROCEDURE — 77062 MAMMO DIGITAL DIAGNOSTIC BILAT WITH TOMO: ICD-10-PCS | Mod: 26,,, | Performed by: RADIOLOGY

## 2021-07-20 ENCOUNTER — OFFICE VISIT (OUTPATIENT)
Dept: FAMILY MEDICINE | Facility: CLINIC | Age: 51
End: 2021-07-20
Payer: COMMERCIAL

## 2021-07-20 ENCOUNTER — HOSPITAL ENCOUNTER (OUTPATIENT)
Dept: RADIOLOGY | Facility: CLINIC | Age: 51
Discharge: HOME | End: 2021-07-20
Attending: FAMILY MEDICINE
Payer: COMMERCIAL

## 2021-07-20 VITALS
DIASTOLIC BLOOD PRESSURE: 74 MMHG | HEIGHT: 64 IN | SYSTOLIC BLOOD PRESSURE: 126 MMHG | RESPIRATION RATE: 16 BRPM | BODY MASS INDEX: 45.91 KG/M2 | OXYGEN SATURATION: 97 % | HEART RATE: 70 BPM | WEIGHT: 268.94 LBS | TEMPERATURE: 98 F

## 2021-07-20 DIAGNOSIS — E28.2 POLYCYSTIC OVARIAN DISEASE: ICD-10-CM

## 2021-07-20 DIAGNOSIS — Z79.890 HORMONE REPLACEMENT THERAPY (HRT): ICD-10-CM

## 2021-07-20 DIAGNOSIS — J45.909 ASTHMA WITH ALLERGIC RHINITIS WITHOUT COMPLICATION, UNSPECIFIED ASTHMA SEVERITY: ICD-10-CM

## 2021-07-20 DIAGNOSIS — G47.33 OSA (OBSTRUCTIVE SLEEP APNEA): Primary | ICD-10-CM

## 2021-07-20 DIAGNOSIS — K21.9 GASTROESOPHAGEAL REFLUX DISEASE WITHOUT ESOPHAGITIS: ICD-10-CM

## 2021-07-20 DIAGNOSIS — E66.01 SEVERE OBESITY (BMI 35.0-35.9 WITH COMORBIDITY): ICD-10-CM

## 2021-07-20 PROCEDURE — 3008F PR BODY MASS INDEX (BMI) DOCUMENTED: ICD-10-PCS | Mod: S$GLB,,, | Performed by: FAMILY MEDICINE

## 2021-07-20 PROCEDURE — 71046 XR CHEST PA AND LATERAL: ICD-10-PCS | Mod: 26,,, | Performed by: RADIOLOGY

## 2021-07-20 PROCEDURE — 99999 PR PBB SHADOW E&M-EST. PATIENT-LVL V: CPT | Mod: PBBFAC,,, | Performed by: FAMILY MEDICINE

## 2021-07-20 PROCEDURE — 71046 X-RAY EXAM CHEST 2 VIEWS: CPT | Mod: 26,,, | Performed by: RADIOLOGY

## 2021-07-20 PROCEDURE — 99215 PR OFFICE/OUTPT VISIT, EST, LEVL V, 40-54 MIN: ICD-10-PCS | Mod: S$GLB,,, | Performed by: FAMILY MEDICINE

## 2021-07-20 PROCEDURE — 99215 OFFICE O/P EST HI 40 MIN: CPT | Mod: S$GLB,,, | Performed by: FAMILY MEDICINE

## 2021-07-20 PROCEDURE — 3008F BODY MASS INDEX DOCD: CPT | Mod: S$GLB,,, | Performed by: FAMILY MEDICINE

## 2021-07-20 PROCEDURE — 71046 X-RAY EXAM CHEST 2 VIEWS: CPT | Mod: TC,FY,PO

## 2021-07-20 PROCEDURE — 99999 PR PBB SHADOW E&M-EST. PATIENT-LVL V: ICD-10-PCS | Mod: PBBFAC,,, | Performed by: FAMILY MEDICINE

## 2021-07-20 PROCEDURE — 1126F PR PAIN SEVERITY QUANTIFIED, NO PAIN PRESENT: ICD-10-PCS | Mod: S$GLB,,, | Performed by: FAMILY MEDICINE

## 2021-07-20 PROCEDURE — 1126F AMNT PAIN NOTED NONE PRSNT: CPT | Mod: S$GLB,,, | Performed by: FAMILY MEDICINE

## 2021-07-20 RX ORDER — DOXYCYCLINE 100 MG/1
100 CAPSULE ORAL 2 TIMES DAILY
COMMUNITY
Start: 2021-05-16 | End: 2022-05-13

## 2021-07-20 RX ORDER — CICLOPIROX 1 G/100ML
5 SHAMPOO TOPICAL DAILY
COMMUNITY
Start: 2021-06-23

## 2021-07-20 RX ORDER — EPINEPHRINE 0.3 MG/.3ML
1 INJECTION INTRAMUSCULAR ONCE
Qty: 0.3 ML | Refills: 11 | Status: SHIPPED | OUTPATIENT
Start: 2021-07-20 | End: 2022-05-13 | Stop reason: SDUPTHER

## 2021-07-20 RX ORDER — CLINDAMYCIN PHOSPHATE 10 UG/ML
1 LOTION TOPICAL 2 TIMES DAILY
COMMUNITY
Start: 2021-06-23

## 2021-07-20 RX ORDER — OMEPRAZOLE 40 MG/1
40 CAPSULE, DELAYED RELEASE ORAL EVERY MORNING
Qty: 90 CAPSULE | Refills: 3 | Status: SHIPPED | OUTPATIENT
Start: 2021-07-20 | End: 2023-06-30 | Stop reason: SDUPTHER

## 2021-07-20 RX ORDER — FLUCONAZOLE 200 MG/1
200 TABLET ORAL DAILY
COMMUNITY
Start: 2021-06-23 | End: 2022-07-01 | Stop reason: ALTCHOICE

## 2021-07-20 RX ORDER — CICLOPIROX OLAMINE 7.7 MG/G
CREAM TOPICAL
COMMUNITY
Start: 2021-04-15 | End: 2024-02-23

## 2021-07-22 ENCOUNTER — HOSPITAL ENCOUNTER (OUTPATIENT)
Dept: PULMONOLOGY | Facility: HOSPITAL | Age: 51
Discharge: HOME | End: 2021-07-22
Attending: FAMILY MEDICINE
Payer: COMMERCIAL

## 2021-07-22 DIAGNOSIS — J45.909 ASTHMA WITH ALLERGIC RHINITIS WITHOUT COMPLICATION, UNSPECIFIED ASTHMA SEVERITY: ICD-10-CM

## 2021-07-22 DIAGNOSIS — Z79.890 HORMONE REPLACEMENT THERAPY (HRT): ICD-10-CM

## 2021-07-22 DIAGNOSIS — E66.01 SEVERE OBESITY (BMI 35.0-35.9 WITH COMORBIDITY): ICD-10-CM

## 2021-07-22 PROCEDURE — 94060 PR EVAL OF BRONCHOSPASM: ICD-10-PCS | Mod: 26,,, | Performed by: INTERNAL MEDICINE

## 2021-07-22 PROCEDURE — 94060 EVALUATION OF WHEEZING: CPT

## 2021-07-22 PROCEDURE — 94729 DIFFUSING CAPACITY: CPT | Mod: 26,,, | Performed by: INTERNAL MEDICINE

## 2021-07-22 PROCEDURE — 94727 GAS DIL/WSHOT DETER LNG VOL: CPT | Mod: 26,,, | Performed by: INTERNAL MEDICINE

## 2021-07-22 PROCEDURE — 94060 EVALUATION OF WHEEZING: CPT | Mod: 26,,, | Performed by: INTERNAL MEDICINE

## 2021-07-22 PROCEDURE — 94727 GAS DIL/WSHOT DETER LNG VOL: CPT

## 2021-07-22 PROCEDURE — 94729 PR C02/MEMBANE DIFFUSE CAPACITY: ICD-10-PCS | Mod: 26,,, | Performed by: INTERNAL MEDICINE

## 2021-07-22 PROCEDURE — 94729 DIFFUSING CAPACITY: CPT

## 2021-07-22 PROCEDURE — 94727 PR PULM FUNCTION TEST BY GAS: ICD-10-PCS | Mod: 26,,, | Performed by: INTERNAL MEDICINE

## 2021-07-26 LAB
BRPFT: NORMAL
DLCO ADJ PRE: 23.15 ML/(MIN*MMHG)
DLCO SINGLE BREATH LLN: 18.58
DLCO SINGLE BREATH PRE REF: 95.2 %
DLCO SINGLE BREATH REF: 24.32
DLCOC SBVA LLN: 3.38
DLCOC SBVA PRE REF: 100.4 %
DLCOC SBVA REF: 4.89
DLCOC SINGLE BREATH LLN: 18.58
DLCOC SINGLE BREATH PRE REF: 95.2 %
DLCOC SINGLE BREATH REF: 24.32
DLCOVA LLN: 3.38
DLCOVA PRE REF: 100.4 %
DLCOVA PRE: 4.91 ML/(MIN*MMHG*L)
DLCOVA REF: 4.89
DLVAADJ PRE: 4.91 ML/(MIN*MMHG*L)
ERVN2 LLN: -16449.05
ERVN2 PRE REF: 74.2 %
ERVN2 PRE: 0.71 L
ERVN2 REF: 0.95
FEF 25 75 CHG: -14 %
FEF 25 75 LLN: 1.53
FEF 25 75 POST REF: 92.6 %
FEF 25 75 PRE REF: 107.7 %
FEF 25 75 REF: 2.73
FET100 CHG: -4.7 %
FEV1 CHG: -7.6 %
FEV1 FVC CHG: -0.3 %
FEV1 FVC LLN: 69
FEV1 FVC POST REF: 98.2 %
FEV1 FVC PRE REF: 98.6 %
FEV1 FVC REF: 80
FEV1 LLN: 2.16
FEV1 POST REF: 92.1 %
FEV1 PRE REF: 99.7 %
FEV1 REF: 2.77
FRCN2 LLN: 1.88
FRCN2 PRE REF: 84.6 %
FRCN2 REF: 2.7
FVC CHG: -7.3 %
FVC LLN: 2.71
FVC POST REF: 93.2 %
FVC PRE REF: 100.6 %
FVC REF: 3.47
IVC PRE: 3.1 L
IVC SINGLE BREATH LLN: 2.71
IVC SINGLE BREATH PRE REF: 89.4 %
IVC SINGLE BREATH REF: 3.47
MVV LLN: 88
MVV PRE REF: 82.1 %
MVV REF: 104
PEF CHG: -15.9 %
PEF LLN: 5.04
PEF POST REF: 78.4 %
PEF PRE REF: 93.2 %
PEF REF: 6.76
POST FEF 25 75: 2.52 L/S
POST FET 100: 8.19 SEC
POST FEV1 FVC: 79.06 %
POST FEV1: 2.55 L
POST FVC: 3.23 L
POST PEF: 5.3 L/S
PRE DLCO: 23.15 ML/(MIN*MMHG)
PRE FEF 25 75: 2.94 L/S
PRE FET 100: 8.6 SEC
PRE FEV1 FVC: 79.31 %
PRE FEV1: 2.76 L
PRE FRC N2: 2.28 L
PRE FVC: 3.48 L
PRE MVV: 85 L/MIN
PRE PEF: 6.3 L/S
RVN2 LLN: 1.17
RVN2 PRE REF: 81.7 %
RVN2 PRE: 1.43 L
RVN2 REF: 1.75
RVN2TLCN2 LLN: 26.37
RVN2TLCN2 PRE REF: 80.9 %
RVN2TLCN2 PRE: 29.09 %
RVN2TLCN2 REF: 35.96
TLCN2 LLN: 3.98
TLCN2 PRE REF: 98.9 %
TLCN2 PRE: 4.92 L
TLCN2 REF: 4.97
VA PRE: 4.71 L
VA SINGLE BREATH LLN: 4.82
VA SINGLE BREATH PRE REF: 97.8 %
VA SINGLE BREATH REF: 4.82
VCMAXN2 LLN: 2.71
VCMAXN2 PRE REF: 100.6 %
VCMAXN2 PRE: 3.48 L
VCMAXN2 REF: 3.47

## 2021-08-28 ENCOUNTER — LAB VISIT (OUTPATIENT)
Dept: LAB | Facility: HOSPITAL | Age: 51
End: 2021-08-28
Attending: FAMILY MEDICINE
Payer: COMMERCIAL

## 2021-08-28 DIAGNOSIS — Z79.890 HORMONE REPLACEMENT THERAPY (HRT): ICD-10-CM

## 2021-08-28 DIAGNOSIS — J45.909 ASTHMA WITH ALLERGIC RHINITIS WITHOUT COMPLICATION, UNSPECIFIED ASTHMA SEVERITY: ICD-10-CM

## 2021-08-28 DIAGNOSIS — E66.01 SEVERE OBESITY (BMI 35.0-35.9 WITH COMORBIDITY): ICD-10-CM

## 2021-08-28 LAB
25(OH)D3+25(OH)D2 SERPL-MCNC: 23 NG/ML (ref 30–96)
CHOLEST SERPL-MCNC: 176 MG/DL (ref 120–199)
CHOLEST/HDLC SERPL: 3.5 {RATIO} (ref 2–5)
ESTIMATED AVG GLUCOSE: 94 MG/DL (ref 68–131)
ESTRADIOL SERPL-MCNC: 52 PG/ML
FSH SERPL-ACNC: 22.49 MIU/ML
HBA1C MFR BLD: 4.9 % (ref 4–5.6)
HDLC SERPL-MCNC: 50 MG/DL (ref 40–75)
HDLC SERPL: 28.4 % (ref 20–50)
LDLC SERPL CALC-MCNC: 103.2 MG/DL (ref 63–159)
LH SERPL-ACNC: 13.8 MIU/ML
NONHDLC SERPL-MCNC: 126 MG/DL
TESTOST SERPL-MCNC: 24 NG/DL (ref 5–73)
TRIGL SERPL-MCNC: 114 MG/DL (ref 30–150)
TSH SERPL DL<=0.005 MIU/L-ACNC: 2.18 UIU/ML (ref 0.4–4)

## 2021-08-28 PROCEDURE — 83002 ASSAY OF GONADOTROPIN (LH): CPT | Performed by: FAMILY MEDICINE

## 2021-08-28 PROCEDURE — 36415 COLL VENOUS BLD VENIPUNCTURE: CPT | Mod: PO | Performed by: FAMILY MEDICINE

## 2021-08-28 PROCEDURE — 84403 ASSAY OF TOTAL TESTOSTERONE: CPT | Performed by: FAMILY MEDICINE

## 2021-08-28 PROCEDURE — 83001 ASSAY OF GONADOTROPIN (FSH): CPT | Performed by: FAMILY MEDICINE

## 2021-08-28 PROCEDURE — 82670 ASSAY OF TOTAL ESTRADIOL: CPT | Performed by: FAMILY MEDICINE

## 2021-08-28 PROCEDURE — 80061 LIPID PANEL: CPT | Performed by: FAMILY MEDICINE

## 2021-08-28 PROCEDURE — 83036 HEMOGLOBIN GLYCOSYLATED A1C: CPT | Performed by: FAMILY MEDICINE

## 2021-08-28 PROCEDURE — 84443 ASSAY THYROID STIM HORMONE: CPT | Performed by: FAMILY MEDICINE

## 2021-08-28 PROCEDURE — 82306 VITAMIN D 25 HYDROXY: CPT | Performed by: FAMILY MEDICINE

## 2021-09-22 ENCOUNTER — TELEPHONE (OUTPATIENT)
Dept: FAMILY MEDICINE | Facility: CLINIC | Age: 51
End: 2021-09-22

## 2021-09-24 ENCOUNTER — TELEPHONE (OUTPATIENT)
Dept: FAMILY MEDICINE | Facility: CLINIC | Age: 51
End: 2021-09-24

## 2021-10-13 DIAGNOSIS — L30.8 PRURITIC DERMATITIS: ICD-10-CM

## 2021-10-13 DIAGNOSIS — L70.9 ACNE, UNSPECIFIED ACNE TYPE: Chronic | ICD-10-CM

## 2021-10-13 DIAGNOSIS — M79.89 LEG SWELLING: ICD-10-CM

## 2021-10-13 RX ORDER — SPIRONOLACTONE 25 MG/1
TABLET ORAL
Qty: 90 TABLET | Refills: 3 | Status: SHIPPED | OUTPATIENT
Start: 2021-10-13 | End: 2022-05-13 | Stop reason: SDUPTHER

## 2021-11-29 ENCOUNTER — OFFICE VISIT (OUTPATIENT)
Dept: URGENT CARE | Facility: CLINIC | Age: 51
End: 2021-11-29
Payer: COMMERCIAL

## 2021-11-29 VITALS
SYSTOLIC BLOOD PRESSURE: 121 MMHG | HEIGHT: 64 IN | WEIGHT: 259 LBS | OXYGEN SATURATION: 95 % | BODY MASS INDEX: 44.22 KG/M2 | HEART RATE: 82 BPM | RESPIRATION RATE: 14 BRPM | DIASTOLIC BLOOD PRESSURE: 88 MMHG | TEMPERATURE: 98 F

## 2021-11-29 DIAGNOSIS — A08.4 VIRAL GASTROENTERITIS: Primary | ICD-10-CM

## 2021-11-29 DIAGNOSIS — R11.2 NON-INTRACTABLE VOMITING WITH NAUSEA, UNSPECIFIED VOMITING TYPE: ICD-10-CM

## 2021-11-29 PROCEDURE — U0002 PR SARS-COV-2 COVID-19 ANY TECHNIQUE, MULT TYPE/SUBTYPE/TARGET: ICD-10-PCS | Mod: QW,S$GLB,, | Performed by: NURSE PRACTITIONER

## 2021-11-29 PROCEDURE — 99214 OFFICE O/P EST MOD 30 MIN: CPT | Mod: S$GLB,,, | Performed by: NURSE PRACTITIONER

## 2021-11-29 PROCEDURE — 99214 PR OFFICE/OUTPT VISIT, EST, LEVL IV, 30-39 MIN: ICD-10-PCS | Mod: S$GLB,,, | Performed by: NURSE PRACTITIONER

## 2021-11-29 PROCEDURE — U0002 COVID-19 LAB TEST NON-CDC: HCPCS | Mod: QW,S$GLB,, | Performed by: NURSE PRACTITIONER

## 2021-11-29 RX ORDER — ONDANSETRON 4 MG/1
4 TABLET, ORALLY DISINTEGRATING ORAL EVERY 8 HOURS PRN
Qty: 15 TABLET | Refills: 0 | Status: SHIPPED | OUTPATIENT
Start: 2021-11-29 | End: 2021-12-04

## 2021-11-29 RX ORDER — DAPSONE 25 MG/1
50 TABLET ORAL DAILY
COMMUNITY
Start: 2021-11-08 | End: 2024-02-23 | Stop reason: SDUPTHER

## 2021-12-29 ENCOUNTER — IMMUNIZATION (OUTPATIENT)
Dept: PRIMARY CARE CLINIC | Facility: CLINIC | Age: 51
End: 2021-12-29
Payer: COMMERCIAL

## 2021-12-29 DIAGNOSIS — Z23 NEED FOR VACCINATION: Primary | ICD-10-CM

## 2021-12-29 PROCEDURE — 0064A COVID-19, MRNA, LNP-S, PF, 100 MCG/0.25 ML DOSE VACCINE (MODERNA BOOSTER): CPT | Mod: S$GLB,,, | Performed by: FAMILY MEDICINE

## 2021-12-29 PROCEDURE — 91306 COVID-19, MRNA, LNP-S, PF, 100 MCG/0.25 ML DOSE VACCINE (MODERNA BOOSTER): ICD-10-PCS | Mod: S$GLB,,, | Performed by: FAMILY MEDICINE

## 2021-12-29 PROCEDURE — 91306 COVID-19, MRNA, LNP-S, PF, 100 MCG/0.25 ML DOSE VACCINE (MODERNA BOOSTER): CPT | Mod: S$GLB,,, | Performed by: FAMILY MEDICINE

## 2021-12-29 PROCEDURE — 0064A COVID-19, MRNA, LNP-S, PF, 100 MCG/0.25 ML DOSE VACCINE (MODERNA BOOSTER): ICD-10-PCS | Mod: S$GLB,,, | Performed by: FAMILY MEDICINE

## 2022-02-07 ENCOUNTER — TELEPHONE (OUTPATIENT)
Dept: FAMILY MEDICINE | Facility: CLINIC | Age: 52
End: 2022-02-07
Payer: COMMERCIAL

## 2022-02-07 NOTE — LETTER
February 7, 2022      [facility]  [address] [address]  Phone: [phone]  Fax: [phone]       Patient: Ijeoma Issa   YOB: 1970  Date of Visit: 02/07/2022    To Whom It May Concern:    Ronan Issa  was at [facility] on 02/07/2022. The patient may return to work on Monday 2/14/22 with no restrictions. If you have any questions or concerns, or if I can be of further assistance, please do not hesitate to contact me.    Sincerely,    Judith Snow LPN

## 2022-02-07 NOTE — TELEPHONE ENCOUNTER
Pt states she took an at home covid test on Friday and tested positive. She also began symptoms on Friday. Pt advised to treat symptoms with OTC meds. Dr. Nunez recommends mucinex dm, vit c, vit d, zinc, lozenges, warm compresses to sinuses, steam showers, hydration, tylenol/ibu. Pt verbalizes understanding and will call back if vitals become alarming or if symptoms do not improve

## 2022-02-07 NOTE — TELEPHONE ENCOUNTER
----- Message from Sirena Guillermo sent at 2/7/2022  9:15 AM CST -----  Contact: pt  Type: Needs Medical Advice    Who: Called: pt     Best Call Back Number: [phone]    Inquiry/Question: Pt tested positive for covid her symptoms started Friday she took a at home test she is quarantined she is having nasal drip, sore throat, hoarse, coughing , headache, slight fever feel liks a flu has body aches as well ears feel like they are under water ,  and needs a call back to see if she needs to do anything different or if there is anything she can take please call to advise  Thank you~

## 2022-05-11 DIAGNOSIS — J45.31 ASTHMA WITH ALLERGIC RHINITIS, MILD PERSISTENT, WITH ACUTE EXACERBATION: Chronic | ICD-10-CM

## 2022-05-11 DIAGNOSIS — Z79.890 HORMONE REPLACEMENT THERAPY (HRT): Primary | ICD-10-CM

## 2022-05-11 RX ORDER — MONTELUKAST SODIUM 10 MG/1
TABLET ORAL
Qty: 30 TABLET | Refills: 5 | Status: SHIPPED | OUTPATIENT
Start: 2022-05-11 | End: 2022-05-13 | Stop reason: SDUPTHER

## 2022-05-11 RX ORDER — ESTRADIOL 0.5 MG/1
0.5 TABLET ORAL DAILY
Qty: 30 TABLET | Refills: 3 | Status: SHIPPED | OUTPATIENT
Start: 2022-05-11 | End: 2022-05-13 | Stop reason: SDUPTHER

## 2022-05-11 NOTE — TELEPHONE ENCOUNTER
Refill Routing Note   Medication(s) are not appropriate for processing by [facility] for the following reason(s):      - Outside of protocol    [facility] action(s):  Defer  Approve       Medication Therapy Plan: estradiol OOP; approve montelukast  Medication reconciliation completed: No     Appointments  past 12m or future 3m with PCP    Date Provider   Last Visit   7/20/2021 Sonny Nunez MD   Next Visit   Visit date not found Sonny Nunez MD   ED visits in past 90 days: 0        Note composed:10:30 AM 05/11/2022

## 2022-05-11 NOTE — TELEPHONE ENCOUNTER
Care Due:                  Date            Visit Type   Department     Provider  --------------------------------------------------------------------------------                                EP -                              PRIMARY      [facility]  Last Visit: 07-      CARE ([facility])   MEDICINE       Sonny Nunez  Next Visit: None Scheduled  None         None Found                                                            Last  Test          Frequency    Reason                     Performed    Due Date  --------------------------------------------------------------------------------    Office Visit  12 months..  montelukast, omeprazole,   07-   07-                             spironolactone...........    CMP.........  12 months..  spironolactone...........  10-   10-    [facility] Embedded Care Gaps. Reference number: [id number]. 5/11/2022   5:40:57 AM CDT

## 2022-05-13 ENCOUNTER — PATIENT MESSAGE (OUTPATIENT)
Dept: FAMILY MEDICINE | Facility: CLINIC | Age: 52
End: 2022-05-13

## 2022-05-13 ENCOUNTER — OFFICE VISIT (OUTPATIENT)
Dept: FAMILY MEDICINE | Facility: CLINIC | Age: 52
End: 2022-05-13
Payer: COMMERCIAL

## 2022-05-13 VITALS
WEIGHT: 266.56 LBS | HEIGHT: 64 IN | OXYGEN SATURATION: 95 % | DIASTOLIC BLOOD PRESSURE: 82 MMHG | HEART RATE: 76 BPM | SYSTOLIC BLOOD PRESSURE: 130 MMHG | BODY MASS INDEX: 45.51 KG/M2

## 2022-05-13 DIAGNOSIS — E66.01 OBESITY, MORBID, BMI 40.0-49.9: ICD-10-CM

## 2022-05-13 DIAGNOSIS — L01.03 BULLOUS IMPETIGO: ICD-10-CM

## 2022-05-13 DIAGNOSIS — L30.8 PRURITIC DERMATITIS: ICD-10-CM

## 2022-05-13 DIAGNOSIS — Z79.890 HORMONE REPLACEMENT THERAPY (HRT): ICD-10-CM

## 2022-05-13 DIAGNOSIS — M79.89 LEG SWELLING: ICD-10-CM

## 2022-05-13 DIAGNOSIS — J45.21 MILD INTERMITTENT ASTHMA WITH ACUTE EXACERBATION: Primary | ICD-10-CM

## 2022-05-13 DIAGNOSIS — L70.9 ACNE, UNSPECIFIED ACNE TYPE: Chronic | ICD-10-CM

## 2022-05-13 DIAGNOSIS — J45.31 ASTHMA WITH ALLERGIC RHINITIS, MILD PERSISTENT, WITH ACUTE EXACERBATION: Chronic | ICD-10-CM

## 2022-05-13 PROCEDURE — 1160F PR REVIEW ALL MEDS BY PRESCRIBER/CLIN PHARMACIST DOCUMENTED: ICD-10-PCS | Mod: CPTII,S$GLB,, | Performed by: NURSE PRACTITIONER

## 2022-05-13 PROCEDURE — 3079F DIAST BP 80-89 MM HG: CPT | Mod: CPTII,S$GLB,, | Performed by: NURSE PRACTITIONER

## 2022-05-13 PROCEDURE — 3075F PR MOST RECENT SYSTOLIC BLOOD PRESS GE 130-139MM HG: ICD-10-PCS | Mod: CPTII,S$GLB,, | Performed by: NURSE PRACTITIONER

## 2022-05-13 PROCEDURE — 1159F MED LIST DOCD IN RCRD: CPT | Mod: CPTII,S$GLB,, | Performed by: NURSE PRACTITIONER

## 2022-05-13 PROCEDURE — 3079F PR MOST RECENT DIASTOLIC BLOOD PRESSURE 80-89 MM HG: ICD-10-PCS | Mod: CPTII,S$GLB,, | Performed by: NURSE PRACTITIONER

## 2022-05-13 PROCEDURE — 99999 PR PBB SHADOW E&M-EST. PATIENT-LVL V: CPT | Mod: PBBFAC,,, | Performed by: NURSE PRACTITIONER

## 2022-05-13 PROCEDURE — 99214 PR OFFICE/OUTPT VISIT, EST, LEVL IV, 30-39 MIN: ICD-10-PCS | Mod: S$GLB,,, | Performed by: NURSE PRACTITIONER

## 2022-05-13 PROCEDURE — 99999 PR PBB SHADOW E&M-EST. PATIENT-LVL V: ICD-10-PCS | Mod: PBBFAC,,, | Performed by: NURSE PRACTITIONER

## 2022-05-13 PROCEDURE — 3008F PR BODY MASS INDEX (BMI) DOCUMENTED: ICD-10-PCS | Mod: CPTII,S$GLB,, | Performed by: NURSE PRACTITIONER

## 2022-05-13 PROCEDURE — 1159F PR MEDICATION LIST DOCUMENTED IN MEDICAL RECORD: ICD-10-PCS | Mod: CPTII,S$GLB,, | Performed by: NURSE PRACTITIONER

## 2022-05-13 PROCEDURE — 3008F BODY MASS INDEX DOCD: CPT | Mod: CPTII,S$GLB,, | Performed by: NURSE PRACTITIONER

## 2022-05-13 PROCEDURE — 1160F RVW MEDS BY RX/DR IN RCRD: CPT | Mod: CPTII,S$GLB,, | Performed by: NURSE PRACTITIONER

## 2022-05-13 PROCEDURE — 3075F SYST BP GE 130 - 139MM HG: CPT | Mod: CPTII,S$GLB,, | Performed by: NURSE PRACTITIONER

## 2022-05-13 PROCEDURE — 99214 OFFICE O/P EST MOD 30 MIN: CPT | Mod: S$GLB,,, | Performed by: NURSE PRACTITIONER

## 2022-05-13 RX ORDER — DAPSONE 50 MG/G
GEL TOPICAL
COMMUNITY
Start: 2022-04-25 | End: 2024-02-23

## 2022-05-13 RX ORDER — MONTELUKAST SODIUM 10 MG/1
10 TABLET ORAL DAILY
Qty: 30 TABLET | Refills: 5 | Status: SHIPPED | OUTPATIENT
Start: 2022-05-13 | End: 2023-06-30 | Stop reason: SDUPTHER

## 2022-05-13 RX ORDER — HYDROCORTISONE 25 MG/G
CREAM TOPICAL
COMMUNITY
Start: 2021-12-20 | End: 2022-07-01 | Stop reason: SDUPTHER

## 2022-05-13 RX ORDER — ESTRADIOL 0.5 MG/1
0.5 TABLET ORAL DAILY
Qty: 30 TABLET | Refills: 3 | Status: SHIPPED | OUTPATIENT
Start: 2022-05-13 | End: 2022-12-27 | Stop reason: SDUPTHER

## 2022-05-13 RX ORDER — HYDROCORTISONE 25 MG/G
CREAM TOPICAL
COMMUNITY
Start: 2021-12-20 | End: 2024-02-23 | Stop reason: ALTCHOICE

## 2022-05-13 RX ORDER — IPRATROPIUM BROMIDE 42 UG/1
1-2 SPRAY, METERED NASAL
COMMUNITY
Start: 2021-10-13 | End: 2022-10-13

## 2022-05-13 RX ORDER — EPINEPHRINE 0.3 MG/.3ML
1 INJECTION INTRAMUSCULAR ONCE
Qty: 0.3 ML | Refills: 11 | Status: SHIPPED | OUTPATIENT
Start: 2022-05-13 | End: 2024-03-28

## 2022-05-13 RX ORDER — DOXYCYCLINE 100 MG/1
100 CAPSULE ORAL 2 TIMES DAILY
Qty: 14 CAPSULE | Refills: 0 | Status: SHIPPED | OUTPATIENT
Start: 2022-05-13 | End: 2022-05-20

## 2022-05-13 RX ORDER — LYSINE HCL 500 MG
300 TABLET ORAL
COMMUNITY
End: 2024-02-23

## 2022-05-13 RX ORDER — SPIRONOLACTONE 25 MG/1
25 TABLET ORAL DAILY
Qty: 90 TABLET | Refills: 3 | Status: SHIPPED | OUTPATIENT
Start: 2022-05-13 | End: 2022-11-15

## 2022-05-13 RX ORDER — ALBUTEROL SULFATE 90 UG/1
2 AEROSOL, METERED RESPIRATORY (INHALATION) EVERY 4 HOURS PRN
Qty: 18 G | Refills: 3 | Status: SHIPPED | OUTPATIENT
Start: 2022-05-13 | End: 2023-10-23

## 2022-05-13 RX ORDER — FLUTICASONE PROPIONATE AND SALMETEROL 250; 50 UG/1; UG/1
1 POWDER RESPIRATORY (INHALATION) 2 TIMES DAILY
Qty: 60 EACH | Refills: 11 | Status: SHIPPED | OUTPATIENT
Start: 2022-05-13

## 2022-05-13 RX ORDER — METHYLPREDNISOLONE 4 MG/1
TABLET ORAL
Qty: 1 EACH | Refills: 0 | Status: SHIPPED | OUTPATIENT
Start: 2022-05-13 | End: 2022-06-03

## 2022-05-13 RX ORDER — DOXEPIN HYDROCHLORIDE 25 MG/1
25 CAPSULE ORAL NIGHTLY
Qty: 30 CAPSULE | Refills: 11 | Status: SHIPPED | OUTPATIENT
Start: 2022-05-13 | End: 2023-06-05 | Stop reason: SDUPTHER

## 2022-05-13 RX ORDER — ALBUTEROL SULFATE 90 UG/1
1-2 AEROSOL, METERED RESPIRATORY (INHALATION)
Qty: 18 G | Status: CANCELLED | OUTPATIENT
Start: 2022-05-13

## 2022-05-13 RX ORDER — ALBUTEROL SULFATE 90 UG/1
1-2 AEROSOL, METERED RESPIRATORY (INHALATION)
COMMUNITY
Start: 2022-05-10 | End: 2022-05-13

## 2022-05-13 RX ORDER — BENZONATATE 100 MG/1
100 CAPSULE ORAL 3 TIMES DAILY PRN
Qty: 30 CAPSULE | Refills: 3 | Status: SHIPPED | OUTPATIENT
Start: 2022-05-13 | End: 2022-05-23

## 2022-05-13 RX ORDER — ALBUTEROL SULFATE 90 UG/1
2 AEROSOL, METERED RESPIRATORY (INHALATION) EVERY 6 HOURS PRN
Qty: 18 G | Refills: 0 | Status: CANCELLED | OUTPATIENT
Start: 2022-05-13

## 2022-05-13 RX ORDER — IPRATROPIUM BROMIDE AND ALBUTEROL SULFATE 2.5; .5 MG/3ML; MG/3ML
3 SOLUTION RESPIRATORY (INHALATION) EVERY 6 HOURS PRN
Qty: 1 EACH | Refills: 0 | Status: SHIPPED | OUTPATIENT
Start: 2022-05-13 | End: 2024-02-23 | Stop reason: ALTCHOICE

## 2022-05-13 RX ORDER — ALBUTEROL SULFATE 90 UG/1
2 AEROSOL, METERED RESPIRATORY (INHALATION)
COMMUNITY
Start: 2021-10-13 | End: 2022-05-13 | Stop reason: SDUPTHER

## 2022-05-13 RX ORDER — KETOCONAZOLE 20 MG/G
1 CREAM TOPICAL 2 TIMES DAILY
COMMUNITY
Start: 2022-04-21

## 2022-05-13 NOTE — LETTER
May 13, 2022      [facility]  [address] [address]  Phone: [phone]  Fax: [phone]       Patient: Ijeoma Issa   YOB: 1970  Date of Visit: 05/13/2022    To Whom It May Concern:    Ronan Issa  was at [facility] on 05/13/2022. Please excuse Ijeoma Issa form 5/10/22. The patient may return to work/school on 5/13/22 with no restrictions. If you have any questions or concerns, or if I can be of further assistance, please do not hesitate to contact me.    Sincerely,    Toshia Gao NP

## 2022-05-13 NOTE — PROGRESS NOTES
Subjective:       Patient ID: Ijeoma Issa is a 52 y.o. female.    Chief Complaint: Nasal Congestion, Cough, Fatigue, and Shortness of Breath    Cough  This is a recurrent problem. The current episode started 1 to 4 weeks ago. The problem has been waxing and waning. The cough is productive of purulent sputum. Associated symptoms include ear congestion and postnasal drip. The symptoms are aggravated by fumes and lying down. She has tried steroid inhaler and a beta-agonist inhaler for the symptoms.       Past Medical History:   Diagnosis Date    Abnormal mammogram     neg biospy    Allergic asthma     Anxiety     Arthritis     Dermatitis     Dr. Weil, Extremities    Diabetes     Diverticulosis of colon     GERD (gastroesophageal reflux disease)     Mass of right breast     Postmenopausal hormone replacement therapy     Tubal pregnancy     Uterine fibroid        Review of patient's allergies indicates:   Allergen Reactions    Cashew nut Anaphylaxis     Allergic to Cashew nuts    Nut flavor Anaphylaxis     Allergic to Cashew nuts    Latex Rash     Including in injections    Varicella vaccines      Red swelling/ lump develops at injection site    Adhesive tape-silicones Rash    Tapentadol Rash         Current Outpatient Medications:     ACZONE 5 % topical gel, Apply topically., Disp: , Rfl:     b complex vitamins capsule, Take 1 capsule by mouth once daily., Disp: , Rfl:     calcium carbonate-vit D3-min 600 mg calcium- 400 unit Tab, Take 300 mg by mouth., Disp: , Rfl:     CALCIUM CARBONATE/VITAMIN D3 (VITAMIN D-3 ORAL), Take by mouth., Disp: , Rfl:     CHROM VINCENT/BRINDALL CARTER (GARCINIA CAMBOGIA ORAL), Take by mouth., Disp: , Rfl:     ciclopirox (LOPROX) 0.77 % Crea, , Disp: , Rfl:     ciclopirox 1 % shampoo, Apply topically., Disp: , Rfl:     clindamycin (CLEOCIN T) 1 % lotion, Apply topically., Disp: , Rfl:     clobetasoL (TEMOVATE) 0.05 % cream, Apply topically 2 (two) times  daily., Disp: , Rfl:     dapsone 25 MG Tab, Take 50 mg by mouth once daily., Disp: , Rfl:     DOC-Q-LACE 100 mg capsule, TAKE ONE CAPSULE BY MOUTH TWICE DAILY, Disp: 30 capsule, Rfl: 0    estradiol (ESTRACE) 0.01 % (0.1 mg/gram) vaginal cream, PLACE 1 GRAM VAGINALLY TWICE A WEEK, Disp: 42.5 g, Rfl: 4    FERROUS FUMARATE (IRON ORAL), Take by mouth once daily., Disp: , Rfl:     fexofenadine (ALLEGRA) 180 MG tablet, Take 180 mg by mouth 2 hours after meals and at bedtime., Disp: , Rfl:     fluconazole (DIFLUCAN) 200 MG Tab, Take 200 mg by mouth once daily., Disp: , Rfl:     fluticasone propionate (FLONASE) 50 mcg/actuation nasal spray, SHAKE LIQUID AND USE 1 SPRAY(50 MCG) IN EACH NOSTRIL TWICE DAILY AS NEEDED FOR RHINITIS, Disp: 16 g, Rfl: 2    hydrocortisone 2.5 % cream, Apply to Affected areas on face and neck twice a day for up to 2 weeks as needed for flares, Disp: , Rfl:     hydrocortisone 2.5 % cream, Apply topically., Disp: , Rfl:     ipratropium (ATROVENT) 42 mcg (0.06 %) nasal spray, 1-2 sprays by Nasal route., Disp: , Rfl:     ketoconazole (NIZORAL) 2 % cream, Apply topically., Disp: , Rfl:     ketoconazole (NIZORAL) 2 % shampoo, , Disp: , Rfl:     levocetirizine (XYZAL) 5 MG tablet, Take 1 tablet (5 mg total) by mouth every evening., Disp: 90 tablet, Rfl: 6    LIDOcaine HCL 1 % Lotn, Apply topically., Disp: , Rfl:     montelukast (SINGULAIR) 10 mg tablet, Take 1 tablet (10 mg total) by mouth once daily., Disp: 30 tablet, Rfl: 5    mupirocin (BACTROBAN) 2 % ointment, Apply topically once daily., Disp: 90 g, Rfl: 3    mupirocin (BACTROBAN) 2 % ointment, Apply topically 2 (two) times daily., Disp: 15 g, Rfl: 0    mv-min-FA-Xw-Bx-naagqby-lutein 0.4-162-18 mg Tab, Take 1 tablet by mouth once daily., Disp: , Rfl:     omega-3 fatty acids-fish oil 300-1,000 mg CpDR, , Disp: , Rfl:     omeprazole (PRILOSEC) 40 MG capsule, Take 1 capsule (40 mg total) by mouth every morning., Disp: 90 capsule, Rfl:  "3    pimecrolimus (ELIDEL) 1 % cream, Apply topically 2 (two) times daily., Disp: , Rfl:     triamcinolone acetonide 0.1% (KENALOG) 0.1 % cream, Apply topically., Disp: , Rfl:     AFLURIA QD 2019-20,3YR UP,,PF, 60 mcg (15 mcg x 4)/0.5 mL Syrg, , Disp: , Rfl: 0    albuterol (PROVENTIL/VENTOLIN HFA) 90 mcg/actuation inhaler, Inhale 2 puffs into the lungs every 4 (four) hours as needed for Wheezing., Disp: 18 g, Rfl: 3    albuterol-ipratropium (DUO-NEB) 2.5 mg-0.5 mg/3 mL nebulizer solution, Take 3 mLs by nebulization every 6 (six) hours as needed for Wheezing. Rescue, Disp: 1 each, Rfl: 0    azelastine (ASTELIN) 137 mcg (0.1 %) nasal spray, 1 spray (137 mcg total) by Nasal route 2 (two) times daily., Disp: 30 mL, Rfl: 3    benzonatate (TESSALON) 100 MG capsule, Take 1 capsule (100 mg total) by mouth 3 (three) times daily as needed for Cough., Disp: 30 capsule, Rfl: 3    doxepin (SINEQUAN) 25 MG capsule, Take 1 capsule (25 mg total) by mouth every evening., Disp: 30 capsule, Rfl: 11    doxycycline (MONODOX) 100 MG capsule, Take 1 capsule (100 mg total) by mouth 2 (two) times daily. for 7 days, Disp: 14 capsule, Rfl: 0    EPIPEN 2-LUCIANO 0.3 mg/0.3 mL AtIn, Inject 0.3 mLs (0.3 mg total) into the muscle once. for 1 dose, Disp: 0.3 mL, Rfl: 11    estradioL (ESTRACE) 0.5 MG tablet, Take 1 tablet (0.5 mg total) by mouth once daily., Disp: 30 tablet, Rfl: 3    fluticasone-salmeterol diskus inhaler 250-50 mcg, Inhale 1 puff into the lungs 2 (two) times daily. Controller, Disp: 60 each, Rfl: 11    methylPREDNISolone (MEDROL DOSEPACK) 4 mg tablet, use as directed, Disp: 1 each, Rfl: 0    spironolactone (ALDACTONE) 25 MG tablet, Take 1 tablet (25 mg total) by mouth once daily., Disp: 90 tablet, Rfl: 3    Review of Systems   HENT: Positive for postnasal drip.        Objective:      /82 (BP Location: Left arm, Patient Position: Sitting, BP Method: Large (Manual))   Pulse 76   Ht 5' 4" (1.626 m)   Wt 120.9 kg " (266 lb 8.6 oz)   SpO2 95%   BMI 45.75 kg/m²   Physical Exam  Constitutional:       Appearance: She is well-developed. She is obese.   Eyes:      Pupils: Pupils are equal, round, and reactive to light.   Cardiovascular:      Rate and Rhythm: Normal rate and regular rhythm.      Heart sounds: Normal heart sounds.   Pulmonary:      Effort: Pulmonary effort is normal.      Breath sounds: Examination of the right-upper field reveals wheezing. Examination of the left-upper field reveals wheezing. Examination of the right-middle field reveals wheezing. Examination of the left-middle field reveals wheezing. Examination of the right-lower field reveals wheezing. Examination of the left-lower field reveals wheezing. Wheezing present.   Abdominal:      General: Bowel sounds are normal.      Palpations: Abdomen is soft.   Musculoskeletal:         General: Normal range of motion.      Cervical back: Normal range of motion.   Skin:     General: Skin is warm and dry.   Neurological:      Mental Status: She is alert and oriented to person, place, and time.   Psychiatric:         Behavior: Behavior normal.         Thought Content: Thought content normal.         Judgment: Judgment normal.         Assessment:       1. Mild intermittent asthma with acute exacerbation    2. Hormone replacement therapy (HRT)    3. Leg swelling    4. Acne, unspecified acne type    5. Pruritic dermatitis    6. Asthma with allergic rhinitis, mild persistent, with acute exacerbation    7. Bullous impetigo    8. Obesity, morbid, BMI 40.0-49.9        Plan:       Mild intermittent asthma with acute exacerbation  -     fluticasone-salmeterol diskus inhaler 250-50 mcg; Inhale 1 puff into the lungs 2 (two) times daily. Controller  Dispense: 60 each; Refill: 11  -     albuterol (PROVENTIL/VENTOLIN HFA) 90 mcg/actuation inhaler; Inhale 2 puffs into the lungs every 4 (four) hours as needed for Wheezing.  Dispense: 18 g; Refill: 3  -     albuterol-ipratropium  (DUO-NEB) 2.5 mg-0.5 mg/3 mL nebulizer solution; Take 3 mLs by nebulization every 6 (six) hours as needed for Wheezing. Rescue  Dispense: 1 each; Refill: 0  -     montelukast (SINGULAIR) 10 mg tablet; Take 1 tablet (10 mg total) by mouth once daily.  Dispense: 30 tablet; Refill: 5  -     methylPREDNISolone (MEDROL DOSEPACK) 4 mg tablet; use as directed  Dispense: 1 each; Refill: 0  -     benzonatate (TESSALON) 100 MG capsule; Take 1 capsule (100 mg total) by mouth 3 (three) times daily as needed for Cough.  Dispense: 30 capsule; Refill: 3    Hormone replacement therapy (HRT)  -     estradioL (ESTRACE) 0.5 MG tablet; Take 1 tablet (0.5 mg total) by mouth once daily.  Dispense: 30 tablet; Refill: 3    Leg swelling  -     spironolactone (ALDACTONE) 25 MG tablet; Take 1 tablet (25 mg total) by mouth once daily.  Dispense: 90 tablet; Refill: 3    Acne, unspecified acne type  -     spironolactone (ALDACTONE) 25 MG tablet; Take 1 tablet (25 mg total) by mouth once daily.  Dispense: 90 tablet; Refill: 3    Pruritic dermatitis  -     spironolactone (ALDACTONE) 25 MG tablet; Take 1 tablet (25 mg total) by mouth once daily.  Dispense: 90 tablet; Refill: 3    Asthma with allergic rhinitis, mild persistent, with acute exacerbation  -     EPIPEN 2-LUCIANO 0.3 mg/0.3 mL AtIn; Inject 0.3 mLs (0.3 mg total) into the muscle once. for 1 dose  Dispense: 0.3 mL; Refill: 11  -     doxycycline (MONODOX) 100 MG capsule; Take 1 capsule (100 mg total) by mouth 2 (two) times daily. for 7 days  Dispense: 14 capsule; Refill: 0    Bullous impetigo  -     doxepin (SINEQUAN) 25 MG capsule; Take 1 capsule (25 mg total) by mouth every evening.  Dispense: 30 capsule; Refill: 11    Obesity, morbid, BMI 40.0-49.9  Counseled patient on his ideal body weight, health consequences of being obese and current recommendations including weekly exercise and a heart healthy diet.  Current BMI is:Estimated body mass index is 45.75 kg/m² as calculated from the  "following:    Height as of this encounter: 5' 4" (1.626 m).    Weight as of this encounter: 120.9 kg (266 lb 8.6 oz)..  Patient is aware that ideal BMI < 25 or Weight in (lb) to have BMI = 25: 145.3.            Patient readiness: acceptance and barriers:none    During the course of the visit the patient was educated and counseled about the following:     Obesity:   General weight loss/lifestyle modification strategies discussed (elicit support from others; identify saboteurs; non-food rewards, etc).  Regular aerobic exercise program discussed.    Goals: Obesity: Reduce calorie intake and BMI    Did patient meet goals/outcomes: No    The following self management tools provided: declined    Patient Instructions (the written plan) was given to the patient/family.     Time spent with patient: 15 minutes    Barriers to medications present (no )    Adverse reactions to current medications (no)    Over the counter medications reviewed (No)          "

## 2022-06-27 ENCOUNTER — TELEPHONE (OUTPATIENT)
Dept: FAMILY MEDICINE | Facility: CLINIC | Age: 52
End: 2022-06-27
Payer: COMMERCIAL

## 2022-06-27 ENCOUNTER — HOSPITAL ENCOUNTER (EMERGENCY)
Facility: HOSPITAL | Age: 52
Discharge: HOME | End: 2022-06-27
Attending: EMERGENCY MEDICINE
Payer: COMMERCIAL

## 2022-06-27 VITALS
SYSTOLIC BLOOD PRESSURE: 122 MMHG | WEIGHT: 260 LBS | RESPIRATION RATE: 18 BRPM | DIASTOLIC BLOOD PRESSURE: 78 MMHG | BODY MASS INDEX: 44.39 KG/M2 | OXYGEN SATURATION: 99 % | HEART RATE: 91 BPM | TEMPERATURE: 99 F | HEIGHT: 64 IN

## 2022-06-27 DIAGNOSIS — J06.9 VIRAL URI WITH COUGH: Primary | ICD-10-CM

## 2022-06-27 DIAGNOSIS — R05.9 COUGH: ICD-10-CM

## 2022-06-27 LAB
GROUP A STREP, MOLECULAR: NEGATIVE
INFLUENZA A, MOLECULAR: NEGATIVE
INFLUENZA B, MOLECULAR: NEGATIVE
SARS-COV-2 RDRP RESP QL NAA+PROBE: NEGATIVE
SPECIMEN SOURCE: NORMAL

## 2022-06-27 PROCEDURE — 87389 HIV-1 AG W/HIV-1&-2 AB AG IA: CPT | Performed by: EMERGENCY MEDICINE

## 2022-06-27 PROCEDURE — U0002 COVID-19 LAB TEST NON-CDC: HCPCS | Performed by: PHYSICIAN ASSISTANT

## 2022-06-27 PROCEDURE — 87502 INFLUENZA DNA AMP PROBE: CPT | Performed by: PHYSICIAN ASSISTANT

## 2022-06-27 PROCEDURE — 36415 COLL VENOUS BLD VENIPUNCTURE: CPT | Performed by: EMERGENCY MEDICINE

## 2022-06-27 PROCEDURE — 87651 STREP A DNA AMP PROBE: CPT | Performed by: PHYSICIAN ASSISTANT

## 2022-06-27 PROCEDURE — 99284 EMERGENCY DEPT VISIT MOD MDM: CPT | Mod: 25

## 2022-06-27 PROCEDURE — 86803 HEPATITIS C AB TEST: CPT | Performed by: EMERGENCY MEDICINE

## 2022-06-27 RX ORDER — BENZONATATE 100 MG/1
100 CAPSULE ORAL 3 TIMES DAILY PRN
Qty: 30 CAPSULE | Refills: 0 | Status: SHIPPED | OUTPATIENT
Start: 2022-06-27 | End: 2022-07-07

## 2022-06-27 RX ORDER — PREDNISONE 20 MG/1
40 TABLET ORAL DAILY
Qty: 10 TABLET | Refills: 0 | Status: SHIPPED | OUTPATIENT
Start: 2022-06-27 | End: 2022-07-01 | Stop reason: SDUPTHER

## 2022-06-27 NOTE — TELEPHONE ENCOUNTER
Patient advised to go to ER due to symptoms of chest tightness and difficulty breathing. Patient verbalized understanding.

## 2022-06-27 NOTE — TELEPHONE ENCOUNTER
----- Message from Mando Prado sent at 6/27/2022 10:21 AM CDT -----  Type:  Sooner Appointment Request    Caller is requesting a sooner appointment.  Caller declined first available appointment listed below.  Caller will not accept being placed on the waitlist and is requesting a message be sent to doctor.    Name of Caller:  Pt  When is the first available appointment?  7/13     Symptoms:  congestion/ coughing up thick green mucus/ hard to breath/ low grade fever/ head and chest tight/ covid home test neg    Best Call Back Number:  [phone]     Additional Information:  Pt sts she will see NP or whoever.  Please advise =-- Thank you

## 2022-06-27 NOTE — ED PROVIDER NOTES
Encounter Date: 2022       History     Chief Complaint   Patient presents with    Cough     Neg home COVID test this am     Weakness    Sore Throat    Otalgia     Patient is a 52 year old female who presents with cough for two days. She reports PMH significant for GERD, DM, anxiety and allergies. She reports associated nasal congestion, sore throat, ear pain and fatigue. She denied N/V/D. She took a home COVID test which was negative. She states her granddaughter was recently sick. Using over the counter medications and her prescribed inhalers/nebulizers as needed. She denied fever.        Review of patient's allergies indicates:   Allergen Reactions    Cashew nut Anaphylaxis     Allergic to Cashew nuts    Nut flavor Anaphylaxis     Allergic to Cashew nuts    Latex Rash     Including in injections    Varicella vaccines      Red swelling/ lump develops at injection site    Adhesive tape-silicones Rash    Tapentadol Rash     Past Medical History:   Diagnosis Date    Abnormal mammogram     neg biospy    Allergic asthma     Anxiety     Arthritis     Dermatitis     Dr. Weil, Extremities    Diabetes     Diverticulosis of colon     GERD (gastroesophageal reflux disease)     Mass of right breast     Postmenopausal hormone replacement therapy     Tubal pregnancy     Uterine fibroid      Past Surgical History:   Procedure Laterality Date    BREAST BIOPSY Right 2012    benign     SECTION, LOW TRANSVERSE      CHOLECYSTECTOMY      HYSTERECTOMY      re: benign tumors per the patient    right ankle  10/10/2014    TOTAL ABDOMINAL HYSTERECTOMY W/ BILATERAL SALPINGOOPHORECTOMY      c appendectomy    TUBAL LIGATION       Family History   Problem Relation Age of Onset    Cancer Father         colon    Colon cancer Father     Diabetes Sister     Breast cancer Sister 28    Cancer Brother         stomach    Heart disease Brother     Heart disease Mother     Ovarian cancer Neg  "Hx      Social History     Tobacco Use    Smoking status: Never Smoker    Smokeless tobacco: Never Used   Substance Use Topics    Alcohol use: Yes     Alcohol/week: 0.0 standard drinks     Comment: very seldom    Drug use: No     Review of Systems   Constitutional: Negative for activity change, appetite change, fatigue and fever.   HENT: Positive for congestion, ear pain, rhinorrhea and sore throat.    Eyes: Negative for visual disturbance.   Respiratory: Positive for cough. Negative for chest tightness, shortness of breath and wheezing.    Cardiovascular: Negative for chest pain and palpitations.   Gastrointestinal: Negative for diarrhea, nausea and vomiting.   Musculoskeletal: Negative for arthralgias and myalgias.   Skin: Negative for rash.   Neurological: Negative for weakness, light-headedness, numbness and headaches.       Physical Exam     Vitals:    06/27/22 1238 06/27/22 1444   BP: (!) 140/81 122/78   Pulse: 108 91   Resp: 18 18   Temp: 98.3 °F (36.8 °C) 99.1 °F (37.3 °C)   TempSrc: Oral    SpO2: 95% 99%   Weight: 117.9 kg (260 lb)    Height: 5' 4" (1.626 m)        Physical Exam    Nursing note and vitals reviewed.  Constitutional: Vital signs are normal. She appears well-developed and well-nourished. She is cooperative.  Non-toxic appearance. She does not have a sickly appearance.   HENT:   Head: Normocephalic and atraumatic.   Right Ear: Tympanic membrane, external ear and ear canal normal.   Left Ear: Tympanic membrane, external ear and ear canal normal.   Nose: Nose normal.   Mouth/Throat: Uvula is midline and oropharynx is clear and moist.   Eyes: Conjunctivae and lids are normal.   Neck: Neck supple.   Normal range of motion.   Full passive range of motion without pain.     Cardiovascular: Normal rate, regular rhythm and normal heart sounds. Exam reveals no gallop and no friction rub.    No murmur heard.  Pulmonary/Chest: Breath sounds normal. She has no wheezes. She has no rhonchi. She has no " rales.   Musculoskeletal:      Cervical back: Full passive range of motion without pain, normal range of motion and neck supple.     Neurological: She is alert.   Skin: Skin is warm, dry and intact. No rash noted.         ED Course   Procedures  Labs Reviewed   INFLUENZA A & B BY MOLECULAR   GROUP A STREP, MOLECULAR   SARS-COV-2 RNA AMPLIFICATION, QUAL   HIV 1 / 2 ANTIBODY   HEPATITIS C ANTIBODY          Imaging Results          X-Ray Chest 1 View (Final result)  Result time 06/27/22 14:01:24    Final result by Rosa M Anguiano MD (06/27/22 14:01:24)                 Impression:      No acute cardiopulmonary disease      Electronically signed by: Rosa M Anguiano MD  Date:    06/27/2022  Time:    14:01             Narrative:    EXAMINATION:  XR CHEST 1 VIEW    CLINICAL HISTORY:  Cough, unspecified    TECHNIQUE:  Single frontal view of the chest was performed.    COMPARISON:  07/20/2021    FINDINGS:  The heart is not enlarged.  The lungs are expanded and clear.  There is no pleural effusion.                                 Medications - No data to display  Medical Decision Making:   History:   Old Medical Records: I decided to obtain old medical records.  Clinical Tests:   Lab Tests: Ordered and Reviewed  Radiological Study: Ordered and Reviewed       APC / Resident Notes:   Urgent evaluation of a well appearing 52 year old female who presents with COVID concerns. Patient reports chills, fever, sore throat and ear pain. Vital signs are stable. Clear and equal breath sounds bilaterally. Oxygen saturation is stable. I doubt pneumonia. No sign of otitis media. Denied GI complaints. Patient is noted to be COVID negative. CXR is normal. History of COPD. Will treat with short course of steroids. Discussed results with patient. Return precautions given. Based on my clinical evaluation, I do not appreciate any immediate, emergent, or life threatening condition or etiology that warrants additional workup today and feel that  the patient can be discharged with close follow up care.  Patient is to follow up with their primary care provider. All questions answered.                     Clinical Impression:   Final diagnoses:  [R05.9] Cough  [J06.9] Viral URI with cough (Primary)          ED Disposition Condition    Discharge Stable        ED Prescriptions     Medication Sig Dispense Start Date End Date Auth. Provider    predniSONE (DELTASONE) 20 MG tablet Take 2 tablets (40 mg total) by mouth once daily. for 5 days 10 tablet 6/27/2022 7/2/2022 Suzanna Gomez PA-C    benzonatate (TESSALON) 100 MG capsule Take 1 capsule (100 mg total) by mouth 3 (three) times daily as needed for Cough. 30 capsule 6/27/2022 7/7/2022 Suzanna Gomez PA-C        Follow-up Information     Follow up With Specialties Details Why Contact Info    Sonny Nnuez MD Family Medicine   [address] [place] [id number]  [phone]      [facility] Emergency Medicine  As needed [address] [place] [id number] [phone]           Suzanna Gomez PA-C  06/27/22 3164

## 2022-06-27 NOTE — DISCHARGE INSTRUCTIONS
Continue over the counter medications as needed as well as inhalers and nebulizer treatments. Take steroids as prescribed.  Follow up with your primary care provider.  For worsening symptoms, chest pain, shortness of breath, increased abdominal pain, high grade fever, stroke or stroke like symptoms, immediately go to the nearest Emergency Room or call 911 as soon as possible.

## 2022-06-28 LAB
HCV AB SERPL QL IA: NEGATIVE
HIV 1+2 AB+HIV1 P24 AG SERPL QL IA: NEGATIVE

## 2022-07-01 ENCOUNTER — OFFICE VISIT (OUTPATIENT)
Dept: FAMILY MEDICINE | Facility: CLINIC | Age: 52
End: 2022-07-01
Payer: COMMERCIAL

## 2022-07-01 VITALS
OXYGEN SATURATION: 93 % | HEIGHT: 64 IN | WEIGHT: 257.5 LBS | SYSTOLIC BLOOD PRESSURE: 104 MMHG | HEART RATE: 77 BPM | BODY MASS INDEX: 43.96 KG/M2 | TEMPERATURE: 98 F | DIASTOLIC BLOOD PRESSURE: 68 MMHG

## 2022-07-01 DIAGNOSIS — J45.21 MILD INTERMITTENT ASTHMA WITH ACUTE EXACERBATION: Primary | ICD-10-CM

## 2022-07-01 DIAGNOSIS — R05.8 PRODUCTIVE COUGH: ICD-10-CM

## 2022-07-01 PROCEDURE — 3008F PR BODY MASS INDEX (BMI) DOCUMENTED: ICD-10-PCS | Mod: CPTII,S$GLB,, | Performed by: NURSE PRACTITIONER

## 2022-07-01 PROCEDURE — 99214 PR OFFICE/OUTPT VISIT, EST, LEVL IV, 30-39 MIN: ICD-10-PCS | Mod: S$GLB,,, | Performed by: NURSE PRACTITIONER

## 2022-07-01 PROCEDURE — 99999 PR PBB SHADOW E&M-EST. PATIENT-LVL III: CPT | Mod: PBBFAC,,, | Performed by: NURSE PRACTITIONER

## 2022-07-01 PROCEDURE — 3074F SYST BP LT 130 MM HG: CPT | Mod: CPTII,S$GLB,, | Performed by: NURSE PRACTITIONER

## 2022-07-01 PROCEDURE — 1159F PR MEDICATION LIST DOCUMENTED IN MEDICAL RECORD: ICD-10-PCS | Mod: CPTII,S$GLB,, | Performed by: NURSE PRACTITIONER

## 2022-07-01 PROCEDURE — 3074F PR MOST RECENT SYSTOLIC BLOOD PRESSURE < 130 MM HG: ICD-10-PCS | Mod: CPTII,S$GLB,, | Performed by: NURSE PRACTITIONER

## 2022-07-01 PROCEDURE — 99999 PR PBB SHADOW E&M-EST. PATIENT-LVL III: ICD-10-PCS | Mod: PBBFAC,,, | Performed by: NURSE PRACTITIONER

## 2022-07-01 PROCEDURE — 3078F PR MOST RECENT DIASTOLIC BLOOD PRESSURE < 80 MM HG: ICD-10-PCS | Mod: CPTII,S$GLB,, | Performed by: NURSE PRACTITIONER

## 2022-07-01 PROCEDURE — 99214 OFFICE O/P EST MOD 30 MIN: CPT | Mod: S$GLB,,, | Performed by: NURSE PRACTITIONER

## 2022-07-01 PROCEDURE — 3008F BODY MASS INDEX DOCD: CPT | Mod: CPTII,S$GLB,, | Performed by: NURSE PRACTITIONER

## 2022-07-01 PROCEDURE — 3078F DIAST BP <80 MM HG: CPT | Mod: CPTII,S$GLB,, | Performed by: NURSE PRACTITIONER

## 2022-07-01 PROCEDURE — 1159F MED LIST DOCD IN RCRD: CPT | Mod: CPTII,S$GLB,, | Performed by: NURSE PRACTITIONER

## 2022-07-01 RX ORDER — PROMETHAZINE HYDROCHLORIDE AND DEXTROMETHORPHAN HYDROBROMIDE 6.25; 15 MG/5ML; MG/5ML
5 SYRUP ORAL 4 TIMES DAILY PRN
Qty: 120 ML | Refills: 0 | Status: SHIPPED | OUTPATIENT
Start: 2022-07-01 | End: 2022-07-08

## 2022-07-01 RX ORDER — AZITHROMYCIN 250 MG/1
TABLET, FILM COATED ORAL
Qty: 6 TABLET | Refills: 0 | Status: SHIPPED | OUTPATIENT
Start: 2022-07-01 | End: 2023-04-03

## 2022-07-01 RX ORDER — PREDNISONE 20 MG/1
40 TABLET ORAL DAILY
Qty: 10 TABLET | Refills: 0 | Status: SHIPPED | OUTPATIENT
Start: 2022-07-01 | End: 2022-07-06

## 2022-07-01 NOTE — PATIENT INSTRUCTIONS
George Raphael,     If you are due for any health screening(s) below please notify me so we can arrange them to be ordered and scheduled to maintain your health. Most healthy patients complete it. Don't lose out on improving your health.     Health Maintenance   Topic Date Due    Mammogram  06/17/2022    TETANUS VACCINE  08/27/2025    Lipid Panel  08/28/2026    Hepatitis C Screening  Completed       Breast Cancer Screening    Breast cancer is the second most common cancer in women after skin cancer, and the second leading cause of death from cancer after lung cancer. Mammograms can detect breast cancer early, which significantly increases the chances of curing the cancer.      A screening mammogram is an x-ray image of the breasts used for early breast cancer detection. It can help reduce the number of deaths from breast cancer among women. To get a clear image, the breast is placed between two plastic plates to make it flat. How often a mammogram is needed depends on your age and your breast cancer risk.    Although you are still overdue for this important screening, due to the COVID-19 pandemic, we recommend scheduling it in the near future.

## 2022-07-01 NOTE — PROGRESS NOTES
This dictation has been generated using Modal Fluency Dictation some phonetic errors may occur. Please contact author for clarification if needed.     Problem List Items Addressed This Visit    None     Visit Diagnoses     Mild intermittent asthma with acute exacerbation    -  Primary    Productive cough              Orders Placed This Encounter    azithromycin (Z-LUCIANO) 250 MG tablet    predniSONE (DELTASONE) 20 MG tablet    promethazine-dextromethorphan (PROMETHAZINE-DM) 6.25-15 mg/5 mL Syrp     Mild intermittent asthma with acute exacerbation and productive cough.  Not improving with steroids.  No worsening doubtful of pneumonia cover for secondary infection.  Consider pertussis.  COVID testing has been negative.  Z-Luciano prednisone as above and cough med.  Discussed risk of somnolence.    Follow up if symptoms worsen or fail to improve.    ________________________________________________________________  ________________________________________________________________      Chief Complaint   Patient presents with    Hospital Follow Up     History of present illness  This 52 y.o. presents today for complaint of cough.  Patient following up on asthma exacerbation.  Taking steroids as directed.  She has been coughing up yellow and green sputum.  Has had some shortness of breath and wheezing.  Notes chest pain with cough.  No pleuritic chest pain.  No fever chills sweats body aches.  Has had COVID testing which was negative.  Notes cough is worse at night and in the morning.  She does vomit after coughing sometimes.        Past Medical History:   Diagnosis Date    Abnormal mammogram     neg biospy    Allergic asthma     Anxiety     Arthritis     Dermatitis     Dr. Weil, Extremities    Diabetes     Diverticulosis of colon     GERD (gastroesophageal reflux disease)     Mass of right breast     Postmenopausal hormone replacement therapy     Tubal pregnancy     Uterine fibroid        Past Surgical History:    Procedure Laterality Date    BREAST BIOPSY Right 2012    benign     SECTION, LOW TRANSVERSE      CHOLECYSTECTOMY      HYSTERECTOMY      re: benign tumors per the patient    right ankle  10/10/2014    TOTAL ABDOMINAL HYSTERECTOMY W/ BILATERAL SALPINGOOPHORECTOMY      c appendectomy    TUBAL LIGATION         Family History   Problem Relation Age of Onset    Cancer Father         colon    Colon cancer Father     Diabetes Sister     Breast cancer Sister 28    Cancer Brother         stomach    Heart disease Brother     Heart disease Mother     Ovarian cancer Neg Hx        Social History     Socioeconomic History    Marital status:    Occupational History     Employer: [employer]   Tobacco Use    Smoking status: Never Smoker    Smokeless tobacco: Never Used   Substance and Sexual Activity    Alcohol use: Yes     Alcohol/week: 0.0 standard drinks     Comment: very seldom    Drug use: No    Sexual activity: Yes     Partners: Male     Birth control/protection: Surgical, See Surgical Hx     Comment: hyst       Current Outpatient Medications   Medication Sig Dispense Refill    ACZONE 5 % topical gel Apply topically.      albuterol (PROVENTIL/VENTOLIN HFA) 90 mcg/actuation inhaler Inhale 2 puffs into the lungs every 4 (four) hours as needed for Wheezing. 18 g 3    albuterol-ipratropium (DUO-NEB) 2.5 mg-0.5 mg/3 mL nebulizer solution Take 3 mLs by nebulization every 6 (six) hours as needed for Wheezing. Rescue 1 each 0    azelastine (ASTELIN) 137 mcg (0.1 %) nasal spray 1 spray (137 mcg total) by Nasal route 2 (two) times daily. 30 mL 3    b complex vitamins capsule Take 1 capsule by mouth once daily.      benzonatate (TESSALON) 100 MG capsule Take 1 capsule (100 mg total) by mouth 3 (three) times daily as needed for Cough. 30 capsule 0    calcium carbonate-vit D3-min 600 mg calcium- 400 unit Tab Take 300 mg by mouth.      ciclopirox (LOPROX) 0.77 % Crea        ciclopirox 1 % shampoo Apply topically.      clindamycin (CLEOCIN T) 1 % lotion Apply topically.      clobetasoL (TEMOVATE) 0.05 % cream Apply topically 2 (two) times daily.      dapsone 25 MG Tab Take 50 mg by mouth once daily.      DOC-Q-LACE 100 mg capsule TAKE ONE CAPSULE BY MOUTH TWICE DAILY 30 capsule 0    doxepin (SINEQUAN) 25 MG capsule Take 1 capsule (25 mg total) by mouth every evening. 30 capsule 11    estradiol (ESTRACE) 0.01 % (0.1 mg/gram) vaginal cream PLACE 1 GRAM VAGINALLY TWICE A WEEK 42.5 g 4    estradioL (ESTRACE) 0.5 MG tablet Take 1 tablet (0.5 mg total) by mouth once daily. 30 tablet 3    FERROUS FUMARATE (IRON ORAL) Take by mouth once daily.      fexofenadine (ALLEGRA) 180 MG tablet Take 180 mg by mouth 2 hours after meals and at bedtime.      fluticasone propionate (FLONASE) 50 mcg/actuation nasal spray SHAKE LIQUID AND USE 1 SPRAY(50 MCG) IN EACH NOSTRIL TWICE DAILY AS NEEDED FOR RHINITIS 16 g 2    fluticasone-salmeterol diskus inhaler 250-50 mcg Inhale 1 puff into the lungs 2 (two) times daily. Controller 60 each 11    hydrocortisone 2.5 % cream Apply to Affected areas on face and neck twice a day for up to 2 weeks as needed for flares      ipratropium (ATROVENT) 42 mcg (0.06 %) nasal spray 1-2 sprays by Nasal route.      ketoconazole (NIZORAL) 2 % cream Apply topically.      ketoconazole (NIZORAL) 2 % shampoo       LIDOcaine HCL 1 % Lotn Apply topically.      montelukast (SINGULAIR) 10 mg tablet Take 1 tablet (10 mg total) by mouth once daily. 30 tablet 5    mupirocin (BACTROBAN) 2 % ointment Apply topically once daily. 90 g 3    mv-min-FA-Up-Fe-jcxqvft-lutein 0.4-162-18 mg Tab Take 1 tablet by mouth once daily.      omega-3 fatty acids-fish oil 300-1,000 mg CpDR       omeprazole (PRILOSEC) 40 MG capsule Take 1 capsule (40 mg total) by mouth every morning. 90 capsule 3    pimecrolimus (ELIDEL) 1 % cream Apply topically 2 (two) times daily.      spironolactone  (ALDACTONE) 25 MG tablet Take 1 tablet (25 mg total) by mouth once daily. 90 tablet 3    triamcinolone acetonide 0.1% (KENALOG) 0.1 % cream Apply topically.      azithromycin (Z-LUCIANO) 250 MG tablet Take 2 tablets by mouth on day 1; Take 1 tablet by mouth on days 2-5 6 tablet 0    EPIPEN 2-LUCIANO 0.3 mg/0.3 mL AtIn Inject 0.3 mLs (0.3 mg total) into the muscle once. for 1 dose 0.3 mL 11    predniSONE (DELTASONE) 20 MG tablet Take 2 tablets (40 mg total) by mouth once daily. for 5 days 10 tablet 0    promethazine-dextromethorphan (PROMETHAZINE-DM) 6.25-15 mg/5 mL Syrp Take 5 mLs by mouth 4 (four) times daily as needed (night time cough). 120 mL 0     No current facility-administered medications for this visit.       Review of patient's allergies indicates:   Allergen Reactions    Cashew nut Anaphylaxis     Allergic to Cashew nuts    Nut flavor Anaphylaxis     Allergic to Cashew nuts    Latex Rash     Including in injections    Varicella vaccines      Red swelling/ lump develops at injection site    Adhesive tape-silicones Rash    Tapentadol Rash       Physical examination  Vitals Reviewed\  Vitals:    07/01/22 0911   BP: 104/68   Pulse: 77   Temp: 98.4 °F (36.9 °C)     Body mass index is 44.2 kg/m². .FLOWAMB[14    Weight: 116.8 kg (257 lb 8 oz)    Gen. Well-dressed well-nourished   Skin warm dry and intact.  No rashes noted.  HEENT.  TM intact bilateral with normal light reflex.  No mastoid tenderness during percussion.  Nares patent bilateral.  Pharynx is unremarkable.  No maxillary or frontal sinus tenderness when percussed.    Neck is supple without adenopathy  Chest.  Respirations are even unlabored.  Scattered rhonchi and wheeze.  Barking cough noted.  Cardiac regular rate and rhythm.  No chest wall adenopathy noted.  Neuro. Awake alert oriented x4.  Normal judgment and cognition noted.  Extremities no clubbing cyanosis or edema noted.     Call or return to clinic prn if these symptoms worsen or fail to  improve as anticipated.

## 2022-12-27 DIAGNOSIS — N95.2 ATROPHY OF VAGINA: ICD-10-CM

## 2022-12-27 DIAGNOSIS — Z79.890 HORMONE REPLACEMENT THERAPY (HRT): ICD-10-CM

## 2022-12-27 RX ORDER — ESTRADIOL 0.5 MG/1
0.5 TABLET ORAL DAILY
Qty: 30 TABLET | Refills: 3 | Status: SHIPPED | OUTPATIENT
Start: 2022-12-27 | End: 2023-05-02 | Stop reason: SDUPTHER

## 2023-03-31 ENCOUNTER — TELEPHONE (OUTPATIENT)
Dept: FAMILY MEDICINE | Facility: CLINIC | Age: 53
End: 2023-03-31
Payer: COMMERCIAL

## 2023-03-31 NOTE — TELEPHONE ENCOUNTER
----- Message from David Worley sent at 3/31/2023 11:35 AM CDT -----  Contact: self  Type:  Sooner Appointment Request    Caller is requesting a sooner appointment.  Caller declined first available appointment listed below.  Caller will not accept being placed on the waitlist and is requesting a message be sent to doctor.    Name of Caller:  self  When is the first available appointment?  5/13  Symptoms:  back pain right side (kidney area)  Best Call Back Number:  [phone]    Additional Information:  Pt states she has been drinking cranberry  juice/ lots of water dark urine for 2 days taking tylenol  Thank you

## 2023-04-03 ENCOUNTER — OFFICE VISIT (OUTPATIENT)
Dept: FAMILY MEDICINE | Facility: CLINIC | Age: 53
End: 2023-04-03
Payer: COMMERCIAL

## 2023-04-03 ENCOUNTER — HOSPITAL ENCOUNTER (OUTPATIENT)
Dept: RADIOLOGY | Facility: HOSPITAL | Age: 53
Discharge: HOME | End: 2023-04-03
Attending: PHYSICIAN ASSISTANT
Payer: COMMERCIAL

## 2023-04-03 VITALS
SYSTOLIC BLOOD PRESSURE: 112 MMHG | HEART RATE: 75 BPM | BODY MASS INDEX: 42.01 KG/M2 | OXYGEN SATURATION: 96 % | WEIGHT: 246.06 LBS | HEIGHT: 64 IN | TEMPERATURE: 98 F | DIASTOLIC BLOOD PRESSURE: 76 MMHG

## 2023-04-03 DIAGNOSIS — R10.9 FLANK PAIN: Primary | ICD-10-CM

## 2023-04-03 DIAGNOSIS — R10.9 ABDOMINAL PAIN, UNSPECIFIED ABDOMINAL LOCATION: ICD-10-CM

## 2023-04-03 LAB
BILIRUB UR QL STRIP: NEGATIVE
BILIRUBIN, UA POC OHS: NEGATIVE
BLOOD, UA POC OHS: NEGATIVE
CLARITY UR REFRACT.AUTO: CLEAR
CLARITY, UA POC OHS: CLEAR
COLOR UR AUTO: YELLOW
COLOR, UA POC OHS: ABNORMAL
GLUCOSE UR QL STRIP: NEGATIVE
GLUCOSE, UA POC OHS: NEGATIVE
HGB UR QL STRIP: NEGATIVE
KETONES UR QL STRIP: NEGATIVE
KETONES, UA POC OHS: NEGATIVE
LEUKOCYTE ESTERASE UR QL STRIP: NEGATIVE
LEUKOCYTES, UA POC OHS: NEGATIVE
NITRITE UR QL STRIP: NEGATIVE
NITRITE, UA POC OHS: NEGATIVE
PH UR STRIP: 7 [PH] (ref 5–8)
PH, UA POC OHS: 7
PROT UR QL STRIP: NEGATIVE
PROTEIN, UA POC OHS: NEGATIVE
SP GR UR STRIP: 1.01 (ref 1–1.03)
SPECIFIC GRAVITY, UA POC OHS: 1.01
URN SPEC COLLECT METH UR: NORMAL
UROBILINOGEN, UA POC OHS: 0.2

## 2023-04-03 PROCEDURE — 3008F PR BODY MASS INDEX (BMI) DOCUMENTED: ICD-10-PCS | Mod: CPTII,S$GLB,, | Performed by: PHYSICIAN ASSISTANT

## 2023-04-03 PROCEDURE — 25500020 PHARM REV CODE 255

## 2023-04-03 PROCEDURE — 3078F DIAST BP <80 MM HG: CPT | Mod: CPTII,S$GLB,, | Performed by: PHYSICIAN ASSISTANT

## 2023-04-03 PROCEDURE — 3078F PR MOST RECENT DIASTOLIC BLOOD PRESSURE < 80 MM HG: ICD-10-PCS | Mod: CPTII,S$GLB,, | Performed by: PHYSICIAN ASSISTANT

## 2023-04-03 PROCEDURE — 3074F PR MOST RECENT SYSTOLIC BLOOD PRESSURE < 130 MM HG: ICD-10-PCS | Mod: CPTII,S$GLB,, | Performed by: PHYSICIAN ASSISTANT

## 2023-04-03 PROCEDURE — 74178 CT ABD&PLV WO CNTR FLWD CNTR: CPT | Mod: 26,,, | Performed by: RADIOLOGY

## 2023-04-03 PROCEDURE — 81003 URINALYSIS AUTO W/O SCOPE: CPT | Performed by: PHYSICIAN ASSISTANT

## 2023-04-03 PROCEDURE — 81003 URINALYSIS AUTO W/O SCOPE: CPT | Mod: QW,S$GLB,, | Performed by: PHYSICIAN ASSISTANT

## 2023-04-03 PROCEDURE — 81003 POCT URINALYSIS(INSTRUMENT): ICD-10-PCS | Mod: QW,S$GLB,, | Performed by: PHYSICIAN ASSISTANT

## 2023-04-03 PROCEDURE — 1159F PR MEDICATION LIST DOCUMENTED IN MEDICAL RECORD: ICD-10-PCS | Mod: CPTII,S$GLB,, | Performed by: PHYSICIAN ASSISTANT

## 2023-04-03 PROCEDURE — 3074F SYST BP LT 130 MM HG: CPT | Mod: CPTII,S$GLB,, | Performed by: PHYSICIAN ASSISTANT

## 2023-04-03 PROCEDURE — 74178 CT ABD&PLV WO CNTR FLWD CNTR: CPT | Mod: TC

## 2023-04-03 PROCEDURE — 1159F MED LIST DOCD IN RCRD: CPT | Mod: CPTII,S$GLB,, | Performed by: PHYSICIAN ASSISTANT

## 2023-04-03 PROCEDURE — 74178 CT UROGRAM ABD PELVIS W WO: ICD-10-PCS | Mod: 26,,, | Performed by: RADIOLOGY

## 2023-04-03 PROCEDURE — 99999 PR PBB SHADOW E&M-EST. PATIENT-LVL III: ICD-10-PCS | Mod: PBBFAC,,, | Performed by: PHYSICIAN ASSISTANT

## 2023-04-03 PROCEDURE — 99999 PR PBB SHADOW E&M-EST. PATIENT-LVL III: CPT | Mod: PBBFAC,,, | Performed by: PHYSICIAN ASSISTANT

## 2023-04-03 PROCEDURE — 3008F BODY MASS INDEX DOCD: CPT | Mod: CPTII,S$GLB,, | Performed by: PHYSICIAN ASSISTANT

## 2023-04-03 PROCEDURE — 99213 OFFICE O/P EST LOW 20 MIN: CPT | Mod: S$GLB,,, | Performed by: PHYSICIAN ASSISTANT

## 2023-04-03 PROCEDURE — 99213 PR OFFICE/OUTPT VISIT, EST, LEVL III, 20-29 MIN: ICD-10-PCS | Mod: S$GLB,,, | Performed by: PHYSICIAN ASSISTANT

## 2023-04-03 RX ORDER — NAPROXEN 500 MG/1
500 TABLET ORAL 2 TIMES DAILY WITH MEALS
Qty: 10 TABLET | Refills: 0 | Status: SHIPPED | OUTPATIENT
Start: 2023-04-03 | End: 2023-04-08

## 2023-04-03 RX ADMIN — IOHEXOL 125 ML: 350 INJECTION, SOLUTION INTRAVENOUS at 11:04

## 2023-04-03 NOTE — PROGRESS NOTES
Subjective:       Patient ID: Ijeoma Issa is a 53 y.o. female.    Chief Complaint: Back Pain    Back Pain  This is a new problem. The current episode started in the past 7 days. The problem occurs constantly. The problem is unchanged. The pain is present in the costovertebral angle. The quality of the pain is described as aching. The pain does not radiate. The pain is at a severity of 4/10. The pain is moderate. The pain is The same all the time. The symptoms are aggravated by position and urinating. Pertinent negatives include no abdominal pain, bladder incontinence, bowel incontinence, dysuria, fever, leg pain, numbness, paresis, paresthesias, pelvic pain or tingling. (Positive for Urine frequency and ventura colored urine ) She has tried analgesics (tylenol and water) for the symptoms. The treatment provided no relief.   Review of Systems   Constitutional:  Positive for chills. Negative for activity change, appetite change, diaphoresis, fatigue and fever.   Gastrointestinal:  Negative for abdominal distention, abdominal pain, bowel incontinence, constipation, diarrhea, nausea and vomiting.   Genitourinary:  Positive for enuresis, flank pain and frequency. Negative for bladder incontinence, decreased urine volume, difficulty urinating, dysuria, hematuria, pelvic pain and urgency.   Musculoskeletal:  Positive for back pain.   Skin:  Negative for rash.   Neurological:  Negative for tingling, numbness and paresthesias.     Objective:      Physical Exam  Constitutional:       General: She is not in acute distress.     Appearance: She is well-developed.   HENT:      Head: Normocephalic and atraumatic.   Cardiovascular:      Rate and Rhythm: Normal rate and regular rhythm.      Heart sounds: Normal heart sounds.   Pulmonary:      Effort: Pulmonary effort is normal.      Breath sounds: Normal breath sounds.   Abdominal:      General: Abdomen is flat. Bowel sounds are normal.      Palpations: Abdomen is soft.      " Tenderness: There is abdominal tenderness in the right lower quadrant. There is right CVA tenderness. There is no guarding or rebound.   Musculoskeletal:      Right lower leg: No edema.      Left lower leg: No edema.   Skin:     General: Skin is warm and dry.   Neurological:      Mental Status: She is alert.   Psychiatric:         Behavior: Behavior is cooperative.       Assessment:       1. Flank pain    2. Abdominal pain, unspecified abdominal location        Plan:       Ijeoma was seen today for back pain.    Diagnoses and all orders for this visit:    Flank pain  -     POCT Urinalysis(Instrument)  -     Cancel: CBC Auto Differential; Future  -     Cancel: COMPREHENSIVE METABOLIC PANEL; Future  -     Urinalysis, Reflex to Urine Culture Urine, Clean Catch  -     CBC Auto Differential; Future  -     COMPREHENSIVE METABOLIC PANEL; Future  -     naproxen (NAPROSYN) 500 MG tablet; Take 1 tablet (500 mg total) by mouth 2 (two) times daily with meals. for 5 days    Abdominal pain, unspecified abdominal location  -     CT Urogram Abd Pelvis W WO; Future  -     Urinalysis, Reflex to Urine Culture Urine, Clean Catch  -     CBC Auto Differential; Future  -     COMPREHENSIVE METABOLIC PANEL; Future  -     naproxen (NAPROSYN) 500 MG tablet; Take 1 tablet (500 mg total) by mouth 2 (two) times daily with meals. for 5 days           Follow up for stat ct , cbc, cmp, .           Documentation entered by me for this encounter may have been done in part using speech-recognition technology. Although I have made an effort to ensure accuracy, "sound like" errors may exist and should be interpreted in context.     "

## 2023-04-04 ENCOUNTER — PATIENT MESSAGE (OUTPATIENT)
Dept: FAMILY MEDICINE | Facility: CLINIC | Age: 53
End: 2023-04-04
Payer: COMMERCIAL

## 2023-04-04 RX ORDER — TIZANIDINE 4 MG/1
4 TABLET ORAL EVERY 8 HOURS
Qty: 20 TABLET | Refills: 0 | Status: SHIPPED | OUTPATIENT
Start: 2023-04-04 | End: 2023-04-14

## 2023-04-05 ENCOUNTER — PATIENT MESSAGE (OUTPATIENT)
Dept: FAMILY MEDICINE | Facility: CLINIC | Age: 53
End: 2023-04-05
Payer: COMMERCIAL

## 2023-05-02 ENCOUNTER — OFFICE VISIT (OUTPATIENT)
Dept: URGENT CARE | Facility: CLINIC | Age: 53
End: 2023-05-02
Payer: COMMERCIAL

## 2023-05-02 VITALS
RESPIRATION RATE: 16 BRPM | HEIGHT: 64 IN | HEART RATE: 81 BPM | BODY MASS INDEX: 43.19 KG/M2 | WEIGHT: 253 LBS | DIASTOLIC BLOOD PRESSURE: 98 MMHG | SYSTOLIC BLOOD PRESSURE: 137 MMHG | OXYGEN SATURATION: 96 % | TEMPERATURE: 98 F

## 2023-05-02 DIAGNOSIS — Z79.890 HORMONE REPLACEMENT THERAPY (HRT): ICD-10-CM

## 2023-05-02 DIAGNOSIS — A08.4 VIRAL GASTROENTERITIS: Primary | ICD-10-CM

## 2023-05-02 PROCEDURE — 99213 PR OFFICE/OUTPT VISIT, EST, LEVL III, 20-29 MIN: ICD-10-PCS | Mod: S$GLB,,, | Performed by: NURSE PRACTITIONER

## 2023-05-02 PROCEDURE — 99213 OFFICE O/P EST LOW 20 MIN: CPT | Mod: S$GLB,,, | Performed by: NURSE PRACTITIONER

## 2023-05-02 RX ORDER — ONDANSETRON 4 MG/1
4 TABLET, ORALLY DISINTEGRATING ORAL EVERY 8 HOURS PRN
Qty: 20 TABLET | Refills: 0 | Status: ON HOLD | OUTPATIENT
Start: 2023-05-02 | End: 2024-02-25 | Stop reason: HOSPADM

## 2023-05-02 NOTE — TELEPHONE ENCOUNTER
Received fax from [facility] ([facility]) requesting refill of Estradiol.  Please advise.     LR--12-27-22  LOV--4-3-23   FOV-- None Noted

## 2023-05-02 NOTE — LETTER
May 2, 2023      [facility]  [address] [facility] [address]  Phone: [phone]       Patient: Ijeoma Issa   YOB: 1970  Date of Visit: 05/02/2023    To Whom It May Concern:    Ronan Issa  was at [facility] on 05/02/2023. The patient may return to work on 5/3/2023 with no restrictions. If you have any questions or concerns, or if I can be of further assistance, please do not hesitate to contact me.    Sincerely,          MATT MartinsC

## 2023-05-02 NOTE — PATIENT INSTRUCTIONS
Diarrhea  If your condition worsens or fails to improve we recommend that you receive another evaluation at the ER immediately or contact your PCP to discuss your concerns or return here. You must understand that you've received an urgent care treatment only and that you may be released before all your medical problems are known or treated. You the patient will arrange for followup care as instructed.   If you have diarrhea you can use Pepto Bismol.  Avoid Imodium.    Avoid any greasy, spicy, fatty or acidic foods at this time.    Increase fluids and rest is important.  Start a clear liquid diet and progress to BRAT diet (see attached handout).     Watch for any increase pain, fever, localized pain to right lower abdomen or continued vomiting or diarrhea.      Nausea & Vomiting  If your condition worsens or fails to improve we recommend that you receive another evaluation at the ER immediately or contact your PCP to discuss your concerns or return here. You must understand that you've received an urgent care treatment only and that you may be released before all your medical problems are known or treated. You the patient will arrange for followup care as instructed.   Use prescribed anti-nausea medicine as needed and prescribed   Increase fluids and rest is important   Start off with liquid diet and progress as tolerated (see below)  Water and clear liquids are important so you do not get dehydrated. Drink a small amount at a time.  Do not force yourself to eat, especially if you have cramps, vomiting, or diarrhea. When you finally decide to start eating, do not eat large amounts at a time, even if you are hungry.  If you eat, avoid fatty, greasy, spicy, or fried foods.  Do not eat dairy products if you have diarrhea; they can make the diarrhea worse  Watch for any increase pain, fever, localized pain to right lower abdomen or continued vomiting or diarrhea.

## 2023-05-03 RX ORDER — ESTRADIOL 0.5 MG/1
0.5 TABLET ORAL DAILY
Qty: 30 TABLET | Refills: 3 | Status: SHIPPED | OUTPATIENT
Start: 2023-05-03 | End: 2023-06-30

## 2023-06-05 DIAGNOSIS — L01.03 BULLOUS IMPETIGO: ICD-10-CM

## 2023-06-05 RX ORDER — DOXEPIN HYDROCHLORIDE 25 MG/1
25 CAPSULE ORAL NIGHTLY
Qty: 30 CAPSULE | Refills: 1 | Status: SHIPPED | OUTPATIENT
Start: 2023-06-05 | End: 2023-06-30 | Stop reason: SDUPTHER

## 2023-06-07 ENCOUNTER — TELEPHONE (OUTPATIENT)
Dept: FAMILY MEDICINE | Facility: CLINIC | Age: 53
End: 2023-06-07
Payer: COMMERCIAL

## 2023-06-07 NOTE — TELEPHONE ENCOUNTER
----- Message from Adriano Tabares sent at 6/7/2023 11:54 AM CDT -----  Contact: Pt  Type:Patient Returning call    Who called:Pt  Who left the message for patient:Nurse Tawanna  Does the patient know what this was regarding:yes and appt  Best call back number:[phone]    Additional Information Please Advise -Thank you

## 2023-06-30 ENCOUNTER — OFFICE VISIT (OUTPATIENT)
Dept: FAMILY MEDICINE | Facility: CLINIC | Age: 53
End: 2023-06-30
Payer: COMMERCIAL

## 2023-06-30 VITALS
OXYGEN SATURATION: 96 % | WEIGHT: 253.5 LBS | HEIGHT: 64 IN | SYSTOLIC BLOOD PRESSURE: 126 MMHG | HEART RATE: 79 BPM | BODY MASS INDEX: 43.28 KG/M2 | TEMPERATURE: 98 F | DIASTOLIC BLOOD PRESSURE: 70 MMHG

## 2023-06-30 DIAGNOSIS — L01.03 BULLOUS IMPETIGO: ICD-10-CM

## 2023-06-30 DIAGNOSIS — R73.02 GLUCOSE INTOLERANCE (IMPAIRED GLUCOSE TOLERANCE): Primary | ICD-10-CM

## 2023-06-30 DIAGNOSIS — K21.9 GASTROESOPHAGEAL REFLUX DISEASE WITHOUT ESOPHAGITIS: ICD-10-CM

## 2023-06-30 DIAGNOSIS — L70.9 ACNE, UNSPECIFIED ACNE TYPE: Chronic | ICD-10-CM

## 2023-06-30 DIAGNOSIS — Z12.31 OTHER SCREENING MAMMOGRAM: ICD-10-CM

## 2023-06-30 DIAGNOSIS — K21.00 GASTROESOPHAGEAL REFLUX DISEASE WITH ESOPHAGITIS WITHOUT HEMORRHAGE: ICD-10-CM

## 2023-06-30 DIAGNOSIS — E78.2 MIXED HYPERLIPIDEMIA: ICD-10-CM

## 2023-06-30 DIAGNOSIS — D50.1 IRON DEFICIENCY ANEMIA DUE TO SIDEROPENIC DYSPHAGIA: ICD-10-CM

## 2023-06-30 DIAGNOSIS — L30.8 PRURITIC DERMATITIS: ICD-10-CM

## 2023-06-30 DIAGNOSIS — J45.21 MILD INTERMITTENT ASTHMA WITH ACUTE EXACERBATION: ICD-10-CM

## 2023-06-30 DIAGNOSIS — Z12.11 COLON CANCER SCREENING: ICD-10-CM

## 2023-06-30 DIAGNOSIS — Z79.890 HORMONE REPLACEMENT THERAPY (HRT): ICD-10-CM

## 2023-06-30 DIAGNOSIS — M79.89 LEG SWELLING: ICD-10-CM

## 2023-06-30 DIAGNOSIS — Z80.0 FAMILY HX OF COLON CANCER: ICD-10-CM

## 2023-06-30 PROCEDURE — 3078F DIAST BP <80 MM HG: CPT | Mod: CPTII,S$GLB,, | Performed by: FAMILY MEDICINE

## 2023-06-30 PROCEDURE — 99999 PR PBB SHADOW E&M-EST. PATIENT-LVL V: ICD-10-PCS | Mod: PBBFAC,,, | Performed by: FAMILY MEDICINE

## 2023-06-30 PROCEDURE — 1160F RVW MEDS BY RX/DR IN RCRD: CPT | Mod: CPTII,S$GLB,, | Performed by: FAMILY MEDICINE

## 2023-06-30 PROCEDURE — 1159F MED LIST DOCD IN RCRD: CPT | Mod: CPTII,S$GLB,, | Performed by: FAMILY MEDICINE

## 2023-06-30 PROCEDURE — 99215 PR OFFICE/OUTPT VISIT, EST, LEVL V, 40-54 MIN: ICD-10-PCS | Mod: S$GLB,,, | Performed by: FAMILY MEDICINE

## 2023-06-30 PROCEDURE — 3008F PR BODY MASS INDEX (BMI) DOCUMENTED: ICD-10-PCS | Mod: CPTII,S$GLB,, | Performed by: FAMILY MEDICINE

## 2023-06-30 PROCEDURE — 3078F PR MOST RECENT DIASTOLIC BLOOD PRESSURE < 80 MM HG: ICD-10-PCS | Mod: CPTII,S$GLB,, | Performed by: FAMILY MEDICINE

## 2023-06-30 PROCEDURE — 99999 PR PBB SHADOW E&M-EST. PATIENT-LVL V: CPT | Mod: PBBFAC,,, | Performed by: FAMILY MEDICINE

## 2023-06-30 PROCEDURE — 1160F PR REVIEW ALL MEDS BY PRESCRIBER/CLIN PHARMACIST DOCUMENTED: ICD-10-PCS | Mod: CPTII,S$GLB,, | Performed by: FAMILY MEDICINE

## 2023-06-30 PROCEDURE — 3008F BODY MASS INDEX DOCD: CPT | Mod: CPTII,S$GLB,, | Performed by: FAMILY MEDICINE

## 2023-06-30 PROCEDURE — 99215 OFFICE O/P EST HI 40 MIN: CPT | Mod: S$GLB,,, | Performed by: FAMILY MEDICINE

## 2023-06-30 PROCEDURE — 3074F PR MOST RECENT SYSTOLIC BLOOD PRESSURE < 130 MM HG: ICD-10-PCS | Mod: CPTII,S$GLB,, | Performed by: FAMILY MEDICINE

## 2023-06-30 PROCEDURE — 1159F PR MEDICATION LIST DOCUMENTED IN MEDICAL RECORD: ICD-10-PCS | Mod: CPTII,S$GLB,, | Performed by: FAMILY MEDICINE

## 2023-06-30 PROCEDURE — 3074F SYST BP LT 130 MM HG: CPT | Mod: CPTII,S$GLB,, | Performed by: FAMILY MEDICINE

## 2023-06-30 RX ORDER — FERROUS SULFATE 325(65) MG
325 TABLET ORAL
Start: 2023-06-30

## 2023-06-30 RX ORDER — OMEPRAZOLE/SODIUM BICARBONATE 20MG-1.1G
CAPSULE ORAL
Refills: 0
Start: 2023-06-30 | End: 2024-02-23

## 2023-06-30 RX ORDER — SPIRONOLACTONE 25 MG/1
25 TABLET ORAL DAILY
Qty: 90 TABLET | Refills: 3 | Status: SHIPPED | OUTPATIENT
Start: 2023-06-30 | End: 2023-12-15

## 2023-06-30 RX ORDER — CHLORHEXIDINE GLUCONATE 40 MG/ML
SOLUTION TOPICAL DAILY PRN
Qty: 236 ML | Refills: 2 | Status: SHIPPED | OUTPATIENT
Start: 2023-06-30

## 2023-06-30 RX ORDER — MONTELUKAST SODIUM 10 MG/1
10 TABLET ORAL DAILY
Qty: 30 TABLET | Refills: 5 | Status: SHIPPED | OUTPATIENT
Start: 2023-06-30

## 2023-06-30 RX ORDER — ESTRADIOL 0.5 MG/1
0.5 TABLET ORAL
Qty: 45 TABLET | Refills: 2 | Status: SHIPPED | OUTPATIENT
Start: 2023-06-30 | End: 2024-06-29

## 2023-06-30 RX ORDER — DOXEPIN HYDROCHLORIDE 25 MG/1
25 CAPSULE ORAL NIGHTLY
Qty: 30 CAPSULE | Refills: 1 | Status: SHIPPED | OUTPATIENT
Start: 2023-06-30 | End: 2023-08-31

## 2023-06-30 RX ORDER — OMEPRAZOLE 40 MG/1
40 CAPSULE, DELAYED RELEASE ORAL EVERY MORNING
Qty: 90 CAPSULE | Refills: 3 | Status: SHIPPED | OUTPATIENT
Start: 2023-06-30 | End: 2023-06-30 | Stop reason: ALTCHOICE

## 2023-06-30 NOTE — PATIENT INSTRUCTIONS
DASH diet for Hypertension and Healthy Eating  Provided by the [facility]    Healthy Lifestyle   Nutrition and healthy eating  The DASH diet emphasizes portion size, eating a variety of foods and getting the right amount of nutrients. Discover how DASH can improve your health and lower your blood pressure.  By [facility] Staff   DASH stands for Dietary Approaches to Stop Hypertension. The DASH diet is a lifelong approach to healthy eating that's designed to help treat or prevent high blood pressure (hypertension). The DASH diet encourages you to reduce the sodium in your diet and eat a variety of foods rich in nutrients that help lower blood pressure, such as potassium, calcium and magnesium.  By following the DASH diet, you may be able to reduce your blood pressure by a few points in just two weeks. Over time, your systolic blood pressure could drop by eight to 14 points, which can make a significant difference in your health risks.  Because the DASH diet is a healthy way of eating, it offers health benefits besides just lowering blood pressure. The DASH diet is also in line with dietary recommendations to prevent osteoporosis, cancer, heart disease, stroke and diabetes.  The DASH diet emphasizes vegetables, fruits and low-fat dairy foods -- and moderate amounts of whole grains, fish, poultry and nuts.  In addition to the standard DASH diet, there is also a lower sodium version of the diet. You can choose the version of the diet that meets your health needs:  Standard DASH diet. You can consume up to 2,300 milligrams (mg) of sodium a day.   Lower sodium DASH diet. You can consume up to 1,500 mg of sodium a day.  Both versions of the DASH diet aim to reduce the amount of sodium in your diet compared with what you might get in a typical American diet, which can amount to a whopping 3,400 mg of sodium a day or more.  The standard DASH diet meets the recommendation from the Dietary Guidelines for Americans to keep  "daily sodium intake to less than 2,300 mg a day.  The American Heart Association recommends 1,500 mg a day of sodium as an upper limit for all adults. If you aren't sure what sodium level is right for you, talk to your doctor.  Both versions of the DASH diet include lots of whole grains, fruits, vegetables and low-fat dairy products. The DASH diet also includes some fish, poultry and legumes, and encourages a small amount of nuts and seeds a few times a week.   You can eat red meat, sweets and fats in small amounts. The DASH diet is low in saturated fat, cholesterol and total fat.  Here's a look at the recommended servings from each food group for the 2,803-[id number]-day DASH diet.  Grains: 6 to 8 servings a day  Grains include bread, cereal, rice and pasta. Examples of one serving of grains include 1 slice whole-wheat bread, 1 ounce dry cereal, or 1/2 cup cooked cereal, rice or pasta.  Focus on whole grains because they have more fiber and nutrients than do refined grains. For instance, use brown rice instead of white rice, whole-wheat pasta instead of regular pasta and whole-grain bread instead of white bread. Look for products labeled "100 percent whole grain" or "100 percent whole wheat."   Grains are naturally low in fat. Keep them this way by avoiding butter, cream and cheese sauces.  Vegetables: 4 to 5 servings a day  Tomatoes, carrots, broccoli, sweet potatoes, greens and other vegetables are full of fiber, vitamins, and such minerals as potassium and magnesium. Examples of one serving include 1 cup raw leafy green vegetables or 1/2 cup cut-up raw or cooked vegetables.  Don't think of vegetables only as side dishes -- a hearty blend of vegetables served over brown rice or whole-wheat noodles can serve as the main dish for a meal.   Fresh and frozen vegetables are both good choices. When buying frozen and canned vegetables, choose those labeled as low sodium or without added salt.   To increase the number of " servings you fit in daily, be creative. In a stir-espinoza, for instance, cut the amount of meat in half and double up on the vegetables.  Fruits: 4 to 5 servings a day  Many fruits need little preparation to become a healthy part of a meal or snack. Like vegetables, they're packed with fiber, potassium and magnesium and are typically low in fat -- coconuts are an exception. Examples of one serving include one medium fruit, 1/2 cup fresh, frozen or canned fruit, or 4 ounces of juice.  Have a piece of fruit with meals and one as a snack, then round out your day with a dessert of fresh fruits topped with a dollop of low-fat yogurt.   Leave on edible peels whenever possible. The peels of apples, pears and most fruits with pits add interesting texture to recipes and contain healthy nutrients and fiber.   Remember that citrus fruits and juices, such as grapefruit, can interact with certain medications, so check with your doctor or pharmacist to see if they're OK for you.   If you choose canned fruit or juice, make sure no sugar is added.  Dairy: 2 to 3 servings a day  Milk, yogurt, cheese and other dairy products are major sources of calcium, vitamin D and protein. But the key is to make sure that you choose dairy products that are low fat or fat-free because otherwise they can be a major source of fat -- and most of it is saturated. Examples of one serving include 1 cup skim or 1 percent milk, 1 cup low fat yogurt, or 1 1/2 ounces part-skim cheese.  Low-fat or fat-free frozen yogurt can help you boost the amount of dairy products you eat while offering a sweet treat. Add fruit for a healthy twist.   If you have trouble digesting dairy products, choose lactose-free products or consider taking an over-the-counter product that contains the enzyme lactase, which can reduce or prevent the symptoms of lactose intolerance.   Go easy on regular and even fat-free cheeses because they are typically high in sodium.  Lean meat, poultry  and fish: 6 servings or fewer a day  Meat can be a rich source of protein, B vitamins, iron and zinc. Choose lean varieties and aim for no more than 6 ounces a day. Cutting back on your meat portion will allow room for more vegetables.  Trim away skin and fat from poultry and meat and then bake, broil, grill or roast instead of frying in fat.   Eat heart-healthy fish, such as salmon, herring and tuna. These types of fish are high in omega-3 fatty acids, which can help lower your total cholesterol.  Nuts, seeds and legumes: 4 to 5 servings a week  Almonds, sunflower seeds, kidney beans, peas, lentils and other foods in this family are good sources of magnesium, potassium and protein. They're also full of fiber and phytochemicals, which are plant compounds that may protect against some cancers and cardiovascular disease.  Serving sizes are small and are intended to be consumed only a few times a week because these foods are high in calories. Examples of one serving include 1/3 cup nuts, 2 tablespoons seeds, or 1/2 cup cooked beans or peas.   Nuts sometimes get a bad rap because of their fat content, but they contain healthy types of fat -- monounsaturated fat and omega-3 fatty acids. They're high in calories, however, so eat them in moderation. Try adding them to stir-fries, salads or cereals.   Soybean-based products, such as tofu and tempeh, can be a good alternative to meat because they contain all of the amino acids your body needs to make a complete protein, just like meat.  Fats and oils: 2 to 3 servings a day  Fat helps your body absorb essential vitamins and helps your body's immune system. But too much fat increases your risk of heart disease, diabetes and obesity. The DASH diet strives for a healthy balance by limiting total fat to less than 30 percent of daily calories from fat, with a focus on the healthier monounsaturated fats.  Examples of one serving include 1 teaspoon soft margarine, 1 tablespoon  mayonnaise or 2 tablespoons salad dressing.  Saturated fat and trans fat are the main dietary culprits in increasing your risk of coronary artery disease. DASH helps keep your daily saturated fat to less than 6 percent of your total calories by limiting use of meat, butter, cheese, whole milk, cream and eggs in your diet, along with foods made from lard, solid shortenings, and palm and coconut oils.   Avoid trans fat, commonly found in such processed foods as crackers, baked goods and fried items.   Read food labels on margarine and salad dressing so that you can choose those that are lowest in saturated fat and free of trans fat.  Sweets: 5 servings or fewer a week  You don't have to banish sweets entirely while following the DASH diet -- just go easy on them. Examples of one serving include 1 tablespoon sugar, jelly or jam, 1/2 cup sorbet, or 1 cup lemonade.  When you eat sweets, choose those that are fat-free or low-fat, such as sorbets, fruit ices, jelly beans, hard candy, bran crackers or low-fat cookies.   Artificial sweeteners such as aspartame (NutraSweet, Equal) and sucralose (Splenda) may help satisfy your sweet tooth while sparing the sugar. But remember that you still must use them sensibly. It's OK to swap a diet cola for a regular cola, but not in place of a more nutritious beverage such as low-fat milk or even plain water.   Cut back on added sugar, which has no nutritional value but can pack on calories.  Drinking too much alcohol can increase blood pressure. The Dietary Guidelines for Americans recommends that men limit alcohol to no more than two drinks a day and women to one or less.  The DASH diet doesn't address caffeine consumption. The influence of caffeine on blood pressure remains unclear. But caffeine can cause your blood pressure to rise at least temporarily. If you already have high blood pressure or if you think caffeine is affecting your blood pressure, talk to your doctor about your  "caffeine consumption.  While the DASH diet is not a weight-loss program, you may indeed lose unwanted pounds because it can help guide you toward healthier food choices.  The DASH diet generally includes about 2,000 calories a day. If you're trying to lose weight, you may need to eat fewer calories. You may also need to adjust your serving goals based on your individual circumstances -- something your health care team can help you decide.  The foods at the core of the DASH diet are naturally low in sodium. So just by following the DASH diet, you're likely to reduce your sodium intake. You also reduce sodium further by:  Using sodium-free spices or flavorings with your food instead of salt   Not adding salt when cooking rice, pasta or hot cereal   Rinsing canned foods to remove some of the sodium   Buying foods labeled "no salt added," "sodium-free," "low sodium" or "very low sodium"  One teaspoon of table salt has 2,325 mg of sodium. When you read food labels, you may be surprised at just how much sodium some processed foods contain. Even low-fat soups, canned vegetables, ready-to-eat cereals and sliced turkey from the local deli -- foods you may have considered healthy -- often have lots of sodium.  You may notice a difference in taste when you choose low-sodium food and beverages. If things seem too bland, gradually introduce low-sodium foods and cut back on table salt until you reach your sodium goal. That'll give your palate time to adjust.  Using salt-free seasoning blends or herbs and spices may also ease the transition. It can take several weeks for your taste buds to get used to less salty foods.  Try these strategies to get started on the DASH diet:   Change gradually. If you now eat only one or two servings of fruits or vegetables a day, try to add a serving at lunch and one at dinner. Rather than switching to all whole grains, start by making one or two of your grain servings whole grains. Increasing " fruits, vegetables and whole grains gradually can also help prevent bloating or diarrhea that may occur if you aren't used to eating a diet with lots of fiber. You can also try over-the-counter products to help reduce gas from beans and vegetables.   Reward successes and forgive slip-ups. Reward yourself with a nonfood treat for your accomplishments -- rent a movie, purchase a book or get together with a friend. Everyone slips, especially when learning something new. Remember that changing your lifestyle is a long-term process. Find out what triggered your setback and then just  where you left off with the DASH diet.   Add physical activity. To boost your blood pressure lowering efforts even more, consider increasing your physical activity in addition to following the DASH diet. Combining both the DASH diet and physical activity makes it more likely that you'll reduce your blood pressure.   Get support if you need it. If you're having trouble sticking to your diet, talk to your doctor or dietitian about it. You might get some tips that will help you stick to the DASH diet.  Remember, healthy eating isn't an all-or-nothing proposition. What's most important is that, on average, you eat healthier foods with plenty of variety -- both to keep your diet nutritious and to avoid boredom or extremes. And with the DASH diet, you can have both. DASH diet for Hypertension and Healthy Eating  Provided by the [facility]    Healthy Lifestyle   Nutrition and healthy eating  The DASH diet emphasizes portion size, eating a variety of foods and getting the right amount of nutrients. Discover how DASH can improve your health and lower your blood pressure.  By [facility] Staff   DASH stands for Dietary Approaches to Stop Hypertension. The DASH diet is a lifelong approach to healthy eating that's designed to help treat or prevent high blood pressure (hypertension). The DASH diet encourages you to reduce the sodium in your diet  and eat a variety of foods rich in nutrients that help lower blood pressure, such as potassium, calcium and magnesium.  By following the DASH diet, you may be able to reduce your blood pressure by a few points in just two weeks. Over time, your systolic blood pressure could drop by eight to 14 points, which can make a significant difference in your health risks.  Because the DASH diet is a healthy way of eating, it offers health benefits besides just lowering blood pressure. The DASH diet is also in line with dietary recommendations to prevent osteoporosis, cancer, heart disease, stroke and diabetes.  The DASH diet emphasizes vegetables, fruits and low-fat dairy foods -- and moderate amounts of whole grains, fish, poultry and nuts.  In addition to the standard DASH diet, there is also a lower sodium version of the diet. You can choose the version of the diet that meets your health needs:  Standard DASH diet. You can consume up to 2,300 milligrams (mg) of sodium a day.   Lower sodium DASH diet. You can consume up to 1,500 mg of sodium a day.  Both versions of the DASH diet aim to reduce the amount of sodium in your diet compared with what you might get in a typical American diet, which can amount to a whopping 3,400 mg of sodium a day or more.  The standard DASH diet meets the recommendation from the Dietary Guidelines for Americans to keep daily sodium intake to less than 2,300 mg a day.  The American Heart Association recommends 1,500 mg a day of sodium as an upper limit for all adults. If you aren't sure what sodium level is right for you, talk to your doctor.  Both versions of the DASH diet include lots of whole grains, fruits, vegetables and low-fat dairy products. The DASH diet also includes some fish, poultry and legumes, and encourages a small amount of nuts and seeds a few times a week.   You can eat red meat, sweets and fats in small amounts. The DASH diet is low in saturated fat, cholesterol and total  "fat.  Here's a look at the recommended servings from each food group for the 2,154-srannvx-f-day DASH diet.  Grains: 6 to 8 servings a day  Grains include bread, cereal, rice and pasta. Examples of one serving of grains include 1 slice whole-wheat bread, 1 ounce dry cereal, or 1/2 cup cooked cereal, rice or pasta.  Focus on whole grains because they have more fiber and nutrients than do refined grains. For instance, use brown rice instead of white rice, whole-wheat pasta instead of regular pasta and whole-grain bread instead of white bread. Look for products labeled "100 percent whole grain" or "100 percent whole wheat."   Grains are naturally low in fat. Keep them this way by avoiding butter, cream and cheese sauces.  Vegetables: 4 to 5 servings a day  Tomatoes, carrots, broccoli, sweet potatoes, greens and other vegetables are full of fiber, vitamins, and such minerals as potassium and magnesium. Examples of one serving include 1 cup raw leafy green vegetables or 1/2 cup cut-up raw or cooked vegetables.  Don't think of vegetables only as side dishes -- a hearty blend of vegetables served over brown rice or whole-wheat noodles can serve as the main dish for a meal.   Fresh and frozen vegetables are both good choices. When buying frozen and canned vegetables, choose those labeled as low sodium or without added salt.   To increase the number of servings you fit in daily, be creative. In a stir-espinoza, for instance, cut the amount of meat in half and double up on the vegetables.  Fruits: 4 to 5 servings a day  Many fruits need little preparation to become a healthy part of a meal or snack. Like vegetables, they're packed with fiber, potassium and magnesium and are typically low in fat -- coconuts are an exception. Examples of one serving include one medium fruit, 1/2 cup fresh, frozen or canned fruit, or 4 ounces of juice.  Have a piece of fruit with meals and one as a snack, then round out your day with a dessert of " fresh fruits topped with a dollop of low-fat yogurt.   Leave on edible peels whenever possible. The peels of apples, pears and most fruits with pits add interesting texture to recipes and contain healthy nutrients and fiber.   Remember that citrus fruits and juices, such as grapefruit, can interact with certain medications, so check with your doctor or pharmacist to see if they're OK for you.   If you choose canned fruit or juice, make sure no sugar is added.  Dairy: 2 to 3 servings a day  Milk, yogurt, cheese and other dairy products are major sources of calcium, vitamin D and protein. But the key is to make sure that you choose dairy products that are low fat or fat-free because otherwise they can be a major source of fat -- and most of it is saturated. Examples of one serving include 1 cup skim or 1 percent milk, 1 cup low fat yogurt, or 1 1/2 ounces part-skim cheese.  Low-fat or fat-free frozen yogurt can help you boost the amount of dairy products you eat while offering a sweet treat. Add fruit for a healthy twist.   If you have trouble digesting dairy products, choose lactose-free products or consider taking an over-the-counter product that contains the enzyme lactase, which can reduce or prevent the symptoms of lactose intolerance.   Go easy on regular and even fat-free cheeses because they are typically high in sodium.  Lean meat, poultry and fish: 6 servings or fewer a day  Meat can be a rich source of protein, B vitamins, iron and zinc. Choose lean varieties and aim for no more than 6 ounces a day. Cutting back on your meat portion will allow room for more vegetables.  Trim away skin and fat from poultry and meat and then bake, broil, grill or roast instead of frying in fat.   Eat heart-healthy fish, such as salmon, herring and tuna. These types of fish are high in omega-3 fatty acids, which can help lower your total cholesterol.  Nuts, seeds and legumes: 4 to 5 servings a week  Almonds, sunflower seeds,  kidney beans, peas, lentils and other foods in this family are good sources of magnesium, potassium and protein. They're also full of fiber and phytochemicals, which are plant compounds that may protect against some cancers and cardiovascular disease.  Serving sizes are small and are intended to be consumed only a few times a week because these foods are high in calories. Examples of one serving include 1/3 cup nuts, 2 tablespoons seeds, or 1/2 cup cooked beans or peas.   Nuts sometimes get a bad rap because of their fat content, but they contain healthy types of fat -- monounsaturated fat and omega-3 fatty acids. They're high in calories, however, so eat them in moderation. Try adding them to stir-fries, salads or cereals.   Soybean-based products, such as tofu and tempeh, can be a good alternative to meat because they contain all of the amino acids your body needs to make a complete protein, just like meat.  Fats and oils: 2 to 3 servings a day  Fat helps your body absorb essential vitamins and helps your body's immune system. But too much fat increases your risk of heart disease, diabetes and obesity. The DASH diet strives for a healthy balance by limiting total fat to less than 30 percent of daily calories from fat, with a focus on the healthier monounsaturated fats.  Examples of one serving include 1 teaspoon soft margarine, 1 tablespoon mayonnaise or 2 tablespoons salad dressing.  Saturated fat and trans fat are the main dietary culprits in increasing your risk of coronary artery disease. DASH helps keep your daily saturated fat to less than 6 percent of your total calories by limiting use of meat, butter, cheese, whole milk, cream and eggs in your diet, along with foods made from lard, solid shortenings, and palm and coconut oils.   Avoid trans fat, commonly found in such processed foods as crackers, baked goods and fried items.   Read food labels on margarine and salad dressing so that you can choose those  that are lowest in saturated fat and free of trans fat.  Sweets: 5 servings or fewer a week  You don't have to banish sweets entirely while following the DASH diet -- just go easy on them. Examples of one serving include 1 tablespoon sugar, jelly or jam, 1/2 cup sorbet, or 1 cup lemonade.  When you eat sweets, choose those that are fat-free or low-fat, such as sorbets, fruit ices, jelly beans, hard candy, bran crackers or low-fat cookies.   Artificial sweeteners such as aspartame (NutraSweet, Equal) and sucralose (Splenda) may help satisfy your sweet tooth while sparing the sugar. But remember that you still must use them sensibly. It's OK to swap a diet cola for a regular cola, but not in place of a more nutritious beverage such as low-fat milk or even plain water.   Cut back on added sugar, which has no nutritional value but can pack on calories.  Drinking too much alcohol can increase blood pressure. The Dietary Guidelines for Americans recommends that men limit alcohol to no more than two drinks a day and women to one or less.  The DASH diet doesn't address caffeine consumption. The influence of caffeine on blood pressure remains unclear. But caffeine can cause your blood pressure to rise at least temporarily. If you already have high blood pressure or if you think caffeine is affecting your blood pressure, talk to your doctor about your caffeine consumption.  While the DASH diet is not a weight-loss program, you may indeed lose unwanted pounds because it can help guide you toward healthier food choices.  The DASH diet generally includes about 2,000 calories a day. If you're trying to lose weight, you may need to eat fewer calories. You may also need to adjust your serving goals based on your individual circumstances -- something your health care team can help you decide.  The foods at the core of the DASH diet are naturally low in sodium. So just by following the DASH diet, you're likely to reduce your sodium  "intake. You also reduce sodium further by:  Using sodium-free spices or flavorings with your food instead of salt   Not adding salt when cooking rice, pasta or hot cereal   Rinsing canned foods to remove some of the sodium   Buying foods labeled "no salt added," "sodium-free," "low sodium" or "very low sodium"  One teaspoon of table salt has 2,325 mg of sodium. When you read food labels, you may be surprised at just how much sodium some processed foods contain. Even low-fat soups, canned vegetables, ready-to-eat cereals and sliced turkey from the local deli -- foods you may have considered healthy -- often have lots of sodium.  You may notice a difference in taste when you choose low-sodium food and beverages. If things seem too bland, gradually introduce low-sodium foods and cut back on table salt until you reach your sodium goal. That'll give your palate time to adjust.  Using salt-free seasoning blends or herbs and spices may also ease the transition. It can take several weeks for your taste buds to get used to less salty foods.  Try these strategies to get started on the DASH diet:   Change gradually. If you now eat only one or two servings of fruits or vegetables a day, try to add a serving at lunch and one at dinner. Rather than switching to all whole grains, start by making one or two of your grain servings whole grains. Increasing fruits, vegetables and whole grains gradually can also help prevent bloating or diarrhea that may occur if you aren't used to eating a diet with lots of fiber. You can also try over-the-counter products to help reduce gas from beans and vegetables.   Reward successes and forgive slip-ups. Reward yourself with a nonfood treat for your accomplishments -- rent a movie, purchase a book or get together with a friend. Everyone slips, especially when learning something new. Remember that changing your lifestyle is a long-term process. Find out what triggered your setback and then just "  where you left off with the DASH diet.   Add physical activity. To boost your blood pressure lowering efforts even more, consider increasing your physical activity in addition to following the DASH diet. Combining both the DASH diet and physical activity makes it more likely that you'll reduce your blood pressure.   Get support if you need it. If you're having trouble sticking to your diet, talk to your doctor or dietitian about it. You might get some tips that will help you stick to the DASH diet.  Remember, healthy eating isn't an all-or-nothing proposition. What's most important is that, on average, you eat healthier foods with plenty of variety -- both to keep your diet nutritious and to avoid boredom or extremes. And with the DASH diet, you can have both.

## 2023-07-01 ENCOUNTER — LAB VISIT (OUTPATIENT)
Dept: LAB | Facility: HOSPITAL | Age: 53
End: 2023-07-01
Attending: FAMILY MEDICINE
Payer: COMMERCIAL

## 2023-07-01 DIAGNOSIS — E78.2 MIXED HYPERLIPIDEMIA: ICD-10-CM

## 2023-07-01 DIAGNOSIS — R73.02 GLUCOSE INTOLERANCE (IMPAIRED GLUCOSE TOLERANCE): ICD-10-CM

## 2023-07-01 DIAGNOSIS — D50.1 IRON DEFICIENCY ANEMIA DUE TO SIDEROPENIC DYSPHAGIA: ICD-10-CM

## 2023-07-01 LAB
ANION GAP SERPL CALC-SCNC: 12 MMOL/L (ref 8–16)
BUN SERPL-MCNC: 16 MG/DL (ref 6–20)
CALCIUM SERPL-MCNC: 9.3 MG/DL (ref 8.7–10.5)
CHLORIDE SERPL-SCNC: 108 MMOL/L (ref 95–110)
CHOLEST SERPL-MCNC: 184 MG/DL (ref 120–199)
CHOLEST/HDLC SERPL: 3.2 {RATIO} (ref 2–5)
CO2 SERPL-SCNC: 24 MMOL/L (ref 23–29)
CREAT SERPL-MCNC: 0.8 MG/DL (ref 0.5–1.4)
EST. GFR  (NO RACE VARIABLE): >60 ML/MIN/1.73 M^2
FERRITIN SERPL-MCNC: 154 NG/ML (ref 20–300)
GLUCOSE SERPL-MCNC: 86 MG/DL (ref 70–110)
HDLC SERPL-MCNC: 57 MG/DL (ref 40–75)
HDLC SERPL: 31 % (ref 20–50)
IRON SERPL-MCNC: 72 UG/DL (ref 30–160)
LDLC SERPL CALC-MCNC: 109.6 MG/DL (ref 63–159)
NONHDLC SERPL-MCNC: 127 MG/DL
POTASSIUM SERPL-SCNC: 4.2 MMOL/L (ref 3.5–5.1)
RETICS/RBC NFR AUTO: 2.2 % (ref 0.5–2.5)
SATURATED IRON: 23 % (ref 20–50)
SODIUM SERPL-SCNC: 144 MMOL/L (ref 136–145)
TOTAL IRON BINDING CAPACITY: 317 UG/DL (ref 250–450)
TRANSFERRIN SERPL-MCNC: 214 MG/DL (ref 200–375)
TRIGL SERPL-MCNC: 87 MG/DL (ref 30–150)

## 2023-07-01 PROCEDURE — 82728 ASSAY OF FERRITIN: CPT | Performed by: FAMILY MEDICINE

## 2023-07-01 PROCEDURE — 80048 BASIC METABOLIC PNL TOTAL CA: CPT | Performed by: FAMILY MEDICINE

## 2023-07-01 PROCEDURE — 85045 AUTOMATED RETICULOCYTE COUNT: CPT | Performed by: FAMILY MEDICINE

## 2023-07-01 PROCEDURE — 80061 LIPID PANEL: CPT | Performed by: FAMILY MEDICINE

## 2023-07-01 PROCEDURE — 36415 COLL VENOUS BLD VENIPUNCTURE: CPT | Mod: PO | Performed by: FAMILY MEDICINE

## 2023-07-01 PROCEDURE — 84466 ASSAY OF TRANSFERRIN: CPT | Performed by: FAMILY MEDICINE

## 2023-07-03 ENCOUNTER — PATIENT MESSAGE (OUTPATIENT)
Dept: GASTROENTEROLOGY | Facility: CLINIC | Age: 53
End: 2023-07-03
Payer: COMMERCIAL

## 2023-07-06 ENCOUNTER — HOSPITAL ENCOUNTER (OUTPATIENT)
Dept: RADIOLOGY | Facility: CLINIC | Age: 53
Discharge: HOME | End: 2023-07-06
Attending: FAMILY MEDICINE
Payer: COMMERCIAL

## 2023-07-06 DIAGNOSIS — Z12.31 OTHER SCREENING MAMMOGRAM: ICD-10-CM

## 2023-07-06 PROCEDURE — 77063 BREAST TOMOSYNTHESIS BI: CPT | Mod: 26,,, | Performed by: RADIOLOGY

## 2023-07-06 PROCEDURE — 77067 SCR MAMMO BI INCL CAD: CPT | Mod: TC,PO

## 2023-07-06 PROCEDURE — 77063 MAMMO DIGITAL SCREENING BILAT WITH TOMO: ICD-10-PCS | Mod: 26,,, | Performed by: RADIOLOGY

## 2023-07-06 PROCEDURE — 77067 MAMMO DIGITAL SCREENING BILAT WITH TOMO: ICD-10-PCS | Mod: 26,,, | Performed by: RADIOLOGY

## 2023-07-06 PROCEDURE — 77067 SCR MAMMO BI INCL CAD: CPT | Mod: 26,,, | Performed by: RADIOLOGY

## 2023-07-06 NOTE — PROGRESS NOTES
Subjective:       Patient ID: Ijeoma Issa is a 53 y.o. female.    Chief Complaint: Annual Exam (Blister on leg and arm.)    Present medical history  Present Medical History:  53 year-old female obese, chronic asthma, chronic folliculitis, and ulnar hormonal replacement therapy for more than 3 years.  The asthma is fairly controlled on present medications.  The last assessed of a shin every morning 3 months of ago.  Patient has poor exercise activity and decrease sun exposure.  He has a high risk for pulmonary embolism and DVT.  The patient has previous history of glucose intolerance with mild elevated blood sugars.  Family history coronary disease with a brother  of a 65 with sudden death.  She has history of migraines.  Also history of polycystic ovarian disease high risk for high estrogen levels and moderate risk for osteoporosis.  Patient has poor sleep patterns.  Morning fitness.  Daytime drowsiness.  When unable to maintain awake while riding in the car.  She feels is very tired for the even after good sleep at night.  No date: Abnormal mammogram/ fibrocystic changes      Comment:  neg biospy  No date: Allergic asthma/ Marlon asthmatic.  Low rate of excessive patient's  No date: Anxiety, decrease impulse control, low attention span, easily distracted.  No date: Arthritis, basically he has 3 of lower spine and bilateral knees osteoarthritic changes  No date: Dermatitis      Comment:  Dr. Weil, Extremities  No date: Diabetes, glucose intolerance with a hemoglobin A1c less than 5.4.  No date: Diverticulosis of colon  No date: GERD (gastroesophageal reflux disease)  No date: Mass of right breast  No date: Postmenopausal hormone replacement therapy .  history of total abdominal hysterectomy 50 years ago  No date: Tubal pregnancy  No date: Uterine fibroid      Review of Systems   Constitutional:  Positive for activity change and fatigue. Negative for unexpected weight change.   HENT:  Positive for  hearing loss, postnasal drip, rhinorrhea and sneezing.    Eyes:  Positive for visual disturbance.   Respiratory:  Positive for cough and shortness of breath. Negative for chest tightness.    Cardiovascular:  Negative for chest pain, palpitations and leg swelling.   Gastrointestinal:  Positive for abdominal distention and constipation. Negative for abdominal pain.   Genitourinary:  Positive for frequency.   Musculoskeletal:  Positive for arthralgias and back pain.   Neurological:  Positive for tremors and weakness. Negative for dizziness, syncope, light-headedness and headaches.   Psychiatric/Behavioral:  Positive for decreased concentration. The patient is nervous/anxious and is hyperactive.      Patient Active Problem List   Diagnosis    Mammogram abnormal    OA (osteoarthritis)    Acne, adult    Asthma with allergic rhinitis    GERD (gastroesophageal reflux disease)    Family history of early CAD, brother  with MI, age 60    Migraine headache    Palpitations    Stress, at home and work    Bacterial vaginosis    Bullous impetigo    Morbid obesity    Body mass index 40.0-44.9, adult    Polycystic ovarian disease    Glucose intolerance (impaired glucose tolerance)    Menopausal symptoms    Vaginal dryness, menopausal    Umbilical hernia    Pruritic dermatitis       Objective:      Physical Exam  Vitals reviewed.   Constitutional:       Appearance: She is well-developed. She is obese. She is not diaphoretic.   HENT:      Head: Normocephalic and atraumatic.      Right Ear: External ear normal.      Left Ear: External ear normal.      Nose: Nose normal.      Mouth/Throat:      Pharynx: No oropharyngeal exudate.   Eyes:      General: No scleral icterus.        Right eye: No discharge.         Left eye: No discharge.      Conjunctiva/sclera: Conjunctivae normal.      Pupils: Pupils are equal, round, and reactive to light.   Neck:      Thyroid: No thyromegaly.      Vascular: No JVD.      Trachea: No tracheal deviation.    Cardiovascular:      Rate and Rhythm: Normal rate.      Heart sounds: Normal heart sounds. No murmur heard.    No friction rub. No gallop.   Pulmonary:      Effort: Pulmonary effort is normal. No respiratory distress.      Breath sounds: No stridor. No wheezing or rales.   Chest:      Chest wall: No tenderness.   Abdominal:      General: Bowel sounds are normal. There is no distension.      Palpations: Abdomen is soft. There is no mass.      Tenderness: There is no abdominal tenderness. There is no guarding or rebound.   Musculoskeletal:         General: Normal range of motion.      Cervical back: Normal range of motion and neck supple.   Lymphadenopathy:      Cervical: No cervical adenopathy.   Skin:     General: Skin is dry.      Coloration: Skin is not pale.      Findings: No erythema or rash.             Comments: Multiple skin lesions, though with comedone acne.   Neurological:      Mental Status: She is alert and oriented to person, place, and time.      Cranial Nerves: No cranial nerve deficit.      Motor: No abnormal muscle tone.      Coordination: Coordination normal.      Deep Tendon Reflexes: Reflexes normal.   Psychiatric:         Behavior: Behavior normal.         Thought Content: Thought content normal.         Judgment: Judgment normal.     [place] Sleepiness Scale Questionnaire:    0 = would never doze or sleep.  1 = slight chance of dozing or sleeping  2 = moderate chance of dozing or sleeping  3 = high chance of dozing or sleeping     Sitting and reading 3  Watching TV 3  Sitting inactive in a public place 3  Being a passenger in a motor vehicle for an hour or more 3  Lying down in the afternoon 3  Sitting and talking to someone 1  Sitting quietly after lunch (no alcohol) 2  Stopped for a few minutes in traffic while driving 0    Total score:  18/24    Lab Results   Component Value Date    WBC 5.90 04/03/2023    HGB 12.0 04/03/2023    HCT 38.0 04/03/2023     04/03/2023    CHOL 184  07/01/2023    TRIG 87 07/01/2023    HDL 57 07/01/2023    ALT 15 04/03/2023    AST 17 04/03/2023     07/01/2023    K 4.2 07/01/2023     07/01/2023    CREATININE 0.8 07/01/2023    BUN 16 07/01/2023    CO2 24 07/01/2023    TSH 2.184 08/28/2021    HGBA1C 4.9 08/28/2021    MICROALBUR 0.32 07/20/2012     The 10-year ASCVD risk score (Joseph ALBERTO, et al., 2019) is: 1.3%    Values used to calculate the score:      Age: 53 years      Sex: Female      Is Non- : No      Diabetic: No      Tobacco smoker: No      Systolic Blood Pressure: 126 mmHg      Is BP treated: No      HDL Cholesterol: 57 mg/dL      Total Cholesterol: 184 mg/dL    Assessment:       1. Glucose intolerance (impaired glucose tolerance)    2. Leg swelling    3. Acne, unspecified acne type    4. Pruritic dermatitis    5. Gastroesophageal reflux disease without esophagitis    6. Mild intermittent asthma with acute exacerbation    7. Bullous impetigo    8. Other screening mammogram    9. Iron deficiency anemia due to sideropenic dysphagia    10. Gastroesophageal reflux disease with esophagitis without hemorrhage    11. Colon cancer screening    12. Family hx of colon cancer    13. Mixed hyperlipidemia    14. Hormone replacement therapy (HRT)        Plan:       Glucose intolerance (impaired glucose tolerance)  -     Basic Metabolic Panel; Future; Expected date: 06/30/2023    Leg swelling  -     spironolactone (ALDACTONE) 25 MG tablet; Take 1 tablet (25 mg total) by mouth once daily.  Dispense: 90 tablet; Refill: 3    Acne, unspecified acne type  -     spironolactone (ALDACTONE) 25 MG tablet; Take 1 tablet (25 mg total) by mouth once daily.  Dispense: 90 tablet; Refill: 3  -     chlorhexidine (HIBICLENS) 4 % external liquid; Apply topically daily as needed (dermatitis).  Dispense: 236 mL; Refill: 2    Pruritic dermatitis  -     spironolactone (ALDACTONE) 25 MG tablet; Take 1 tablet (25 mg total) by mouth once daily.  Dispense: 90  tablet; Refill: 3    Gastroesophageal reflux disease without esophagitis  -     Discontinue: omeprazole (PRILOSEC) 40 MG capsule; Take 1 capsule (40 mg total) by mouth every morning.  Dispense: 90 capsule; Refill: 3    Mild intermittent asthma with acute exacerbation  -     montelukast (SINGULAIR) 10 mg tablet; Take 1 tablet (10 mg total) by mouth once daily.  Dispense: 30 tablet; Refill: 5    Bullous impetigo  -     doxepin (SINEQUAN) 25 MG capsule; Take 1 capsule (25 mg total) by mouth every evening.  Dispense: 30 capsule; Refill: 1    Other screening mammogram  -     Mammo Digital Screening Bilat w/ Sharath; Future; Expected date: 06/30/2023    Iron deficiency anemia due to sideropenic dysphagia  -     ferrous sulfate (IRON) 325 mg (65 mg iron) Tab tablet; Take 1 tablet (325 mg total) by mouth 3 (three) times a week.  -     Reticulocytes; Future; Expected date: 06/30/2023  -     Ferritin; Future; Expected date: 06/30/2023  -     Iron and TIBC; Future; Expected date: 06/30/2023    Gastroesophageal reflux disease with esophagitis without hemorrhage  -     omeprazole-sodium bicarbonate 20-1.1 mg-gram Cap; 1 cap ac breakfast; Refill: 0    Colon cancer screening  -     Case Request Endoscopy: COLONOSCOPY    Family hx of colon cancer  -     Case Request Endoscopy: COLONOSCOPY    Mixed hyperlipidemia  -     Lipid Panel; Future; Expected date: 06/30/2023    Hormone replacement therapy (HRT)  -     estradioL (ESTRACE) 0.5 MG tablet; Take 1 tablet (0.5 mg total) by mouth 3 (three) times a week.  Dispense: 45 tablet; Refill: 2      Patient readiness: nonacceptance and barriers:readiness, social stressors, household issues and poor sleep habits  Return to clinic to discuss hormonal replacement therapy to decrease her hormone prescriptions due to high risk of DVT and PE.  The patient was also written to clinic to discuss semaglutide and or metformin, and or Topamax.  During the course of the visit the patient was educated and  counseled about the following:     Hypertension:   Dietary sodium restriction.  Check blood pressures daily and record.  Follow up: 3 week and as needed.  Obesity:   General weight loss/lifestyle modification strategies discussed (elicit support from others; identify saboteurs; non-food rewards, etc).  Behavioral treatment: Slim Fast.  Diet interventions: low calorie (1000 kCal/d) deficit diet, ovo-lactarian diet and qualitative changes (increase low-fat,  high-fiber foods).  Informal exercise measures discussed, e.g. taking stairs instead of elevator.  Regular aerobic exercise program discussed.    Goals: Hypertension: Reduce Blood Pressure and Obesity: Reduce calorie intake and BMI    Did patient meet goals/outcomes: Yes    The following self management tools provided: blood pressure log  excercise log    Patient Instructions (the written plan) was given to the patient/family.     Time spent with patient: 30 minutes    Barriers to medications present (yes )    Adverse reactions to current medications (yes)    Over the counter medications reviewed (Yes)    Review plate method  Review foods in home  Discuss food labels  Refer to exercise program  Assist with medical visit preparation  Review patient results  Review patient education about results  Review chronic disease interventions (see specific disease intervention)  Assess knowledge level  Provide and discuss information/education material  Encourage communication with physician  Monitor compliance  Educate on risk of non-compliance  Give food and resource list  Review patient education material (see patient instructions)  Review patient education material (see patient instructions)  Refer to exercise program  Review stress management techniques  Review patient education material (see patient instructions)  Assess support system  Discuss stress management techniques  Review normal ranges for labs      50 minutes-minute visit.  Fifteen  minutes spent counseling  patient on diet, exercise, and weight loss.  This has been fully explained to the patient, who indicates understanding.    Discussed health maintenance guidelines appropriate for age.  Discussed health maintenance guidelines appropriate for age.

## 2023-10-18 ENCOUNTER — PATIENT MESSAGE (OUTPATIENT)
Dept: GASTROENTEROLOGY | Facility: CLINIC | Age: 53
End: 2023-10-18
Payer: COMMERCIAL

## 2023-10-23 ENCOUNTER — OFFICE VISIT (OUTPATIENT)
Dept: FAMILY MEDICINE | Facility: CLINIC | Age: 53
End: 2023-10-23
Payer: COMMERCIAL

## 2023-10-23 VITALS
BODY MASS INDEX: 43.25 KG/M2 | HEART RATE: 81 BPM | HEIGHT: 64 IN | OXYGEN SATURATION: 95 % | WEIGHT: 253.31 LBS | TEMPERATURE: 98 F | DIASTOLIC BLOOD PRESSURE: 76 MMHG | SYSTOLIC BLOOD PRESSURE: 130 MMHG

## 2023-10-23 DIAGNOSIS — R11.0 NAUSEA: ICD-10-CM

## 2023-10-23 DIAGNOSIS — R05.9 COUGH, UNSPECIFIED TYPE: ICD-10-CM

## 2023-10-23 DIAGNOSIS — L08.9 FINGER INFECTION: ICD-10-CM

## 2023-10-23 DIAGNOSIS — S69.91XA FINGER INJURY, RIGHT, INITIAL ENCOUNTER: ICD-10-CM

## 2023-10-23 DIAGNOSIS — J10.1 INFLUENZA B: Primary | ICD-10-CM

## 2023-10-23 LAB
CTP QC/QA: YES
POC MOLECULAR INFLUENZA A AGN: NEGATIVE
POC MOLECULAR INFLUENZA B AGN: POSITIVE

## 2023-10-23 PROCEDURE — 99213 PR OFFICE/OUTPT VISIT, EST, LEVL III, 20-29 MIN: ICD-10-PCS | Mod: S$GLB,,,

## 2023-10-23 PROCEDURE — 99999 PR PBB SHADOW E&M-EST. PATIENT-LVL IV: ICD-10-PCS | Mod: PBBFAC,,,

## 2023-10-23 PROCEDURE — 87502 POCT INFLUENZA A/B MOLECULAR: ICD-10-PCS | Mod: QW,S$GLB,,

## 2023-10-23 PROCEDURE — 3008F PR BODY MASS INDEX (BMI) DOCUMENTED: ICD-10-PCS | Mod: CPTII,S$GLB,,

## 2023-10-23 PROCEDURE — 3078F PR MOST RECENT DIASTOLIC BLOOD PRESSURE < 80 MM HG: ICD-10-PCS | Mod: CPTII,S$GLB,,

## 2023-10-23 PROCEDURE — 87502 INFLUENZA DNA AMP PROBE: CPT | Mod: QW,S$GLB,,

## 2023-10-23 PROCEDURE — 3075F SYST BP GE 130 - 139MM HG: CPT | Mod: CPTII,S$GLB,,

## 2023-10-23 PROCEDURE — 1159F PR MEDICATION LIST DOCUMENTED IN MEDICAL RECORD: ICD-10-PCS | Mod: CPTII,S$GLB,,

## 2023-10-23 PROCEDURE — 1160F RVW MEDS BY RX/DR IN RCRD: CPT | Mod: CPTII,S$GLB,,

## 2023-10-23 PROCEDURE — 1160F PR REVIEW ALL MEDS BY PRESCRIBER/CLIN PHARMACIST DOCUMENTED: ICD-10-PCS | Mod: CPTII,S$GLB,,

## 2023-10-23 PROCEDURE — 3078F DIAST BP <80 MM HG: CPT | Mod: CPTII,S$GLB,,

## 2023-10-23 PROCEDURE — 3075F PR MOST RECENT SYSTOLIC BLOOD PRESS GE 130-139MM HG: ICD-10-PCS | Mod: CPTII,S$GLB,,

## 2023-10-23 PROCEDURE — 1159F MED LIST DOCD IN RCRD: CPT | Mod: CPTII,S$GLB,,

## 2023-10-23 PROCEDURE — 99213 OFFICE O/P EST LOW 20 MIN: CPT | Mod: S$GLB,,,

## 2023-10-23 PROCEDURE — 3008F BODY MASS INDEX DOCD: CPT | Mod: CPTII,S$GLB,,

## 2023-10-23 PROCEDURE — 99999 PR PBB SHADOW E&M-EST. PATIENT-LVL IV: CPT | Mod: PBBFAC,,,

## 2023-10-23 RX ORDER — PROMETHAZINE HYDROCHLORIDE AND DEXTROMETHORPHAN HYDROBROMIDE 6.25; 15 MG/5ML; MG/5ML
5 SYRUP ORAL EVERY 6 HOURS PRN
Qty: 118 ML | Refills: 0 | Status: SHIPPED | OUTPATIENT
Start: 2023-10-23 | End: 2023-10-30 | Stop reason: SDUPTHER

## 2023-10-23 RX ORDER — DOXYCYCLINE 100 MG/1
100 CAPSULE ORAL 2 TIMES DAILY
Qty: 14 CAPSULE | Refills: 0 | Status: SHIPPED | OUTPATIENT
Start: 2023-10-23 | End: 2023-10-30 | Stop reason: ALTCHOICE

## 2023-10-23 RX ORDER — DOXYCYCLINE 100 MG/1
100 CAPSULE ORAL 2 TIMES DAILY
Qty: 10 CAPSULE | Refills: 0 | Status: SHIPPED | OUTPATIENT
Start: 2023-10-23 | End: 2023-10-23

## 2023-10-23 NOTE — LETTER
October 23, 2023      [facility]  [address] [place] [address]  Phone: [phone]  Fax: [phone]       Patient: Ijeoma Issa   YOB: 1970  Date of Visit: 10/23/2023    To Whom It May Concern:    Ronan Issa  was at [facility] on 10/23/2023. The patient may return to work/school on 10/31/23 with no restrictions. If you have any questions or concerns, or if I can be of further assistance, please do not hesitate to contact me.    Sincerely,    Bambi Lugo PA-C

## 2023-10-23 NOTE — PROGRESS NOTES
Subjective:       Patient ID: Ijeoma Issa is a 53 y.o. female.    Chief Complaint: Cough      Ijeoma Issa is a 53 y.o. female with history of asthma, seasonal allergies who presents to clinic for evaluation of non-productive cough ongoing for 6 days. Associated symptoms include subjective fever, nausea, nasal congestion, post-nasal drip, ear pain/ pressure and sinus pressure. Denies chills or vomiting. She has been using Astelin and Flonase nasal sprays, as well as her daily Singulair, Allegra, and Fluticasone-Salmeterol inhaler. She has been using her Albuterol duo nebs twice daily and her Albuterol inhaler once daily.     The patient also reports she poked herself in the right pinky finger while trying to get a perch off the hook while fishing on 10/14. She states the appearance of the right pinky finger has improved, but is still swollen and discolored.     Cough  Associated symptoms include ear pain, a fever (subjective) and postnasal drip. Pertinent negatives include no chest pain, chills, headaches, myalgias, rash, shortness of breath or wheezing.       Review of Systems   Constitutional:  Positive for fever (subjective). Negative for activity change, appetite change, chills, fatigue and unexpected weight change.   HENT:  Positive for congestion, ear pain, postnasal drip and sinus pressure.    Eyes:  Negative for visual disturbance.   Respiratory:  Positive for cough. Negative for shortness of breath and wheezing.    Cardiovascular:  Negative for chest pain, palpitations and leg swelling.   Gastrointestinal:  Positive for nausea. Negative for abdominal pain, constipation and diarrhea.   Musculoskeletal:  Negative for arthralgias and myalgias.        Right pinky finger pain, swelling, discoloration   Skin:  Negative for rash.   Neurological:  Negative for dizziness, light-headedness and headaches.   Psychiatric/Behavioral:  Negative for dysphoric mood. The patient is not  nervous/anxious.          Past Medical History:   Diagnosis Date    Abnormal mammogram     neg biospy    Allergic asthma     Anxiety     Arthritis     Dermatitis     Dr. Weil, Extremities    Diabetes     Diverticulosis of colon     GERD (gastroesophageal reflux disease)     Mass of right breast     Postmenopausal hormone replacement therapy     Tubal pregnancy     Uterine fibroid        Review of patient's allergies indicates:   Allergen Reactions    Cashew nut Anaphylaxis     Allergic to Cashew nuts    Nut flavor Anaphylaxis     Allergic to Cashew nuts    Latex Rash     Including in injections    Varicella vaccines      Red swelling/ lump develops at injection site    Adhesive tape-silicones Rash    Tapentadol Rash         Current Outpatient Medications:     ACZONE 5 % topical gel, Apply topically., Disp: , Rfl:     albuterol (PROVENTIL/VENTOLIN HFA) 90 mcg/actuation inhaler, Inhale 2 puffs into the lungs every 4 (four) hours as needed for Wheezing., Disp: 18 g, Rfl: 3    albuterol-ipratropium (DUO-NEB) 2.5 mg-0.5 mg/3 mL nebulizer solution, Take 3 mLs by nebulization every 6 (six) hours as needed for Wheezing. Rescue, Disp: 1 each, Rfl: 0    azelastine (ASTELIN) 137 mcg (0.1 %) nasal spray, 1 spray (137 mcg total) by Nasal route 2 (two) times daily., Disp: 30 mL, Rfl: 3    calcium carbonate-vit D3-min 600 mg calcium- 400 unit Tab, Take 300 mg by mouth., Disp: , Rfl:     chlorhexidine (HIBICLENS) 4 % external liquid, Apply topically daily as needed (dermatitis)., Disp: 236 mL, Rfl: 2    ciclopirox (LOPROX) 0.77 % Crea, , Disp: , Rfl:     ciclopirox 1 % shampoo, Apply topically., Disp: , Rfl:     clindamycin (CLEOCIN T) 1 % lotion, Apply topically., Disp: , Rfl:     clobetasoL (TEMOVATE) 0.05 % cream, Apply topically 2 (two) times daily., Disp: , Rfl:     dapsone 25 MG Tab, Take 50 mg by mouth once daily., Disp: , Rfl:     DOC-Q-LACE 100 mg capsule, TAKE ONE CAPSULE BY MOUTH TWICE DAILY, Disp: 30 capsule, Rfl: 0     doxepin (SINEQUAN) 25 MG capsule, TAKE 1 CAPSULE(25 MG) BY MOUTH EVERY EVENING, Disp: 90 capsule, Rfl: 3    estradioL (ESTRACE) 0.5 MG tablet, Take 1 tablet (0.5 mg total) by mouth 3 (three) times a week., Disp: 45 tablet, Rfl: 2    ferrous sulfate (IRON) 325 mg (65 mg iron) Tab tablet, Take 1 tablet (325 mg total) by mouth 3 (three) times a week., Disp: , Rfl:     fexofenadine (ALLEGRA) 180 MG tablet, Take 180 mg by mouth 2 hours after meals and at bedtime., Disp: , Rfl:     fluticasone propionate (FLONASE) 50 mcg/actuation nasal spray, SHAKE LIQUID AND USE 1 SPRAY(50 MCG) IN EACH NOSTRIL TWICE DAILY AS NEEDED FOR RHINITIS, Disp: 16 g, Rfl: 2    fluticasone-salmeterol diskus inhaler 250-50 mcg, Inhale 1 puff into the lungs 2 (two) times daily. Controller, Disp: 60 each, Rfl: 11    hydrocortisone 2.5 % cream, Apply to Affected areas on face and neck twice a day for up to 2 weeks as needed for flares, Disp: , Rfl:     ketoconazole (NIZORAL) 2 % cream, Apply topically., Disp: , Rfl:     ketoconazole (NIZORAL) 2 % shampoo, , Disp: , Rfl:     LIDOcaine HCL 1 % Lotn, Apply topically., Disp: , Rfl:     montelukast (SINGULAIR) 10 mg tablet, Take 1 tablet (10 mg total) by mouth once daily., Disp: 30 tablet, Rfl: 5    mv-min-FA-Re-Gl-koavvwo-lutein 0.4-162-18 mg Tab, Take 1 tablet by mouth once daily., Disp: , Rfl:     omeprazole-sodium bicarbonate 20-1.1 mg-gram Cap, 1 cap ac breakfast, Disp: , Rfl: 0    ondansetron (ZOFRAN-ODT) 4 MG TbDL, Take 1 tablet (4 mg total) by mouth every 8 (eight) hours as needed (nausea and vomiting)., Disp: 20 tablet, Rfl: 0    pimecrolimus (ELIDEL) 1 % cream, Apply topically 2 (two) times daily., Disp: , Rfl:     spironolactone (ALDACTONE) 25 MG tablet, Take 1 tablet (25 mg total) by mouth once daily., Disp: 90 tablet, Rfl: 3    triamcinolone acetonide 0.1% (KENALOG) 0.1 % cream, Apply topically., Disp: , Rfl:     doxycycline (VIBRAMYCIN) 100 MG Cap, Take 1 capsule (100 mg total) by mouth 2  "(two) times daily. for 5 days, Disp: 10 capsule, Rfl: 0    EPIPEN 2-LUCIANO 0.3 mg/0.3 mL AtIn, Inject 0.3 mLs (0.3 mg total) into the muscle once. for 1 dose, Disp: 0.3 mL, Rfl: 11    promethazine-dextromethorphan (PROMETHAZINE-DM) 6.25-15 mg/5 mL Syrp, Take 5 mLs by mouth every 6 (six) hours as needed (cough)., Disp: 118 mL, Rfl: 0    Objective:        Physical Exam  Vitals reviewed.   Constitutional:       Appearance: Normal appearance.   HENT:      Head: Normocephalic and atraumatic.      Comments: Frontal and maxillary sinuses TTP      Right Ear: Tympanic membrane and ear canal normal.      Left Ear: Tympanic membrane and ear canal normal.      Nose: Nose normal.      Mouth/Throat:      Pharynx: Oropharynx is clear. Posterior oropharyngeal erythema present. No oropharyngeal exudate.   Eyes:      Conjunctiva/sclera: Conjunctivae normal.   Cardiovascular:      Rate and Rhythm: Normal rate and regular rhythm.      Heart sounds: Normal heart sounds.   Pulmonary:      Effort: Pulmonary effort is normal.      Breath sounds: Normal breath sounds. No wheezing, rhonchi or rales.   Musculoskeletal:         General: Normal range of motion.      Cervical back: Normal range of motion and neck supple.      Comments: Right pinky (see photo)   Lymphadenopathy:      Cervical: No cervical adenopathy.   Skin:     General: Skin is warm and dry.   Neurological:      Mental Status: She is alert and oriented to person, place, and time.   Psychiatric:         Mood and Affect: Mood normal.         Behavior: Behavior normal.         Thought Content: Thought content normal.             Visit Vitals  /76   Pulse 81   Temp 98.1 °F (36.7 °C)   Ht 5' 4" (1.626 m)   Wt 114.9 kg (253 lb 4.9 oz)   SpO2 95%   BMI 43.48 kg/m²      Assessment:         1. Influenza B    2. Cough, unspecified type    3. Nausea    4. Finger injury, right, initial encounter    5. Finger infection        Plan:         Ijeoma was seen today for cough.    Diagnoses and " all orders for this visit:    Influenza B       -   Symptomatic treatment       -   Hydrate well. Tylenol 500 mg or Ibuprofen 200 mg 2 tabs every 6 hours as needed for fever, headache, body aches, throat pain, etc.        -   Honey, hot tea, throat lozenges as needed for throat pain         -   Continue asthma medications, including Albuterol inhaler and nebs as needed      Cough, unspecified type  -     POCT Influenza A/B Molecular  -     promethazine-dextromethorphan (PROMETHAZINE-DM) 6.25-15 mg/5 mL Syrp; Take 5 mLs by mouth every 6 (six) hours as needed (cough)    Nausea  -     POCT Influenza A/B Molecular    Finger injury, right, initial encounter  -     X-Ray Finger 2 or More Views Right; Future  - she may return for the xray later this week when well  -     doxycycline (VIBRAMYCIN) 100 MG Cap; Take 1 capsule (100 mg total) by mouth 2 (two) times daily for 7 days    Finger infection        -   Doxycyline twice daily for 7 days. Follow up with me in 1 week with xray prior.       Work excuse given through 10/31. Follow up with me in 1 week.         Family Medicine Physician Assistant     Future Appointments       Date Provider Specialty Appt Notes    10/26/2023  Radiology ray    10/30/2023 Bambi Lugo PA-C Family Medicine 1 week f/u    1/2/2024 Toshia Gao, NP Family Medicine 6mo g/u             All of your core healthy metrics are met.       I spent a total of 25 minutes on the day of the visit.This includes face to face time and non-face to face time preparing to see the patient (eg, review of tests), obtaining and/or reviewing separately obtained history, documenting clinical information in the electronic or other health record, independently interpreting results and communicating results to the patient/family/caregiver, or care coordinator.    We have addressed [3] Low: 2 or more self-limited or minor problems / 1 stable chronic illness / 1 acute, uncomplicated illness or injury  The complexity  of the data reviewed and analyzed for this visit was [3] Limited (Reviewed prior external note, ordered unique testing or reviewed the results of each unique test)   The risk of complications and/or morbidity or mortality are [3] Low risk   The level of Medical Decision Making for this visit is [3] Low

## 2023-10-26 ENCOUNTER — HOSPITAL ENCOUNTER (OUTPATIENT)
Dept: RADIOLOGY | Facility: CLINIC | Age: 53
Discharge: HOME | End: 2023-10-26
Payer: COMMERCIAL

## 2023-10-26 DIAGNOSIS — S69.91XA FINGER INJURY, RIGHT, INITIAL ENCOUNTER: ICD-10-CM

## 2023-10-26 PROCEDURE — 73140 X-RAY EXAM OF FINGER(S): CPT | Mod: 26,RT,S$GLB, | Performed by: RADIOLOGY

## 2023-10-26 PROCEDURE — 73140 XR FINGER 2 OR MORE VIEWS RIGHT: ICD-10-PCS | Mod: 26,RT,S$GLB, | Performed by: RADIOLOGY

## 2023-10-26 PROCEDURE — 73140 X-RAY EXAM OF FINGER(S): CPT | Mod: TC,FY,PO,RT

## 2023-10-30 ENCOUNTER — OFFICE VISIT (OUTPATIENT)
Dept: FAMILY MEDICINE | Facility: CLINIC | Age: 53
End: 2023-10-30
Payer: COMMERCIAL

## 2023-10-30 VITALS
SYSTOLIC BLOOD PRESSURE: 126 MMHG | HEIGHT: 64 IN | BODY MASS INDEX: 42.87 KG/M2 | WEIGHT: 251.13 LBS | DIASTOLIC BLOOD PRESSURE: 80 MMHG | TEMPERATURE: 98 F | OXYGEN SATURATION: 98 % | HEART RATE: 97 BPM

## 2023-10-30 DIAGNOSIS — S69.91XD INJURY OF FINGER OF RIGHT HAND, SUBSEQUENT ENCOUNTER: ICD-10-CM

## 2023-10-30 DIAGNOSIS — R05.9 COUGH, UNSPECIFIED TYPE: Primary | ICD-10-CM

## 2023-10-30 PROCEDURE — 1160F RVW MEDS BY RX/DR IN RCRD: CPT | Mod: CPTII,S$GLB,,

## 2023-10-30 PROCEDURE — 99999 PR PBB SHADOW E&M-EST. PATIENT-LVL V: CPT | Mod: PBBFAC,,,

## 2023-10-30 PROCEDURE — 3079F DIAST BP 80-89 MM HG: CPT | Mod: CPTII,S$GLB,,

## 2023-10-30 PROCEDURE — 99999 PR PBB SHADOW E&M-EST. PATIENT-LVL V: ICD-10-PCS | Mod: PBBFAC,,,

## 2023-10-30 PROCEDURE — 99213 OFFICE O/P EST LOW 20 MIN: CPT | Mod: S$GLB,,,

## 2023-10-30 PROCEDURE — 3074F SYST BP LT 130 MM HG: CPT | Mod: CPTII,S$GLB,,

## 2023-10-30 PROCEDURE — 3079F PR MOST RECENT DIASTOLIC BLOOD PRESSURE 80-89 MM HG: ICD-10-PCS | Mod: CPTII,S$GLB,,

## 2023-10-30 PROCEDURE — 3008F BODY MASS INDEX DOCD: CPT | Mod: CPTII,S$GLB,,

## 2023-10-30 PROCEDURE — 1160F PR REVIEW ALL MEDS BY PRESCRIBER/CLIN PHARMACIST DOCUMENTED: ICD-10-PCS | Mod: CPTII,S$GLB,,

## 2023-10-30 PROCEDURE — 1159F PR MEDICATION LIST DOCUMENTED IN MEDICAL RECORD: ICD-10-PCS | Mod: CPTII,S$GLB,,

## 2023-10-30 PROCEDURE — 3074F PR MOST RECENT SYSTOLIC BLOOD PRESSURE < 130 MM HG: ICD-10-PCS | Mod: CPTII,S$GLB,,

## 2023-10-30 PROCEDURE — 3008F PR BODY MASS INDEX (BMI) DOCUMENTED: ICD-10-PCS | Mod: CPTII,S$GLB,,

## 2023-10-30 PROCEDURE — 99213 PR OFFICE/OUTPT VISIT, EST, LEVL III, 20-29 MIN: ICD-10-PCS | Mod: S$GLB,,,

## 2023-10-30 PROCEDURE — 1159F MED LIST DOCD IN RCRD: CPT | Mod: CPTII,S$GLB,,

## 2023-10-30 RX ORDER — PROMETHAZINE HYDROCHLORIDE AND DEXTROMETHORPHAN HYDROBROMIDE 6.25; 15 MG/5ML; MG/5ML
5 SYRUP ORAL EVERY 6 HOURS PRN
Qty: 118 ML | Refills: 0 | Status: SHIPPED | OUTPATIENT
Start: 2023-10-30 | End: 2023-11-09

## 2023-10-30 NOTE — LETTER
October 30, 2023      [facility]  [address] [place] [address]  Phone: [phone]  Fax: [phone]       Patient: Ijeoma Issa   YOB: 1970  Date of Visit: 10/30/2023    To Whom It May Concern:    Ronan Issa  was at [facility] on 10/30/2023. The patient may return to work/school on 10/31/23 with no restrictions. If you have any questions or concerns, or if I can be of further assistance, please do not hesitate to contact me.    Sincerely,    Bambi Lugo PA-C

## 2023-10-30 NOTE — PROGRESS NOTES
Subjective:       Patient ID: Ijeoma Issa is a 53 y.o. female.    Chief Complaint: Follow-up      Ijeoma Issa is a 53 y.o. female with history of  asthma who presents to clinic for 1 week follow up. Pt seen 1 week ago regarding a right pinky injury and also influenza. Pt reports her right pinky swelling and pain has improved, and she has finished the Doxycyline prescription. She notes she is feeling better overall regarding the influenza. No more fever. She does have a lingering cough and would like the cough syrup refilled. She does endorse a papular rash on her chin and chest which she noticed at the end of last week, which is now resolving. Also having some right side pain from cough.         Review of Systems   Constitutional:  Negative for activity change, appetite change, chills, fatigue, fever and unexpected weight change.   Eyes:  Negative for visual disturbance.   Respiratory:  Positive for cough. Negative for shortness of breath and wheezing.    Cardiovascular:  Negative for chest pain, palpitations and leg swelling.   Gastrointestinal:  Negative for abdominal pain, constipation, diarrhea and nausea.   Musculoskeletal:  Negative for arthralgias.        Right side pain   Skin:  Positive for rash.   Neurological:  Negative for dizziness, light-headedness and headaches.   Psychiatric/Behavioral:  Negative for dysphoric mood. The patient is not nervous/anxious.          Past Medical History:   Diagnosis Date    Abnormal mammogram     neg biospy    Allergic asthma     Anxiety     Arthritis     Dermatitis     Dr. Weil, Extremities    Diabetes     Diverticulosis of colon     GERD (gastroesophageal reflux disease)     Mass of right breast     Postmenopausal hormone replacement therapy     Tubal pregnancy     Uterine fibroid        Review of patient's allergies indicates:   Allergen Reactions    Cashew nut Anaphylaxis     Allergic to Cashew nuts    Nut flavor Anaphylaxis     Allergic to Cashew  nuts    Latex Rash     Including in injections    Varicella vaccines      Red swelling/ lump develops at injection site    Adhesive tape-silicones Rash    Tapentadol Rash         Current Outpatient Medications:     ACZONE 5 % topical gel, Apply topically., Disp: , Rfl:     calcium carbonate-vit D3-min 600 mg calcium- 400 unit Tab, Take 300 mg by mouth., Disp: , Rfl:     chlorhexidine (HIBICLENS) 4 % external liquid, Apply topically daily as needed (dermatitis)., Disp: 236 mL, Rfl: 2    ciclopirox (LOPROX) 0.77 % Crea, , Disp: , Rfl:     ciclopirox 1 % shampoo, Apply topically., Disp: , Rfl:     clindamycin (CLEOCIN T) 1 % lotion, Apply topically., Disp: , Rfl:     clobetasoL (TEMOVATE) 0.05 % cream, Apply topically 2 (two) times daily., Disp: , Rfl:     dapsone 25 MG Tab, Take 50 mg by mouth once daily., Disp: , Rfl:     DOC-Q-LACE 100 mg capsule, TAKE ONE CAPSULE BY MOUTH TWICE DAILY, Disp: 30 capsule, Rfl: 0    doxepin (SINEQUAN) 25 MG capsule, TAKE 1 CAPSULE(25 MG) BY MOUTH EVERY EVENING, Disp: 90 capsule, Rfl: 3    estradioL (ESTRACE) 0.5 MG tablet, Take 1 tablet (0.5 mg total) by mouth 3 (three) times a week., Disp: 45 tablet, Rfl: 2    ferrous sulfate (IRON) 325 mg (65 mg iron) Tab tablet, Take 1 tablet (325 mg total) by mouth 3 (three) times a week., Disp: , Rfl:     fexofenadine (ALLEGRA) 180 MG tablet, Take 180 mg by mouth 2 hours after meals and at bedtime., Disp: , Rfl:     fluticasone propionate (FLONASE) 50 mcg/actuation nasal spray, SHAKE LIQUID AND USE 1 SPRAY(50 MCG) IN EACH NOSTRIL TWICE DAILY AS NEEDED FOR RHINITIS, Disp: 16 g, Rfl: 2    fluticasone-salmeterol diskus inhaler 250-50 mcg, Inhale 1 puff into the lungs 2 (two) times daily. Controller, Disp: 60 each, Rfl: 11    hydrocortisone 2.5 % cream, Apply to Affected areas on face and neck twice a day for up to 2 weeks as needed for flares, Disp: , Rfl:     ketoconazole (NIZORAL) 2 % cream, Apply topically., Disp: , Rfl:     ketoconazole (NIZORAL)  2 % shampoo, , Disp: , Rfl:     LIDOcaine HCL 1 % Lotn, Apply topically., Disp: , Rfl:     montelukast (SINGULAIR) 10 mg tablet, Take 1 tablet (10 mg total) by mouth once daily., Disp: 30 tablet, Rfl: 5    mv-min-FA-Rc-Zz-vrojsor-lutein 0.4-162-18 mg Tab, Take 1 tablet by mouth once daily., Disp: , Rfl:     omeprazole-sodium bicarbonate 20-1.1 mg-gram Cap, 1 cap ac breakfast, Disp: , Rfl: 0    ondansetron (ZOFRAN-ODT) 4 MG TbDL, Take 1 tablet (4 mg total) by mouth every 8 (eight) hours as needed (nausea and vomiting)., Disp: 20 tablet, Rfl: 0    pimecrolimus (ELIDEL) 1 % cream, Apply topically 2 (two) times daily., Disp: , Rfl:     spironolactone (ALDACTONE) 25 MG tablet, Take 1 tablet (25 mg total) by mouth once daily., Disp: 90 tablet, Rfl: 3    triamcinolone acetonide 0.1% (KENALOG) 0.1 % cream, Apply topically., Disp: , Rfl:     albuterol (PROVENTIL/VENTOLIN HFA) 90 mcg/actuation inhaler, Inhale 2 puffs into the lungs every 4 (four) hours as needed for Wheezing., Disp: 18 g, Rfl: 3    albuterol-ipratropium (DUO-NEB) 2.5 mg-0.5 mg/3 mL nebulizer solution, Take 3 mLs by nebulization every 6 (six) hours as needed for Wheezing. Rescue, Disp: 1 each, Rfl: 0    azelastine (ASTELIN) 137 mcg (0.1 %) nasal spray, 1 spray (137 mcg total) by Nasal route 2 (two) times daily., Disp: 30 mL, Rfl: 3    EPIPEN 2-LUCIANO 0.3 mg/0.3 mL AtIn, Inject 0.3 mLs (0.3 mg total) into the muscle once. for 1 dose, Disp: 0.3 mL, Rfl: 11    promethazine-dextromethorphan (PROMETHAZINE-DM) 6.25-15 mg/5 mL Syrp, Take 5 mLs by mouth every 6 (six) hours as needed (cough)., Disp: 118 mL, Rfl: 0    Objective:        Physical Exam  Vitals reviewed.   Constitutional:       Appearance: Normal appearance.   HENT:      Head: Normocephalic and atraumatic.   Eyes:      Conjunctiva/sclera: Conjunctivae normal.   Cardiovascular:      Rate and Rhythm: Normal rate and regular rhythm.      Heart sounds: Normal heart sounds.   Pulmonary:      Effort: Pulmonary  "effort is normal.      Breath sounds: Normal breath sounds. No wheezing, rhonchi or rales.   Musculoskeletal:         General: Normal range of motion.      Cervical back: Normal range of motion and neck supple.   Skin:     General: Skin is warm and dry.      Comments: Right 5th digit: slight erythema with peeling of skin. No drainage noted.     3-4 pink papules noted on chest, 1-2 papular lesions on chin    Neurological:      Mental Status: She is alert and oriented to person, place, and time.   Psychiatric:         Mood and Affect: Mood normal.         Behavior: Behavior normal.         Thought Content: Thought content normal.         Judgment: Judgment normal.           Visit Vitals  /80   Pulse 97   Temp 98.1 °F (36.7 °C)   Ht 5' 4" (1.626 m)   Wt 113.9 kg (251 lb 1.7 oz)   SpO2 98%   BMI 43.10 kg/m²      Assessment:         1. Cough, unspecified type    2. Injury of finger of right hand, subsequent encounter        Plan:         Ijeoma was seen today for follow-up.    Diagnoses and all orders for this visit:    Cough, unspecified type  -     promethazine-dextromethorphan (PROMETHAZINE-DM) 6.25-15 mg/5 mL Syrp; Take 5 mLs by mouth every 6 (six) hours as needed (cough).  -     Recommend she try Mucinex DM or OTC non-drowsy cough suppressant upon returning to work   -     Discussed cough may linger for up to 4-6 weeks  -     Continue symptomatic treatment as needed. Tylenol 500 mg 2 tabs every 6 hours as needed for fever, body aches, etc    Injury of finger of right hand, subsequent encounter        -    Improvement of right fifth digit upon completion of Doxycycline. If further swelling, erythema, or drainage occurs please follow up.        Follow up if symptoms worsen or fail to improve.         Family Medicine Physician Assistant     Future Appointments       Date Provider Specialty Appt Notes    1/2/2024 Toshia Gao, NP Family Medicine 6mo g/u             All of your core healthy metrics are " met.       I spent a total of 20 minutes on the day of the visit.This includes face to face time and non-face to face time preparing to see the patient (eg, review of tests), obtaining and/or reviewing separately obtained history, documenting clinical information in the electronic or other health record, independently interpreting results and communicating results to the patient/family/caregiver, or care coordinator.    We have addressed [3] Low: 2 or more self-limited or minor problems / 1 stable chronic illness / 1 acute, uncomplicated illness or injury  The complexity of the data reviewed and analyzed for this visit was [3] Limited (Reviewed prior external note, ordered unique testing or reviewed the results of each unique test)   The risk of complications and/or morbidity or mortality are [3] Low risk   The level of Medical Decision Making for this visit is [3] Low

## 2023-11-02 ENCOUNTER — PATIENT MESSAGE (OUTPATIENT)
Dept: PAIN MEDICINE | Facility: CLINIC | Age: 53
End: 2023-11-02
Payer: COMMERCIAL

## 2023-11-29 ENCOUNTER — TELEPHONE (OUTPATIENT)
Dept: FAMILY MEDICINE | Facility: CLINIC | Age: 53
End: 2023-11-29
Payer: COMMERCIAL

## 2023-11-29 NOTE — TELEPHONE ENCOUNTER
Received message stating patient returning call to office. Unable to locate any documentation of office reaching out to patient. Writer placed a return call to patient at this time, no answer received.  Left detailed message including date and time of voicemail requesting return call to office to discuss above.

## 2023-11-29 NOTE — TELEPHONE ENCOUNTER
Raciel Myers Staff     ----- Message from Raciel Meyrs sent at 11/29/2023  2:29 PM CST -----  Type:  Patient Returning Call    Who Called:  pt  Who Left Message for Patient:  Manish  Does the patient know what this is regarding?:  yes  Best Call Back Number:  [phone] (home)     Additional Information:  please call and advise--thank you

## 2023-11-30 NOTE — TELEPHONE ENCOUNTER
Follow up call placed to patient who reports she was returning call to Manish regarding scheduling colonoscopy.  Patient states she has since spoken to Mrs. Hammond. No further assistance is needed.

## 2023-12-15 DIAGNOSIS — M79.89 LEG SWELLING: ICD-10-CM

## 2023-12-15 DIAGNOSIS — L70.9 ACNE, UNSPECIFIED ACNE TYPE: Chronic | ICD-10-CM

## 2023-12-15 DIAGNOSIS — L30.8 PRURITIC DERMATITIS: ICD-10-CM

## 2023-12-15 RX ORDER — SPIRONOLACTONE 25 MG/1
25 TABLET ORAL
Qty: 90 TABLET | Refills: 3 | Status: SHIPPED | OUTPATIENT
Start: 2023-12-15

## 2023-12-27 NOTE — PROGRESS NOTES
"[facility]   OFFICE VISIT NOTE    Patient Name: Ijeoma Issa  YOB: 1970    PRESENTING HISTORY     History of Present Illness:  Ms. Ijeoma Issa is a 53 y.o. female is a former patient of Dr. Nunez.   She has migraine, acne, asthma with allergic rhinitis, palpitations, abnormal mammo in the past, PCOS, GERD     Meds: albuterol, azelastine, chlorhexidine, ciclopirox, clindamycin lotion, doxepin, ferrous sulfate, ketoconazole shampoo, montelukast     Specialists: allergy immunology, gastro, derm     She has dysuria, right flank pain, and productive cough. Flank pain for 2 days without any increase in physical activity. Denies any history of kidney stones. Cough has been productive with light yellow sputum. Dysuria is accompanied by increase in urge, frequency, and pressure with voiding. Denies having any fever, chills, malaise, or myalgia     Review of Systems   Constitutional:  Negative for chills, fatigue and fever.   Respiratory:  Positive for cough. Negative for shortness of breath and wheezing.    Cardiovascular:  Positive for palpitations. Negative for chest pain.   Genitourinary:  Positive for difficulty urinating, dysuria, flank pain, frequency and urgency.          OBJECTIVE:   Vital Signs:  Vitals:    12/28/23 0819   BP: 100/68   Pulse: 74   Resp: 18   SpO2: 96%   Weight: 114.9 kg (253 lb 4.9 oz)   Height: 5' 4" (1.626 m)         Physical Exam  Constitutional:       General: She is not in acute distress.     Appearance: She is obese. She is not ill-appearing or toxic-appearing.   HENT:      Head: Normocephalic and atraumatic.      Mouth/Throat:      Mouth: Mucous membranes are moist.      Pharynx: Uvula midline. No pharyngeal swelling.   Cardiovascular:      Rate and Rhythm: Normal rate and regular rhythm.   Pulmonary:      Effort: Pulmonary effort is normal. No tachypnea, bradypnea, accessory muscle usage, prolonged expiration or respiratory distress. "      Breath sounds: Normal breath sounds. No stridor. No wheezing, rhonchi or rales.   Genitourinary:     Comments: No suprapubic tenderness of CVA tenderness present   Neurological:      General: No focal deficit present.      Mental Status: She is alert.   Psychiatric:         Mood and Affect: Mood normal.         Behavior: Behavior normal.         ASSESSMENT & PLAN:     Acute cystitis with hematuria  -     Urinalysis  -     POCT Urinalysis(Instrument)  -     ciprofloxacin HCl (CIPRO) 500 MG tablet; Take 1 tablet (500 mg total) by mouth every 12 (twelve) hours. for 7 days  Dispense: 14 tablet; Refill: 0  UA POCT concerning for UTI along with her presenting symptoms   Will put her on ciprofloxacin  Encouraged ample hydration and taking probiotics with her abx     Gastroesophageal reflux disease without esophagitis  Continue with PPI     Productive cough  Likely due to postnasal drip from her allergic rhinitis or common cold  Continue with her current medications        Allison Adler MD  Family Medicine  [facility]     This note was created using [employer] voice recognition software that occasionally misinterprets phrases or words

## 2023-12-28 ENCOUNTER — OFFICE VISIT (OUTPATIENT)
Dept: FAMILY MEDICINE | Facility: CLINIC | Age: 53
End: 2023-12-28
Payer: COMMERCIAL

## 2023-12-28 VITALS
RESPIRATION RATE: 18 BRPM | DIASTOLIC BLOOD PRESSURE: 68 MMHG | HEART RATE: 74 BPM | WEIGHT: 253.31 LBS | OXYGEN SATURATION: 96 % | SYSTOLIC BLOOD PRESSURE: 100 MMHG | BODY MASS INDEX: 43.25 KG/M2 | HEIGHT: 64 IN

## 2023-12-28 DIAGNOSIS — K21.9 GASTROESOPHAGEAL REFLUX DISEASE WITHOUT ESOPHAGITIS: ICD-10-CM

## 2023-12-28 DIAGNOSIS — R05.8 PRODUCTIVE COUGH: ICD-10-CM

## 2023-12-28 DIAGNOSIS — N30.01 ACUTE CYSTITIS WITH HEMATURIA: Primary | ICD-10-CM

## 2023-12-28 LAB
BACTERIA #/AREA URNS AUTO: ABNORMAL /HPF
BILIRUB UR QL STRIP: NEGATIVE
BILIRUBIN, UA POC OHS: ABNORMAL
BLOOD, UA POC OHS: ABNORMAL
CLARITY UR REFRACT.AUTO: ABNORMAL
CLARITY, UA POC OHS: ABNORMAL
COLOR UR AUTO: ABNORMAL
COLOR, UA POC OHS: YELLOW
GLUCOSE UR QL STRIP: NEGATIVE
GLUCOSE, UA POC OHS: NEGATIVE
HGB UR QL STRIP: ABNORMAL
HYALINE CASTS UR QL AUTO: 0 /LPF
KETONES UR QL STRIP: NEGATIVE
KETONES, UA POC OHS: NEGATIVE
LEUKOCYTE ESTERASE UR QL STRIP: ABNORMAL
LEUKOCYTES, UA POC OHS: ABNORMAL
MICROSCOPIC COMMENT: ABNORMAL
NITRITE UR QL STRIP: POSITIVE
NITRITE, UA POC OHS: NEGATIVE
PH UR STRIP: 5 [PH] (ref 5–8)
PH, UA POC OHS: 6
PROT UR QL STRIP: ABNORMAL
PROTEIN, UA POC OHS: >=300
RBC #/AREA URNS AUTO: >100 /HPF (ref 0–4)
SP GR UR STRIP: 1.02 (ref 1–1.03)
SPECIFIC GRAVITY, UA POC OHS: >=1.03
SQUAMOUS #/AREA URNS AUTO: 11 /HPF
URN SPEC COLLECT METH UR: ABNORMAL
UROBILINOGEN, UA POC OHS: 0.2
WBC #/AREA URNS AUTO: >100 /HPF (ref 0–5)
WBC CLUMPS UR QL AUTO: ABNORMAL

## 2023-12-28 PROCEDURE — 99214 OFFICE O/P EST MOD 30 MIN: CPT | Mod: S$GLB,,, | Performed by: STUDENT IN AN ORGANIZED HEALTH CARE EDUCATION/TRAINING PROGRAM

## 2023-12-28 PROCEDURE — 99214 PR OFFICE/OUTPT VISIT, EST, LEVL IV, 30-39 MIN: ICD-10-PCS | Mod: S$GLB,,, | Performed by: STUDENT IN AN ORGANIZED HEALTH CARE EDUCATION/TRAINING PROGRAM

## 2023-12-28 PROCEDURE — 81003 POCT URINALYSIS(INSTRUMENT): ICD-10-PCS | Mod: QW,S$GLB,, | Performed by: STUDENT IN AN ORGANIZED HEALTH CARE EDUCATION/TRAINING PROGRAM

## 2023-12-28 PROCEDURE — 81001 URINALYSIS AUTO W/SCOPE: CPT | Performed by: STUDENT IN AN ORGANIZED HEALTH CARE EDUCATION/TRAINING PROGRAM

## 2023-12-28 PROCEDURE — 3008F PR BODY MASS INDEX (BMI) DOCUMENTED: ICD-10-PCS | Mod: CPTII,S$GLB,, | Performed by: STUDENT IN AN ORGANIZED HEALTH CARE EDUCATION/TRAINING PROGRAM

## 2023-12-28 PROCEDURE — 1160F PR REVIEW ALL MEDS BY PRESCRIBER/CLIN PHARMACIST DOCUMENTED: ICD-10-PCS | Mod: CPTII,S$GLB,, | Performed by: STUDENT IN AN ORGANIZED HEALTH CARE EDUCATION/TRAINING PROGRAM

## 2023-12-28 PROCEDURE — 3074F SYST BP LT 130 MM HG: CPT | Mod: CPTII,S$GLB,, | Performed by: STUDENT IN AN ORGANIZED HEALTH CARE EDUCATION/TRAINING PROGRAM

## 2023-12-28 PROCEDURE — 1159F MED LIST DOCD IN RCRD: CPT | Mod: CPTII,S$GLB,, | Performed by: STUDENT IN AN ORGANIZED HEALTH CARE EDUCATION/TRAINING PROGRAM

## 2023-12-28 PROCEDURE — 3008F BODY MASS INDEX DOCD: CPT | Mod: CPTII,S$GLB,, | Performed by: STUDENT IN AN ORGANIZED HEALTH CARE EDUCATION/TRAINING PROGRAM

## 2023-12-28 PROCEDURE — 99999 PR PBB SHADOW E&M-EST. PATIENT-LVL V: ICD-10-PCS | Mod: PBBFAC,,, | Performed by: STUDENT IN AN ORGANIZED HEALTH CARE EDUCATION/TRAINING PROGRAM

## 2023-12-28 PROCEDURE — 3078F DIAST BP <80 MM HG: CPT | Mod: CPTII,S$GLB,, | Performed by: STUDENT IN AN ORGANIZED HEALTH CARE EDUCATION/TRAINING PROGRAM

## 2023-12-28 PROCEDURE — 3074F PR MOST RECENT SYSTOLIC BLOOD PRESSURE < 130 MM HG: ICD-10-PCS | Mod: CPTII,S$GLB,, | Performed by: STUDENT IN AN ORGANIZED HEALTH CARE EDUCATION/TRAINING PROGRAM

## 2023-12-28 PROCEDURE — 1160F RVW MEDS BY RX/DR IN RCRD: CPT | Mod: CPTII,S$GLB,, | Performed by: STUDENT IN AN ORGANIZED HEALTH CARE EDUCATION/TRAINING PROGRAM

## 2023-12-28 PROCEDURE — 3078F PR MOST RECENT DIASTOLIC BLOOD PRESSURE < 80 MM HG: ICD-10-PCS | Mod: CPTII,S$GLB,, | Performed by: STUDENT IN AN ORGANIZED HEALTH CARE EDUCATION/TRAINING PROGRAM

## 2023-12-28 PROCEDURE — 1159F PR MEDICATION LIST DOCUMENTED IN MEDICAL RECORD: ICD-10-PCS | Mod: CPTII,S$GLB,, | Performed by: STUDENT IN AN ORGANIZED HEALTH CARE EDUCATION/TRAINING PROGRAM

## 2023-12-28 PROCEDURE — 81003 URINALYSIS AUTO W/O SCOPE: CPT | Mod: QW,S$GLB,, | Performed by: STUDENT IN AN ORGANIZED HEALTH CARE EDUCATION/TRAINING PROGRAM

## 2023-12-28 PROCEDURE — 99999 PR PBB SHADOW E&M-EST. PATIENT-LVL V: CPT | Mod: PBBFAC,,, | Performed by: STUDENT IN AN ORGANIZED HEALTH CARE EDUCATION/TRAINING PROGRAM

## 2023-12-28 RX ORDER — CIPROFLOXACIN 500 MG/1
500 TABLET ORAL EVERY 12 HOURS
Qty: 14 TABLET | Refills: 0 | Status: SHIPPED | OUTPATIENT
Start: 2023-12-28 | End: 2024-01-04

## 2023-12-28 RX ORDER — ALBUTEROL SULFATE 0.83 MG/ML
2.5 SOLUTION RESPIRATORY (INHALATION) EVERY 4 HOURS PRN
COMMUNITY
Start: 2023-11-20 | End: 2024-11-19

## 2023-12-28 RX ORDER — SULFAMETHOXAZOLE AND TRIMETHOPRIM 800; 160 MG/1; MG/1
1 TABLET ORAL 2 TIMES DAILY
Qty: 20 TABLET | Refills: 0 | Status: CANCELLED | OUTPATIENT
Start: 2023-12-28 | End: 2024-01-07

## 2023-12-28 RX ORDER — ALBUTEROL SULFATE 90 UG/1
2 AEROSOL, METERED RESPIRATORY (INHALATION) EVERY 6 HOURS PRN
COMMUNITY
Start: 2023-08-29

## 2023-12-28 NOTE — PATIENT INSTRUCTIONS
George Raphael,     If you are due for any health screening(s) below please notify me so we can arrange them to be ordered and scheduled. Most healthy patients at your age complete them, but you are free to accept or refuse.     If you can't do it, I'll definitely understand. If you can, I'd certainly appreciate it!    All of your core healthy metrics are met.

## 2024-01-31 ENCOUNTER — ON-DEMAND VIRTUAL (OUTPATIENT)
Dept: URGENT CARE | Facility: CLINIC | Age: 54
End: 2024-01-31
Payer: COMMERCIAL

## 2024-01-31 ENCOUNTER — NURSE TRIAGE (OUTPATIENT)
Dept: ADMINISTRATIVE | Facility: CLINIC | Age: 54
End: 2024-01-31
Payer: COMMERCIAL

## 2024-02-01 NOTE — TELEPHONE ENCOUNTER
Pt reports tested negative for Covid, but having a persistent dry cough, has had to use her inhaler at times, no fever. Pt advised to see a MD within 24 hours per protocol via PCP office/UC/ED/ODVV, Pt plans to try the virtual visit tonight. Pt encouraged to call back with any worsening symptoms or questions. Pt verbalized understanding.    Reason for Disposition   SEVERE coughing spells (e.g., whooping sound after coughing, vomiting after coughing)    Additional Information   Negative: SEVERE difficulty breathing (e.g., struggling for each breath, speaks in single words)   Negative: Bluish (or gray) lips or face now   Negative: [1] Rapid onset of cough AND [2] has hives   Negative: Coughing started suddenly after medicine, an allergic food or bee sting   Negative: [1] Difficulty breathing AND [2] exposure to flames, smoke, or fumes   Negative: [1] Stridor AND [2] difficulty breathing   Negative: Sounds like a life-threatening emergency to the triager   Negative: [1] MODERATE difficulty breathing (e.g., speaks in phrases, SOB even at rest, pulse 100-120) AND [2] still present when not coughing   Negative: Chest pain  (Exception: MILD central chest pain, present only when coughing.)   Negative: Patient sounds very sick or weak to the triager   Negative: [1] MILD difficulty breathing (e.g., minimal/no SOB at rest, SOB with walking, pulse <100) AND [2] still present when not coughing   Negative: [1] Coughed up blood AND [2] > 1 tablespoon (15 ml)   (Exception: Blood-tinged sputum.)   Negative: Fever > 103 F (39.4 C)   Negative: [1] Fever > 101 F (38.3 C) AND [2] age > 60 years   Negative: [1] Fever > 100.0 F (37.8 C) AND [2] bedridden (e.g., CVA, chronic illness, recovering from surgery)   Negative: [1] Fever > 100.0 F (37.8 C) AND [2] diabetes mellitus or weak immune system (e.g., HIV positive, cancer chemo, splenectomy, organ transplant, chronic steroids)   Negative: Wheezing is present   Negative: [1] Ankle swelling  AND [2] swelling is increasing    Protocols used: Cough - Acute Non-Productive-A-AH

## 2024-02-01 NOTE — PROGRESS NOTES
In [place]. Unable to see patient. Advised to attempt visit with [facility]. Eliecer this visit as erroneous. This encounter was created in error - please disregard.

## 2024-02-01 NOTE — TELEPHONE ENCOUNTER
Pt reports ***, Pt advised *** per protocol, Pt encouraged to call back with any worsening symptoms or questions. Pt verbalized understanding.

## 2024-02-07 DIAGNOSIS — E11.9 TYPE 2 DIABETES MELLITUS WITHOUT COMPLICATION: ICD-10-CM

## 2024-02-12 ENCOUNTER — LAB VISIT (OUTPATIENT)
Dept: LAB | Facility: HOSPITAL | Age: 54
End: 2024-02-12
Attending: STUDENT IN AN ORGANIZED HEALTH CARE EDUCATION/TRAINING PROGRAM
Payer: COMMERCIAL

## 2024-02-12 ENCOUNTER — OFFICE VISIT (OUTPATIENT)
Dept: FAMILY MEDICINE | Facility: CLINIC | Age: 54
End: 2024-02-12
Payer: COMMERCIAL

## 2024-02-12 VITALS
DIASTOLIC BLOOD PRESSURE: 80 MMHG | WEIGHT: 253.06 LBS | RESPIRATION RATE: 18 BRPM | BODY MASS INDEX: 43.2 KG/M2 | SYSTOLIC BLOOD PRESSURE: 132 MMHG | HEIGHT: 64 IN | HEART RATE: 95 BPM | OXYGEN SATURATION: 97 %

## 2024-02-12 DIAGNOSIS — M79.622 PAIN IN LEFT AXILLA: ICD-10-CM

## 2024-02-12 DIAGNOSIS — E11.9 TYPE 2 DIABETES MELLITUS WITHOUT COMPLICATION: ICD-10-CM

## 2024-02-12 DIAGNOSIS — M79.629 PAIN IN AXILLA, UNSPECIFIED LATERALITY: ICD-10-CM

## 2024-02-12 LAB
BASOPHILS # BLD AUTO: 0.06 K/UL (ref 0–0.2)
BASOPHILS NFR BLD: 1 % (ref 0–1.9)
DIFFERENTIAL METHOD BLD: NORMAL
EOSINOPHIL # BLD AUTO: 0.2 K/UL (ref 0–0.5)
EOSINOPHIL NFR BLD: 3.6 % (ref 0–8)
ERYTHROCYTE [DISTWIDTH] IN BLOOD BY AUTOMATED COUNT: 13.4 % (ref 11.5–14.5)
ERYTHROCYTE [SEDIMENTATION RATE] IN BLOOD BY WESTERGREN METHOD: 7 MM/HR (ref 0–20)
ESTIMATED AVG GLUCOSE: 77 MG/DL (ref 68–131)
HBA1C MFR BLD: 4.3 % (ref 4–5.6)
HCT VFR BLD AUTO: 39.4 % (ref 37–48.5)
HGB BLD-MCNC: 12.6 G/DL (ref 12–16)
HIV 1+2 AB+HIV1 P24 AG SERPL QL IA: NORMAL
IMM GRANULOCYTES # BLD AUTO: 0.01 K/UL (ref 0–0.04)
IMM GRANULOCYTES NFR BLD AUTO: 0.2 % (ref 0–0.5)
LYMPHOCYTES # BLD AUTO: 1.9 K/UL (ref 1–4.8)
LYMPHOCYTES NFR BLD: 30.4 % (ref 18–48)
MCH RBC QN AUTO: 30.1 PG (ref 27–31)
MCHC RBC AUTO-ENTMCNC: 32 G/DL (ref 32–36)
MCV RBC AUTO: 94 FL (ref 82–98)
MONOCYTES # BLD AUTO: 0.5 K/UL (ref 0.3–1)
MONOCYTES NFR BLD: 8.6 % (ref 4–15)
NEUTROPHILS # BLD AUTO: 3.4 K/UL (ref 1.8–7.7)
NEUTROPHILS NFR BLD: 56.2 % (ref 38–73)
NRBC BLD-RTO: 0 /100 WBC
PLATELET # BLD AUTO: 271 K/UL (ref 150–450)
PMV BLD AUTO: 12 FL (ref 9.2–12.9)
RBC # BLD AUTO: 4.18 M/UL (ref 4–5.4)
WBC # BLD AUTO: 6.08 K/UL (ref 3.9–12.7)

## 2024-02-12 PROCEDURE — 99213 OFFICE O/P EST LOW 20 MIN: CPT | Mod: S$GLB,,, | Performed by: STUDENT IN AN ORGANIZED HEALTH CARE EDUCATION/TRAINING PROGRAM

## 2024-02-12 PROCEDURE — 85025 COMPLETE CBC W/AUTO DIFF WBC: CPT | Performed by: STUDENT IN AN ORGANIZED HEALTH CARE EDUCATION/TRAINING PROGRAM

## 2024-02-12 PROCEDURE — 87389 HIV-1 AG W/HIV-1&-2 AB AG IA: CPT | Performed by: STUDENT IN AN ORGANIZED HEALTH CARE EDUCATION/TRAINING PROGRAM

## 2024-02-12 PROCEDURE — 1160F RVW MEDS BY RX/DR IN RCRD: CPT | Mod: CPTII,S$GLB,, | Performed by: STUDENT IN AN ORGANIZED HEALTH CARE EDUCATION/TRAINING PROGRAM

## 2024-02-12 PROCEDURE — 83036 HEMOGLOBIN GLYCOSYLATED A1C: CPT | Performed by: STUDENT IN AN ORGANIZED HEALTH CARE EDUCATION/TRAINING PROGRAM

## 2024-02-12 PROCEDURE — 3079F DIAST BP 80-89 MM HG: CPT | Mod: CPTII,S$GLB,, | Performed by: STUDENT IN AN ORGANIZED HEALTH CARE EDUCATION/TRAINING PROGRAM

## 2024-02-12 PROCEDURE — 1159F MED LIST DOCD IN RCRD: CPT | Mod: CPTII,S$GLB,, | Performed by: STUDENT IN AN ORGANIZED HEALTH CARE EDUCATION/TRAINING PROGRAM

## 2024-02-12 PROCEDURE — 3075F SYST BP GE 130 - 139MM HG: CPT | Mod: CPTII,S$GLB,, | Performed by: STUDENT IN AN ORGANIZED HEALTH CARE EDUCATION/TRAINING PROGRAM

## 2024-02-12 PROCEDURE — 3044F HG A1C LEVEL LT 7.0%: CPT | Mod: CPTII,S$GLB,, | Performed by: STUDENT IN AN ORGANIZED HEALTH CARE EDUCATION/TRAINING PROGRAM

## 2024-02-12 PROCEDURE — 85651 RBC SED RATE NONAUTOMATED: CPT | Mod: PO | Performed by: STUDENT IN AN ORGANIZED HEALTH CARE EDUCATION/TRAINING PROGRAM

## 2024-02-12 PROCEDURE — 86592 SYPHILIS TEST NON-TREP QUAL: CPT | Performed by: STUDENT IN AN ORGANIZED HEALTH CARE EDUCATION/TRAINING PROGRAM

## 2024-02-12 PROCEDURE — 3008F BODY MASS INDEX DOCD: CPT | Mod: CPTII,S$GLB,, | Performed by: STUDENT IN AN ORGANIZED HEALTH CARE EDUCATION/TRAINING PROGRAM

## 2024-02-12 PROCEDURE — 36415 COLL VENOUS BLD VENIPUNCTURE: CPT | Mod: PO | Performed by: STUDENT IN AN ORGANIZED HEALTH CARE EDUCATION/TRAINING PROGRAM

## 2024-02-12 PROCEDURE — 99999 PR PBB SHADOW E&M-EST. PATIENT-LVL V: CPT | Mod: PBBFAC,,, | Performed by: STUDENT IN AN ORGANIZED HEALTH CARE EDUCATION/TRAINING PROGRAM

## 2024-02-12 RX ORDER — DOXYCYCLINE 100 MG/1
100 CAPSULE ORAL 2 TIMES DAILY
Qty: 20 CAPSULE | Refills: 0 | Status: ON HOLD | OUTPATIENT
Start: 2024-02-12 | End: 2024-02-25 | Stop reason: HOSPADM

## 2024-02-12 RX ORDER — DAPSONE 25 MG/1
50 TABLET ORAL 2 TIMES DAILY
COMMUNITY
Start: 2024-02-02 | End: 2024-02-23 | Stop reason: ALTCHOICE

## 2024-02-12 NOTE — PROGRESS NOTES
"[facility]   OFFICE VISIT NOTE    Patient Name: Ijeoma Issa  YOB: 1970    PRESENTING HISTORY     History of Present Illness:  Ms. Ijeoma Issa is a 53 y.o. female for left axilla pain.  Denies any fever, chills, drainage from the site.  Per patient it feels hard and painful and it is aggravated by movement.    Denies any recent exposure to cat or cat bites.  She is right-handed.  Denies any shaving or picking hair from the site. Breast cancer screening up to date. Patient denies any recent left sided breast pain, nipple discharge, or skin changes on her breast       Review of Systems   Constitutional:  Negative for chills, diaphoresis, fatigue and fever.   Respiratory:  Negative for cough, shortness of breath and wheezing.    Cardiovascular:  Negative for chest pain and palpitations.   Gastrointestinal:  Negative for constipation, diarrhea, nausea and vomiting.   Genitourinary:  Negative for bladder incontinence.   Integumentary:  Positive for mole/lesion.   Neurological:  Negative for coordination difficulties.   Psychiatric/Behavioral:  Negative for behavioral problems.           OBJECTIVE:   Vital Signs:  Vitals:    02/12/24 1412   BP: 132/80   Pulse: 95   Resp: 18   SpO2: 97%   Weight: 114.8 kg (253 lb 1.4 oz)   Height: 5' 4" (1.626 m)           Physical Exam  Constitutional:       General: She is not in acute distress.     Appearance: She is obese. She is not ill-appearing or toxic-appearing.   HENT:      Head: Normocephalic and atraumatic.      Mouth/Throat:      Mouth: Mucous membranes are moist.      Pharynx: Uvula midline. No pharyngeal swelling.   Eyes:      Extraocular Movements: Extraocular movements intact.      Pupils: Pupils are equal, round, and reactive to light.   Cardiovascular:      Rate and Rhythm: Normal rate and regular rhythm.   Pulmonary:      Effort: Pulmonary effort is normal. No tachypnea, bradypnea, accessory muscle usage, " prolonged expiration or respiratory distress.      Breath sounds: Normal breath sounds. No stridor. No wheezing, rhonchi or rales.   Lymphadenopathy:      Head:      Left side of head: No submental, submandibular, preauricular or posterior auricular adenopathy.      Cervical:      Left cervical: No superficial or posterior cervical adenopathy.      Upper Body:      Left upper body: No supraclavicular or axillary adenopathy.   Skin:     General: Skin is warm.      Comments: Erythematous lesion on left axilla that is slightly protruded out. About 2 cm by 1.5 cm in size. No active drainage of pus or blood noted. Slightly hard on palpation    Neurological:      General: No focal deficit present.      Mental Status: She is alert.   Psychiatric:         Mood and Affect: Mood normal.         Behavior: Behavior normal.         ASSESSMENT & PLAN:     Pain in left axilla  -     doxycycline (VIBRAMYCIN) 100 MG Cap; Take 1 capsule (100 mg total) by mouth 2 (two) times daily. for 10 days  Dispense: 20 capsule; Refill: 0  -     CBC Auto Differential; Future; Expected date: 02/12/2024  -     Sedimentation rate; Future; Expected date: 02/12/2024  -     HIV 1/2 Ag/Ab (4th Gen); Future; Expected date: 02/12/2024  -     RPR; Future; Expected date: 02/12/2024    Based on the exam, she had erythematous lesion on left axilla that is slightly protruded out.  Sizes about 2 cm x 1.5 cm.  No active drainage or pus or blood noted.  Slightly hard on palpation.   No axillary or supraclavicular lymphadenopathy noted.   Differential diagnoses: cat scratch disease, syphilis, hematoma, abscess, skin infection including cellulitis, hidradenitis suppurativa     Will try doxycycline 100 mg BID for the next 10 days and treat this as skin infection   Other top differential diagnosis includes HS -- which may require longer course of doxycyline   Advised the patient to follow up in person if pain persisted or lesion enlarges in size     Allison Adler  MD  Family Medicine  [facility]     This note was created using [employer] voice recognition software that occasionally misinterprets phrases or words

## 2024-02-14 LAB — RPR SER QL: NORMAL

## 2024-02-22 ENCOUNTER — HOSPITAL ENCOUNTER (OUTPATIENT)
Facility: HOSPITAL | Age: 54
Discharge: HOME | End: 2024-02-25
Attending: EMERGENCY MEDICINE | Admitting: HOSPITALIST
Payer: COMMERCIAL

## 2024-02-22 DIAGNOSIS — R29.818 ACUTE FOCAL NEUROLOGICAL DEFICIT: ICD-10-CM

## 2024-02-22 DIAGNOSIS — R29.898 LEFT ARM WEAKNESS: Primary | ICD-10-CM

## 2024-02-22 LAB
ALBUMIN SERPL BCP-MCNC: 4 G/DL (ref 3.5–5.2)
ALP SERPL-CCNC: 92 U/L (ref 55–135)
ALT SERPL W/O P-5'-P-CCNC: 22 U/L (ref 10–44)
ANION GAP SERPL CALC-SCNC: 11 MMOL/L (ref 8–16)
AST SERPL-CCNC: 26 U/L (ref 10–40)
BASOPHILS # BLD AUTO: 0.04 K/UL (ref 0–0.2)
BASOPHILS NFR BLD: 0.5 % (ref 0–1.9)
BILIRUB SERPL-MCNC: 0.9 MG/DL (ref 0.1–1)
BUN SERPL-MCNC: 16 MG/DL (ref 6–20)
CALCIUM SERPL-MCNC: 9.2 MG/DL (ref 8.7–10.5)
CHLORIDE SERPL-SCNC: 106 MMOL/L (ref 95–110)
CHOLEST SERPL-MCNC: 167 MG/DL (ref 120–199)
CHOLEST/HDLC SERPL: 3.2 {RATIO} (ref 2–5)
CO2 SERPL-SCNC: 23 MMOL/L (ref 23–29)
CREAT SERPL-MCNC: 0.8 MG/DL (ref 0.5–1.4)
DIFFERENTIAL METHOD BLD: ABNORMAL
EOSINOPHIL # BLD AUTO: 0.2 K/UL (ref 0–0.5)
EOSINOPHIL NFR BLD: 2.1 % (ref 0–8)
ERYTHROCYTE [DISTWIDTH] IN BLOOD BY AUTOMATED COUNT: 13.2 % (ref 11.5–14.5)
EST. GFR  (NO RACE VARIABLE): >60 ML/MIN/1.73 M^2
GLUCOSE SERPL-MCNC: 110 MG/DL (ref 70–110)
GLUCOSE SERPL-MCNC: 111 MG/DL (ref 70–110)
HCT VFR BLD AUTO: 39.6 % (ref 37–48.5)
HDLC SERPL-MCNC: 53 MG/DL (ref 40–75)
HDLC SERPL: 31.7 % (ref 20–50)
HGB BLD-MCNC: 12.5 G/DL (ref 12–16)
IMM GRANULOCYTES # BLD AUTO: 0.02 K/UL (ref 0–0.04)
IMM GRANULOCYTES NFR BLD AUTO: 0.2 % (ref 0–0.5)
INR PPP: 1 (ref 0.8–1.2)
LDLC SERPL CALC-MCNC: 97 MG/DL (ref 63–159)
LYMPHOCYTES # BLD AUTO: 1.2 K/UL (ref 1–4.8)
LYMPHOCYTES NFR BLD: 14.6 % (ref 18–48)
MCH RBC QN AUTO: 29.1 PG (ref 27–31)
MCHC RBC AUTO-ENTMCNC: 31.6 G/DL (ref 32–36)
MCV RBC AUTO: 92 FL (ref 82–98)
MONOCYTES # BLD AUTO: 0.6 K/UL (ref 0.3–1)
MONOCYTES NFR BLD: 6.7 % (ref 4–15)
NEUTROPHILS # BLD AUTO: 6.2 K/UL (ref 1.8–7.7)
NEUTROPHILS NFR BLD: 75.9 % (ref 38–73)
NONHDLC SERPL-MCNC: 114 MG/DL
NRBC BLD-RTO: 0 /100 WBC
PLATELET # BLD AUTO: 232 K/UL (ref 150–450)
PMV BLD AUTO: 10.9 FL (ref 9.2–12.9)
POC PTINR: 1.1 (ref 0.9–1.2)
POCT GLUCOSE: 110 MG/DL (ref 70–110)
POTASSIUM SERPL-SCNC: 3.8 MMOL/L (ref 3.5–5.1)
PROT SERPL-MCNC: 7.4 G/DL (ref 6–8.4)
PROTHROMBIN TIME: 10.4 SEC (ref 9–12.5)
RBC # BLD AUTO: 4.29 M/UL (ref 4–5.4)
SAMPLE: NORMAL
SODIUM SERPL-SCNC: 140 MMOL/L (ref 136–145)
TRIGL SERPL-MCNC: 85 MG/DL (ref 30–150)
TSH SERPL DL<=0.005 MIU/L-ACNC: 1.52 UIU/ML (ref 0.4–4)
WBC # BLD AUTO: 8.21 K/UL (ref 3.9–12.7)

## 2024-02-22 PROCEDURE — 94761 N-INVAS EAR/PLS OXIMETRY MLT: CPT

## 2024-02-22 PROCEDURE — 99204 OFFICE O/P NEW MOD 45 MIN: CPT | Mod: GT,,, | Performed by: PSYCHIATRY & NEUROLOGY

## 2024-02-22 PROCEDURE — G0378 HOSPITAL OBSERVATION PER HR: HCPCS

## 2024-02-22 PROCEDURE — 82962 GLUCOSE BLOOD TEST: CPT

## 2024-02-22 PROCEDURE — 99900035 HC TECH TIME PER 15 MIN (STAT)

## 2024-02-22 PROCEDURE — 80061 LIPID PANEL: CPT | Performed by: EMERGENCY MEDICINE

## 2024-02-22 PROCEDURE — 93010 ELECTROCARDIOGRAM REPORT: CPT | Mod: ,,, | Performed by: GENERAL PRACTICE

## 2024-02-22 PROCEDURE — 36415 COLL VENOUS BLD VENIPUNCTURE: CPT | Performed by: EMERGENCY MEDICINE

## 2024-02-22 PROCEDURE — 63600175 PHARM REV CODE 636 W HCPCS: Performed by: EMERGENCY MEDICINE

## 2024-02-22 PROCEDURE — 84443 ASSAY THYROID STIM HORMONE: CPT | Performed by: EMERGENCY MEDICINE

## 2024-02-22 PROCEDURE — 85025 COMPLETE CBC W/AUTO DIFF WBC: CPT | Performed by: EMERGENCY MEDICINE

## 2024-02-22 PROCEDURE — 25000003 PHARM REV CODE 250: Performed by: NURSE PRACTITIONER

## 2024-02-22 PROCEDURE — 85610 PROTHROMBIN TIME: CPT

## 2024-02-22 PROCEDURE — 85610 PROTHROMBIN TIME: CPT | Performed by: EMERGENCY MEDICINE

## 2024-02-22 PROCEDURE — 99285 EMERGENCY DEPT VISIT HI MDM: CPT | Mod: 25

## 2024-02-22 PROCEDURE — 96376 TX/PRO/DX INJ SAME DRUG ADON: CPT

## 2024-02-22 PROCEDURE — 93005 ELECTROCARDIOGRAM TRACING: CPT

## 2024-02-22 PROCEDURE — 80053 COMPREHEN METABOLIC PANEL: CPT | Performed by: EMERGENCY MEDICINE

## 2024-02-22 PROCEDURE — 96374 THER/PROPH/DIAG INJ IV PUSH: CPT | Mod: 59

## 2024-02-22 RX ORDER — GABAPENTIN 300 MG/1
300 CAPSULE ORAL NIGHTLY
Status: DISCONTINUED | OUTPATIENT
Start: 2024-02-22 | End: 2024-02-25 | Stop reason: HOSPADM

## 2024-02-22 RX ORDER — NAPROXEN SODIUM 220 MG/1
81 TABLET, FILM COATED ORAL DAILY
Status: DISCONTINUED | OUTPATIENT
Start: 2024-02-23 | End: 2024-02-25

## 2024-02-22 RX ORDER — SODIUM CHLORIDE 0.9 % (FLUSH) 0.9 %
10 SYRINGE (ML) INJECTION
Status: DISCONTINUED | OUTPATIENT
Start: 2024-02-22 | End: 2024-02-25 | Stop reason: HOSPADM

## 2024-02-22 RX ORDER — PANTOPRAZOLE SODIUM 40 MG/1
40 TABLET, DELAYED RELEASE ORAL DAILY
Status: DISCONTINUED | OUTPATIENT
Start: 2024-02-23 | End: 2024-02-25 | Stop reason: HOSPADM

## 2024-02-22 RX ORDER — SPIRONOLACTONE 25 MG/1
25 TABLET ORAL DAILY
Status: DISCONTINUED | OUTPATIENT
Start: 2024-02-23 | End: 2024-02-25 | Stop reason: HOSPADM

## 2024-02-22 RX ORDER — LABETALOL HYDROCHLORIDE 5 MG/ML
10 INJECTION, SOLUTION INTRAVENOUS EVERY 6 HOURS PRN
Status: DISCONTINUED | OUTPATIENT
Start: 2024-02-22 | End: 2024-02-25 | Stop reason: HOSPADM

## 2024-02-22 RX ORDER — CALCIUM CARBONATE 200(500)MG
500 TABLET,CHEWABLE ORAL 3 TIMES DAILY PRN
Status: DISCONTINUED | OUTPATIENT
Start: 2024-02-22 | End: 2024-02-25 | Stop reason: HOSPADM

## 2024-02-22 RX ORDER — HYDROCODONE BITARTRATE AND ACETAMINOPHEN 10; 325 MG/1; MG/1
1 TABLET ORAL EVERY 4 HOURS PRN
Status: DISCONTINUED | OUTPATIENT
Start: 2024-02-22 | End: 2024-02-25

## 2024-02-22 RX ORDER — ACETAMINOPHEN 325 MG/1
650 TABLET ORAL EVERY 6 HOURS PRN
Status: DISCONTINUED | OUTPATIENT
Start: 2024-02-22 | End: 2024-02-25 | Stop reason: HOSPADM

## 2024-02-22 RX ORDER — MONTELUKAST SODIUM 10 MG/1
10 TABLET ORAL DAILY
Status: DISCONTINUED | OUTPATIENT
Start: 2024-02-23 | End: 2024-02-25 | Stop reason: HOSPADM

## 2024-02-22 RX ORDER — HYDROCODONE BITARTRATE AND ACETAMINOPHEN 5; 325 MG/1; MG/1
1 TABLET ORAL EVERY 4 HOURS PRN
Status: DISCONTINUED | OUTPATIENT
Start: 2024-02-22 | End: 2024-02-25 | Stop reason: HOSPADM

## 2024-02-22 RX ORDER — DOXEPIN HYDROCHLORIDE 25 MG/1
25 CAPSULE ORAL NIGHTLY
Status: DISCONTINUED | OUTPATIENT
Start: 2024-02-22 | End: 2024-02-25 | Stop reason: HOSPADM

## 2024-02-22 RX ORDER — ONDANSETRON HYDROCHLORIDE 2 MG/ML
4 INJECTION, SOLUTION INTRAVENOUS
Status: COMPLETED | OUTPATIENT
Start: 2024-02-22 | End: 2024-02-22

## 2024-02-22 RX ORDER — ONDANSETRON HYDROCHLORIDE 2 MG/ML
4 INJECTION, SOLUTION INTRAVENOUS EVERY 12 HOURS PRN
Status: DISCONTINUED | OUTPATIENT
Start: 2024-02-22 | End: 2024-02-23

## 2024-02-22 RX ORDER — TALC
6 POWDER (GRAM) TOPICAL NIGHTLY PRN
Status: DISCONTINUED | OUTPATIENT
Start: 2024-02-22 | End: 2024-02-25 | Stop reason: HOSPADM

## 2024-02-22 RX ADMIN — GABAPENTIN 300 MG: 300 CAPSULE ORAL at 11:02

## 2024-02-22 RX ADMIN — DOXEPIN HYDROCHLORIDE 25 MG: 25 CAPSULE ORAL at 11:02

## 2024-02-22 RX ADMIN — ONDANSETRON 4 MG: 2 INJECTION INTRAMUSCULAR; INTRAVENOUS at 11:02

## 2024-02-22 RX ADMIN — ONDANSETRON 4 MG: 2 INJECTION INTRAMUSCULAR; INTRAVENOUS at 09:02

## 2024-02-22 NOTE — LETTER
February 25, 2024         [address] [place] [address] [address]OS [address]ISH [id number]       Patient: Ijeoma Issa   YOB: 1970  Date of Visit: 02/22/2024-02/25/2024    To Whom It May Concern:    Ronan Issa  was at [facility] on 02/22/2024-02/25/2024. The patient may return to work once cleared by outpatient physical therapy. If you have any questions or concerns, or if I can be of further assistance, please do not hesitate to contact me.    Sincerely,    Belinda Grace RN

## 2024-02-22 NOTE — LETTER
February 25, 2024         [address] [place] [address] DR MARCOS PARRISH [id number]       Patient: Ijeoma Issa   YOB: 1970  Date of Visit: 02/22/2024-02/25/2024    To Whom It May Concern:    Ronan Issa  was at [facility] on 02/22/2024-02/25/2024. The patient may return to work after evaluated at outpatient follow up appointment. If you have any questions or concerns, or if I can be of further assistance, please do not hesitate to contact me.    Sincerely,    Belinda Grace RN

## 2024-02-23 PROBLEM — M25.512 ACUTE PAIN OF LEFT SHOULDER: Status: ACTIVE | Noted: 2024-02-23

## 2024-02-23 LAB
ALBUMIN SERPL BCP-MCNC: 3.5 G/DL (ref 3.5–5.2)
ALP SERPL-CCNC: 81 U/L (ref 55–135)
ALT SERPL W/O P-5'-P-CCNC: 19 U/L (ref 10–44)
ANION GAP SERPL CALC-SCNC: 10 MMOL/L (ref 8–16)
APTT PPP: 24.5 SEC (ref 21–32)
AST SERPL-CCNC: 21 U/L (ref 10–40)
BASOPHILS # BLD AUTO: 0.03 K/UL (ref 0–0.2)
BASOPHILS NFR BLD: 0.5 % (ref 0–1.9)
BILIRUB SERPL-MCNC: 1.1 MG/DL (ref 0.1–1)
BUN SERPL-MCNC: 14 MG/DL (ref 6–20)
CALCIUM SERPL-MCNC: 8.6 MG/DL (ref 8.7–10.5)
CHLORIDE SERPL-SCNC: 106 MMOL/L (ref 95–110)
CHOLEST SERPL-MCNC: 141 MG/DL (ref 120–199)
CHOLEST/HDLC SERPL: 2.9 {RATIO} (ref 2–5)
CO2 SERPL-SCNC: 20 MMOL/L (ref 23–29)
CREAT SERPL-MCNC: 0.8 MG/DL (ref 0.5–1.4)
DIFFERENTIAL METHOD BLD: ABNORMAL
EOSINOPHIL # BLD AUTO: 0.1 K/UL (ref 0–0.5)
EOSINOPHIL NFR BLD: 1.5 % (ref 0–8)
ERYTHROCYTE [DISTWIDTH] IN BLOOD BY AUTOMATED COUNT: 13.4 % (ref 11.5–14.5)
EST. GFR  (NO RACE VARIABLE): >60 ML/MIN/1.73 M^2
GLUCOSE SERPL-MCNC: 97 MG/DL (ref 70–110)
HCT VFR BLD AUTO: 39.5 % (ref 37–48.5)
HDLC SERPL-MCNC: 48 MG/DL (ref 40–75)
HDLC SERPL: 34 % (ref 20–50)
HGB BLD-MCNC: 12.2 G/DL (ref 12–16)
IMM GRANULOCYTES # BLD AUTO: 0.02 K/UL (ref 0–0.04)
IMM GRANULOCYTES NFR BLD AUTO: 0.3 % (ref 0–0.5)
INR PPP: 1 (ref 0.8–1.2)
LDLC SERPL CALC-MCNC: 79.8 MG/DL (ref 63–159)
LYMPHOCYTES # BLD AUTO: 1 K/UL (ref 1–4.8)
LYMPHOCYTES NFR BLD: 16.9 % (ref 18–48)
MAGNESIUM SERPL-MCNC: 1.7 MG/DL (ref 1.6–2.6)
MCH RBC QN AUTO: 29.6 PG (ref 27–31)
MCHC RBC AUTO-ENTMCNC: 30.9 G/DL (ref 32–36)
MCV RBC AUTO: 96 FL (ref 82–98)
MONOCYTES # BLD AUTO: 0.6 K/UL (ref 0.3–1)
MONOCYTES NFR BLD: 9.8 % (ref 4–15)
NEUTROPHILS # BLD AUTO: 4.2 K/UL (ref 1.8–7.7)
NEUTROPHILS NFR BLD: 71 % (ref 38–73)
NONHDLC SERPL-MCNC: 93 MG/DL
NRBC BLD-RTO: 0 /100 WBC
PHOSPHATE SERPL-MCNC: 3.2 MG/DL (ref 2.7–4.5)
PLATELET # BLD AUTO: 205 K/UL (ref 150–450)
PMV BLD AUTO: 11.3 FL (ref 9.2–12.9)
POCT GLUCOSE: 104 MG/DL (ref 70–110)
POTASSIUM SERPL-SCNC: 3.9 MMOL/L (ref 3.5–5.1)
PROT SERPL-MCNC: 6.7 G/DL (ref 6–8.4)
PROTHROMBIN TIME: 10.9 SEC (ref 9–12.5)
RBC # BLD AUTO: 4.12 M/UL (ref 4–5.4)
SODIUM SERPL-SCNC: 136 MMOL/L (ref 136–145)
TRIGL SERPL-MCNC: 66 MG/DL (ref 30–150)
TROPONIN I SERPL DL<=0.01 NG/ML-MCNC: <0.006 NG/ML (ref 0–0.03)
TROPONIN I SERPL DL<=0.01 NG/ML-MCNC: <0.006 NG/ML (ref 0–0.03)
WBC # BLD AUTO: 5.91 K/UL (ref 3.9–12.7)

## 2024-02-23 PROCEDURE — 85730 THROMBOPLASTIN TIME PARTIAL: CPT | Performed by: NURSE PRACTITIONER

## 2024-02-23 PROCEDURE — 84100 ASSAY OF PHOSPHORUS: CPT | Performed by: NURSE PRACTITIONER

## 2024-02-23 PROCEDURE — 83735 ASSAY OF MAGNESIUM: CPT | Performed by: NURSE PRACTITIONER

## 2024-02-23 PROCEDURE — 94761 N-INVAS EAR/PLS OXIMETRY MLT: CPT

## 2024-02-23 PROCEDURE — 36415 COLL VENOUS BLD VENIPUNCTURE: CPT | Performed by: NURSE PRACTITIONER

## 2024-02-23 PROCEDURE — G0378 HOSPITAL OBSERVATION PER HR: HCPCS

## 2024-02-23 PROCEDURE — 25000003 PHARM REV CODE 250: Performed by: NURSE PRACTITIONER

## 2024-02-23 PROCEDURE — 63600175 PHARM REV CODE 636 W HCPCS: Performed by: NURSE PRACTITIONER

## 2024-02-23 PROCEDURE — 27000221 HC OXYGEN, UP TO 24 HOURS

## 2024-02-23 PROCEDURE — 63600175 PHARM REV CODE 636 W HCPCS: Performed by: HOSPITALIST

## 2024-02-23 PROCEDURE — 96376 TX/PRO/DX INJ SAME DRUG ADON: CPT | Mod: 59

## 2024-02-23 PROCEDURE — 25000003 PHARM REV CODE 250: Performed by: HOSPITALIST

## 2024-02-23 PROCEDURE — 80061 LIPID PANEL: CPT | Performed by: NURSE PRACTITIONER

## 2024-02-23 PROCEDURE — 96375 TX/PRO/DX INJ NEW DRUG ADDON: CPT | Mod: 59

## 2024-02-23 PROCEDURE — 97165 OT EVAL LOW COMPLEX 30 MIN: CPT

## 2024-02-23 PROCEDURE — A9585 GADOBUTROL INJECTION: HCPCS

## 2024-02-23 PROCEDURE — 85610 PROTHROMBIN TIME: CPT | Performed by: NURSE PRACTITIONER

## 2024-02-23 PROCEDURE — 80053 COMPREHEN METABOLIC PANEL: CPT | Performed by: NURSE PRACTITIONER

## 2024-02-23 PROCEDURE — 84484 ASSAY OF TROPONIN QUANT: CPT | Performed by: HOSPITALIST

## 2024-02-23 PROCEDURE — 25500020 PHARM REV CODE 255

## 2024-02-23 PROCEDURE — 85025 COMPLETE CBC W/AUTO DIFF WBC: CPT | Performed by: NURSE PRACTITIONER

## 2024-02-23 PROCEDURE — 36415 COLL VENOUS BLD VENIPUNCTURE: CPT | Performed by: HOSPITALIST

## 2024-02-23 RX ORDER — GADOBUTROL 604.72 MG/ML
INJECTION INTRAVENOUS
Status: COMPLETED
Start: 2024-02-23 | End: 2024-02-23

## 2024-02-23 RX ORDER — IBUPROFEN 400 MG/1
400 TABLET ORAL 3 TIMES DAILY
Status: DISCONTINUED | OUTPATIENT
Start: 2024-02-23 | End: 2024-02-25 | Stop reason: HOSPADM

## 2024-02-23 RX ORDER — PROCHLORPERAZINE EDISYLATE 5 MG/ML
5 INJECTION INTRAMUSCULAR; INTRAVENOUS EVERY 6 HOURS PRN
Status: DISCONTINUED | OUTPATIENT
Start: 2024-02-23 | End: 2024-02-25 | Stop reason: HOSPADM

## 2024-02-23 RX ORDER — ONDANSETRON HYDROCHLORIDE 2 MG/ML
4 INJECTION, SOLUTION INTRAVENOUS EVERY 6 HOURS PRN
Status: DISCONTINUED | OUTPATIENT
Start: 2024-02-23 | End: 2024-02-25 | Stop reason: HOSPADM

## 2024-02-23 RX ORDER — LIDOCAINE 50 MG/G
1 PATCH TOPICAL
Status: DISCONTINUED | OUTPATIENT
Start: 2024-02-23 | End: 2024-02-25 | Stop reason: HOSPADM

## 2024-02-23 RX ORDER — ACITRETIN 25 MG/1
25 CAPSULE ORAL DAILY
Status: ON HOLD | COMMUNITY
Start: 2024-02-22 | End: 2024-03-26

## 2024-02-23 RX ADMIN — HYDROCODONE BITARTRATE AND ACETAMINOPHEN 1 TABLET: 5; 325 TABLET ORAL at 06:02

## 2024-02-23 RX ADMIN — MONTELUKAST 10 MG: 10 TABLET, FILM COATED ORAL at 08:02

## 2024-02-23 RX ADMIN — IBUPROFEN 400 MG: 400 TABLET, FILM COATED ORAL at 04:02

## 2024-02-23 RX ADMIN — LIDOCAINE 1 PATCH: 700 PATCH TOPICAL at 10:02

## 2024-02-23 RX ADMIN — ONDANSETRON 4 MG: 2 INJECTION INTRAMUSCULAR; INTRAVENOUS at 08:02

## 2024-02-23 RX ADMIN — PROCHLORPERAZINE EDISYLATE 5 MG: 5 INJECTION INTRAMUSCULAR; INTRAVENOUS at 10:02

## 2024-02-23 RX ADMIN — DOXEPIN HYDROCHLORIDE 25 MG: 25 CAPSULE ORAL at 10:02

## 2024-02-23 RX ADMIN — ASPIRIN 81 MG CHEWABLE TABLET 81 MG: 81 TABLET CHEWABLE at 08:02

## 2024-02-23 RX ADMIN — SPIRONOLACTONE 25 MG: 25 TABLET ORAL at 08:02

## 2024-02-23 RX ADMIN — HYDROCODONE BITARTRATE AND ACETAMINOPHEN 1 TABLET: 10; 325 TABLET ORAL at 01:02

## 2024-02-23 RX ADMIN — PANTOPRAZOLE SODIUM 40 MG: 40 TABLET, DELAYED RELEASE ORAL at 08:02

## 2024-02-23 RX ADMIN — ACETAMINOPHEN 650 MG: 325 TABLET ORAL at 04:02

## 2024-02-23 RX ADMIN — GABAPENTIN 300 MG: 300 CAPSULE ORAL at 08:02

## 2024-02-23 RX ADMIN — ONDANSETRON 4 MG: 2 INJECTION INTRAMUSCULAR; INTRAVENOUS at 04:02

## 2024-02-23 RX ADMIN — HYDROCODONE BITARTRATE AND ACETAMINOPHEN 1 TABLET: 10; 325 TABLET ORAL at 08:02

## 2024-02-23 RX ADMIN — HYDROCODONE BITARTRATE AND ACETAMINOPHEN 1 TABLET: 10; 325 TABLET ORAL at 02:02

## 2024-02-23 RX ADMIN — IBUPROFEN 400 MG: 400 TABLET, FILM COATED ORAL at 08:02

## 2024-02-23 RX ADMIN — GADOBUTROL 10 ML: 604.72 INJECTION INTRAVENOUS at 05:02

## 2024-02-23 NOTE — SUBJECTIVE & OBJECTIVE
Interval History:  Developed sudden onset left shoulder pain, unable to move left upper arm after holding a baby for prolonged position.  No fever chills, no nausea vomiting diarrhea.  No chest pain or SOB.     Review of Systems   Constitutional:  Negative for activity change and fever.   HENT:  Negative for ear discharge, facial swelling and sore throat.    Eyes:  Negative for photophobia, pain and visual disturbance.   Respiratory:  Negative for apnea, cough and shortness of breath.    Cardiovascular:  Negative for chest pain and leg swelling.   Gastrointestinal:  Negative for abdominal pain and blood in stool.   Endocrine: Negative for cold intolerance and heat intolerance.   Musculoskeletal:  Negative for back pain and gait problem.        Left shoulder pain, unable to move left upper arm   Skin:  Negative for pallor and rash.   Neurological:  Negative for speech difficulty and headaches.   Psychiatric/Behavioral:  Negative for confusion, hallucinations and suicidal ideas.    All other systems reviewed and are negative.    Objective:     Vital Signs (Most Recent):  Temp: 98.1 °F (36.7 °C) (02/23/24 1035)  Pulse: 71 (02/23/24 1022)  Resp: 16 (02/23/24 0800)  BP: (!) 100/58 (02/23/24 0801)  SpO2: 95 % (02/23/24 1022) Vital Signs (24h Range):  Temp:  [97.8 °F (36.6 °C)-98.1 °F (36.7 °C)] 98.1 °F (36.7 °C)  Pulse:  [64-98] 71  Resp:  [16-20] 16  SpO2:  [88 %-97 %] 95 %  BP: (100-138)/(58-74) 100/58     Weight: 115.7 kg (255 lb)  Body mass index is 43.77 kg/m².  No intake or output data in the 24 hours ending 02/23/24 1122      Physical Exam  Constitutional:       Appearance: Normal appearance.   HENT:      Head: Normocephalic and atraumatic.      Nose: Nose normal.   Eyes:      Extraocular Movements: Extraocular movements intact.      Pupils: Pupils are equal, round, and reactive to light.   Cardiovascular:      Rate and Rhythm: Normal rate and regular rhythm.      Heart sounds: No murmur heard.  Pulmonary:       Effort: Pulmonary effort is normal.      Breath sounds: Normal breath sounds.   Abdominal:      General: Abdomen is flat.      Palpations: Abdomen is soft.   Musculoskeletal:      Cervical back: Normal range of motion and neck supple.      Comments: Significant tenderness at the left upper shoulder,  decreased range of motion in the left upper arm.    Skin:     General: Skin is warm and dry.   Neurological:      General: No focal deficit present.      Mental Status: She is alert and oriented to person, place, and time.   Psychiatric:         Mood and Affect: Mood normal.             Significant Labs: All pertinent labs within the past 24 hours have been reviewed.  CBC:   Recent Labs   Lab 02/22/24 2000 02/23/24  0545   WBC 8.21 5.91   HGB 12.5 12.2   HCT 39.6 39.5    205     CMP:   Recent Labs   Lab 02/22/24 2000 02/23/24  0545    136   K 3.8 3.9    106   CO2 23 20*   * 97   BUN 16 14   CREATININE 0.8 0.8   CALCIUM 9.2 8.6*   PROT 7.4 6.7   ALBUMIN 4.0 3.5   BILITOT 0.9 1.1*   ALKPHOS 92 81   AST 26 21   ALT 22 19   ANIONGAP 11 10       Significant Imaging: I have reviewed all pertinent imaging results/findings within the past 24 hours.  I have reviewed and interpreted all pertinent imaging results/findings within the past 24 hours.    CTA Head and Neck (xpd)    Result Date: 2/23/2024  EXAMINATION: CTA HEAD AND NECK (XPD) CLINICAL HISTORY: Neuro deficit, acute, stroke suspected; TECHNIQUE: Non contrast low dose axial images were obtained through the head. CT angiogram was performed from the level of the merritt to the top of the head following the IV administration of 100mL of Omnipaque 350.   Sagittal and coronal reconstructions and maximum intensity projection reconstructions were performed. Arterial stenosis percentages are based on NASCET measurement criteria. COMPARISON: None FINDINGS: CTA HEAD: Vasculature: Mild atherosclerotic changes of the cavernous carotid arteries without  significant stenosis.  The intracranial arteries show normal anatomy without evidence of major branch occlusion or focal luminal narrowing.There is no suspicion of vascular malformation or saccular aneurysm. Variant anatomy: Presumed small right anterior orbital/supra orbital venous varix or potential small AV fistula involving a branch of the ophthalmic artery and facial vein. Brain: There is no evidence of mass, mass effect, edema, midline shift, intracranial hemorrhage, or space-occupying extra-axial fluid collection. After the administration of contrast there is no abnormal enhancement. Ventricles/Sulci: The ventricles and sulci are appropriate in size for age. Osseous Structures: The osseous structures are unremarkable in appearance. Sinuses and orbits: Mild scattered mucosal thickening.  Visualized orbits are within normal limits. Other: Visualized portions of the mastoids are unremarkable. CTA NECK: Aorta: Conventional branching pattern. The great vessel origins are patent without flow limiting stenosis. Vertebral Arteries: The origins of the vertebral arteries are patent.  No flow limiting stenosis, occlusion, or dissection. Right Carotid: No flow limiting stenosis, occlusion, or dissection of the common carotid or internal carotid arteries.  No significant plaque of the proximal ICA.  Right internal carotid artery: 0 % stenosis by and NASCET. Left Carotid: No flow limiting stenosis, occlusion, or dissection of the common carotid or internal carotid arteries. No significant plaque of the proximal ICA. Right internal carotid artery: 0 % stenosis by and NASCET. Extravascular Anatomy: No definite adjacent soft tissue abnormality seen accounting for technique used.  Mild superior endplate compression deformity of T3 which appears chronic.  No significant bony process.     1. No acute intracranial CT findings. 2. No intracranial proximal large vessel occlusion or significant stenosis. 3. No significant  atherosclerotic changes or arterial stenosis within the neck. 4. 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 The major findings between the preliminary and final reports are concordant. Electronically signed by: Endy Musa Date:    02/23/2024 Time:    08:06    MRI Cervical Spine Without Contrast    Result Date: 2/23/2024  EXAMINATION: MRI CERVICAL SPINE WITHOUT CONTRAST CLINICAL HISTORY: Myelopathy, acute, cervical spine;. TECHNIQUE: Multiplanar, multisequence MR images of the cervical spine were acquired without the administration of contrast. COMPARISON: CTA 02/22/2024. FINDINGS: Motion limited exam Morphology: Cervical vertebral body heights are preserved marrow signal is within normal limits.  Incidentally noted chronic appearing mild superior endplate compression deformity of T3 and T3 and T4 vertebral body hemangiomas. Alignment: Cervical spinal alignment is normal. Cord: The cervical cord shows normal contour and signal content throughout its length. Craniocervical Junction: Cerebellar tonsils are normally positioned. The visualized portions of the posterior fossa are unremarkable.  Incidentally noted partially empty sella configuration, nonspecific in isolation as described on the concurrently performed brain MRI 02/22/2024.  The regional osseous anatomy is normal. Disc levels: C2-C3: Mild right-sided facet arthrosis producing no more than mild right foraminal narrowing.  The spinal canal and left foramen are patent.. C3-C4: Mild bilateral facet arthrosis producing no more than mild bilateral foraminal narrowing.  The spinal canal is patent. C4-C5: Mild bilateral facet arthrosis and uncovertebral spurring producing no more than mild bilateral foraminal narrowing.  The spinal canal is patent.. C5-C6: Mild right-sided facet arthrosis.  The spinal canal and foramina remain patent.. C6-C7: Broad-based disc protrusion centrally and lateralizing to the left producing mild spinal  canal narrowing and suspected mild-to-moderate left foraminal narrowing.  The right foramen is patent.. C7-T1: Within normal limits. Soft tissues: The visualized paraspinal soft tissues are within normal limits.     1. Motion limited exam without evidence of an acute process/cord impingement. 2. Overall mild degenerative changes most pronounced at C6-C7 where there is a shallow left lateralizing disc protrusion contributing to mild spinal canal narrowing and mild-to-moderate left foraminal narrowing. The preliminary and final reports are concordant. Electronically signed by: Endy Musa Date:    02/23/2024 Time:    07:50    MRI Brain Without Contrast    Result Date: 2/23/2024  EXAMINATION: MRI BRAIN WITHOUT CONTRAST CLINICAL HISTORY: Stroke, follow up;. TECHNIQUE: Multiplanar multisequence MR imaging of the brain was performed without contrast COMPARISON: CTA 02/22/2024. FINDINGS: Motion degraded exam. Brain: There are no concerning focal areas of abnormal or restricted diffusion within the brain.  No mass, hemorrhage or acute infarct is present. Midline Structures: Partially empty sella configuration, nonspecific in isolation.  The midline structures of the brain are normal. Ventricles: The ventricles, sulci and cisterns are within normal limits. Vasculature: The vascular flow voids at the base of the brain are within normal limits. Calvarium: The visualized osseous structures are unremarkable. Sinuses: Minimal mucosal thickening within the dependent maxillary sinuses.  No obstructive changes or dependent fluid levels.  Minimal ethmoid sinus opacification. Orbits: The orbits and globes are unremarkable. Mastoids: The mastoid air cells are adequately aerated. Extracranial soft tissues: The visualized extracranial soft tissues are unremarkable.     1. No acute process, specifically no recent infarct on this motion limited noncontrast brain MRI. 2. Variant partially empty sella configuration, nonspecific in isolation.  The preliminary and final reports are concordant. Electronically signed by: Endy Musa Date:    02/23/2024 Time:    07:12  - pulls last radiology orders

## 2024-02-23 NOTE — ASSESSMENT & PLAN NOTE
Body mass index is 43.77 kg/m². Morbid obesity complicates all aspects of disease management from diagnostic modalities to treatment. Weight loss encouraged and health benefits explained to patient.

## 2024-02-23 NOTE — H&P
[facility]  History & Physical    Patient Name: Ijeoma Issa  MRN: [id number]  Patient Class: OP- Observation  Admission Date: 2024  Attending Physician: Ijeoma Bustamante MD   Primary Care Provider: Allison Adler MD         Patient information was obtained from patient and ER records.     Subjective:     Principal Problem:Left arm weakness    Chief Complaint:   Chief Complaint   Patient presents with    Numbness     Pt c/o left arm numbness/tingling and limited ROM. Pt states symptoms started approx 1500 today.        HPI: Ijeoma Issa is a 54 y/o F with a past medical history significant for DM2, GERD, and PCOS who presented to the ED with c/o LUE weakness and numbness which began around 3 pm today after napping on her couch.  She notes having some mild L shoulder pain over the past few days, but this was exacerbated this afternoon.  Denies trauma, headache, vision changes, LE weakness or any other issues.  CTA head and neck performed in the ED without acute findings.  She was evaluated by telemed vascular neurology with recommendations to obtain MRI brain and MRI C-spine to r/o acute central process.  She is placed in observation under the service of hospital medicine for continued workup and treatment.     Past Medical History:   Diagnosis Date    Abnormal mammogram     neg biospy    Allergic asthma     Anxiety     Arthritis     Dermatitis     Dr. Weil, Extremities    Diabetes     Diverticulosis of colon     GERD (gastroesophageal reflux disease)     Mass of right breast     Postmenopausal hormone replacement therapy     Tubal pregnancy     Uterine fibroid        Past Surgical History:   Procedure Laterality Date    BREAST BIOPSY Right 2012    benign     SECTION, LOW TRANSVERSE      CHOLECYSTECTOMY      HYSTERECTOMY      re: benign tumors per the patient    right ankle  10/10/2014    TOTAL ABDOMINAL HYSTERECTOMY W/ BILATERAL  SALPINGOOPHORECTOMY  1997    c appendectomy    TUBAL LIGATION         Review of patient's allergies indicates:   Allergen Reactions    Cashew nut Anaphylaxis     Allergic to Cashew nuts    Nut flavor Anaphylaxis     Allergic to Cashew nuts    Latex Rash     Including in injections    Varicella vaccines      Red swelling/ lump develops at injection site    Adhesive tape-silicones Rash    Tapentadol Rash       No current facility-administered medications on file prior to encounter.     Current Outpatient Medications on File Prior to Encounter   Medication Sig    ACZONE 5 % topical gel Apply topically.    albuterol (PROVENTIL) 2.5 mg /3 mL (0.083 %) nebulizer solution Inhale 2.5 mg into the lungs every 4 (four) hours as needed.    albuterol (PROVENTIL/VENTOLIN HFA) 90 mcg/actuation inhaler INHALE 2 PUFFS INTO THE LUNGS EVERY 4 HOURS AS NEEDED FOR WHEEZING OR SHORTNESS OF BREATH    albuterol-ipratropium (DUO-NEB) 2.5 mg-0.5 mg/3 mL nebulizer solution Take 3 mLs by nebulization every 6 (six) hours as needed for Wheezing. Rescue    azelastine (ASTELIN) 137 mcg (0.1 %) nasal spray 1 spray (137 mcg total) by Nasal route 2 (two) times daily.    calcium carbonate-vit D3-min 600 mg calcium- 400 unit Tab Take 300 mg by mouth.    chlorhexidine (HIBICLENS) 4 % external liquid Apply topically daily as needed (dermatitis).    ciclopirox (LOPROX) 0.77 % Crea     ciclopirox 1 % shampoo Apply topically.    clindamycin (CLEOCIN T) 1 % lotion Apply topically.    clobetasoL (TEMOVATE) 0.05 % cream Apply topically 2 (two) times daily.    dapsone 25 MG Tab Take 50 mg by mouth once daily.    dapsone 25 MG Tab Take 50 mg by mouth 2 (two) times daily.    DOC-Q-LACE 100 mg capsule TAKE ONE CAPSULE BY MOUTH TWICE DAILY    doxepin (SINEQUAN) 25 MG capsule TAKE 1 CAPSULE(25 MG) BY MOUTH EVERY EVENING    doxycycline (VIBRAMYCIN) 100 MG Cap Take 1 capsule (100 mg total) by mouth 2 (two) times daily. for 10 days    EPIPEN 2-LUCIANO 0.3 mg/0.3 mL AtIn  Inject 0.3 mLs (0.3 mg total) into the muscle once. for 1 dose    estradioL (ESTRACE) 0.5 MG tablet Take 1 tablet (0.5 mg total) by mouth 3 (three) times a week.    ferrous sulfate (IRON) 325 mg (65 mg iron) Tab tablet Take 1 tablet (325 mg total) by mouth 3 (three) times a week.    fexofenadine (ALLEGRA) 180 MG tablet Take 180 mg by mouth 2 hours after meals and at bedtime.    fluticasone propionate (FLONASE) 50 mcg/actuation nasal spray SHAKE LIQUID AND USE 1 SPRAY(50 MCG) IN EACH NOSTRIL TWICE DAILY AS NEEDED FOR RHINITIS    fluticasone-salmeterol diskus inhaler 250-50 mcg Inhale 1 puff into the lungs 2 (two) times daily. Controller    hydrocortisone 2.5 % cream Apply to Affected areas on face and neck twice a day for up to 2 weeks as needed for flares    ketoconazole (NIZORAL) 2 % cream Apply topically.    ketoconazole (NIZORAL) 2 % shampoo     LIDOcaine HCL 1 % Lotn Apply topically.    montelukast (SINGULAIR) 10 mg tablet Take 1 tablet (10 mg total) by mouth once daily.    mv-min-FA-Ka-Vl-sfjitso-lutein 0.4-162-18 mg Tab Take 1 tablet by mouth once daily.    omeprazole-sodium bicarbonate 20-1.1 mg-gram Cap 1 cap ac breakfast    ondansetron (ZOFRAN-ODT) 4 MG TbDL Take 1 tablet (4 mg total) by mouth every 8 (eight) hours as needed (nausea and vomiting).    pimecrolimus (ELIDEL) 1 % cream Apply topically 2 (two) times daily.    spironolactone (ALDACTONE) 25 MG tablet TAKE 1 TABLET BY MOUTH DAILY    triamcinolone acetonide 0.1% (KENALOG) 0.1 % cream Apply topically.     Family History       Problem Relation (Age of Onset)    Breast cancer Sister (28)    Cancer Father, Brother    Colon cancer Father    Diabetes Sister    Heart disease Brother, Mother          Tobacco Use    Smoking status: Never     Passive exposure: Never    Smokeless tobacco: Never   Substance and Sexual Activity    Alcohol use: Yes     Alcohol/week: 0.0 standard drinks of alcohol     Comment: very seldom    Drug use: No    Sexual activity: Yes      Partners: Male     Birth control/protection: Surgical, See Surgical Hx     Comment: hyst     Review of Systems   Constitutional:  Negative for appetite change, chills, fatigue and fever.   Respiratory:  Negative for cough and shortness of breath.    Cardiovascular:  Negative for chest pain and palpitations.   Gastrointestinal:  Negative for abdominal pain, diarrhea, nausea and vomiting.   Neurological:  Positive for weakness (LUE) and numbness (LUE). Negative for facial asymmetry.   Psychiatric/Behavioral:  Negative for agitation and confusion.    All other systems reviewed and are negative.    Objective:     Vital Signs (Most Recent):  Temp: 97.8 °F (36.6 °C) (02/22/24 1944)  Pulse: 86 (02/22/24 2030)  Resp: 20 (02/22/24 2030)  BP: 132/63 (02/22/24 2030)  SpO2: 97 % (02/22/24 2030) Vital Signs (24h Range):  Temp:  [97.8 °F (36.6 °C)] 97.8 °F (36.6 °C)  Pulse:  [86-95] 86  Resp:  [20] 20  SpO2:  [95 %-97 %] 97 %  BP: (132-134)/(63-67) 132/63     Weight: 115.7 kg (255 lb)  Body mass index is 43.77 kg/m².     Physical Exam  Constitutional:       General: She is not in acute distress.     Appearance: She is well-developed. She is obese. She is not toxic-appearing.      Comments: uncomfortable   HENT:      Head: Normocephalic and atraumatic.   Eyes:      Conjunctiva/sclera: Conjunctivae normal.      Pupils: Pupils are equal, round, and reactive to light.   Cardiovascular:      Rate and Rhythm: Normal rate and regular rhythm.      Heart sounds: Normal heart sounds.   Pulmonary:      Effort: Pulmonary effort is normal.      Breath sounds: Normal breath sounds.   Abdominal:      General: Bowel sounds are normal. There is no distension.      Palpations: Abdomen is soft.      Tenderness: There is no abdominal tenderness.   Musculoskeletal:         General: Tenderness (left shoulder) present.      Cervical back: Normal range of motion and neck supple.   Skin:     General: Skin is warm and dry.   Neurological:      Mental  Status: She is alert and oriented to person, place, and time.      Sensory: Sensory deficit present.      Motor: Weakness present.      Comments: LUE flaccid   Psychiatric:         Mood and Affect: Mood is anxious.         Behavior: Behavior normal. Behavior is cooperative.              CRANIAL NERVES     CN III, IV, VI   Pupils are equal, round, and reactive to light.       Significant Labs: All pertinent labs within the past 24 hours have been reviewed.  CBC:   Recent Labs   Lab 02/22/24 2000   WBC 8.21   HGB 12.5   HCT 39.6        CMP:   Recent Labs   Lab 02/22/24 2000      K 3.8      CO2 23   *   BUN 16   CREATININE 0.8   CALCIUM 9.2   PROT 7.4   ALBUMIN 4.0   BILITOT 0.9   ALKPHOS 92   AST 26   ALT 22   ANIONGAP 11       Significant Imaging: I have reviewed all pertinent imaging results/findings within the past 24 hours.  CTA head and neck:  no acute findings.  Assessment/Plan:     * Left arm weakness  Consult Neurology   Check CMP,CBC, Mag, Phos  Fasting Lipid Panel  CTA head and neck without acute findings  MRI brain and c-spine  Antiplatelet therapy  PT/OT   Pain control        Morbid obesity  Body mass index is 43.77 kg/m². Morbid obesity complicates all aspects of disease management from diagnostic modalities to treatment. Weight loss encouraged and health benefits explained to patient.         GERD (gastroesophageal reflux disease)  Chronic; PPI while inpatient        VTE Risk Mitigation (From admission, onward)           Ordered     IP VTE HIGH RISK PATIENT  Once         02/22/24 2150     Place sequential compression device  Until discontinued         02/22/24 2150     Reason for No Pharmacological VTE Prophylaxis  Once        Comments: Defer to neurology   Question:  Reasons:  Answer:  Physician Provided (leave comment)    02/22/24 2150                         On 02/22/2024, patient should be placed in hospital observation services under my care in collaboration with   Ijeoma Bustamante.           Nirali Galindo, FNP  Department of Hospital Medicine  [facility]

## 2024-02-23 NOTE — SUBJECTIVE & OBJECTIVE
HPI:  53 y.o. female with h/o PCOS, GERD, who presents with L arm weakness and numbness.  Patient was napping on the couch on her back and woke up with L arm numbness and weakness.  She also had significant pain in L shoulder area.  She recalls having some mild L shoulder pain the last few days without weakness.  She noted no changes in L leg or L face.     Images personally reviewed and interpreted:  Study: Head CT  Study Interpretation: no acute findings     Assessment and plan:  Total plegia and patchy numbness of L arm, without arm or face involvement -- unable to localize this deficit to CNS.  Consider plexopathy, such as [place].  Absence of DTR in arm supports lower motor neuron process.  Recommend MRI brain and MRI C-spine to r/o acute central process (in case pain is masking motor exam and arm is not truly plegic).  Outpatient EMG/NCS can confirm diagnosis.  Can consider MRI w/ Gd of Brachial Plexus.    Lytics recommendation: Thrombolytic therapy not recommended due to Outside of treatment window  and Suspected stroke mimic   Thrombectomy recommendation: No; at this time symptoms not suggestive of large vessel occlusion  Placement recommendation: admit to inpatient

## 2024-02-23 NOTE — ASSESSMENT & PLAN NOTE
Unable to move left upper arm,  I suspect patient may have underlying adhesive capsulitis now have acute exacerbation due to prolonged immobilization.   MRI of the C-spine/ brain is negative for acute changes.   We will try Lidocaine patch.  We will try ibuprofen 400 mg t.i.d..  Get left shoulder x-ray.   We will ask for neurology evaluation.   PTOT evaluation

## 2024-02-23 NOTE — PROGRESS NOTES
[facility]  Progress Note    Patient Name: Ijeoma Issa  MRN: [id number]  Patient Class: OP- Observation   Admission Date: 2/22/2024  Length of Stay: 0 days  Attending Physician: Malu Hyde MD  Primary Care Provider: Allison Adler MD        Subjective:     Principal Problem:Acute pain of left shoulder        HPI:  Ijeoma Issa is a 52 y/o F with a past medical history significant for DM2, GERD, and PCOS who presented to the ED with c/o LUE weakness and numbness which began around 3 pm today after napping on her couch.  She notes having some mild L shoulder pain over the past few days, but this was exacerbated this afternoon.  Denies trauma, headache, vision changes, LE weakness or any other issues.  CTA head and neck performed in the ED without acute findings.  She was evaluated by telemed vascular neurology with recommendations to obtain MRI brain and MRI C-spine to r/o acute central process.  She is placed in observation under the service of hospital medicine for continued workup and treatment.     Overview/Hospital Course:  No notes on file    Interval History:  Developed sudden onset left shoulder pain, unable to move left upper arm after holding a baby for prolonged position.  No fever chills, no nausea vomiting diarrhea.  No chest pain or SOB.     Review of Systems   Constitutional:  Negative for activity change and fever.   HENT:  Negative for ear discharge, facial swelling and sore throat.    Eyes:  Negative for photophobia, pain and visual disturbance.   Respiratory:  Negative for apnea, cough and shortness of breath.    Cardiovascular:  Negative for chest pain and leg swelling.   Gastrointestinal:  Negative for abdominal pain and blood in stool.   Endocrine: Negative for cold intolerance and heat intolerance.   Musculoskeletal:  Negative for back pain and gait problem.        Left shoulder pain, unable to move left upper arm   Skin:  Negative for  pallor and rash.   Neurological:  Negative for speech difficulty and headaches.   Psychiatric/Behavioral:  Negative for confusion, hallucinations and suicidal ideas.    All other systems reviewed and are negative.    Objective:     Vital Signs (Most Recent):  Temp: 98.1 °F (36.7 °C) (02/23/24 1035)  Pulse: 71 (02/23/24 1022)  Resp: 16 (02/23/24 0800)  BP: (!) 100/58 (02/23/24 0801)  SpO2: 95 % (02/23/24 1022) Vital Signs (24h Range):  Temp:  [97.8 °F (36.6 °C)-98.1 °F (36.7 °C)] 98.1 °F (36.7 °C)  Pulse:  [64-98] 71  Resp:  [16-20] 16  SpO2:  [88 %-97 %] 95 %  BP: (100-138)/(58-74) 100/58     Weight: 115.7 kg (255 lb)  Body mass index is 43.77 kg/m².  No intake or output data in the 24 hours ending 02/23/24 1122      Physical Exam  Constitutional:       Appearance: Normal appearance.   HENT:      Head: Normocephalic and atraumatic.      Nose: Nose normal.   Eyes:      Extraocular Movements: Extraocular movements intact.      Pupils: Pupils are equal, round, and reactive to light.   Cardiovascular:      Rate and Rhythm: Normal rate and regular rhythm.      Heart sounds: No murmur heard.  Pulmonary:      Effort: Pulmonary effort is normal.      Breath sounds: Normal breath sounds.   Abdominal:      General: Abdomen is flat.      Palpations: Abdomen is soft.   Musculoskeletal:      Cervical back: Normal range of motion and neck supple.      Comments: Significant tenderness at the left upper shoulder,  decreased range of motion in the left upper arm.    Skin:     General: Skin is warm and dry.   Neurological:      General: No focal deficit present.      Mental Status: She is alert and oriented to person, place, and time.   Psychiatric:         Mood and Affect: Mood normal.             Significant Labs: All pertinent labs within the past 24 hours have been reviewed.  CBC:   Recent Labs   Lab 02/22/24 2000 02/23/24  0545   WBC 8.21 5.91   HGB 12.5 12.2   HCT 39.6 39.5    205     CMP:   Recent Labs   Lab  02/22/24 2000 02/23/24  0545    136   K 3.8 3.9    106   CO2 23 20*   * 97   BUN 16 14   CREATININE 0.8 0.8   CALCIUM 9.2 8.6*   PROT 7.4 6.7   ALBUMIN 4.0 3.5   BILITOT 0.9 1.1*   ALKPHOS 92 81   AST 26 21   ALT 22 19   ANIONGAP 11 10       Significant Imaging: I have reviewed all pertinent imaging results/findings within the past 24 hours.  I have reviewed and interpreted all pertinent imaging results/findings within the past 24 hours.    CTA Head and Neck (xpd)    Result Date: 2/23/2024  EXAMINATION: CTA HEAD AND NECK (XPD) CLINICAL HISTORY: Neuro deficit, acute, stroke suspected; TECHNIQUE: Non contrast low dose axial images were obtained through the head. CT angiogram was performed from the level of the merritt to the top of the head following the IV administration of 100mL of Omnipaque 350.   Sagittal and coronal reconstructions and maximum intensity projection reconstructions were performed. Arterial stenosis percentages are based on NASCET measurement criteria. COMPARISON: None FINDINGS: CTA HEAD: Vasculature: Mild atherosclerotic changes of the cavernous carotid arteries without significant stenosis.  The intracranial arteries show normal anatomy without evidence of major branch occlusion or focal luminal narrowing.There is no suspicion of vascular malformation or saccular aneurysm. Variant anatomy: Presumed small right anterior orbital/supra orbital venous varix or potential small AV fistula involving a branch of the ophthalmic artery and facial vein. Brain: There is no evidence of mass, mass effect, edema, midline shift, intracranial hemorrhage, or space-occupying extra-axial fluid collection. After the administration of contrast there is no abnormal enhancement. Ventricles/Sulci: The ventricles and sulci are appropriate in size for age. Osseous Structures: The osseous structures are unremarkable in appearance. Sinuses and orbits: Mild scattered mucosal thickening.  Visualized orbits  are within normal limits. Other: Visualized portions of the mastoids are unremarkable. CTA NECK: Aorta: Conventional branching pattern. The great vessel origins are patent without flow limiting stenosis. Vertebral Arteries: The origins of the vertebral arteries are patent.  No flow limiting stenosis, occlusion, or dissection. Right Carotid: No flow limiting stenosis, occlusion, or dissection of the common carotid or internal carotid arteries.  No significant plaque of the proximal ICA.  Right internal carotid artery: 0 % stenosis by and NASCET. Left Carotid: No flow limiting stenosis, occlusion, or dissection of the common carotid or internal carotid arteries. No significant plaque of the proximal ICA. Right internal carotid artery: 0 % stenosis by and NASCET. Extravascular Anatomy: No definite adjacent soft tissue abnormality seen accounting for technique used.  Mild superior endplate compression deformity of T3 which appears chronic.  No significant bony process.     1. No acute intracranial CT findings. 2. No intracranial proximal large vessel occlusion or significant stenosis. 3. No significant atherosclerotic changes or arterial stenosis within the neck. 4. 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 The major findings between the preliminary and final reports are concordant. Electronically signed by: Endy Musa Date:    02/23/2024 Time:    08:06    MRI Cervical Spine Without Contrast    Result Date: 2/23/2024  EXAMINATION: MRI CERVICAL SPINE WITHOUT CONTRAST CLINICAL HISTORY: Myelopathy, acute, cervical spine;. TECHNIQUE: Multiplanar, multisequence MR images of the cervical spine were acquired without the administration of contrast. COMPARISON: CTA 02/22/2024. FINDINGS: Motion limited exam Morphology: Cervical vertebral body heights are preserved marrow signal is within normal limits.  Incidentally noted chronic appearing mild superior endplate compression deformity of T3 and  T3 and T4 vertebral body hemangiomas. Alignment: Cervical spinal alignment is normal. Cord: The cervical cord shows normal contour and signal content throughout its length. Craniocervical Junction: Cerebellar tonsils are normally positioned. The visualized portions of the posterior fossa are unremarkable.  Incidentally noted partially empty sella configuration, nonspecific in isolation as described on the concurrently performed brain MRI 02/22/2024.  The regional osseous anatomy is normal. Disc levels: C2-C3: Mild right-sided facet arthrosis producing no more than mild right foraminal narrowing.  The spinal canal and left foramen are patent.. C3-C4: Mild bilateral facet arthrosis producing no more than mild bilateral foraminal narrowing.  The spinal canal is patent. C4-C5: Mild bilateral facet arthrosis and uncovertebral spurring producing no more than mild bilateral foraminal narrowing.  The spinal canal is patent.. C5-C6: Mild right-sided facet arthrosis.  The spinal canal and foramina remain patent.. C6-C7: Broad-based disc protrusion centrally and lateralizing to the left producing mild spinal canal narrowing and suspected mild-to-moderate left foraminal narrowing.  The right foramen is patent.. C7-T1: Within normal limits. Soft tissues: The visualized paraspinal soft tissues are within normal limits.     1. Motion limited exam without evidence of an acute process/cord impingement. 2. Overall mild degenerative changes most pronounced at C6-C7 where there is a shallow left lateralizing disc protrusion contributing to mild spinal canal narrowing and mild-to-moderate left foraminal narrowing. The preliminary and final reports are concordant. Electronically signed by: Endy Musa Date:    02/23/2024 Time:    07:50    MRI Brain Without Contrast    Result Date: 2/23/2024  EXAMINATION: MRI BRAIN WITHOUT CONTRAST CLINICAL HISTORY: Stroke, follow up;. TECHNIQUE: Multiplanar multisequence MR imaging of the brain was  performed without contrast COMPARISON: CTA 02/22/2024. FINDINGS: Motion degraded exam. Brain: There are no concerning focal areas of abnormal or restricted diffusion within the brain.  No mass, hemorrhage or acute infarct is present. Midline Structures: Partially empty sella configuration, nonspecific in isolation.  The midline structures of the brain are normal. Ventricles: The ventricles, sulci and cisterns are within normal limits. Vasculature: The vascular flow voids at the base of the brain are within normal limits. Calvarium: The visualized osseous structures are unremarkable. Sinuses: Minimal mucosal thickening within the dependent maxillary sinuses.  No obstructive changes or dependent fluid levels.  Minimal ethmoid sinus opacification. Orbits: The orbits and globes are unremarkable. Mastoids: The mastoid air cells are adequately aerated. Extracranial soft tissues: The visualized extracranial soft tissues are unremarkable.     1. No acute process, specifically no recent infarct on this motion limited noncontrast brain MRI. 2. Variant partially empty sella configuration, nonspecific in isolation. The preliminary and final reports are concordant. Electronically signed by: Endy Musa Date:    02/23/2024 Time:    07:12  - pulls last radiology orders      Assessment/Plan:      * Acute pain of left shoulder    Unable to move left upper arm,  I suspect patient may have underlying adhesive capsulitis now have acute exacerbation due to prolonged immobilization.   MRI of the C-spine/ brain is negative for acute changes.   We will try Lidocaine patch.  We will try ibuprofen 400 mg t.i.d..  Get left shoulder x-ray.   We will ask for neurology evaluation.   PTOT evaluation    Left arm weakness  Likely secondary to frozen shoulder, MRI of C-spine brain is negative for acute changes      Morbid obesity  Body mass index is 43.77 kg/m². Morbid obesity complicates all aspects of disease management from diagnostic modalities  to treatment. Weight loss encouraged and health benefits explained to patient.         GERD (gastroesophageal reflux disease)  Chronic; PPI while inpatient        VTE Risk Mitigation (From admission, onward)           Ordered     IP VTE HIGH RISK PATIENT  Once         02/22/24 2150     Place sequential compression device  Until discontinued         02/22/24 2150     Reason for No Pharmacological VTE Prophylaxis  Once        Comments: Defer to neurology   Question:  Reasons:  Answer:  Physician Provided (leave comment)    02/22/24 2150                    Discharge Planning   GHASSAN: 2/24/2024     Code Status: Full Code   Is the patient medically ready for discharge?:     Reason for patient still in hospital (select all that apply): Patient trending condition and Treatment  Discharge Plan A: Home with family                  Malu Hyde MD  Department of Hospital Medicine   [facility]

## 2024-02-23 NOTE — NURSING
"Pt awake alert and oriented. Noted to have sling in place to left arm. PIV in place to right antecubital space- having "slight burning" sensation with IV med administration. IV insertion site with scant serosanguineous drainage- pt agreeable to having 2nd IV site started. Family member at bedside sharing events leading up to pt coming to be evaluated at ED.    "

## 2024-02-23 NOTE — PLAN OF CARE
Hospital follow up scheduled     02/23/24 3319   Post-Acute Status   Hospital Resources/Appts/Education Provided Appointments scheduled and added to AVS

## 2024-02-23 NOTE — ED PROVIDER NOTES
Encounter Date: 2024       History     Chief Complaint   Patient presents with    Numbness     Pt c/o left arm numbness/tingling and limited ROM. Pt states symptoms started approx 1500 today.     53-year-old female with a past medical history of diabetes mellitus and reflux disease presents with left arm numbness and tingling.  She reports that it started at around 3:00 p.m. this afternoon and has gotten progressively worse.  She reports weakness to the left arm as well.  She does also report some pain and tenderness to the left posterior shoulder that started today as well.  She denies any trauma, headache, changes in vision, changes in speech, weakness in the left leg, nausea/vomiting, chest pain, neck pain, or shortness of breath.  There are no alleviating or aggravating factors.      Review of patient's allergies indicates:   Allergen Reactions    Cashew nut Anaphylaxis     Allergic to Cashew nuts    Nut flavor Anaphylaxis     Allergic to Cashew nuts    Latex Rash     Including in injections    Varicella vaccines      Red swelling/ lump develops at injection site    Adhesive tape-silicones Rash    Tapentadol Rash     Past Medical History:   Diagnosis Date    Abnormal mammogram     neg biospy    Allergic asthma     Anxiety     Arthritis     Dermatitis     Dr. Weil, Extremities    Diabetes     Diverticulosis of colon     GERD (gastroesophageal reflux disease)     Mass of right breast     Postmenopausal hormone replacement therapy     Tubal pregnancy     Uterine fibroid      Past Surgical History:   Procedure Laterality Date    BREAST BIOPSY Right 2012    benign     SECTION, LOW TRANSVERSE      CHOLECYSTECTOMY      HYSTERECTOMY      re: benign tumors per the patient    right ankle  10/10/2014    TOTAL ABDOMINAL HYSTERECTOMY W/ BILATERAL SALPINGOOPHORECTOMY      c appendectomy    TUBAL LIGATION       Family History   Problem Relation Age of Onset    Cancer Father         colon    Colon  cancer Father     Diabetes Sister     Breast cancer Sister 28    Cancer Brother         stomach    Heart disease Brother     Heart disease Mother     Ovarian cancer Neg Hx      Social History     Tobacco Use    Smoking status: Never     Passive exposure: Never    Smokeless tobacco: Never   Substance Use Topics    Alcohol use: Yes     Alcohol/week: 0.0 standard drinks of alcohol     Comment: very seldom    Drug use: No     Review of Systems   Constitutional:  Negative for chills and fever.   HENT:  Negative for congestion.    Respiratory:  Negative for cough and shortness of breath.    Cardiovascular:  Negative for chest pain.   Gastrointestinal:  Negative for abdominal pain, nausea and vomiting.   Genitourinary:  Negative for dysuria.   Musculoskeletal:  Negative for gait problem.   Skin:  Negative for color change.   Neurological:  Positive for weakness and numbness. Negative for dizziness.   Psychiatric/Behavioral:  Negative for agitation.        Physical Exam     Initial Vitals [02/22/24 1944]   BP Pulse Resp Temp SpO2   134/67 95 20 97.8 °F (36.6 °C) 97 %      MAP       --         Physical Exam    Nursing note and vitals reviewed.  Constitutional: She appears well-developed and well-nourished.   HENT:   Head: Atraumatic.   Eyes: EOM are normal. Pupils are equal, round, and reactive to light.   Neck:   Normal range of motion.  Cardiovascular:  Normal rate and regular rhythm.           Pulmonary/Chest: Breath sounds normal.   Abdominal: Abdomen is soft. Bowel sounds are normal. She exhibits no distension. There is no abdominal tenderness. There is no rebound and no guarding.   Musculoskeletal:         General: Normal range of motion.      Right shoulder: Normal.      Left shoulder: Normal.      Cervical back: Normal range of motion.     Neurological: She is alert and oriented to person, place, and time. A sensory deficit is present. No cranial nerve deficit. GCS score is 15. GCS eye subscore is 4. GCS verbal  subscore is 5. GCS motor subscore is 6.   Flaccidity noted to the left arm/hand, with changes in sensation noted to the entire arm and hand.  No reflexes noted to the left arm.  Normal reflexes noted to the right arm.   Skin: Skin is warm and dry.   Psychiatric: She has a normal mood and affect.         ED Course   Critical Care    Date/Time: 2/22/2024 9:20 PM    Performed by: Jak Ross MD  Authorized by: Jak Ross MD  Direct patient critical care time: 22 minutes  Ordering / reviewing critical care time: 10 minutes  Documentation critical care time: 15 minutes  Consulting other physicians critical care time: 12 minutes  Total critical care time (exclusive of procedural time) : 59 minutes  Critical care was time spent personally by me on the following activities: development of treatment plan with patient or surrogate, evaluation of patient's response to treatment, examination of patient, obtaining history from patient or surrogate, ordering and review of laboratory studies and ordering and review of radiographic studies.        Labs Reviewed   CBC W/ AUTO DIFFERENTIAL - Abnormal; Notable for the following components:       Result Value    MCHC 31.6 (*)     Gran % 75.9 (*)     Lymph % 14.6 (*)     All other components within normal limits   COMPREHENSIVE METABOLIC PANEL - Abnormal; Notable for the following components:    Glucose 111 (*)     All other components within normal limits   COMPREHENSIVE METABOLIC PANEL - Abnormal; Notable for the following components:    CO2 20 (*)     Calcium 8.6 (*)     Total Bilirubin 1.1 (*)     All other components within normal limits   CBC W/ AUTO DIFFERENTIAL - Abnormal; Notable for the following components:    MCHC 30.9 (*)     Lymph % 16.9 (*)     All other components within normal limits   POCT GLUCOSE, HAND-HELD DEVICE - Normal   PROTIME-INR   TSH   LIPID PANEL   MAGNESIUM   PHOSPHORUS   APTT   PROTIME-INR   LIPID PANEL   TROPONIN I   POCT GLUCOSE   ISTAT  PROCEDURE     EKG Readings: (Independently Interpreted)   Initial Reading: No STEMI. Rhythm: Normal Sinus Rhythm. Heart Rate: 89. Ectopy: No Ectopy. Conduction: Normal. ST Segments: Normal ST Segments. T Waves: Normal. Clinical Impression: Normal Sinus Rhythm     ECG Results              ECG 12 lead (Final result)        Collection Time Result Time QRS Duration OHS QTC Calculation    02/22/24 20:19:15 02/24/24 06:46:52 80 420                     Final result by Interface, [facility] (02/24/24 06:46:55)                   Narrative:    Test Reason : R29.818,    Vent. Rate : 089 BPM     Atrial Rate : 089 BPM     P-R Int : 128 ms          QRS Dur : 080 ms      QT Int : 346 ms       P-R-T Axes : 065 -11 046 degrees     QTc Int : 420 ms    Normal sinus rhythm  Minimal voltage criteria for LVH, may be normal variant  Borderline Abnormal ECG  When compared with ECG of 12-OCT-2020 10:11,  No significant change was found  Confirmed by Phil HUTCHINS, Bubba MADSEN ([id number]) on 2/24/2024 6:46:47 AM    Referred By: [email or web address]   SELF           Confirmed By:Bubba Villarreal MD                                  Imaging Results              X-Ray Shoulder 2 or More Views Left (Final result)  Result time 02/23/24 12:00:31      Final result by Calvin Valdovinos MD (02/23/24 12:00:31)                   Impression:      As above      Electronically signed by: Calvin Valdovinos MD  Date:    02/23/2024  Time:    12:00               Narrative:    EXAMINATION:  XR SHOULDER COMPLETE 2 OR MORE VIEWS LEFT    CLINICAL HISTORY:  left shoulder pain;    TECHNIQUE:  Two or three views of the left shoulder were performed.    COMPARISON:  None    FINDINGS:  No fracture or dislocation is evident.  Some limitation due to unconventional positioning.                                       MRI Cervical Spine Without Contrast (Final result)  Result time 02/23/24 07:50:26      Final result by Endy Musa MD (02/23/24 07:50:26)                   Impression:       1. Motion limited exam without evidence of an acute process/cord impingement.  2. Overall mild degenerative changes most pronounced at C6-C7 where there is a shallow left lateralizing disc protrusion contributing to mild spinal canal narrowing and mild-to-moderate left foraminal narrowing.  The preliminary and final reports are concordant.      Electronically signed by: Endy Musa  Date:    02/23/2024  Time:    07:50               Narrative:    EXAMINATION:  MRI CERVICAL SPINE WITHOUT CONTRAST    CLINICAL HISTORY:  Myelopathy, acute, cervical spine;.    TECHNIQUE:  Multiplanar, multisequence MR images of the cervical spine were acquired without the administration of contrast.    COMPARISON:  CTA 02/22/2024.    FINDINGS:  Motion limited exam    Morphology: Cervical vertebral body heights are preserved marrow signal is within normal limits.  Incidentally noted chronic appearing mild superior endplate compression deformity of T3 and T3 and T4 vertebral body hemangiomas.    Alignment: Cervical spinal alignment is normal.    Cord: The cervical cord shows normal contour and signal content throughout its length.    Craniocervical Junction: Cerebellar tonsils are normally positioned. The visualized portions of the posterior fossa are unremarkable.  Incidentally noted partially empty sella configuration, nonspecific in isolation as described on the concurrently performed brain MRI 02/22/2024.  The regional osseous anatomy is normal.      Disc levels:      C2-C3: Mild right-sided facet arthrosis producing no more than mild right foraminal narrowing.  The spinal canal and left foramen are patent..    C3-C4: Mild bilateral facet arthrosis producing no more than mild bilateral foraminal narrowing.  The spinal canal is patent.    C4-C5: Mild bilateral facet arthrosis and uncovertebral spurring producing no more than mild bilateral foraminal narrowing.  The spinal canal is patent..    C5-C6: Mild right-sided facet arthrosis.   The spinal canal and foramina remain patent..    C6-C7: Broad-based disc protrusion centrally and lateralizing to the left producing mild spinal canal narrowing and suspected mild-to-moderate left foraminal narrowing.  The right foramen is patent..    C7-T1: Within normal limits.    Soft tissues: The visualized paraspinal soft tissues are within normal limits.                                         MRI Brain Without Contrast (Final result)  Result time 02/23/24 07:12:16      Final result by Endy Musa MD (02/23/24 07:12:16)                   Impression:      1. No acute process, specifically no recent infarct on this motion limited noncontrast brain MRI.  2. Variant partially empty sella configuration, nonspecific in isolation.  The preliminary and final reports are concordant.      Electronically signed by: Endy Musa  Date:    02/23/2024  Time:    07:12               Narrative:    EXAMINATION:  MRI BRAIN WITHOUT CONTRAST    CLINICAL HISTORY:  Stroke, follow up;.    TECHNIQUE:  Multiplanar multisequence MR imaging of the brain was performed without contrast    COMPARISON:  CTA 02/22/2024.    FINDINGS:  Motion degraded exam.    Brain: There are no concerning focal areas of abnormal or restricted diffusion within the brain.  No mass, hemorrhage or acute infarct is present.  Midline Structures: Partially empty sella configuration, nonspecific in isolation.  The midline structures of the brain are normal.  Ventricles: The ventricles, sulci and cisterns are within normal limits.  Vasculature: The vascular flow voids at the base of the brain are within normal limits.  Calvarium: The visualized osseous structures are unremarkable.  Sinuses: Minimal mucosal thickening within the dependent maxillary sinuses.  No obstructive changes or dependent fluid levels.  Minimal ethmoid sinus opacification.  Orbits: The orbits and globes are unremarkable.  Mastoids: The mastoid air cells are adequately aerated.  Extracranial soft  tissues: The visualized extracranial soft tissues are unremarkable.                                       CTA Head and Neck (xpd) (Final result)  Result time 02/23/24 08:06:25      Final result by Endy Musa MD (02/23/24 08:06:25)                   Impression:      1. No acute intracranial CT findings.  2. No intracranial proximal large vessel occlusion or significant stenosis.  3. No significant atherosclerotic changes or arterial stenosis within the neck.  4. 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  The major findings between the preliminary and final reports are concordant.      Electronically signed by: Endy Musa  Date:    02/23/2024  Time:    08:06               Narrative:    EXAMINATION:  CTA HEAD AND NECK (XPD)    CLINICAL HISTORY:  Neuro deficit, acute, stroke suspected;    TECHNIQUE:  Non contrast low dose axial images were obtained through the head. CT angiogram was performed from the level of the merritt to the top of the head following the IV administration of 100mL of Omnipaque 350.   Sagittal and coronal reconstructions and maximum intensity projection reconstructions were performed. Arterial stenosis percentages are based on NASCET measurement criteria.    COMPARISON:  None    FINDINGS:  CTA HEAD:    Vasculature: Mild atherosclerotic changes of the cavernous carotid arteries without significant stenosis.  The intracranial arteries show normal anatomy without evidence of major branch occlusion or focal luminal narrowing.There is no suspicion of vascular malformation or saccular aneurysm.    Variant anatomy: Presumed small right anterior orbital/supra orbital venous varix or potential small AV fistula involving a branch of the ophthalmic artery and facial vein.    Brain: There is no evidence of mass, mass effect, edema, midline shift, intracranial hemorrhage, or space-occupying extra-axial fluid collection. After the administration of contrast there is no abnormal  enhancement.    Ventricles/Sulci: The ventricles and sulci are appropriate in size for age.    Osseous Structures: The osseous structures are unremarkable in appearance.    Sinuses and orbits: Mild scattered mucosal thickening.  Visualized orbits are within normal limits.    Other: Visualized portions of the mastoids are unremarkable.    CTA NECK:    Aorta: Conventional branching pattern. The great vessel origins are patent without flow limiting stenosis.    Vertebral Arteries: The origins of the vertebral arteries are patent.  No flow limiting stenosis, occlusion, or dissection.    Right Carotid: No flow limiting stenosis, occlusion, or dissection of the common carotid or internal carotid arteries.  No significant plaque of the proximal ICA.  Right internal carotid artery: 0 % stenosis by and NASCET.    Left Carotid: No flow limiting stenosis, occlusion, or dissection of the common carotid or internal carotid arteries. No significant plaque of the proximal ICA. Right internal carotid artery: 0 % stenosis by and NASCET.    Extravascular Anatomy: No definite adjacent soft tissue abnormality seen accounting for technique used.  Mild superior endplate compression deformity of T3 which appears chronic.  No significant bony process.                                       RADIOLOGY REPORT (Final result)  Result time 02/23/24 13:10:46      Final result by Unknown User (02/23/24 13:10:46)                                         Medications   ondansetron injection 4 mg (4 mg Intravenous Given 2/22/24 2113)   ondansetron injection 4 mg (4 mg Intravenous Given 2/22/24 2320)   gadobutroL (GADAVIST) 10 mmol/10 mL (1 mmol/mL) injection (10 mLs Intravenous Given 2/23/24 1735)     Medical Decision Making  53-year-old female presented with left arm paresthesias and weakness.    Initial differential diagnosis included but not limited to cervical radiculopathy CVA, and TIA.    Amount and/or Complexity of Data Reviewed  Labs:  ordered.  Radiology: ordered.  ECG/medicine tests: ordered.    Risk  Prescription drug management.  Risk Details: The patient was emergently evaluated in the emergency department, her evaluation was significant for a middle-age female with significant weakness noted to the left arm and hand.  The patient was stroke activated from triage by myself.               ED Course as of 02/27/24 1426   Thu Feb 22, 2024   2100 The patient's CTA of her head and neck showed no acute abnormalities Radiology.  The patient was evaluated by telemedicine Neurology at bedside, she recommends MRI of the brain and cervical spine.  She also recommends admission for further workup and neurologic evaluation by our neurologist. [PE]   2115 The patient's labs showed no acute processes.  The case was discussed with the [facility] on call for hospitalist service.  She has accepted the patient for admission. [PE]   Fri Feb 23, 2024   0708 Care transferred from Dr. Ross.  L arm weakness numbness.  Mri brain and c spine negative.  Neurology recommends admission.  [JS]      ED Course User Index  [JS] Marvin Magdaleno MD  [PE] Jak Ross MD                           Clinical Impression:  Final diagnoses:  [R29.818] Acute focal neurological deficit  [R29.898] Left arm weakness (Primary)          ED Disposition Condition    Observation                 Jak Ross MD  02/22/24 2134       Jak Ross MD  02/27/24 0128

## 2024-02-23 NOTE — H&P
[facility]  History & Physical    Patient Name: Ijeoma Issa  MRN: [id number]  Patient Class: OP- Observation  Admission Date: 2024  Attending Physician: Ijeoam Bustamante MD   Primary Care Provider: Allison Adler MD         Patient information was obtained from patient and ER records.     Subjective:     Principal Problem:Left arm weakness    Chief Complaint:   Chief Complaint   Patient presents with    Numbness     Pt c/o left arm numbness/tingling and limited ROM. Pt states symptoms started approx 1500 today.        HPI: Ijeoma Issa is a 54 y/o F with a past medical history significant for DM2, GERD, and PCOS who presented to the ED with c/o LUE weakness and numbness which began around 3 pm today after napping on her couch.  She notes having some mild L shoulder pain over the past few days, but this was exacerbated this afternoon.  Denies trauma, headache, vision changes, LE weakness or any other issues.  CTA head and neck performed in the ED without acute findings.  She was evaluated by telemed vascular neurology with recommendations to obtain MRI brain and MRI C-spine to r/o acute central process.  She is placed in observation under the service of hospital medicine for continued workup and treatment.     Past Medical History:   Diagnosis Date    Abnormal mammogram     neg biospy    Allergic asthma     Anxiety     Arthritis     Dermatitis     Dr. Weil, Extremities    Diabetes     Diverticulosis of colon     GERD (gastroesophageal reflux disease)     Mass of right breast     Postmenopausal hormone replacement therapy     Tubal pregnancy     Uterine fibroid        Past Surgical History:   Procedure Laterality Date    BREAST BIOPSY Right 2012    benign     SECTION, LOW TRANSVERSE      CHOLECYSTECTOMY      HYSTERECTOMY      re: benign tumors per the patient    right ankle  10/10/2014    TOTAL ABDOMINAL HYSTERECTOMY W/ BILATERAL  SALPINGOOPHORECTOMY  1997    c appendectomy    TUBAL LIGATION         Review of patient's allergies indicates:   Allergen Reactions    Cashew nut Anaphylaxis     Allergic to Cashew nuts    Nut flavor Anaphylaxis     Allergic to Cashew nuts    Latex Rash     Including in injections    Varicella vaccines      Red swelling/ lump develops at injection site    Adhesive tape-silicones Rash    Tapentadol Rash       No current facility-administered medications on file prior to encounter.     Current Outpatient Medications on File Prior to Encounter   Medication Sig    ACZONE 5 % topical gel Apply topically.    albuterol (PROVENTIL) 2.5 mg /3 mL (0.083 %) nebulizer solution Inhale 2.5 mg into the lungs every 4 (four) hours as needed.    albuterol (PROVENTIL/VENTOLIN HFA) 90 mcg/actuation inhaler INHALE 2 PUFFS INTO THE LUNGS EVERY 4 HOURS AS NEEDED FOR WHEEZING OR SHORTNESS OF BREATH    albuterol-ipratropium (DUO-NEB) 2.5 mg-0.5 mg/3 mL nebulizer solution Take 3 mLs by nebulization every 6 (six) hours as needed for Wheezing. Rescue    azelastine (ASTELIN) 137 mcg (0.1 %) nasal spray 1 spray (137 mcg total) by Nasal route 2 (two) times daily.    calcium carbonate-vit D3-min 600 mg calcium- 400 unit Tab Take 300 mg by mouth.    chlorhexidine (HIBICLENS) 4 % external liquid Apply topically daily as needed (dermatitis).    ciclopirox (LOPROX) 0.77 % Crea     ciclopirox 1 % shampoo Apply topically.    clindamycin (CLEOCIN T) 1 % lotion Apply topically.    clobetasoL (TEMOVATE) 0.05 % cream Apply topically 2 (two) times daily.    dapsone 25 MG Tab Take 50 mg by mouth once daily.    dapsone 25 MG Tab Take 50 mg by mouth 2 (two) times daily.    DOC-Q-LACE 100 mg capsule TAKE ONE CAPSULE BY MOUTH TWICE DAILY    doxepin (SINEQUAN) 25 MG capsule TAKE 1 CAPSULE(25 MG) BY MOUTH EVERY EVENING    doxycycline (VIBRAMYCIN) 100 MG Cap Take 1 capsule (100 mg total) by mouth 2 (two) times daily. for 10 days    EPIPEN 2-LUCIANO 0.3 mg/0.3 mL AtIn  Inject 0.3 mLs (0.3 mg total) into the muscle once. for 1 dose    estradioL (ESTRACE) 0.5 MG tablet Take 1 tablet (0.5 mg total) by mouth 3 (three) times a week.    ferrous sulfate (IRON) 325 mg (65 mg iron) Tab tablet Take 1 tablet (325 mg total) by mouth 3 (three) times a week.    fexofenadine (ALLEGRA) 180 MG tablet Take 180 mg by mouth 2 hours after meals and at bedtime.    fluticasone propionate (FLONASE) 50 mcg/actuation nasal spray SHAKE LIQUID AND USE 1 SPRAY(50 MCG) IN EACH NOSTRIL TWICE DAILY AS NEEDED FOR RHINITIS    fluticasone-salmeterol diskus inhaler 250-50 mcg Inhale 1 puff into the lungs 2 (two) times daily. Controller    hydrocortisone 2.5 % cream Apply to Affected areas on face and neck twice a day for up to 2 weeks as needed for flares    ketoconazole (NIZORAL) 2 % cream Apply topically.    ketoconazole (NIZORAL) 2 % shampoo     LIDOcaine HCL 1 % Lotn Apply topically.    montelukast (SINGULAIR) 10 mg tablet Take 1 tablet (10 mg total) by mouth once daily.    mv-min-FA-Iv-Jn-mhlvrjk-lutein 0.4-162-18 mg Tab Take 1 tablet by mouth once daily.    omeprazole-sodium bicarbonate 20-1.1 mg-gram Cap 1 cap ac breakfast    ondansetron (ZOFRAN-ODT) 4 MG TbDL Take 1 tablet (4 mg total) by mouth every 8 (eight) hours as needed (nausea and vomiting).    pimecrolimus (ELIDEL) 1 % cream Apply topically 2 (two) times daily.    spironolactone (ALDACTONE) 25 MG tablet TAKE 1 TABLET BY MOUTH DAILY    triamcinolone acetonide 0.1% (KENALOG) 0.1 % cream Apply topically.     Family History       Problem Relation (Age of Onset)    Breast cancer Sister (28)    Cancer Father, Brother    Colon cancer Father    Diabetes Sister    Heart disease Brother, Mother          Tobacco Use    Smoking status: Never     Passive exposure: Never    Smokeless tobacco: Never   Substance and Sexual Activity    Alcohol use: Yes     Alcohol/week: 0.0 standard drinks of alcohol     Comment: very seldom    Drug use: No    Sexual activity: Yes      Partners: Male     Birth control/protection: Surgical, See Surgical Hx     Comment: hyst     Review of Systems   Constitutional:  Negative for appetite change, chills, fatigue and fever.   Respiratory:  Negative for cough and shortness of breath.    Cardiovascular:  Negative for chest pain and palpitations.   Gastrointestinal:  Negative for abdominal pain, diarrhea, nausea and vomiting.   Neurological:  Positive for weakness (LUE) and numbness (LUE). Negative for facial asymmetry.   Psychiatric/Behavioral:  Negative for agitation and confusion.    All other systems reviewed and are negative.    Objective:     Vital Signs (Most Recent):  Temp: 97.8 °F (36.6 °C) (02/22/24 1944)  Pulse: 86 (02/22/24 2030)  Resp: 20 (02/22/24 2030)  BP: 132/63 (02/22/24 2030)  SpO2: 97 % (02/22/24 2030) Vital Signs (24h Range):  Temp:  [97.8 °F (36.6 °C)] 97.8 °F (36.6 °C)  Pulse:  [86-95] 86  Resp:  [20] 20  SpO2:  [95 %-97 %] 97 %  BP: (132-134)/(63-67) 132/63     Weight: 115.7 kg (255 lb)  Body mass index is 43.77 kg/m².     Physical Exam           Significant Labs: All pertinent labs within the past 24 hours have been reviewed.  CBC:   Recent Labs   Lab 02/22/24 2000   WBC 8.21   HGB 12.5   HCT 39.6        CMP:   Recent Labs   Lab 02/22/24 2000      K 3.8      CO2 23   *   BUN 16   CREATININE 0.8   CALCIUM 9.2   PROT 7.4   ALBUMIN 4.0   BILITOT 0.9   ALKPHOS 92   AST 26   ALT 22   ANIONGAP 11       Significant Imaging: I have reviewed all pertinent imaging results/findings within the past 24 hours.  CTA head and neck:  no acute findings.  Assessment/Plan:     * Left arm weakness  Consult Neurology   Check CMP,CBC, Mag, Phos  Fasting Lipid Panel  CTA head and neck without acute findings  MRI brain and c-spine  Antiplatelet therapy  PT/OT   Pain control        Morbid obesity  Body mass index is 43.77 kg/m². Morbid obesity complicates all aspects of disease management from diagnostic modalities to  treatment. Weight loss encouraged and health benefits explained to patient.         GERD (gastroesophageal reflux disease)  Chronic; PPI while inpatient        VTE Risk Mitigation (From admission, onward)           Ordered     IP VTE HIGH RISK PATIENT  Once         02/22/24 2150     Place sequential compression device  Until discontinued         02/22/24 2150     Reason for No Pharmacological VTE Prophylaxis  Once        Comments: Defer to neurology   Question:  Reasons:  Answer:  Physician Provided (leave comment)    02/22/24 2150                         On 02/22/2024, patient should be placed in hospital observation services under my care in collaboration with Dr. Ijeoma Bustamante.           Nirali Galindo, SARAP  Department of Hospital Medicine  [facility]

## 2024-02-23 NOTE — PT/OT/SLP PROGRESS
Physical Therapy      Patient Name:  Ijeoma Issa   MRN:  [id number]    Patient not seen today secondary to be seen after admission to room.  Spoke with RN (Rafy).  Will follow-up 02/24.

## 2024-02-23 NOTE — PLAN OF CARE
[facility] ED  Initial Discharge Assessment       Primary Care Provider: Allison Adler MD    Admission Diagnosis: Left arm weakness [R29.898]    Admission Date: 2/22/2024  Expected Discharge Date: 2/24/2024    Spoke to pt and spouse at bedside to complete dc assessment. Verified facesheet. PCP [facility]. Pharmacy [facility]. DME neb. Denies hd/hh/dm/coumadin. Pt without recent admit. Family to provide transport home. CM to follow for dc needs      Transition of Care Barriers: None    Payor: [employer] [facility] / Plan: [facility] [employer] [facility] [employer] / Product Type: Commercial /     Extended Emergency Contact Information  Primary Emergency Contact: CarlinsarahLoc  Address: [address] [place] [address] [facility]  Home Phone: [phone]  Mobile Phone: [phone]  Relation: Spouse  Preferred language: English   needed? No  Secondary Emergency Contact: Mayo Issa  Address: [address] [place] [address] [facility]  Home Phone: [phone]  Mobile Phone: [phone]  Relation: Son  Preferred language: English   needed? No    Discharge Plan A: Home with family  Discharge Plan B: Home      [id number] [place], [place] - [address] [facility] [address]  [address]  [place] [address]  Phone: [phone] Fax: [phone]      Initial Assessment (most recent)       Adult Discharge Assessment - 02/23/24 1100          Discharge Assessment    Assessment Type Discharge Planning Assessment     Confirmed/corrected address, phone number and insurance Yes     Confirmed Demographics Correct on Facesheet     Source of Information patient;family     Communicated GHASSAN with patient/caregiver Yes     People in Home spouse     Do you expect to return to your current living situation? Yes     Do you have help at home or someone to help you manage your care at home? Yes     Prior to  hospitilization cognitive status: Alert/Oriented     Current cognitive status: Alert/Oriented     Walking or Climbing Stairs Difficulty no     Dressing/Bathing Difficulty no     Equipment Currently Used at Home nebulizer     Readmission within 30 days? No     Patient currently being followed by outpatient case management? No     Do you currently have service(s) that help you manage your care at home? No     Do you take prescription medications? Yes     Do you have prescription coverage? Yes     Do you have any problems affording any of your prescribed medications? No     Is the patient taking medications as prescribed? yes     How do you get to doctors appointments? family or friend will provide     Are you on dialysis? No     Do you take coumadin? No     Discharge Plan A Home with family     Discharge Plan B Home     DME Needed Upon Discharge  none     Discharge Plan discussed with: Patient     Transition of Care Barriers None

## 2024-02-23 NOTE — SUBJECTIVE & OBJECTIVE
Past Medical History:   Diagnosis Date    Abnormal mammogram     neg biospy    Allergic asthma     Anxiety     Arthritis     Dermatitis     Dr. Weil, Extremities    Diabetes     Diverticulosis of colon     GERD (gastroesophageal reflux disease)     Mass of right breast     Postmenopausal hormone replacement therapy     Tubal pregnancy     Uterine fibroid        Past Surgical History:   Procedure Laterality Date    BREAST BIOPSY Right 2012    benign     SECTION, LOW TRANSVERSE      CHOLECYSTECTOMY      HYSTERECTOMY      re: benign tumors per the patient    right ankle  10/10/2014    TOTAL ABDOMINAL HYSTERECTOMY W/ BILATERAL SALPINGOOPHORECTOMY      c appendectomy    TUBAL LIGATION         Review of patient's allergies indicates:   Allergen Reactions    Cashew nut Anaphylaxis     Allergic to Cashew nuts    Nut flavor Anaphylaxis     Allergic to Cashew nuts    Latex Rash     Including in injections    Varicella vaccines      Red swelling/ lump develops at injection site    Adhesive tape-silicones Rash    Tapentadol Rash       No current facility-administered medications on file prior to encounter.     Current Outpatient Medications on File Prior to Encounter   Medication Sig    ACZONE 5 % topical gel Apply topically.    albuterol (PROVENTIL) 2.5 mg /3 mL (0.083 %) nebulizer solution Inhale 2.5 mg into the lungs every 4 (four) hours as needed.    albuterol (PROVENTIL/VENTOLIN HFA) 90 mcg/actuation inhaler INHALE 2 PUFFS INTO THE LUNGS EVERY 4 HOURS AS NEEDED FOR WHEEZING OR SHORTNESS OF BREATH    albuterol-ipratropium (DUO-NEB) 2.5 mg-0.5 mg/3 mL nebulizer solution Take 3 mLs by nebulization every 6 (six) hours as needed for Wheezing. Rescue    azelastine (ASTELIN) 137 mcg (0.1 %) nasal spray 1 spray (137 mcg total) by Nasal route 2 (two) times daily.    calcium carbonate-vit D3-min 600 mg calcium- 400 unit Tab Take 300 mg by mouth.    chlorhexidine (HIBICLENS) 4 % external liquid Apply topically  daily as needed (dermatitis).    ciclopirox (LOPROX) 0.77 % Crea     ciclopirox 1 % shampoo Apply topically.    clindamycin (CLEOCIN T) 1 % lotion Apply topically.    clobetasoL (TEMOVATE) 0.05 % cream Apply topically 2 (two) times daily.    dapsone 25 MG Tab Take 50 mg by mouth once daily.    dapsone 25 MG Tab Take 50 mg by mouth 2 (two) times daily.    DOC-Q-LACE 100 mg capsule TAKE ONE CAPSULE BY MOUTH TWICE DAILY    doxepin (SINEQUAN) 25 MG capsule TAKE 1 CAPSULE(25 MG) BY MOUTH EVERY EVENING    doxycycline (VIBRAMYCIN) 100 MG Cap Take 1 capsule (100 mg total) by mouth 2 (two) times daily. for 10 days    EPIPEN 2-LUCIANO 0.3 mg/0.3 mL AtIn Inject 0.3 mLs (0.3 mg total) into the muscle once. for 1 dose    estradioL (ESTRACE) 0.5 MG tablet Take 1 tablet (0.5 mg total) by mouth 3 (three) times a week.    ferrous sulfate (IRON) 325 mg (65 mg iron) Tab tablet Take 1 tablet (325 mg total) by mouth 3 (three) times a week.    fexofenadine (ALLEGRA) 180 MG tablet Take 180 mg by mouth 2 hours after meals and at bedtime.    fluticasone propionate (FLONASE) 50 mcg/actuation nasal spray SHAKE LIQUID AND USE 1 SPRAY(50 MCG) IN EACH NOSTRIL TWICE DAILY AS NEEDED FOR RHINITIS    fluticasone-salmeterol diskus inhaler 250-50 mcg Inhale 1 puff into the lungs 2 (two) times daily. Controller    hydrocortisone 2.5 % cream Apply to Affected areas on face and neck twice a day for up to 2 weeks as needed for flares    ketoconazole (NIZORAL) 2 % cream Apply topically.    ketoconazole (NIZORAL) 2 % shampoo     LIDOcaine HCL 1 % Lotn Apply topically.    montelukast (SINGULAIR) 10 mg tablet Take 1 tablet (10 mg total) by mouth once daily.    mv-min-FA-Db-Wi-dmeyxez-lutein 0.4-162-18 mg Tab Take 1 tablet by mouth once daily.    omeprazole-sodium bicarbonate 20-1.1 mg-gram Cap 1 cap ac breakfast    ondansetron (ZOFRAN-ODT) 4 MG TbDL Take 1 tablet (4 mg total) by mouth every 8 (eight) hours as needed (nausea and vomiting).    pimecrolimus (ELIDEL)  1 % cream Apply topically 2 (two) times daily.    spironolactone (ALDACTONE) 25 MG tablet TAKE 1 TABLET BY MOUTH DAILY    triamcinolone acetonide 0.1% (KENALOG) 0.1 % cream Apply topically.     Family History       Problem Relation (Age of Onset)    Breast cancer Sister (28)    Cancer Father, Brother    Colon cancer Father    Diabetes Sister    Heart disease Brother, Mother          Tobacco Use    Smoking status: Never     Passive exposure: Never    Smokeless tobacco: Never   Substance and Sexual Activity    Alcohol use: Yes     Alcohol/week: 0.0 standard drinks of alcohol     Comment: very seldom    Drug use: No    Sexual activity: Yes     Partners: Male     Birth control/protection: Surgical, See Surgical Hx     Comment: hyst     Review of Systems   Constitutional:  Negative for appetite change, chills, fatigue and fever.   Respiratory:  Negative for cough and shortness of breath.    Cardiovascular:  Negative for chest pain and palpitations.   Gastrointestinal:  Negative for abdominal pain, diarrhea, nausea and vomiting.   Neurological:  Positive for weakness (LUE) and numbness (LUE). Negative for facial asymmetry.   Psychiatric/Behavioral:  Negative for agitation and confusion.    All other systems reviewed and are negative.    Objective:     Vital Signs (Most Recent):  Temp: 97.8 °F (36.6 °C) (02/22/24 1944)  Pulse: 86 (02/22/24 2030)  Resp: 20 (02/22/24 2030)  BP: 132/63 (02/22/24 2030)  SpO2: 97 % (02/22/24 2030) Vital Signs (24h Range):  Temp:  [97.8 °F (36.6 °C)] 97.8 °F (36.6 °C)  Pulse:  [86-95] 86  Resp:  [20] 20  SpO2:  [95 %-97 %] 97 %  BP: (132-134)/(63-67) 132/63     Weight: 115.7 kg (255 lb)  Body mass index is 43.77 kg/m².     Physical Exam  Constitutional:       General: She is not in acute distress.     Appearance: She is well-developed. She is obese. She is not toxic-appearing.      Comments: uncomfortable   HENT:      Head: Normocephalic and atraumatic.   Eyes:      Conjunctiva/sclera:  Conjunctivae normal.      Pupils: Pupils are equal, round, and reactive to light.   Cardiovascular:      Rate and Rhythm: Normal rate and regular rhythm.      Heart sounds: Normal heart sounds.   Pulmonary:      Effort: Pulmonary effort is normal.      Breath sounds: Normal breath sounds.   Abdominal:      General: Bowel sounds are normal. There is no distension.      Palpations: Abdomen is soft.      Tenderness: There is no abdominal tenderness.   Musculoskeletal:         General: Tenderness (left shoulder) present.      Cervical back: Normal range of motion and neck supple.   Skin:     General: Skin is warm and dry.   Neurological:      Mental Status: She is alert and oriented to person, place, and time.      Sensory: Sensory deficit present.      Motor: Weakness present.      Comments: LUE flaccid   Psychiatric:         Mood and Affect: Mood is anxious.         Behavior: Behavior normal. Behavior is cooperative.              CRANIAL NERVES     CN III, IV, VI   Pupils are equal, round, and reactive to light.       Significant Labs: All pertinent labs within the past 24 hours have been reviewed.  CBC:   Recent Labs   Lab 02/22/24 2000   WBC 8.21   HGB 12.5   HCT 39.6        CMP:   Recent Labs   Lab 02/22/24 2000      K 3.8      CO2 23   *   BUN 16   CREATININE 0.8   CALCIUM 9.2   PROT 7.4   ALBUMIN 4.0   BILITOT 0.9   ALKPHOS 92   AST 26   ALT 22   ANIONGAP 11       Significant Imaging: I have reviewed all pertinent imaging results/findings within the past 24 hours.  CTA head and neck:  no acute findings.

## 2024-02-23 NOTE — CONSULTS
[facility]  Department of Neurology  Neurology Consultation Note        PATIENT NAME: Ijeoma Issa  MRN: [id number]  CSN: [id number]      TODAY'S DATE: 02/23/2024  ADMIT DATE: 2/22/2024                            CONSULTING PROVIDER: George Rothman MD  CONSULT REQUESTED BY: Malu Hyde MD      Reason for consult:  Left upper extremity weakness or numbness       History obtained from chart review and the patient.    HPI per EMR: Ijeoma Issa is a 53 y.o. female with a history of DM2, GERD, and PCOS who presented to the ED with c/o LUE weakness and numbness which began around 3 pm today after napping on her couch. She notes having some mild L shoulder pain over the past few days, but this was exacerbated this afternoon. Denies trauma, headache, vision changes, LE weakness or any other issues. CTA head and neck performed in the ED without acute findings. She was evaluated by telemed vascular neurology with recommendations to obtain MRI brain and MRI C-spine to r/o acute central process. She is placed in observation under the service of hospital medicine for continued workup and treatment     Neurology consult:  Patient was seen and examined by me.  She states that at about 3:00 p.m. after waking up from a nap, she started noticing weakness and numbness in her left upper extremity below her shoulder level.  She states that she has not able to feel anything in her left upper extremity and not able to move it at all.  She also endorses of some pain around the shoulder region.  She denies any weakness or numbness in any other extremities, denies any vision trouble, speech trouble, nausea, vomiting, dizziness.  She denies lying in any weird position with extended arm, denies any fall or trauma.    She states that she was recently diagnosed with a lump in the axillary region on the left which was treated with some antibiotics and the lump has decreased in size now.  Denies any surgical  procedure done.    PREVIOUS MEDICAL HISTORY:  Past Medical History:   Diagnosis Date    Abnormal mammogram     neg biospy    Allergic asthma     Anxiety     Arthritis     Dermatitis     Dr. Weil, Extremities    Diabetes     Diverticulosis of colon     GERD (gastroesophageal reflux disease)     Mass of right breast     Postmenopausal hormone replacement therapy     Tubal pregnancy     Uterine fibroid      PREVIOUS SURGICAL HISTORY:  Past Surgical History:   Procedure Laterality Date    BREAST BIOPSY Right 2012    benign     SECTION, LOW TRANSVERSE      CHOLECYSTECTOMY      HYSTERECTOMY      re: benign tumors per the patient    right ankle  10/10/2014    TOTAL ABDOMINAL HYSTERECTOMY W/ BILATERAL SALPINGOOPHORECTOMY      c appendectomy    TUBAL LIGATION       FAMILY MEDICAL HISTORY:  Family History   Problem Relation Age of Onset    Cancer Father         colon    Colon cancer Father     Diabetes Sister     Breast cancer Sister 28    Cancer Brother         stomach    Heart disease Brother     Heart disease Mother     Ovarian cancer Neg Hx      SOCIAL HISTORY:  Social History     Tobacco Use    Smoking status: Never     Passive exposure: Never    Smokeless tobacco: Never   Substance Use Topics    Alcohol use: Yes     Alcohol/week: 0.0 standard drinks of alcohol     Comment: very seldom    Drug use: No     ALLERGIES:  Review of patient's allergies indicates:   Allergen Reactions    Cashew nut Anaphylaxis     Allergic to Cashew nuts    Nut flavor Anaphylaxis     Allergic to Cashew nuts    Latex Rash     Including in injections    Varicella vaccines      Red swelling/ lump develops at injection site    Adhesive tape-silicones Rash    Tapentadol Rash         CURRENT SCHEDULED MEDICATIONS:   aspirin  81 mg Oral Daily    doxepin  25 mg Oral QHS    gabapentin  300 mg Oral QHS    ibuprofen  400 mg Oral TID    LIDOcaine  1 patch Transdermal Q24H    montelukast  10 mg Oral Daily    pantoprazole  40 mg Oral  Daily    spironolactone  25 mg Oral Daily     CURRENT INFUSIONS:   sodium chloride 0.9%       CURRENT PRN MEDICATIONS:  acetaminophen, calcium carbonate, HYDROcodone-acetaminophen, HYDROcodone-acetaminophen, labetalol, melatonin, ondansetron, prochlorperazine, sodium chloride 0.9%, sodium chloride 0.9%    REVIEW OF SYSTEMS:  Please refer to the HPI for all pertinent positive and negative findings. A comprehensive review of all other systems was negative.       PHYSICAL EXAM:  Patient Vitals for the past 24 hrs:   BP Temp Temp src Pulse Resp SpO2 Height Weight   02/23/24 1035 -- 98.1 °F (36.7 °C) Oral -- -- -- -- --   02/23/24 1022 -- -- -- 71 -- 95 % -- --   02/23/24 1021 -- -- -- 68 -- 95 % -- --   02/23/24 1020 -- -- -- 68 -- (!) 94 % -- --   02/23/24 1019 -- -- -- 70 -- 95 % -- --   02/23/24 1018 -- -- -- 71 -- (!) 94 % -- --   02/23/24 1017 -- -- -- 70 -- 95 % -- --   02/23/24 1016 -- -- -- 67 -- 96 % -- --   02/23/24 1015 -- -- -- 68 -- (!) 94 % -- --   02/23/24 1014 -- -- -- 65 -- 95 % -- --   02/23/24 1013 -- -- -- 66 -- 95 % -- --   02/23/24 1012 -- -- -- 67 -- 95 % -- --   02/23/24 1011 -- -- -- 67 -- 95 % -- --   02/23/24 1010 -- -- -- 75 -- 95 % -- --   02/23/24 1009 -- -- -- 76 -- 95 % -- --   02/23/24 1008 -- -- -- 83 -- (!) 92 % -- --   02/23/24 1007 -- -- -- 76 -- 96 % -- --   02/23/24 1006 -- -- -- 73 -- 95 % -- --   02/23/24 1005 -- -- -- 70 -- 96 % -- --   02/23/24 1004 -- -- -- 72 -- 96 % -- --   02/23/24 1003 -- -- -- 69 -- 95 % -- --   02/23/24 1002 -- -- -- 70 -- 96 % -- --   02/23/24 1001 -- -- -- 69 -- 95 % -- --   02/23/24 1000 -- -- -- 69 -- 96 % -- --   02/23/24 0959 -- -- -- 69 -- 96 % -- --   02/23/24 0958 -- -- -- 70 -- 96 % -- --   02/23/24 0957 -- -- -- 68 -- 96 % -- --   02/23/24 0956 -- -- -- 69 -- 96 % -- --   02/23/24 0955 -- -- -- 67 -- 96 % -- --   02/23/24 0954 -- -- -- 68 -- 96 % -- --   02/23/24 0953 -- -- -- 69 -- 96 % -- --   02/23/24 0952 -- -- -- 67 -- 96 % -- --    02/23/24 0951 -- -- -- 69 -- 95 % -- --   02/23/24 0950 -- -- -- 66 -- 95 % -- --   02/23/24 0949 -- -- -- 70 -- 96 % -- --   02/23/24 0948 -- -- -- 70 -- 95 % -- --   02/23/24 0947 -- -- -- 69 -- 95 % -- --   02/23/24 0946 -- -- -- 67 -- 95 % -- --   02/23/24 0945 -- -- -- 66 -- 96 % -- --   02/23/24 0944 -- -- -- 67 -- 96 % -- --   02/23/24 0943 -- -- -- 67 -- 96 % -- --   02/23/24 0942 -- -- -- 66 -- 96 % -- --   02/23/24 0941 -- -- -- 65 -- 96 % -- --   02/23/24 0940 -- -- -- 67 -- 96 % -- --   02/23/24 0939 -- -- -- 65 -- 95 % -- --   02/23/24 0938 -- -- -- 69 -- 95 % -- --   02/23/24 0937 -- -- -- 67 -- 95 % -- --   02/23/24 0936 -- -- -- 66 -- 95 % -- --   02/23/24 0934 -- -- -- 66 -- 95 % -- --   02/23/24 0933 -- -- -- 66 -- (!) 94 % -- --   02/23/24 0932 -- -- -- 67 -- (!) 94 % -- --   02/23/24 0931 -- -- -- 73 -- 95 % -- --   02/23/24 0930 -- -- -- 75 -- 95 % -- --   02/23/24 0929 -- -- -- 65 -- 95 % -- --   02/23/24 0928 -- -- -- 65 -- 97 % -- --   02/23/24 0927 -- -- -- 67 -- 95 % -- --   02/23/24 0926 -- -- -- 68 -- 96 % -- --   02/23/24 0925 -- -- -- 68 -- 95 % -- --   02/23/24 0924 -- -- -- 67 -- 96 % -- --   02/23/24 0923 -- -- -- 68 -- 96 % -- --   02/23/24 0922 -- -- -- 68 -- 95 % -- --   02/23/24 0921 -- -- -- 66 -- 95 % -- --   02/23/24 0920 -- -- -- 66 -- (!) 94 % -- --   02/23/24 0919 -- -- -- 66 -- 96 % -- --   02/23/24 0918 -- -- -- 71 -- 95 % -- --   02/23/24 0917 -- -- -- 73 -- 96 % -- --   02/23/24 0916 -- -- -- 70 -- 96 % -- --   02/23/24 0915 -- -- -- 73 -- 96 % -- --   02/23/24 0914 -- -- -- 72 -- 96 % -- --   02/23/24 0913 -- -- -- 73 -- 96 % -- --   02/23/24 0912 -- -- -- 75 -- (!) 94 % -- --   02/23/24 0911 -- -- -- 69 -- 95 % -- --   02/23/24 0910 -- -- -- 67 -- 95 % -- --   02/23/24 0909 -- -- -- 65 -- 95 % -- --   02/23/24 0908 -- -- -- 67 -- 95 % -- --   02/23/24 0907 -- -- -- 67 -- 95 % -- --   02/23/24 0906 -- -- -- 64 -- 95 % -- --   02/23/24 0905 -- -- -- 66 -- 95 % -- --    02/23/24 0904 -- -- -- 66 -- 95 % -- --   02/23/24 0903 -- -- -- 65 -- 95 % -- --   02/23/24 0902 -- -- -- 65 -- 95 % -- --   02/23/24 0901 -- -- -- 64 -- 95 % -- --   02/23/24 0900 -- -- -- 66 -- 95 % -- --   02/23/24 0859 -- -- -- 66 -- 95 % -- --   02/23/24 0858 -- -- -- 65 -- 95 % -- --   02/23/24 0857 -- -- -- 67 -- 95 % -- --   02/23/24 0856 -- -- -- 66 -- 95 % -- --   02/23/24 0855 -- -- -- 66 -- 95 % -- --   02/23/24 0854 -- -- -- 64 -- 95 % -- --   02/23/24 0853 -- -- -- 66 -- (!) 94 % -- --   02/23/24 0852 -- -- -- 64 -- 95 % -- --   02/23/24 0851 -- -- -- 71 -- 96 % -- --   02/23/24 0850 -- -- -- 67 -- (!) 94 % -- --   02/23/24 0849 -- -- -- 67 -- 95 % -- --   02/23/24 0848 -- -- -- 68 -- 96 % -- --   02/23/24 0847 -- -- -- 67 -- 96 % -- --   02/23/24 0846 -- -- -- 70 -- (!) 92 % -- --   02/23/24 0845 -- -- -- 69 -- (!) 94 % -- --   02/23/24 0844 -- -- -- 68 -- 95 % -- --   02/23/24 0843 -- -- -- 67 -- 96 % -- --   02/23/24 0842 -- -- -- 69 -- 95 % -- --   02/23/24 0841 -- -- -- 70 -- 95 % -- --   02/23/24 0840 -- -- -- 69 -- 96 % -- --   02/23/24 0839 -- -- -- 70 -- 96 % -- --   02/23/24 0838 -- -- -- 71 -- 96 % -- --   02/23/24 0837 -- -- -- 71 -- 96 % -- --   02/23/24 0836 -- -- -- 71 -- 96 % -- --   02/23/24 0834 -- -- -- 72 -- 95 % -- --   02/23/24 0833 -- -- -- 73 -- 96 % -- --   02/23/24 0832 -- -- -- 74 -- 95 % -- --   02/23/24 0831 -- -- -- 69 -- 95 % -- --   02/23/24 0830 -- -- -- 70 -- (!) 94 % -- --   02/23/24 0829 -- -- -- 72 -- 95 % -- --   02/23/24 0828 -- -- -- 72 -- 95 % -- --   02/23/24 0827 -- -- -- 74 -- 95 % -- --   02/23/24 0826 -- -- -- 75 -- 95 % -- --   02/23/24 0825 -- -- -- 74 -- 95 % -- --   02/23/24 0824 -- -- -- 73 -- 95 % -- --   02/23/24 0823 -- -- -- 72 -- 95 % -- --   02/23/24 0822 -- -- -- 73 -- 95 % -- --   02/23/24 0821 -- -- -- 74 -- (!) 94 % -- --   02/23/24 0820 -- -- -- 71 -- (!) 94 % -- --   02/23/24 0819 -- -- -- 72 -- 95 % -- --   02/23/24 0818 -- -- -- 72 --  95 % -- --   02/23/24 0817 -- -- -- 70 -- 95 % -- --   02/23/24 0816 -- -- -- 72 -- 95 % -- --   02/23/24 0815 -- -- -- 72 -- 95 % -- --   02/23/24 0814 -- -- -- 70 -- 95 % -- --   02/23/24 0813 -- -- -- 71 -- 95 % -- --   02/23/24 0812 -- -- -- 73 -- 95 % -- --   02/23/24 0811 -- -- -- 78 -- 95 % -- --   02/23/24 0810 -- -- -- 75 -- 95 % -- --   02/23/24 0809 -- -- -- 73 -- 95 % -- --   02/23/24 0808 -- -- -- 71 -- 95 % -- --   02/23/24 0807 -- -- -- 71 -- 96 % -- --   02/23/24 0806 -- -- -- 74 -- 96 % -- --   02/23/24 0805 -- -- -- 69 -- 96 % -- --   02/23/24 0804 -- -- -- 73 -- 96 % -- --   02/23/24 0803 -- -- -- 65 -- 96 % -- --   02/23/24 0802 -- -- -- 64 -- 96 % -- --   02/23/24 0801 (!) 100/58 -- -- 65 -- 95 % -- --   02/23/24 0800 -- -- -- 70 16 96 % -- --   02/23/24 0759 -- -- -- 69 -- 96 % -- --   02/23/24 0758 -- -- -- 65 -- 96 % -- --   02/23/24 0757 -- -- -- 65 -- 95 % -- --   02/23/24 0756 -- -- -- 64 -- 95 % -- --   02/23/24 0755 -- -- -- 65 -- 95 % -- --   02/23/24 0754 -- -- -- 65 -- 95 % -- --   02/23/24 0753 -- -- -- 71 -- 95 % -- --   02/23/24 0752 -- -- -- 70 -- 96 % -- --   02/23/24 0751 -- -- -- 72 -- 96 % -- --   02/23/24 0750 -- -- -- 73 -- 97 % -- --   02/23/24 0749 -- -- -- 67 -- 97 % -- --   02/23/24 0748 -- -- -- 69 -- 97 % -- --   02/23/24 0747 -- -- -- 75 -- 96 % -- --   02/23/24 0746 -- -- -- 67 -- 96 % -- --   02/23/24 0745 -- -- -- 68 -- 96 % -- --   02/23/24 0744 -- -- -- 67 -- 96 % -- --   02/23/24 0743 -- -- -- 67 -- 96 % -- --   02/23/24 0742 -- -- -- 68 -- 97 % -- --   02/23/24 0741 -- -- -- 68 -- 97 % -- --   02/23/24 0740 -- -- -- 71 -- 96 % -- --   02/23/24 0739 -- -- -- 68 -- 95 % -- --   02/23/24 0738 -- -- -- 69 -- 95 % -- --   02/23/24 0737 -- -- -- 69 -- 96 % -- --   02/23/24 0735 -- -- -- 69 -- 95 % -- --   02/23/24 0734 -- -- -- 71 -- 95 % -- --   02/23/24 0733 -- -- -- 74 -- 96 % -- --   02/23/24 0732 -- -- -- 70 -- 95 % -- --   02/23/24 0731 -- -- -- 71 -- 95 % --  --   02/23/24 0730 -- -- -- 70 -- 96 % -- --   02/23/24 0729 -- -- -- 72 -- 96 % -- --   02/23/24 0728 -- -- -- 71 -- 96 % -- --   02/23/24 0727 -- -- -- 69 -- 95 % -- --   02/23/24 0726 -- -- -- 69 -- 95 % -- --   02/23/24 0725 -- -- -- 68 -- 95 % -- --   02/23/24 0724 -- -- -- 70 -- 96 % -- --   02/23/24 0723 -- -- -- 70 -- 96 % -- --   02/23/24 0722 -- -- -- 70 -- 95 % -- --   02/23/24 0721 -- -- -- 68 -- 96 % -- --   02/23/24 0720 -- -- -- 69 -- 96 % -- --   02/23/24 0719 -- -- -- 73 -- 96 % -- --   02/23/24 0718 -- -- -- 75 -- 96 % -- --   02/23/24 0717 -- -- -- 71 -- 95 % -- --   02/23/24 0716 -- -- -- 70 -- 95 % -- --   02/23/24 0715 -- -- -- 73 -- 95 % -- --   02/23/24 0714 -- -- -- 73 -- 96 % -- --   02/23/24 0713 -- -- -- 72 -- 95 % -- --   02/23/24 0712 -- -- -- 70 -- 95 % -- --   02/23/24 0711 -- -- -- 73 -- 96 % -- --   02/23/24 0710 -- -- -- 76 -- 96 % -- --   02/23/24 0709 -- -- -- 71 -- 96 % -- --   02/23/24 0708 -- -- -- 70 -- 96 % -- --   02/23/24 0707 -- -- -- 70 -- 96 % -- --   02/23/24 0706 -- -- -- 75 -- 96 % -- --   02/23/24 0705 -- -- -- 69 -- 96 % -- --   02/23/24 0704 -- -- -- 69 -- 96 % -- --   02/23/24 0703 -- -- -- 69 -- 96 % -- --   02/23/24 0702 -- -- -- 70 -- 96 % -- --   02/23/24 0701 -- -- -- 71 -- 96 % -- --   02/23/24 0700 -- -- -- 69 -- 96 % -- --   02/23/24 0659 -- -- -- 70 -- 96 % -- --   02/23/24 0658 -- -- -- 70 -- 96 % -- --   02/23/24 0657 -- -- -- 70 -- 96 % -- --   02/23/24 0656 -- -- -- 69 -- 96 % -- --   02/23/24 0655 -- -- -- 71 -- 96 % -- --   02/23/24 0654 -- -- -- 70 -- 96 % -- --   02/23/24 0653 -- -- -- 70 -- 96 % -- --   02/23/24 0652 -- -- -- 70 18 96 % -- --   02/23/24 0651 -- -- -- 69 -- 95 % -- --   02/23/24 0650 -- -- -- 69 -- 95 % -- --   02/23/24 0649 -- -- -- 72 -- 96 % -- --   02/23/24 0648 -- -- -- 72 -- 95 % -- --   02/23/24 0647 -- -- -- 71 -- 95 % -- --   02/23/24 0646 -- -- -- 77 -- 96 % -- --   02/23/24 0645 -- -- -- 76 -- 96 % -- --   02/23/24  0644 -- -- -- 79 -- 96 % -- --   02/23/24 0643 -- -- -- 81 -- 96 % -- --   02/23/24 0642 -- -- -- 77 -- 96 % -- --   02/23/24 0641 -- -- -- 70 -- 96 % -- --   02/23/24 0640 -- -- -- 72 -- 96 % -- --   02/23/24 0639 -- -- -- 71 -- 96 % -- --   02/23/24 0638 -- -- -- 72 -- 96 % -- --   02/23/24 0636 -- -- -- 71 -- 96 % -- --   02/23/24 0635 -- -- -- 71 -- 96 % -- --   02/23/24 0634 -- -- -- 70 -- 96 % -- --   02/23/24 0633 -- -- -- 76 -- 96 % -- --   02/23/24 0632 -- -- -- 71 -- 96 % -- --   02/23/24 0631 -- -- -- 71 -- 96 % -- --   02/23/24 0630 -- -- -- 74 -- 96 % -- --   02/23/24 0629 -- -- -- 72 -- 96 % -- --   02/23/24 0628 -- -- -- 72 -- 96 % -- --   02/23/24 0627 -- -- -- 72 -- 95 % -- --   02/23/24 0626 -- -- -- 72 -- 95 % -- --   02/23/24 0625 -- -- -- 71 -- 95 % -- --   02/23/24 0624 -- -- -- 72 -- 95 % -- --   02/23/24 0623 -- -- -- 73 -- 95 % -- --   02/23/24 0622 -- -- -- 72 -- 95 % -- --   02/23/24 0621 -- -- -- 73 -- 95 % -- --   02/23/24 0620 -- -- -- 72 -- 95 % -- --   02/23/24 0619 -- -- -- 76 -- 96 % -- --   02/23/24 0618 -- -- -- 82 -- 96 % -- --   02/23/24 0617 -- -- -- 75 -- 96 % -- --   02/23/24 0616 -- -- -- 75 -- 95 % -- --   02/23/24 0615 -- -- -- 74 -- 96 % -- --   02/23/24 0614 -- -- -- 73 -- 96 % -- --   02/23/24 0613 -- -- -- 73 -- 96 % -- --   02/23/24 0612 -- -- -- 73 -- 96 % -- --   02/23/24 0611 -- -- -- 74 -- 95 % -- --   02/23/24 0610 -- -- -- 74 -- 95 % -- --   02/23/24 0609 -- -- -- 74 -- 95 % -- --   02/23/24 0608 -- -- -- 74 -- 95 % -- --   02/23/24 0607 -- -- -- 75 -- 95 % -- --   02/23/24 0606 -- -- -- 75 -- 95 % -- --   02/23/24 0605 -- -- -- 74 -- 95 % -- --   02/23/24 0604 -- -- -- 75 -- 95 % -- --   02/23/24 0603 -- -- -- 76 -- 95 % -- --   02/23/24 0602 -- -- -- 74 -- 96 % -- --   02/23/24 0601 -- -- -- 75 -- 95 % -- --   02/23/24 0600 -- -- -- 75 -- 95 % -- --   02/23/24 0559 -- -- -- 76 -- 95 % -- --   02/23/24 0558 -- -- -- 75 -- 95 % -- --   02/23/24 0557 -- -- -- 76  -- 95 % -- --   02/23/24 0556 -- -- -- 75 -- 95 % -- --   02/23/24 0555 -- -- -- 76 -- 95 % -- --   02/23/24 0554 -- -- -- 76 -- 95 % -- --   02/23/24 0553 -- -- -- 76 -- 95 % -- --   02/23/24 0552 -- -- -- 76 -- 95 % -- --   02/23/24 0551 -- -- -- 76 -- 95 % -- --   02/23/24 0550 -- -- -- 75 -- 95 % -- --   02/23/24 0549 -- -- -- 76 -- 95 % -- --   02/23/24 0548 -- -- -- 77 -- 95 % -- --   02/23/24 0547 -- -- -- 78 -- 96 % -- --   02/23/24 0546 -- -- -- 77 -- 95 % -- --   02/23/24 0545 -- -- -- 78 -- 95 % -- --   02/23/24 0544 -- -- -- 80 -- (!) 92 % -- --   02/23/24 0543 -- -- -- 79 -- (!) 90 % -- --   02/23/24 0542 -- -- -- 79 -- 96 % -- --   02/23/24 0541 -- -- -- 78 -- 95 % -- --   02/23/24 0540 -- -- -- 77 -- 95 % -- --   02/23/24 0539 -- -- -- 77 -- 95 % -- --   02/23/24 0538 -- -- -- 78 -- 95 % -- --   02/23/24 0536 -- -- -- 79 -- 95 % -- --   02/23/24 0535 -- -- -- 80 -- (!) 94 % -- --   02/23/24 0534 -- -- -- 80 -- 95 % -- --   02/23/24 0533 -- -- -- 86 -- (!) 94 % -- --   02/23/24 0532 -- -- -- 88 -- 95 % -- --   02/23/24 0531 -- -- -- 89 -- 95 % -- --   02/23/24 0520 -- -- -- 82 -- 95 % -- --   02/23/24 0519 -- -- -- 80 -- 95 % -- --   02/23/24 0518 -- -- -- 79 -- 95 % -- --   02/23/24 0517 -- -- -- 76 -- 95 % -- --   02/23/24 0516 -- -- -- 89 -- 96 % -- --   02/23/24 0515 -- -- -- 83 -- 95 % -- --   02/23/24 0514 -- -- -- 78 -- 96 % -- --   02/23/24 0513 -- -- -- 78 -- 96 % -- --   02/23/24 0512 -- -- -- 76 -- 96 % -- --   02/23/24 0511 -- -- -- 76 -- 96 % -- --   02/23/24 0510 -- -- -- 76 -- 96 % -- --   02/23/24 0509 -- -- -- 76 -- 96 % -- --   02/23/24 0508 -- -- -- 75 -- 96 % -- --   02/23/24 0507 -- -- -- 76 -- 96 % -- --   02/23/24 0506 -- -- -- 78 -- 96 % -- --   02/23/24 0505 -- -- -- 77 -- 96 % -- --   02/23/24 0504 -- -- -- 78 -- 96 % -- --   02/23/24 0503 -- -- -- 75 -- 96 % -- --   02/23/24 0502 -- -- -- 76 -- 95 % -- --   02/23/24 0501 -- -- -- 75 -- 96 % -- --   02/23/24 0500 108/67 -- --  75 18 95 % -- --   02/23/24 0459 -- -- -- 77 -- 96 % -- --   02/23/24 0458 -- -- -- 77 -- 95 % -- --   02/23/24 0457 -- -- -- 77 -- 96 % -- --   02/23/24 0456 -- -- -- 75 -- 96 % -- --   02/23/24 0455 -- -- -- 76 -- 96 % -- --   02/23/24 0454 -- -- -- 74 -- 95 % -- --   02/23/24 0453 -- -- -- 75 -- 95 % -- --   02/23/24 0452 -- -- -- 75 -- 95 % -- --   02/23/24 0451 -- -- -- 78 -- 96 % -- --   02/23/24 0450 -- -- -- 77 -- 96 % -- --   02/23/24 0449 -- -- -- 76 -- 96 % -- --   02/23/24 0448 -- -- -- 75 -- 95 % -- --   02/23/24 0447 -- -- -- 73 -- 96 % -- --   02/23/24 0446 -- -- -- 76 -- 96 % -- --   02/23/24 0445 -- -- -- 77 -- 95 % -- --   02/23/24 0444 -- -- -- 76 -- 95 % -- --   02/23/24 0443 -- -- -- 75 -- 95 % -- --   02/23/24 0442 -- -- -- 75 -- 95 % -- --   02/23/24 0441 -- -- -- 77 -- 95 % -- --   02/23/24 0440 -- -- -- 76 -- (!) 94 % -- --   02/23/24 0439 -- -- -- 77 -- (!) 94 % -- --   02/23/24 0437 -- -- -- 77 -- (!) 94 % -- --   02/23/24 0436 -- -- -- 78 -- (!) 94 % -- --   02/23/24 0435 -- -- -- 80 -- 95 % -- --   02/23/24 0434 -- -- -- 81 -- 95 % -- --   02/23/24 0433 -- -- -- 75 -- 95 % -- --   02/23/24 0432 -- -- -- 82 -- 96 % -- --   02/23/24 0431 -- -- -- 78 -- 95 % -- --   02/23/24 0430 -- -- -- 80 -- 96 % -- --   02/23/24 0429 -- -- -- 79 -- 95 % -- --   02/23/24 0428 -- -- -- 78 -- 96 % -- --   02/23/24 0427 -- -- -- 80 -- 96 % -- --   02/23/24 0426 -- -- -- 82 -- 96 % -- --   02/23/24 0425 -- -- -- 77 -- 96 % -- --   02/23/24 0424 -- -- -- 79 -- 96 % -- --   02/23/24 0423 -- -- -- 78 -- 96 % -- --   02/23/24 0422 -- -- -- 76 -- 96 % -- --   02/23/24 0421 -- -- -- 77 -- 96 % -- --   02/23/24 0420 -- -- -- 77 -- 96 % -- --   02/23/24 0419 -- -- -- 77 -- 96 % -- --   02/23/24 0418 -- -- -- 77 -- 96 % -- --   02/23/24 0417 -- -- -- 76 -- 96 % -- --   02/23/24 0416 -- -- -- 77 -- 96 % -- --   02/23/24 0415 -- -- -- 79 -- 96 % -- --   02/23/24 0414 -- -- -- 76 -- 96 % -- --   02/23/24 0413 -- -- --  78 -- 96 % -- --   02/23/24 0412 -- -- -- 78 -- 96 % -- --   02/23/24 0411 -- -- -- 78 -- 96 % -- --   02/23/24 0410 -- -- -- 76 -- 96 % -- --   02/23/24 0409 -- -- -- 77 -- 96 % -- --   02/23/24 0408 -- -- -- 77 -- 96 % -- --   02/23/24 0407 -- -- -- 76 -- 96 % -- --   02/23/24 0406 -- -- -- 78 -- 95 % -- --   02/23/24 0405 -- -- -- 79 -- 95 % -- --   02/23/24 0404 -- -- -- 81 -- 96 % -- --   02/23/24 0403 -- -- -- 87 -- 96 % -- --   02/23/24 0402 -- -- -- 78 -- 96 % -- --   02/23/24 0401 -- -- -- 77 -- 97 % -- --   02/23/24 0400 103/68 -- -- 82 18 97 % -- --   02/23/24 0359 -- -- -- 79 -- 97 % -- --   02/23/24 0358 -- -- -- 81 -- 97 % -- --   02/23/24 0357 -- -- -- 81 -- 97 % -- --   02/23/24 0356 -- -- -- 81 -- 97 % -- --   02/23/24 0355 -- -- -- 83 -- 97 % -- --   02/23/24 0354 -- -- -- 80 -- 97 % -- --   02/23/24 0353 -- -- -- 82 -- 97 % -- --   02/23/24 0352 -- -- -- 79 -- 97 % -- --   02/23/24 0351 -- -- -- 79 -- 96 % -- --   02/23/24 0350 -- -- -- 80 -- 97 % -- --   02/23/24 0349 -- -- -- 79 -- 97 % -- --   02/23/24 0348 -- -- -- 80 -- 96 % -- --   02/23/24 0347 -- -- -- 80 -- 97 % -- --   02/23/24 0346 -- -- -- 79 -- 96 % -- --   02/23/24 0345 -- -- -- 81 -- 97 % -- --   02/23/24 0344 -- -- -- 81 -- 97 % -- --   02/23/24 0343 -- -- -- 80 -- 97 % -- --   02/23/24 0342 -- -- -- 82 -- 97 % -- --   02/23/24 0341 -- -- -- 82 -- 97 % -- --   02/23/24 0340 -- -- -- 83 -- 97 % -- --   02/23/24 0338 -- -- -- 82 -- 96 % -- --   02/23/24 0337 -- -- -- 82 -- 96 % -- --   02/23/24 0336 -- -- -- 84 -- 97 % -- --   02/23/24 0335 -- -- -- 86 -- 97 % -- --   02/23/24 0334 -- -- -- 84 -- 97 % -- --   02/23/24 0333 -- -- -- 75 -- 97 % -- --   02/23/24 0332 -- -- -- 75 -- 97 % -- --   02/23/24 0331 -- -- -- 77 -- 97 % -- --   02/23/24 0330 -- -- -- 75 -- 97 % -- --   02/23/24 0329 -- -- -- 77 -- 97 % -- --   02/23/24 0328 -- -- -- 76 -- 97 % -- --   02/23/24 0327 -- -- -- 77 -- 97 % -- --   02/23/24 0326 -- -- -- 76 -- 97 % --  --   02/23/24 0325 -- -- -- 76 -- 96 % -- --   02/23/24 0324 -- -- -- 77 -- 96 % -- --   02/23/24 0323 -- -- -- 77 -- 96 % -- --   02/23/24 0322 -- -- -- 76 -- 96 % -- --   02/23/24 0321 -- -- -- 77 -- 96 % -- --   02/23/24 0320 -- -- -- 76 -- 96 % -- --   02/23/24 0319 -- -- -- 77 -- 96 % -- --   02/23/24 0318 -- -- -- 76 -- 96 % -- --   02/23/24 0317 -- -- -- 75 -- 96 % -- --   02/23/24 0316 -- -- -- 78 -- 96 % -- --   02/23/24 0315 -- -- -- 77 -- 96 % -- --   02/23/24 0314 -- -- -- 76 -- 96 % -- --   02/23/24 0313 -- -- -- 75 -- 96 % -- --   02/23/24 0312 -- -- -- 76 -- 97 % -- --   02/23/24 0311 -- -- -- 77 -- 96 % -- --   02/23/24 0310 -- -- -- 78 -- 97 % -- --   02/23/24 0309 -- -- -- 78 -- 97 % -- --   02/23/24 0308 -- -- -- 79 -- 97 % -- --   02/23/24 0307 -- -- -- 79 -- 96 % -- --   02/23/24 0306 -- -- -- 80 -- 96 % -- --   02/23/24 0305 -- -- -- 81 -- 96 % -- --   02/23/24 0304 -- -- -- 84 -- (!) 94 % -- --   02/23/24 0303 -- -- -- 87 -- (!) 93 % -- --   02/23/24 0302 -- -- -- 83 -- (!) 92 % -- --   02/23/24 0301 -- -- -- 80 -- (!) 92 % -- --   02/23/24 0300 111/60 -- -- 79 16 96 % -- --   02/23/24 0259 -- -- -- 80 -- (!) 92 % -- --   02/23/24 0258 -- -- -- 81 -- (!) 92 % -- --   02/23/24 0257 -- -- -- 80 -- (!) 92 % -- --   02/23/24 0256 -- -- -- 80 -- (!) 92 % -- --   02/23/24 0255 -- -- -- 79 -- (!) 92 % -- --   02/23/24 0254 -- -- -- 82 -- (!) 92 % -- --   02/23/24 0253 -- -- -- 80 -- (!) 92 % -- --   02/23/24 0252 -- -- -- 80 -- (!) 92 % -- --   02/23/24 0251 -- -- -- 80 -- (!) 92 % -- --   02/23/24 0250 -- -- -- 79 -- (!) 92 % -- --   02/23/24 0249 -- -- -- 79 -- (!) 92 % -- --   02/23/24 0248 -- -- -- 80 -- (!) 92 % -- --   02/23/24 0247 -- -- -- 79 -- (!) 92 % -- --   02/23/24 0246 -- -- -- 79 -- (!) 92 % -- --   02/23/24 0245 -- -- -- 80 -- (!) 92 % -- --   02/23/24 0244 -- -- -- 81 -- (!) 92 % -- --   02/23/24 0243 -- -- -- 79 -- (!) 92 % -- --   02/23/24 0242 -- -- -- 78 -- (!) 93 % -- --    02/23/24 0241 -- -- -- 78 -- (!) 93 % -- --   02/23/24 0240 -- -- -- 79 -- (!) 94 % -- --   02/23/24 0238 -- -- -- 79 -- (!) 92 % -- --   02/23/24 0237 -- -- -- 78 -- (!) 93 % -- --   02/23/24 0236 -- -- -- 79 -- (!) 94 % -- --   02/23/24 0235 -- -- -- 81 -- (!) 93 % -- --   02/23/24 0234 -- -- -- 82 -- (!) 93 % -- --   02/23/24 0233 -- -- -- 82 -- (!) 92 % -- --   02/23/24 0232 -- -- -- 82 -- (!) 94 % -- --   02/23/24 0231 -- -- -- 92 -- 96 % -- --   02/23/24 0230 -- -- -- 89 -- (!) 92 % -- --   02/23/24 0229 -- -- -- 80 -- (!) 91 % -- --   02/23/24 0228 -- -- -- 78 -- (!) 92 % -- --   02/23/24 0227 -- -- -- 80 -- (!) 91 % -- --   02/23/24 0226 -- -- -- 80 -- (!) 91 % -- --   02/23/24 0225 -- -- -- 79 -- (!) 92 % -- --   02/23/24 0224 -- -- -- 90 -- 95 % -- --   02/23/24 0223 -- -- -- 91 -- (!) 94 % -- --   02/23/24 0222 -- -- -- 80 -- (!) 93 % -- --   02/23/24 0221 -- -- -- 81 -- (!) 93 % -- --   02/23/24 0220 -- -- -- 80 -- (!) 93 % -- --   02/23/24 0219 -- -- -- 81 -- (!) 93 % -- --   02/23/24 0218 -- -- -- 81 -- (!) 94 % -- --   02/23/24 0217 -- -- -- 81 -- 95 % -- --   02/23/24 0216 -- -- -- 88 -- 95 % -- --   02/23/24 0215 -- -- -- 89 -- 95 % -- --   02/23/24 0214 -- -- -- 85 -- (!) 94 % -- --   02/23/24 0213 -- -- -- 83 18 (!) 92 % -- --   02/23/24 0212 -- -- -- 83 -- (!) 92 % -- --   02/23/24 0211 -- -- -- 83 -- (!) 93 % -- --   02/23/24 0210 -- -- -- 84 -- (!) 94 % -- --   02/23/24 0209 -- -- -- 86 -- (!) 93 % -- --   02/23/24 0208 -- -- -- 90 -- 95 % -- --   02/23/24 0207 -- -- -- 88 -- (!) 92 % -- --   02/23/24 0206 -- -- -- 83 -- (!) 91 % -- --   02/23/24 0205 -- -- -- 83 -- (!) 90 % -- --   02/23/24 0204 -- -- -- 83 -- (!) 90 % -- --   02/23/24 0203 -- -- -- 84 -- (!) 91 % -- --   02/23/24 0202 -- -- -- 85 -- (!) 91 % -- --   02/23/24 0201 -- -- -- 84 -- (!) 90 % -- --   02/23/24 0200 107/60 -- -- 82 -- 96 % -- --   02/23/24 0159 -- -- -- 81 -- (!) 90 % -- --   02/23/24 0158 -- -- -- 82 -- (!) 90 %  -- --   02/23/24 0157 -- -- -- 82 -- (!) 90 % -- --   02/23/24 0156 -- -- -- 82 -- (!) 90 % -- --   02/23/24 0155 -- -- -- 83 -- (!) 90 % -- --   02/23/24 0154 -- -- -- 83 -- (!) 91 % -- --   02/23/24 0153 -- -- -- 82 -- (!) 90 % -- --   02/23/24 0152 -- -- -- 82 -- (!) 90 % -- --   02/23/24 0151 -- -- -- 82 -- (!) 91 % -- --   02/23/24 0150 -- -- -- 83 -- (!) 91 % -- --   02/23/24 0149 -- -- -- 82 -- (!) 91 % -- --   02/23/24 0148 -- -- -- 82 -- (!) 90 % -- --   02/23/24 0147 -- -- -- 82 -- (!) 91 % -- --   02/23/24 0146 -- -- -- 82 -- (!) 92 % -- --   02/23/24 0145 -- -- -- 83 -- (!) 90 % -- --   02/23/24 0144 -- -- -- 82 -- (!) 90 % -- --   02/23/24 0143 -- -- -- 80 -- (!) 93 % -- --   02/23/24 0142 -- -- -- 84 -- (!) 90 % -- --   02/23/24 0141 -- -- -- 83 -- (!) 90 % -- --   02/23/24 0139 -- -- -- 83 -- (!) 90 % -- --   02/23/24 0138 -- -- -- 83 -- (!) 90 % -- --   02/23/24 0137 -- -- -- 84 -- (!) 90 % -- --   02/23/24 0136 -- -- -- 82 -- (!) 91 % -- --   02/23/24 0135 -- -- -- 83 -- (!) 90 % -- --   02/23/24 0134 -- -- -- 82 -- (!) 92 % -- --   02/23/24 0133 -- -- -- 88 -- (!) 93 % -- --   02/23/24 0132 -- -- -- 85 -- (!) 92 % -- --   02/23/24 0131 -- -- -- 84 -- (!) 92 % -- --   02/23/24 0130 -- -- -- 83 -- (!) 92 % -- --   02/23/24 0129 -- -- -- 85 -- (!) 94 % -- --   02/23/24 0128 -- -- -- 83 -- (!) 94 % -- --   02/23/24 0127 -- -- -- 82 -- (!) 94 % -- --   02/23/24 0126 -- -- -- 85 -- (!) 94 % -- --   02/23/24 0125 -- -- -- 87 -- (!) 93 % -- --   02/23/24 0124 -- -- -- 92 -- 95 % -- --   02/23/24 0123 -- -- -- 93 -- (!) 93 % -- --   02/23/24 0122 -- -- -- 96 -- 96 % -- --   02/23/24 0121 -- -- -- 95 -- (!) 91 % -- --   02/23/24 0120 -- -- -- 83 -- (!) 91 % -- --   02/23/24 0119 -- -- -- 81 -- (!) 91 % -- --   02/23/24 0118 -- -- -- 82 -- (!) 91 % -- --   02/23/24 0117 -- -- -- 81 -- (!) 91 % -- --   02/23/24 0116 -- -- -- 81 -- (!) 91 % -- --   02/23/24 0115 -- -- -- 80 -- (!) 91 % -- --   02/23/24 0114 --  -- -- 79 -- (!) 91 % -- --   02/23/24 0113 -- -- -- 79 -- (!) 91 % -- --   02/23/24 0112 -- -- -- 78 -- (!) 91 % -- --   02/23/24 0111 -- -- -- 80 -- (!) 91 % -- --   02/23/24 0110 -- -- -- 78 -- (!) 91 % -- --   02/23/24 0109 -- -- -- 80 -- (!) 93 % -- --   02/23/24 0108 -- -- -- 81 -- (!) 91 % -- --   02/23/24 0107 -- -- -- 81 -- (!) 91 % -- --   02/23/24 0106 -- -- -- 81 -- (!) 91 % -- --   02/23/24 0105 -- -- -- 80 -- (!) 92 % -- --   02/23/24 0104 -- -- -- 81 -- (!) 92 % -- --   02/23/24 0103 -- -- -- 81 -- (!) 94 % -- --   02/23/24 0102 -- -- -- 81 -- (!) 91 % -- --   02/23/24 0101 -- -- -- 80 -- (!) 92 % -- --   02/23/24 0100 115/69 -- -- 80 18 (!) 94 % -- --   02/23/24 0041 -- -- -- 84 -- (!) 93 % -- --   02/23/24 0040 -- -- -- 82 -- (!) 94 % -- --   02/23/24 0039 -- -- -- 79 -- (!) 94 % -- --   02/23/24 0038 -- -- -- 79 -- 95 % -- --   02/23/24 0037 -- -- -- 82 -- 95 % -- --   02/23/24 0036 -- -- -- 87 -- 96 % -- --   02/23/24 0035 -- -- -- 90 -- (!) 93 % -- --   02/23/24 0034 -- -- -- 87 -- (!) 94 % -- --   02/23/24 0033 -- -- -- 78 -- 95 % -- --   02/23/24 0032 -- -- -- 81 -- (!) 94 % -- --   02/23/24 0031 -- -- -- 78 -- (!) 92 % -- --   02/23/24 0030 113/62 -- -- 81 18 (!) 93 % -- --   02/23/24 0029 -- -- -- 81 -- (!) 92 % -- --   02/23/24 0028 -- -- -- 81 -- (!) 92 % -- --   02/23/24 0027 -- -- -- 79 -- (!) 92 % -- --   02/23/24 0026 -- -- -- 79 -- (!) 91 % -- --   02/23/24 0024 -- -- -- 79 -- (!) 94 % -- --   02/23/24 0023 -- -- -- 77 -- (!) 93 % -- --   02/23/24 0022 -- -- -- 79 -- (!) 92 % -- --   02/23/24 0021 -- -- -- 79 -- (!) 92 % -- --   02/23/24 0020 -- -- -- 81 -- (!) 94 % -- --   02/23/24 0019 -- -- -- 91 -- 95 % -- --   02/23/24 0018 -- -- -- 90 -- (!) 94 % -- --   02/23/24 0017 -- -- -- 98 -- (!) 94 % -- --   02/23/24 0006 -- -- -- 85 -- 96 % -- --   02/23/24 0005 -- -- -- 83 -- (!) 93 % -- --   02/23/24 0004 -- -- -- 82 -- (!) 92 % -- --   02/23/24 0003 -- -- -- 83 -- (!) 93 % -- --    02/23/24 0002 -- -- -- 83 -- (!) 94 % -- --   02/23/24 0001 -- -- -- 87 -- (!) 92 % -- --   02/23/24 0000 125/74 -- -- 82 18 (!) 93 % -- --   02/22/24 2359 -- -- -- 83 -- (!) 94 % -- --   02/22/24 2358 -- -- -- 83 -- (!) 94 % -- --   02/22/24 2357 -- -- -- 86 -- (!) 94 % -- --   02/22/24 2356 -- -- -- 84 -- (!) 94 % -- --   02/22/24 2355 -- -- -- 85 -- (!) 93 % -- --   02/22/24 2354 -- -- -- 84 -- (!) 90 % -- --   02/22/24 2353 -- -- -- 84 -- (!) 93 % -- --   02/22/24 2352 -- -- -- 88 -- (!) 93 % -- --   02/22/24 2351 -- -- -- 84 -- (!) 92 % -- --   02/22/24 2350 -- -- -- 87 -- (!) 92 % -- --   02/22/24 2349 -- -- -- 87 -- 95 % -- --   02/22/24 2348 -- -- -- 88 -- (!) 93 % -- --   02/22/24 2347 -- -- -- 83 -- (!) 93 % -- --   02/22/24 2346 -- -- -- 86 -- (!) 92 % -- --   02/22/24 2345 -- -- -- 85 -- (!) 93 % -- --   02/22/24 2344 -- -- -- 83 -- (!) 94 % -- --   02/22/24 2343 -- -- -- 82 -- (!) 94 % -- --   02/22/24 2342 -- -- -- 85 -- (!) 94 % -- --   02/22/24 2341 -- -- -- 83 -- (!) 93 % -- --   02/22/24 2340 -- -- -- 81 -- (!) 94 % -- --   02/22/24 2339 -- -- -- 84 -- (!) 92 % -- --   02/22/24 2338 -- -- -- 83 -- (!) 93 % -- --   02/22/24 2337 -- -- -- 81 -- (!) 94 % -- --   02/22/24 2336 -- -- -- 82 -- (!) 94 % -- --   02/22/24 2335 -- -- -- 81 -- 95 % -- --   02/22/24 2334 -- -- -- 79 -- (!) 93 % -- --   02/22/24 2333 -- -- -- 81 -- (!) 94 % -- --   02/22/24 2332 -- -- -- 80 -- (!) 94 % -- --   02/22/24 2331 -- -- -- 83 -- (!) 93 % -- --   02/22/24 2330 121/70 -- -- 82 18 95 % -- --   02/22/24 2329 -- -- -- 80 -- 95 % -- --   02/22/24 2328 -- -- -- 82 -- 95 % -- --   02/22/24 2327 -- -- -- 82 -- (!) 94 % -- --   02/22/24 2325 -- -- -- 86 -- 96 % -- --   02/22/24 2324 -- -- -- 81 -- (!) 94 % -- --   02/22/24 2323 -- -- -- 81 -- (!) 93 % -- --   02/22/24 2322 -- -- -- 79 -- (!) 94 % -- --   02/22/24 2321 -- -- -- 81 -- 95 % -- --   02/22/24 2320 -- -- -- 83 -- (!) 93 % -- --   02/22/24 2319 -- -- -- 82 -- (!) 93  % -- --   02/22/24 2318 -- -- -- 80 -- 95 % -- --   02/22/24 2317 -- -- -- 81 -- (!) 93 % -- --   02/22/24 2316 -- -- -- 81 -- (!) 94 % -- --   02/22/24 2315 -- -- -- 81 -- (!) 94 % -- --   02/22/24 2314 -- -- -- 83 -- (!) 93 % -- --   02/22/24 2313 -- -- -- 80 -- (!) 93 % -- --   02/22/24 2312 -- -- -- 81 -- (!) 93 % -- --   02/22/24 2311 -- -- -- 81 -- (!) 94 % -- --   02/22/24 2310 -- -- -- 84 -- 95 % -- --   02/22/24 2309 -- -- -- 84 -- (!) 94 % -- --   02/22/24 2308 -- -- -- 86 -- 96 % -- --   02/22/24 2307 -- -- -- 82 -- 96 % -- --   02/22/24 2306 -- -- -- 90 -- (!) 94 % -- --   02/22/24 2305 -- -- -- 89 -- 95 % -- --   02/22/24 2304 -- -- -- 92 -- 95 % -- --   02/22/24 2303 -- -- -- 90 -- 95 % -- --   02/22/24 2302 -- -- -- 81 -- (!) 90 % -- --   02/22/24 2301 -- -- -- 81 -- (!) 91 % -- --   02/22/24 2300 123/69 -- -- 81 -- (!) 93 % -- --   02/22/24 2259 -- -- -- 83 -- (!) 93 % -- --   02/22/24 2258 -- -- -- 84 -- 95 % -- --   02/22/24 2257 -- -- -- 83 -- (!) 93 % -- --   02/22/24 2256 -- -- -- 82 -- (!) 92 % -- --   02/22/24 2255 -- -- -- 82 -- (!) 94 % -- --   02/22/24 2254 -- -- -- 82 -- (!) 92 % -- --   02/22/24 2253 -- -- -- 87 -- (!) 94 % -- --   02/22/24 2252 -- -- -- 82 -- (!) 94 % -- --   02/22/24 2251 -- -- -- 81 -- (!) 93 % -- --   02/22/24 2250 -- -- -- 83 -- (!) 94 % -- --   02/22/24 2249 -- -- -- 82 -- (!) 93 % -- --   02/22/24 2248 -- -- -- 80 -- (!) 93 % -- --   02/22/24 2247 -- -- -- 79 -- (!) 94 % -- --   02/22/24 2246 -- -- -- 83 -- 95 % -- --   02/22/24 2245 120/70 -- -- 84 -- 97 % -- --   02/22/24 2244 -- -- -- 86 -- (!) 88 % -- --   02/22/24 2243 -- -- -- 84 -- (!) 93 % -- --   02/22/24 2242 -- -- -- 82 -- (!) 92 % -- --   02/22/24 2241 -- -- -- 81 -- (!) 92 % -- --   02/22/24 2240 -- -- -- 85 -- (!) 91 % -- --   02/22/24 2239 -- -- -- 85 -- (!) 92 % -- --   02/22/24 2238 -- -- -- 88 -- (!) 94 % -- --   02/22/24 2237 -- -- -- 83 -- (!) 92 % -- --   02/22/24 2236 -- -- -- 87 -- (!) 91 %  "-- --   02/22/24 2235 -- -- -- 85 -- (!) 94 % -- --   02/22/24 2215 118/68 -- -- 74 20 97 % -- --   02/22/24 2045 138/60 -- -- 85 20 95 % -- --   02/22/24 2030 132/63 -- -- 86 20 97 % -- --   02/22/24 2019 -- -- -- 91 -- 95 % -- --   02/22/24 1957 -- -- -- -- -- 97 % -- --   02/22/24 1944 134/67 97.8 °F (36.6 °C) Temporal 95 20 97 % 5' 4" (1.626 m) 115.7 kg (255 lb)       GENERAL APPEARANCE: Alert, well-developed, well-nourished female in no acute distress.  HEENT: Normocephalic and atraumatic. PERRL. Oropharynx unremarkable.  PULM: Normal respiratory effort. No accessory muscle use.  CV: RRR.  ABDOMEN: Soft, nontender.  EXTREMITIES: No obvious signs of vascular compromise. Pulses present. No cyanosis, clubbing or edema.  SKIN: Clear; no rashes, lesions or skin breaks in exposed areas.    NEURO:  MENTAL STATUS:   , Patient awake and oriented to time, place, and person, recent/remote memory normal, attention span/concentration normal, speech fluent without paraphasic errors, good comprehension with appropriate thought content, and fund of knowledge appropriate for patient's level of education.  Affect euthymic.    CRANIAL NERVES:  CN I: Not tested.  CN II: Fundoscopic exam deferred.  CN III, IV, VI: Pupils equal, round and reactive to light.  Extraocular movements full and intact.  CN V: Facial sensation normal.  CN VII: Facial asymmetry absent.  CN VIII: Hearing grossly normal and equal bilaterally.  No skew deviation or pathologic nystagmus.  CN IX, X: Palate elevates symmetrically. Speech/articulation is clear without dysarthria.  CN XI: Shoulder shrug and chin rotation equal with good strength.  CN XII: Tongue protrusion midline.    MOTOR:  Bulk normal. Tone normal and symmetric throughout.  Abnormal movements absent.  Tremor: none present.  Strength  5/5 in all extremities.  0/5 in entire left upper extremity except 1/5 proximally .    REFLEXES:  DTRs 2+ throughout (reflexes preserved in the left upper " "extremity).  Plantar response equivocal bilaterally.  SENSATION: grossly intact throughout except absent on LUE  COORDINATION: intact on RUE and bilateral LE  STATION: not tested.  GAIT: not tested.        Labs:  Recent Labs   Lab 02/22/24 2000 02/23/24  0545    136   K 3.8 3.9    106   CO2 23 20*   BUN 16 14   CREATININE 0.8 0.8   * 97   CALCIUM 9.2 8.6*   PHOS  --  3.2   MG  --  1.7     Recent Labs   Lab 02/22/24 2000 02/23/24  0545   WBC 8.21 5.91   HGB 12.5 12.2   HCT 39.6 39.5    205     Recent Labs   Lab 02/22/24 2000 02/23/24  0545   ALBUMIN 4.0 3.5   PROT 7.4 6.7   BILITOT 0.9 1.1*   ALKPHOS 92 81   ALT 22 19   AST 26 21     Lab Results   Component Value Date    INR 1.0 02/23/2024     Lab Results   Component Value Date    TRIG 66 02/23/2024    HDL 48 02/23/2024    CHOLHDL 34.0 02/23/2024     Lab Results   Component Value Date    HGBA1C 4.3 02/12/2024     No results found for: "PROTEINCSF", "GLUCCSF", "WBCCSF"    Imaging:  I have reviewed and interpreted the pertinent imaging and lab results.      CTA Head and Neck (xpd)  Narrative: EXAMINATION:  CTA HEAD AND NECK (XPD)    CLINICAL HISTORY:  Neuro deficit, acute, stroke suspected;    TECHNIQUE:  Non contrast low dose axial images were obtained through the head. CT angiogram was performed from the level of the merritt to the top of the head following the IV administration of 100mL of Omnipaque 350.   Sagittal and coronal reconstructions and maximum intensity projection reconstructions were performed. Arterial stenosis percentages are based on NASCET measurement criteria.    COMPARISON:  None    FINDINGS:  CTA HEAD:    Vasculature: Mild atherosclerotic changes of the cavernous carotid arteries without significant stenosis.  The intracranial arteries show normal anatomy without evidence of major branch occlusion or focal luminal narrowing.There is no suspicion of vascular malformation or saccular aneurysm.    Variant anatomy: Presumed " small right anterior orbital/supra orbital venous varix or potential small AV fistula involving a branch of the ophthalmic artery and facial vein.    Brain: There is no evidence of mass, mass effect, edema, midline shift, intracranial hemorrhage, or space-occupying extra-axial fluid collection. After the administration of contrast there is no abnormal enhancement.    Ventricles/Sulci: The ventricles and sulci are appropriate in size for age.    Osseous Structures: The osseous structures are unremarkable in appearance.    Sinuses and orbits: Mild scattered mucosal thickening.  Visualized orbits are within normal limits.    Other: Visualized portions of the mastoids are unremarkable.    CTA NECK:    Aorta: Conventional branching pattern. The great vessel origins are patent without flow limiting stenosis.    Vertebral Arteries: The origins of the vertebral arteries are patent.  No flow limiting stenosis, occlusion, or dissection.    Right Carotid: No flow limiting stenosis, occlusion, or dissection of the common carotid or internal carotid arteries.  No significant plaque of the proximal ICA.  Right internal carotid artery: 0 % stenosis by and NASCET.    Left Carotid: No flow limiting stenosis, occlusion, or dissection of the common carotid or internal carotid arteries. No significant plaque of the proximal ICA. Right internal carotid artery: 0 % stenosis by and NASCET.    Extravascular Anatomy: No definite adjacent soft tissue abnormality seen accounting for technique used.  Mild superior endplate compression deformity of T3 which appears chronic.  No significant bony process.  Impression: 1. No acute intracranial CT findings.  2. No intracranial proximal large vessel occlusion or significant stenosis.  3. No significant atherosclerotic changes or arterial stenosis within the neck.  4. 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  The major findings between the preliminary and final  reports are concordant.    Electronically signed by: Endy Musa  Date:    02/23/2024  Time:    08:06  MRI Cervical Spine Without Contrast  Narrative: EXAMINATION:  MRI CERVICAL SPINE WITHOUT CONTRAST    CLINICAL HISTORY:  Myelopathy, acute, cervical spine;.    TECHNIQUE:  Multiplanar, multisequence MR images of the cervical spine were acquired without the administration of contrast.    COMPARISON:  CTA 02/22/2024.    FINDINGS:  Motion limited exam    Morphology: Cervical vertebral body heights are preserved marrow signal is within normal limits.  Incidentally noted chronic appearing mild superior endplate compression deformity of T3 and T3 and T4 vertebral body hemangiomas.    Alignment: Cervical spinal alignment is normal.    Cord: The cervical cord shows normal contour and signal content throughout its length.    Craniocervical Junction: Cerebellar tonsils are normally positioned. The visualized portions of the posterior fossa are unremarkable.  Incidentally noted partially empty sella configuration, nonspecific in isolation as described on the concurrently performed brain MRI 02/22/2024.  The regional osseous anatomy is normal.    Disc levels:    C2-C3: Mild right-sided facet arthrosis producing no more than mild right foraminal narrowing.  The spinal canal and left foramen are patent..    C3-C4: Mild bilateral facet arthrosis producing no more than mild bilateral foraminal narrowing.  The spinal canal is patent.    C4-C5: Mild bilateral facet arthrosis and uncovertebral spurring producing no more than mild bilateral foraminal narrowing.  The spinal canal is patent..    C5-C6: Mild right-sided facet arthrosis.  The spinal canal and foramina remain patent..    C6-C7: Broad-based disc protrusion centrally and lateralizing to the left producing mild spinal canal narrowing and suspected mild-to-moderate left foraminal narrowing.  The right foramen is patent..    C7-T1: Within normal limits.    Soft tissues: The  visualized paraspinal soft tissues are within normal limits.  Impression: 1. Motion limited exam without evidence of an acute process/cord impingement.  2. Overall mild degenerative changes most pronounced at C6-C7 where there is a shallow left lateralizing disc protrusion contributing to mild spinal canal narrowing and mild-to-moderate left foraminal narrowing.  The preliminary and final reports are concordant.    Electronically signed by: Endy Musa  Date:    02/23/2024  Time:    07:50  MRI Brain Without Contrast  Narrative: EXAMINATION:  MRI BRAIN WITHOUT CONTRAST    CLINICAL HISTORY:  Stroke, follow up;.    TECHNIQUE:  Multiplanar multisequence MR imaging of the brain was performed without contrast    COMPARISON:  CTA 02/22/2024.    FINDINGS:  Motion degraded exam.    Brain: There are no concerning focal areas of abnormal or restricted diffusion within the brain.  No mass, hemorrhage or acute infarct is present.  Midline Structures: Partially empty sella configuration, nonspecific in isolation.  The midline structures of the brain are normal.  Ventricles: The ventricles, sulci and cisterns are within normal limits.  Vasculature: The vascular flow voids at the base of the brain are within normal limits.  Calvarium: The visualized osseous structures are unremarkable.  Sinuses: Minimal mucosal thickening within the dependent maxillary sinuses.  No obstructive changes or dependent fluid levels.  Minimal ethmoid sinus opacification.  Orbits: The orbits and globes are unremarkable.  Mastoids: The mastoid air cells are adequately aerated.  Extracranial soft tissues: The visualized extracranial soft tissues are unremarkable.  Impression: 1. No acute process, specifically no recent infarct on this motion limited noncontrast brain MRI.  2. Variant partially empty sella configuration, nonspecific in isolation.  The preliminary and final reports are concordant.    Electronically signed by: Endy  Lencho  Date:    02/23/2024  Time:    07:12         ASSESSMENT & PLAN:      Left upper extremity weakness    Plan:   Etiology of left upper extremity weakness and numbness is unknown at this time.\ Differential includes left brachial plexopathy.  Workup for intracranial and C-spine pathology has been negative with MRI brain and C-spine.  Recommend MRI brachial plexus on the left  X-ray of the left shoulder was negative for fracture or dislocation.  Pain management per primary team.  Can consider gabapentin  PT OT evaluate and treat  Will follow         Thank you kindly for including us in the care of this patient. Please do not hesitate to contact us with any questions.             George Rothman MD  Neurology/vascular Neurology  Date of Service: 02/23/2024  10:51 AM    --------------------------------------------------------------------------------------------------------------------------------------------------------------------------------------------------------------------------------------------------------------  Please note: This note was transcribed using voice recognition software. Because of this technology there are often uinintended grammatical, spelling, and other transcription errors. Please disregard these errors.

## 2024-02-23 NOTE — HPI
Ijeoma Issa is a 54 y/o F with a past medical history significant for DM2, GERD, and PCOS who presented to the ED with c/o LUE weakness and numbness which began around 3 pm today after napping on her couch.  She notes having some mild L shoulder pain over the past few days, but this was exacerbated this afternoon.  Denies trauma, headache, vision changes, LE weakness or any other issues.  CTA head and neck performed in the ED without acute findings.  She was evaluated by telemed vascular neurology with recommendations to obtain MRI brain and MRI C-spine to r/o acute central process.  She is placed in observation under the service of hospital medicine for continued workup and treatment.

## 2024-02-23 NOTE — TELEMEDICINE CONSULT
[facility]  Vascular Neurology  Comprehensive Stroke Center  TeleVascular Neurology Acute Consultation Note        Consult Information  Consults    Consulting Provider: NICKOLAS LILLY   Current Providers  No providers found    Patient Location:  [facility] EMERGENCY DEPARTMENT Emergency Department    Spoke hospital nurse at bedside with patient assisting consultant.  Patient information was obtained from patient.       Stroke Documentation  Acute Stroke Times   Last Known Normal Date: 02/22/24  Last Known Normal Time: 1500  Stroke Team Called Date: 02/22/24  Stroke Team Called Time: 2000  Stroke Team Arrival Date: 02/22/24  Stroke Team Arrival Time: 2025  CT Interpretation Time: 2040  Thrombolytic Therapy Recommended: No  Thrombectomy Recommended: No    NIH Scale:  1a. Level of Consciousness: 0-->Alert, keenly responsive  1b. LOC Questions: 0-->Answers both questions correctly  1c. LOC Commands: 0-->Performs both tasks correctly  2. Best Gaze: 0-->Normal  3. Visual: 0-->No visual loss  4. Facial Palsy: 0-->Normal symmetrical movements  5a. Motor Arm, Left: 3-->No effort against gravity, limb falls  5b. Motor Arm, Right: 0-->No drift, limb holds 90 (or 45) degrees for full 10 secs  6a. Motor Leg, Left: 0-->No drift, leg holds 30 degree position for full 5 secs  6b. Motor Leg, Right: 0-->No drift, leg holds 30 degree position for full 5 secs  7. Limb Ataxia: 0-->Absent  8. Sensory: 1-->Mild-to-moderate sensory loss, patient feels pinprick is less sharp or is dull on the affected side, or there is a loss of superficial pain with pinprick, but patient is aware of being touched  9. Best Language: 0-->No aphasia, normal  10. Dysarthria: 0-->Normal  11. Extinction and Inattention (formerly Neglect): 0-->No abnormality  Total (NIH Stroke Scale): 4      Modified [place]:    Suresh Coma Scale:     ABCD2 Score:    [id number] Score:    HAS -BLED Score:    ICH Score:    Hunt & Bach Classification:   "    Blood pressure 132/63, pulse 86, temperature 97.8 °F (36.6 °C), temperature source Temporal, resp. rate 20, height 5' 4" (1.626 m), weight 115.7 kg (255 lb), SpO2 97 %.    Van Negative    Medical Decision Making  HPI:  53 y.o. female with h/o PCOS, GERD, who presents with L arm weakness and numbness.  Patient was napping on the couch on her back and woke up with L arm numbness and weakness.  She also had significant pain in L shoulder area.  She recalls having some mild L shoulder pain the last few days without weakness.  She noted no changes in L leg or L face.     Images personally reviewed and interpreted:  Study: Head CT  Study Interpretation: no acute findings     Assessment and plan:  Total plegia and patchy numbness of L arm, without arm or face involvement -- unable to localize this deficit to CNS.  Consider plexopathy, such as [place].  Absence of DTR in arm supports lower motor neuron process.  Recommend MRI brain and MRI C-spine to r/o acute central process (in case pain is masking motor exam and arm is not truly plegic).  Outpatient EMG/NCS can confirm diagnosis.  Can consider MRI w/ Gd of Brachial Plexus.    Lytics recommendation: Thrombolytic therapy not recommended due to Outside of treatment window  and Suspected stroke mimic   Thrombectomy recommendation: No; at this time symptoms not suggestive of large vessel occlusion  Placement recommendation: admit to inpatient               ROS  Physical Exam  Musculoskeletal:      Comments: Significant shoulder pain with passive ROM of L arm   Neurological:      Mental Status: She is oriented to person, place, and time.      Cranial Nerves: No cranial nerve deficit.      Comments: L arm 0/5  Absent DTR in L arm       Past Medical History:   Diagnosis Date    Abnormal mammogram     neg biospy    Allergic asthma     Anxiety     Arthritis     Dermatitis     Dr. Weil, Extremities    Diabetes     Diverticulosis of colon     GERD (gastroesophageal " reflux disease)     Mass of right breast     Postmenopausal hormone replacement therapy     Tubal pregnancy     Uterine fibroid      Past Surgical History:   Procedure Laterality Date    BREAST BIOPSY Right 2012    benign     SECTION, LOW TRANSVERSE      CHOLECYSTECTOMY      HYSTERECTOMY      re: benign tumors per the patient    right ankle  10/10/2014    TOTAL ABDOMINAL HYSTERECTOMY W/ BILATERAL SALPINGOOPHORECTOMY      c appendectomy    TUBAL LIGATION       Family History   Problem Relation Age of Onset    Cancer Father         colon    Colon cancer Father     Diabetes Sister     Breast cancer Sister 28    Cancer Brother         stomach    Heart disease Brother     Heart disease Mother     Ovarian cancer Neg Hx        Diagnoses  No problems updated.    Francia Ventura MD      Emergent/Acute neurological consultation requested by spoke provider due to critical concerns for possible cerebrovascular event that could result in permanent loss of neurologic/bodily function, severe disability or death of this patient.  Immediate/timely evaluation by a highly prepared expert is paramount for optimal outcomes  High risk for neurological deterioration if not properly diagnosed  High risk for neurological deterioration if not treated promplty/as soon as possible  Complex diagnostic evaluation may be required (advanced imaging)  High risk treatment options (thrombolytics and/or thrombectomy)    Patient care was coordinated with spoke provider, including but not limted to    Discussing likely diagnosis/etiology of symptoms  Making recommendations for further diagnostic studies  Making recommendations for intravenous thrombolytics or other advanced therapies  Making recommendations for disposition (admission/transfer for higher level of care)

## 2024-02-23 NOTE — PT/OT/SLP EVAL
Occupational Therapy Evaluation     Name: Ijeoma Issa  MRN: [id number]  Admitting Diagnosis: Acute pain of left shoulder  Recent Surgery: * No surgery found *      Recommendations:     Discharge Recommendations: Low Intensity Therapy  Level of Assistance Recommended: 24 hours light assistance and 24 hours supervision  Discharge Equipment Recommendations: none  Barriers to discharge: None    Assessment:     Ijeoma Issa is a 53 y.o. female with a medical diagnosis of Acute pain of left shoulder. She presents with performance deficits affecting function including impaired endurance, impaired self care skills, impaired functional mobility, gait instability, decreased upper extremity function, pain, decreased ROM. L UE distal PROM WFL; L UE sling to immobilize shoulder presently.     Recommend low intensity therapy upon discharge.    Rehab Prognosis: Good; patient would benefit from acute OT services to address these deficits and reach maximum level of function.    Plan:     Patient to be seen 3 x/week to address the above listed problems via self-care/home management, therapeutic activities, therapeutic exercises  Plan of Care Expires: 03/15/24  Plan of Care Reviewed with: patient, spouse    Subjective     Chief Complaint: Tired  Patient Comments/Goals: Patient and spouse report they would like to understand what is going on and what results of testing (performed last night) are - OTR communicating this to nurse  Pain/Comfort:  Pain Rating 1: 0/10    Patients cultural, spiritual, Hoahaoism conflicts given the current situation: no    Social History:  Living Environment: Patient  lives with spouse  in a single story home   Prior Level of Function: Prior to admission, patient was independent  Roles and Routines: Patient was driving prior to admission.  Equipment Used at Home: none  DME owned (not currently used): none  Assistance Upon Discharge: significant other    Objective:     Communicated with  Nursechristo, prior to session. Patient found  in ED room 06 - R sidelying with spouse present  with telemetry, peripheral IV, blood pressure cuff, oxygen, pulse ox (continuous) upon OT entry to room.    General Precautions: Standard, fall   Orthopedic Precautions:  (L UE sling)   Braces:  (L UE sling)    Respiratory Status: Nasal cannula    Occupational Performance    Bed Mobility:   Supine to sit from right side of bed with minimum assistance  Sit to Supine with minimum assistance on right side of bed    Activities of Daily Living:  Feeding: stand by assistance  Grooming: stand by assistance  Upper Body Dressing: maximal assistance  Lower Body Dressing: minimum assistance  Toileting: minimum assistance    Cognitive/Visual Perceptual:  Oriented x 3    Physical Exam:  Upper Extremity Range of Motion:     -       Right Upper Extremity: WNL  -       Left Upper Extremity: PROM WFL except shoulder N/T - remained immobilized  Upper Extremity Strength:    -       Right Upper Extremity: WNL  -       Left Upper Extremity: N/T due to impaired sensation and immobilization    AMPAC 6 Click ADL:  AMPAC Total Score: 18    Treatment & Education:  Therapist provided facilitation and instruction of proper body mechanics, energy conservation, and fall prevention strategies during tasks listed above  Patient educated on role of OT, POC, and goals for therapy  Patient educated on importance of OOB activities with staff member assistance and sitting OOB majority of the day    Patient left  sideling on stretcher  with all lines intact, call button in reach, RN notified, and significant other present.    GOALS:   Multidisciplinary Problems       Occupational Therapy Goals          Problem: Occupational Therapy    Goal Priority Disciplines Outcome Interventions   Occupational Therapy Goal     OT, PT/OT     Description: Goals to be met by: 3/15/2024     Patient will increase functional independence with ADLs by performing:    UE Dressing  with Stand-by Assistance.  Grooming while standing with [place].  Toileting from toilet with Modified [place] for hygiene and clothing management.   Toilet transfer to toilet with Modified [place].                         History:     Past Medical History:   Diagnosis Date    Abnormal mammogram     neg biospy    Allergic asthma     Anxiety     Arthritis     Dermatitis     Dr. Weil, Extremities    Diabetes     Diverticulosis of colon     GERD (gastroesophageal reflux disease)     Mass of right breast     Postmenopausal hormone replacement therapy     Tubal pregnancy     Uterine fibroid          Past Surgical History:   Procedure Laterality Date    BREAST BIOPSY Right 2012    benign     SECTION, LOW TRANSVERSE      CHOLECYSTECTOMY      HYSTERECTOMY      re: benign tumors per the patient    right ankle  10/10/2014    TOTAL ABDOMINAL HYSTERECTOMY W/ BILATERAL SALPINGOOPHORECTOMY      c appendectomy    TUBAL LIGATION         Time Tracking:     OT Date of Treatment: 24  OT Start Time: 1320  OT Stop Time: 1328  OT Total Time (min): 8 min    Billable Minutes: Evaluation 8    2024

## 2024-02-23 NOTE — PLAN OF CARE
Goals to be met by: 3/15/2024     Patient will increase functional independence with ADLs by performing:    UE Dressing with Stand-by Assistance.  Grooming while standing with [place].  Toileting from toilet with Modified [place] for hygiene and clothing management.   Toilet transfer to toilet with Modified [place].    OT POC initiated and established.

## 2024-02-23 NOTE — ASSESSMENT & PLAN NOTE
Consult Neurology   Check CMP,CBC, Mag, Phos  Fasting Lipid Panel  CTA head and neck without acute findings  MRI brain and c-spine  Antiplatelet therapy  PT/OT   Pain control

## 2024-02-24 LAB
OHS QRS DURATION: 80 MS
OHS QTC CALCULATION: 420 MS
POCT GLUCOSE: 75 MG/DL (ref 70–110)
POCT GLUCOSE: 96 MG/DL (ref 70–110)
POCT GLUCOSE: 99 MG/DL (ref 70–110)
POCT GLUCOSE: 99 MG/DL (ref 70–110)
TROPONIN I SERPL DL<=0.01 NG/ML-MCNC: <0.006 NG/ML (ref 0–0.03)

## 2024-02-24 PROCEDURE — 27000221 HC OXYGEN, UP TO 24 HOURS

## 2024-02-24 PROCEDURE — 96376 TX/PRO/DX INJ SAME DRUG ADON: CPT

## 2024-02-24 PROCEDURE — 97161 PT EVAL LOW COMPLEX 20 MIN: CPT

## 2024-02-24 PROCEDURE — 63600175 PHARM REV CODE 636 W HCPCS: Performed by: HOSPITALIST

## 2024-02-24 PROCEDURE — 94761 N-INVAS EAR/PLS OXIMETRY MLT: CPT

## 2024-02-24 PROCEDURE — G0378 HOSPITAL OBSERVATION PER HR: HCPCS

## 2024-02-24 PROCEDURE — 97530 THERAPEUTIC ACTIVITIES: CPT

## 2024-02-24 PROCEDURE — 36415 COLL VENOUS BLD VENIPUNCTURE: CPT | Performed by: HOSPITALIST

## 2024-02-24 PROCEDURE — 25000003 PHARM REV CODE 250: Performed by: HOSPITALIST

## 2024-02-24 PROCEDURE — 84484 ASSAY OF TROPONIN QUANT: CPT | Performed by: HOSPITALIST

## 2024-02-24 PROCEDURE — 25000003 PHARM REV CODE 250: Performed by: NURSE PRACTITIONER

## 2024-02-24 RX ADMIN — IBUPROFEN 400 MG: 400 TABLET, FILM COATED ORAL at 08:02

## 2024-02-24 RX ADMIN — GABAPENTIN 300 MG: 300 CAPSULE ORAL at 09:02

## 2024-02-24 RX ADMIN — IBUPROFEN 400 MG: 400 TABLET, FILM COATED ORAL at 02:02

## 2024-02-24 RX ADMIN — HYDROCODONE BITARTRATE AND ACETAMINOPHEN 1 TABLET: 5; 325 TABLET ORAL at 09:02

## 2024-02-24 RX ADMIN — ASPIRIN 81 MG CHEWABLE TABLET 81 MG: 81 TABLET CHEWABLE at 08:02

## 2024-02-24 RX ADMIN — MONTELUKAST 10 MG: 10 TABLET, FILM COATED ORAL at 08:02

## 2024-02-24 RX ADMIN — PANTOPRAZOLE SODIUM 40 MG: 40 TABLET, DELAYED RELEASE ORAL at 08:02

## 2024-02-24 RX ADMIN — DOXEPIN HYDROCHLORIDE 25 MG: 25 CAPSULE ORAL at 09:02

## 2024-02-24 RX ADMIN — ONDANSETRON 4 MG: 2 INJECTION INTRAMUSCULAR; INTRAVENOUS at 08:02

## 2024-02-24 RX ADMIN — LIDOCAINE 1 PATCH: 700 PATCH TOPICAL at 11:02

## 2024-02-24 RX ADMIN — SPIRONOLACTONE 25 MG: 25 TABLET ORAL at 08:02

## 2024-02-24 RX ADMIN — IBUPROFEN 400 MG: 400 TABLET, FILM COATED ORAL at 09:02

## 2024-02-24 NOTE — PLAN OF CARE
02/23/24 2015   Patient Assessment/Suction   Level of Consciousness (AVPU) alert   Respiratory Effort Normal;Unlabored   Rhythm/Pattern, Respiratory pattern regular   Cough Frequency no cough   PRE-TX-O2   Device (Oxygen Therapy) nasal cannula   Flow (L/min) 2   SpO2 95 %   Pulse Oximetry Type Intermittent

## 2024-02-24 NOTE — PLAN OF CARE
Spoke with Dr. Wharton regarding ortho consult. MD states pt can follow up with him outpt. Also states that patient needs physical therapy. Notified Dr. Hyde.

## 2024-02-24 NOTE — PROGRESS NOTES
[facility] Medicine  Progress Note    Patient Name: Ijeoma Issa  MRN: [id number]  Patient Class: OP- Observation   Admission Date: 2/22/2024  Length of Stay: 0 days  Attending Physician: Malu Hyde MD  Primary Care Provider: Allison Adler MD        Subjective:     Principal Problem:Acute pain of left shoulder        HPI:  Ijeoma Issa is a 54 y/o F with a past medical history significant for DM2, GERD, and PCOS who presented to the ED with c/o LUE weakness and numbness which began around 3 pm today after napping on her couch.  She notes having some mild L shoulder pain over the past few days, but this was exacerbated this afternoon.  Denies trauma, headache, vision changes, LE weakness or any other issues.  CTA head and neck performed in the ED without acute findings.  She was evaluated by telemed vascular neurology with recommendations to obtain MRI brain and MRI C-spine to r/o acute central process.  She is placed in observation under the service of hospital medicine for continued workup and treatment.     Overview/Hospital Course:  No notes on file    Interval History:   Still complaining of persistent left shoulder pain, still unable to move left upper arm, left upper arm on a sling, no fever chills no nausea vomiting diarrhea, no chest pain or SOB.         Review of Systems   Constitutional:  Negative for activity change and fever.   HENT:  Negative for ear discharge, facial swelling and sore throat.    Eyes:  Negative for photophobia, pain and visual disturbance.   Respiratory:  Negative for apnea, cough and shortness of breath.    Cardiovascular:  Negative for chest pain and leg swelling.   Gastrointestinal:  Negative for abdominal pain and blood in stool.   Endocrine: Negative for cold intolerance and heat intolerance.   Musculoskeletal:  Negative for back pain and gait problem.        Left shoulder pain, unable to move left upper arm   Skin:  Negative  for pallor and rash.   Neurological:  Negative for speech difficulty and headaches.   Psychiatric/Behavioral:  Negative for confusion, hallucinations and suicidal ideas.    All other systems reviewed and are negative.    Objective:     Vital Signs (Most Recent):  Temp: 98.2 °F (36.8 °C) (02/24/24 0824)  Pulse: 63 (02/24/24 0824)  Resp: 18 (02/24/24 0950)  BP: 132/64 (02/24/24 0824)  SpO2: 99 % (02/24/24 0824) Vital Signs (24h Range):  Temp:  [97.6 °F (36.4 °C)-98.2 °F (36.8 °C)] 98.2 °F (36.8 °C)  Pulse:  [57-92] 63  Resp:  [16-20] 18  SpO2:  [87 %-99 %] 99 %  BP: ()/(55-72) 132/64     Weight: 115.7 kg (255 lb)  Body mass index is 43.77 kg/m².    Intake/Output Summary (Last 24 hours) at 2/24/2024 1048  Last data filed at 2/24/2024 0640  Gross per 24 hour   Intake 120 ml   Output --   Net 120 ml         Physical Exam  Constitutional:       Appearance: Normal appearance.   HENT:      Head: Normocephalic and atraumatic.      Nose: Nose normal.   Eyes:      Extraocular Movements: Extraocular movements intact.      Pupils: Pupils are equal, round, and reactive to light.   Cardiovascular:      Rate and Rhythm: Normal rate and regular rhythm.      Heart sounds: No murmur heard.  Pulmonary:      Effort: Pulmonary effort is normal.      Breath sounds: Normal breath sounds.   Abdominal:      General: Abdomen is flat.      Palpations: Abdomen is soft.   Musculoskeletal:      Cervical back: Normal range of motion and neck supple.      Comments: Significant tenderness at the left upper shoulder,  decreased range of motion in the left upper arm.    Skin:     General: Skin is warm and dry.   Neurological:      General: No focal deficit present.      Mental Status: She is alert and oriented to person, place, and time.   Psychiatric:         Mood and Affect: Mood normal.             Significant Labs: All pertinent labs within the past 24 hours have been reviewed.  CBC:   Recent Labs   Lab 02/22/24  2000 02/23/24  0545   WBC  8.21 5.91   HGB 12.5 12.2   HCT 39.6 39.5    205       CMP:   Recent Labs   Lab 02/22/24 2000 02/23/24  0545    136   K 3.8 3.9    106   CO2 23 20*   * 97   BUN 16 14   CREATININE 0.8 0.8   CALCIUM 9.2 8.6*   PROT 7.4 6.7   ALBUMIN 4.0 3.5   BILITOT 0.9 1.1*   ALKPHOS 92 81   AST 26 21   ALT 22 19   ANIONGAP 11 10         Significant Imaging: I have reviewed all pertinent imaging results/findings within the past 24 hours.  I have reviewed and interpreted all pertinent imaging results/findings within the past 24 hours.    CTA Head and Neck (xpd)    Result Date: 2/23/2024  EXAMINATION: CTA HEAD AND NECK (XPD) CLINICAL HISTORY: Neuro deficit, acute, stroke suspected; TECHNIQUE: Non contrast low dose axial images were obtained through the head. CT angiogram was performed from the level of the merritt to the top of the head following the IV administration of 100mL of Omnipaque 350.   Sagittal and coronal reconstructions and maximum intensity projection reconstructions were performed. Arterial stenosis percentages are based on NASCET measurement criteria. COMPARISON: None FINDINGS: CTA HEAD: Vasculature: Mild atherosclerotic changes of the cavernous carotid arteries without significant stenosis.  The intracranial arteries show normal anatomy without evidence of major branch occlusion or focal luminal narrowing.There is no suspicion of vascular malformation or saccular aneurysm. Variant anatomy: Presumed small right anterior orbital/supra orbital venous varix or potential small AV fistula involving a branch of the ophthalmic artery and facial vein. Brain: There is no evidence of mass, mass effect, edema, midline shift, intracranial hemorrhage, or space-occupying extra-axial fluid collection. After the administration of contrast there is no abnormal enhancement. Ventricles/Sulci: The ventricles and sulci are appropriate in size for age. Osseous Structures: The osseous structures are unremarkable in  appearance. Sinuses and orbits: Mild scattered mucosal thickening.  Visualized orbits are within normal limits. Other: Visualized portions of the mastoids are unremarkable. CTA NECK: Aorta: Conventional branching pattern. The great vessel origins are patent without flow limiting stenosis. Vertebral Arteries: The origins of the vertebral arteries are patent.  No flow limiting stenosis, occlusion, or dissection. Right Carotid: No flow limiting stenosis, occlusion, or dissection of the common carotid or internal carotid arteries.  No significant plaque of the proximal ICA.  Right internal carotid artery: 0 % stenosis by and NASCET. Left Carotid: No flow limiting stenosis, occlusion, or dissection of the common carotid or internal carotid arteries. No significant plaque of the proximal ICA. Right internal carotid artery: 0 % stenosis by and NASCET. Extravascular Anatomy: No definite adjacent soft tissue abnormality seen accounting for technique used.  Mild superior endplate compression deformity of T3 which appears chronic.  No significant bony process.     1. No acute intracranial CT findings. 2. No intracranial proximal large vessel occlusion or significant stenosis. 3. No significant atherosclerotic changes or arterial stenosis within the neck. 4. 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 The major findings between the preliminary and final reports are concordant. Electronically signed by: Endy Musa Date:    02/23/2024 Time:    08:06    MRI Cervical Spine Without Contrast    Result Date: 2/23/2024  EXAMINATION: MRI CERVICAL SPINE WITHOUT CONTRAST CLINICAL HISTORY: Myelopathy, acute, cervical spine;. TECHNIQUE: Multiplanar, multisequence MR images of the cervical spine were acquired without the administration of contrast. COMPARISON: CTA 02/22/2024. FINDINGS: Motion limited exam Morphology: Cervical vertebral body heights are preserved marrow signal is within normal limits.   Incidentally noted chronic appearing mild superior endplate compression deformity of T3 and T3 and T4 vertebral body hemangiomas. Alignment: Cervical spinal alignment is normal. Cord: The cervical cord shows normal contour and signal content throughout its length. Craniocervical Junction: Cerebellar tonsils are normally positioned. The visualized portions of the posterior fossa are unremarkable.  Incidentally noted partially empty sella configuration, nonspecific in isolation as described on the concurrently performed brain MRI 02/22/2024.  The regional osseous anatomy is normal. Disc levels: C2-C3: Mild right-sided facet arthrosis producing no more than mild right foraminal narrowing.  The spinal canal and left foramen are patent.. C3-C4: Mild bilateral facet arthrosis producing no more than mild bilateral foraminal narrowing.  The spinal canal is patent. C4-C5: Mild bilateral facet arthrosis and uncovertebral spurring producing no more than mild bilateral foraminal narrowing.  The spinal canal is patent.. C5-C6: Mild right-sided facet arthrosis.  The spinal canal and foramina remain patent.. C6-C7: Broad-based disc protrusion centrally and lateralizing to the left producing mild spinal canal narrowing and suspected mild-to-moderate left foraminal narrowing.  The right foramen is patent.. C7-T1: Within normal limits. Soft tissues: The visualized paraspinal soft tissues are within normal limits.     1. Motion limited exam without evidence of an acute process/cord impingement. 2. Overall mild degenerative changes most pronounced at C6-C7 where there is a shallow left lateralizing disc protrusion contributing to mild spinal canal narrowing and mild-to-moderate left foraminal narrowing. The preliminary and final reports are concordant. Electronically signed by: Endy Musa Date:    02/23/2024 Time:    07:50    MRI Brain Without Contrast    Result Date: 2/23/2024  EXAMINATION: MRI BRAIN WITHOUT CONTRAST CLINICAL HISTORY:  Stroke, follow up;. TECHNIQUE: Multiplanar multisequence MR imaging of the brain was performed without contrast COMPARISON: CTA 02/22/2024. FINDINGS: Motion degraded exam. Brain: There are no concerning focal areas of abnormal or restricted diffusion within the brain.  No mass, hemorrhage or acute infarct is present. Midline Structures: Partially empty sella configuration, nonspecific in isolation.  The midline structures of the brain are normal. Ventricles: The ventricles, sulci and cisterns are within normal limits. Vasculature: The vascular flow voids at the base of the brain are within normal limits. Calvarium: The visualized osseous structures are unremarkable. Sinuses: Minimal mucosal thickening within the dependent maxillary sinuses.  No obstructive changes or dependent fluid levels.  Minimal ethmoid sinus opacification. Orbits: The orbits and globes are unremarkable. Mastoids: The mastoid air cells are adequately aerated. Extracranial soft tissues: The visualized extracranial soft tissues are unremarkable.     1. No acute process, specifically no recent infarct on this motion limited noncontrast brain MRI. 2. Variant partially empty sella configuration, nonspecific in isolation. The preliminary and final reports are concordant. Electronically signed by: Endy Musa Date:    02/23/2024 Time:    07:12  - pulls last radiology orders      Assessment/Plan:      * Acute pain of left shoulder    Unable to move left upper arm,  ? adhesive capsulitis   MRI of the C-spine/ brain is negative for acute changes.   Left shoulder x-ray is negative for dislocation or fracture.   MRI of branchial plexus is pending.  We will try Lidocaine patch.  We will try ibuprofen 400 mg t.i.d..  PTOT evaluation    I will also orthopedic surgeon opinion    Left arm weakness  MRI of C-spine / brain is negative for acute changes.    Neurology note appreciated  Patient recent had left axillary bumps the received p.o. doxycycline, I did not  palpate any left armpit mass.   We will follow-up MRI of  MRI Brachial Plexus W WO Contrast .           Morbid obesity  Body mass index is 43.77 kg/m². Morbid obesity complicates all aspects of disease management from diagnostic modalities to treatment. Weight loss encouraged and health benefits explained to patient.         GERD (gastroesophageal reflux disease)  Chronic; PPI while inpatient        VTE Risk Mitigation (From admission, onward)           Ordered     IP VTE HIGH RISK PATIENT  Once         02/22/24 2150     Place sequential compression device  Until discontinued         02/22/24 2150     Reason for No Pharmacological VTE Prophylaxis  Once        Comments: Defer to neurology   Question:  Reasons:  Answer:  Physician Provided (leave comment)    02/22/24 2150                    Discharge Planning   GHASSAN: 2/24/2024     Code Status: Full Code   Is the patient medically ready for discharge?:     Reason for patient still in hospital (select all that apply): Patient trending condition and Treatment  Discharge Plan A: Home with family          Spoke with orthopedic surgeon, recommend outpatient follow-up.  will set this up upon discharge.         Malu Hyde MD  Department of Hospital Medicine   [facility]/Surg

## 2024-02-24 NOTE — PLAN OF CARE
Problem: Physical Therapy  Goal: Physical Therapy Goal  Description: Goals to be met by: 3/15/2024     Patient will increase functional independence with mobility by performin). Supine to sit with Modified [place]  2). Sit to supine with Modified [place]  3). Sit to stand transfer with Modified [place]  4). Gait  x > 50 feet with Modified [place] using Single-point Cane .     Outcome: Ongoing, Progressing

## 2024-02-24 NOTE — CONSULTS
Food & Nutrition Education     Diet Education: Cardiac Nutrition Therapy  Time Spent: 5 minutes.  Learners: Patient     Nutrition Education provided with handouts:  Dietary Fats, Heart Healthy Diet, Mediterranean Diet, Low Salt diet, DASH Diet. (clinical references)    Past Medical History:   Diagnosis Date    Abnormal mammogram     neg biospy    Allergic asthma     Anxiety     Arthritis     Dermatitis     Dr. Weil, Extremities    Diabetes     Diverticulosis of colon     GERD (gastroesophageal reflux disease)     Mass of right breast     Postmenopausal hormone replacement therapy     Tubal pregnancy     Uterine fibroid      Patient Active Problem List   Diagnosis    Mammogram abnormal    OA (osteoarthritis)    Acne, adult    Asthma with allergic rhinitis    GERD (gastroesophageal reflux disease)    Family history of early CAD, brother  with MI, age 60    Migraine headache    Palpitations    Stress, at home and work    Bacterial vaginosis    Bullous impetigo    Morbid obesity    Body mass index 40.0-44.9, adult    Polycystic ovarian disease    Glucose intolerance (impaired glucose tolerance)    Menopausal symptoms    Vaginal dryness, menopausal    Umbilical hernia    Pruritic dermatitis    Left arm weakness    Acute pain of left shoulder        Comments:  Remote Dietitian attempted to provide Cardiac nutrition therapy education to patient however, was not able to reach pt via room telephone. Heart healthy and low sodium/DASH diet clinical references was attached to the pt discharge documents. On-site RD will provide additional eduction with handouts during follow up.    Please re-consult as needed.  Thank You!  Adan Yanes MS, Registration Eligible, Provisional LDN

## 2024-02-24 NOTE — PLAN OF CARE
Problem: Adult Inpatient Plan of Care  Goal: Plan of Care Review  Outcome: Ongoing, Progressing  Goal: Patient-Specific Goal (Individualized)  Outcome: Ongoing, Progressing  Goal: Absence of Hospital-Acquired Illness or Injury  Outcome: Ongoing, Progressing  Goal: Optimal Comfort and Wellbeing  Outcome: Ongoing, Progressing  Goal: Readiness for Transition of Care  Outcome: Ongoing, Progressing     Problem: Bariatric Environmental Safety  Goal: Safety Maintained with Care  Outcome: Ongoing, Progressing     Problem: Adjustment to Illness (Stroke, Ischemic/Transient Ischemic Attack)  Goal: Optimal Coping  Outcome: Ongoing, Progressing     Problem: Bowel Elimination Impaired (Stroke, Ischemic/Transient Ischemic Attack)  Goal: Effective Bowel Elimination  Outcome: Ongoing, Progressing     Problem: Cerebral Tissue Perfusion (Stroke, Ischemic/Transient Ischemic Attack)  Goal: Optimal Cerebral Tissue Perfusion  Outcome: Ongoing, Progressing     Problem: Cognitive Impairment (Stroke, Ischemic/Transient Ischemic Attack)  Goal: Optimal Cognitive Function  Outcome: Ongoing, Progressing     Problem: Communication Impairment (Stroke, Ischemic/Transient Ischemic Attack)  Goal: Improved Communication Skills  Outcome: Ongoing, Progressing     Problem: Functional Ability Impaired (Stroke, Ischemic/Transient Ischemic Attack)  Goal: Optimal Functional Ability  Outcome: Ongoing, Progressing     Problem: Respiratory Compromise (Stroke, Ischemic/Transient Ischemic Attack)  Goal: Effective Oxygenation and Ventilation  Outcome: Ongoing, Progressing     Problem: Sensorimotor Impairment (Stroke, Ischemic/Transient Ischemic Attack)  Goal: Improved Sensorimotor Function  Outcome: Ongoing, Progressing     Problem: Swallowing Impairment (Stroke, Ischemic/Transient Ischemic Attack)  Goal: Optimal Eating and Swallowing without Aspiration  Outcome: Ongoing, Progressing     Problem: Urinary Elimination Impaired (Stroke, Ischemic/Transient Ischemic  Attack)  Goal: Effective Urinary Elimination  Outcome: Ongoing, Progressing     Problem: Fall Injury Risk  Goal: Absence of Fall and Fall-Related Injury  Outcome: Ongoing, Progressing     Problem: Infection  Goal: Absence of Infection Signs and Symptoms  Outcome: Ongoing, Progressing

## 2024-02-24 NOTE — SUBJECTIVE & OBJECTIVE
Interval History:   Still complaining of persistent left shoulder pain, still unable to move left upper arm, left upper arm on a sling, no fever chills no nausea vomiting diarrhea, no chest pain or SOB.         Review of Systems   Constitutional:  Negative for activity change and fever.   HENT:  Negative for ear discharge, facial swelling and sore throat.    Eyes:  Negative for photophobia, pain and visual disturbance.   Respiratory:  Negative for apnea, cough and shortness of breath.    Cardiovascular:  Negative for chest pain and leg swelling.   Gastrointestinal:  Negative for abdominal pain and blood in stool.   Endocrine: Negative for cold intolerance and heat intolerance.   Musculoskeletal:  Negative for back pain and gait problem.        Left shoulder pain, unable to move left upper arm   Skin:  Negative for pallor and rash.   Neurological:  Negative for speech difficulty and headaches.   Psychiatric/Behavioral:  Negative for confusion, hallucinations and suicidal ideas.    All other systems reviewed and are negative.    Objective:     Vital Signs (Most Recent):  Temp: 98.2 °F (36.8 °C) (02/24/24 0824)  Pulse: 63 (02/24/24 0824)  Resp: 18 (02/24/24 0950)  BP: 132/64 (02/24/24 0824)  SpO2: 99 % (02/24/24 0824) Vital Signs (24h Range):  Temp:  [97.6 °F (36.4 °C)-98.2 °F (36.8 °C)] 98.2 °F (36.8 °C)  Pulse:  [57-92] 63  Resp:  [16-20] 18  SpO2:  [87 %-99 %] 99 %  BP: ()/(55-72) 132/64     Weight: 115.7 kg (255 lb)  Body mass index is 43.77 kg/m².    Intake/Output Summary (Last 24 hours) at 2/24/2024 1048  Last data filed at 2/24/2024 0640  Gross per 24 hour   Intake 120 ml   Output --   Net 120 ml         Physical Exam  Constitutional:       Appearance: Normal appearance.   HENT:      Head: Normocephalic and atraumatic.      Nose: Nose normal.   Eyes:      Extraocular Movements: Extraocular movements intact.      Pupils: Pupils are equal, round, and reactive to light.   Cardiovascular:      Rate and Rhythm:  Normal rate and regular rhythm.      Heart sounds: No murmur heard.  Pulmonary:      Effort: Pulmonary effort is normal.      Breath sounds: Normal breath sounds.   Abdominal:      General: Abdomen is flat.      Palpations: Abdomen is soft.   Musculoskeletal:      Cervical back: Normal range of motion and neck supple.      Comments: Significant tenderness at the left upper shoulder,  decreased range of motion in the left upper arm.    Skin:     General: Skin is warm and dry.   Neurological:      General: No focal deficit present.      Mental Status: She is alert and oriented to person, place, and time.   Psychiatric:         Mood and Affect: Mood normal.             Significant Labs: All pertinent labs within the past 24 hours have been reviewed.  CBC:   Recent Labs   Lab 02/22/24 2000 02/23/24  0545   WBC 8.21 5.91   HGB 12.5 12.2   HCT 39.6 39.5    205       CMP:   Recent Labs   Lab 02/22/24 2000 02/23/24  0545    136   K 3.8 3.9    106   CO2 23 20*   * 97   BUN 16 14   CREATININE 0.8 0.8   CALCIUM 9.2 8.6*   PROT 7.4 6.7   ALBUMIN 4.0 3.5   BILITOT 0.9 1.1*   ALKPHOS 92 81   AST 26 21   ALT 22 19   ANIONGAP 11 10         Significant Imaging: I have reviewed all pertinent imaging results/findings within the past 24 hours.  I have reviewed and interpreted all pertinent imaging results/findings within the past 24 hours.    CTA Head and Neck (xpd)    Result Date: 2/23/2024  EXAMINATION: CTA HEAD AND NECK (XPD) CLINICAL HISTORY: Neuro deficit, acute, stroke suspected; TECHNIQUE: Non contrast low dose axial images were obtained through the head. CT angiogram was performed from the level of the merritt to the top of the head following the IV administration of 100mL of Omnipaque 350.   Sagittal and coronal reconstructions and maximum intensity projection reconstructions were performed. Arterial stenosis percentages are based on NASCET measurement criteria. COMPARISON: None FINDINGS: CTA HEAD:  Vasculature: Mild atherosclerotic changes of the cavernous carotid arteries without significant stenosis.  The intracranial arteries show normal anatomy without evidence of major branch occlusion or focal luminal narrowing.There is no suspicion of vascular malformation or saccular aneurysm. Variant anatomy: Presumed small right anterior orbital/supra orbital venous varix or potential small AV fistula involving a branch of the ophthalmic artery and facial vein. Brain: There is no evidence of mass, mass effect, edema, midline shift, intracranial hemorrhage, or space-occupying extra-axial fluid collection. After the administration of contrast there is no abnormal enhancement. Ventricles/Sulci: The ventricles and sulci are appropriate in size for age. Osseous Structures: The osseous structures are unremarkable in appearance. Sinuses and orbits: Mild scattered mucosal thickening.  Visualized orbits are within normal limits. Other: Visualized portions of the mastoids are unremarkable. CTA NECK: Aorta: Conventional branching pattern. The great vessel origins are patent without flow limiting stenosis. Vertebral Arteries: The origins of the vertebral arteries are patent.  No flow limiting stenosis, occlusion, or dissection. Right Carotid: No flow limiting stenosis, occlusion, or dissection of the common carotid or internal carotid arteries.  No significant plaque of the proximal ICA.  Right internal carotid artery: 0 % stenosis by and NASCET. Left Carotid: No flow limiting stenosis, occlusion, or dissection of the common carotid or internal carotid arteries. No significant plaque of the proximal ICA. Right internal carotid artery: 0 % stenosis by and NASCET. Extravascular Anatomy: No definite adjacent soft tissue abnormality seen accounting for technique used.  Mild superior endplate compression deformity of T3 which appears chronic.  No significant bony process.     1. No acute intracranial CT findings. 2. No intracranial  proximal large vessel occlusion or significant stenosis. 3. No significant atherosclerotic changes or arterial stenosis within the neck. 4. 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 The major findings between the preliminary and final reports are concordant. Electronically signed by: Endy Musa Date:    02/23/2024 Time:    08:06    MRI Cervical Spine Without Contrast    Result Date: 2/23/2024  EXAMINATION: MRI CERVICAL SPINE WITHOUT CONTRAST CLINICAL HISTORY: Myelopathy, acute, cervical spine;. TECHNIQUE: Multiplanar, multisequence MR images of the cervical spine were acquired without the administration of contrast. COMPARISON: CTA 02/22/2024. FINDINGS: Motion limited exam Morphology: Cervical vertebral body heights are preserved marrow signal is within normal limits.  Incidentally noted chronic appearing mild superior endplate compression deformity of T3 and T3 and T4 vertebral body hemangiomas. Alignment: Cervical spinal alignment is normal. Cord: The cervical cord shows normal contour and signal content throughout its length. Craniocervical Junction: Cerebellar tonsils are normally positioned. The visualized portions of the posterior fossa are unremarkable.  Incidentally noted partially empty sella configuration, nonspecific in isolation as described on the concurrently performed brain MRI 02/22/2024.  The regional osseous anatomy is normal. Disc levels: C2-C3: Mild right-sided facet arthrosis producing no more than mild right foraminal narrowing.  The spinal canal and left foramen are patent.. C3-C4: Mild bilateral facet arthrosis producing no more than mild bilateral foraminal narrowing.  The spinal canal is patent. C4-C5: Mild bilateral facet arthrosis and uncovertebral spurring producing no more than mild bilateral foraminal narrowing.  The spinal canal is patent.. C5-C6: Mild right-sided facet arthrosis.  The spinal canal and foramina remain patent.. C6-C7: Broad-based disc  protrusion centrally and lateralizing to the left producing mild spinal canal narrowing and suspected mild-to-moderate left foraminal narrowing.  The right foramen is patent.. C7-T1: Within normal limits. Soft tissues: The visualized paraspinal soft tissues are within normal limits.     1. Motion limited exam without evidence of an acute process/cord impingement. 2. Overall mild degenerative changes most pronounced at C6-C7 where there is a shallow left lateralizing disc protrusion contributing to mild spinal canal narrowing and mild-to-moderate left foraminal narrowing. The preliminary and final reports are concordant. Electronically signed by: Endy Musa Date:    02/23/2024 Time:    07:50    MRI Brain Without Contrast    Result Date: 2/23/2024  EXAMINATION: MRI BRAIN WITHOUT CONTRAST CLINICAL HISTORY: Stroke, follow up;. TECHNIQUE: Multiplanar multisequence MR imaging of the brain was performed without contrast COMPARISON: CTA 02/22/2024. FINDINGS: Motion degraded exam. Brain: There are no concerning focal areas of abnormal or restricted diffusion within the brain.  No mass, hemorrhage or acute infarct is present. Midline Structures: Partially empty sella configuration, nonspecific in isolation.  The midline structures of the brain are normal. Ventricles: The ventricles, sulci and cisterns are within normal limits. Vasculature: The vascular flow voids at the base of the brain are within normal limits. Calvarium: The visualized osseous structures are unremarkable. Sinuses: Minimal mucosal thickening within the dependent maxillary sinuses.  No obstructive changes or dependent fluid levels.  Minimal ethmoid sinus opacification. Orbits: The orbits and globes are unremarkable. Mastoids: The mastoid air cells are adequately aerated. Extracranial soft tissues: The visualized extracranial soft tissues are unremarkable.     1. No acute process, specifically no recent infarct on this motion limited noncontrast brain MRI. 2.  Variant partially empty sella configuration, nonspecific in isolation. The preliminary and final reports are concordant. Electronically signed by: Endy Musa Date:    02/23/2024 Time:    07:12  - pulls last radiology orders

## 2024-02-24 NOTE — ASSESSMENT & PLAN NOTE
Unable to move left upper arm,  ? adhesive capsulitis   MRI of the C-spine/ brain is negative for acute changes.   Left shoulder x-ray is negative for dislocation or fracture.   MRI of branchial plexus is pending.  We will try Lidocaine patch.  We will try ibuprofen 400 mg t.i.d..  PTOT evaluation    I will also orthopedic surgeon opinion

## 2024-02-24 NOTE — PT/OT/SLP PROGRESS
Occupational Therapy      Patient Name:  Ijeoma Issa   MRN:  [id number]    Patient not seen today secondary to declined OT at 10:32 and stated she just finished with PT. Pt declined OT at 1310 and stated she would prefer her  assist her with ADLs. Will follow-up 2/25/2024.    2/24/2024

## 2024-02-24 NOTE — PT/OT/SLP EVAL
Physical Therapy Evaluation    Patient Name:  Ijeoma Issa   MRN:  [id number]    Recommendations:     Discharge Recommendations: Low Intensity Therapy   Discharge Equipment Recommendations: none (has cane)   Barriers to discharge: None    Assessment:     Ijeoma Issa is a 53 y.o. female admitted with a medical diagnosis of Acute pain of left shoulder.  She presents with the following impairments/functional limitations: weakness, impaired endurance, impaired balance, gait instability, impaired functional mobility, decreased lower extremity function  .    Rehab Prognosis: Good; patient would benefit from acute skilled PT services to address these deficits and reach maximum level of function.    Recent Surgery: * No surgery found *      Plan:     During this hospitalization, patient to be seen 6 x/week to address the identified rehab impairments via gait training, therapeutic activities, therapeutic exercises and progress toward the following goals:    Plan of Care Expires:  03/15/24    Subjective     Patient/Family Comments/goals: agrees to walk in [place]    Living Environment:  House with spouse, 0 steps to enter.  Prior to admission, patients level of function was independent without AD, + driving.  Equipment used at home: none.  DME owned (not currently used): single point cane.  Upon discharge, patient will have assistance from spouse.    Objective:     Communicated with nurse (Stalin) prior to session.  Patient found supine with oxygen, telemetry  upon PT entry to room.    General Precautions: Standard, fall  Orthopedic Precautions:    Braces: UE Sling  Respiratory Status: Nasal cannula, flow 2 L/min    Exams:  LUE Strength: N/T  LLE ROM: N/T    Functional Mobility training:  Bed Mobility:     Rolling Right: minimum assistance  Supine to Sit: minimum assistance  Transfers:     Sit to Stand:  minimum assistance with no AD and x 3 reps (NWB LUE)  Bed to Chair: minimum assistance with  no AD  using   Stand Pivot  Toilet Transfer: stand by assistance with  grab bars  using  Step Transfer and NWB LUE  Gait: 15' x 1, 100' x 1 with CGA and cues      AM-PAC 6 CLICK MOBILITY  Total Score:18       Treatment & Education:  Mobility training as above with cues for technique  Rec'd to use cane at home, use extra caution and assistance.    Patient left up in chair with all lines intact, call button in reach, chair alarm on, spouse  present, and O2 to wall via nc .    GOALS:   Multidisciplinary Problems       Physical Therapy Goals          Problem: Physical Therapy    Goal Priority Disciplines Outcome Goal Variances Interventions   Physical Therapy Goal     PT, PT/OT Ongoing, Progressing     Description: Goals to be met by: 3/15/2024     Patient will increase functional independence with mobility by performin). Supine to sit with Modified [place]  2). Sit to supine with Modified [place]  3). Sit to stand transfer with Modified [place]  4). Gait  x > 50 feet with Modified [place] using Single-point Cane .                          History:     Past Medical History:   Diagnosis Date    Abnormal mammogram     neg biospy    Allergic asthma     Anxiety     Arthritis     Dermatitis     Dr. Weil, Extremities    Diabetes     Diverticulosis of colon     GERD (gastroesophageal reflux disease)     Mass of right breast     Postmenopausal hormone replacement therapy     Tubal pregnancy     Uterine fibroid        Past Surgical History:   Procedure Laterality Date    BREAST BIOPSY Right 2012    benign     SECTION, LOW TRANSVERSE      CHOLECYSTECTOMY      HYSTERECTOMY      re: benign tumors per the patient    right ankle  10/10/2014    TOTAL ABDOMINAL HYSTERECTOMY W/ BILATERAL SALPINGOOPHORECTOMY      c appendectomy    TUBAL LIGATION         Time Tracking:     PT Received On: 24  PT Start Time: 0847     PT Stop Time: 921  PT Total Time (min): 34 min     Billable Minutes: Evaluation 10  and Therapeutic Activity 10      02/24/2024

## 2024-02-24 NOTE — PLAN OF CARE
Problem: Adult Inpatient Plan of Care  Goal: Plan of Care Review  Outcome: Ongoing, Progressing  Goal: Patient-Specific Goal (Individualized)  Outcome: Ongoing, Progressing  Goal: Absence of Hospital-Acquired Illness or Injury  Outcome: Ongoing, Progressing  Goal: Optimal Comfort and Wellbeing  Outcome: Ongoing, Progressing  Goal: Readiness for Transition of Care  Outcome: Ongoing, Progressing     Problem: Bariatric Environmental Safety  Goal: Safety Maintained with Care  Outcome: Ongoing, Progressing       Problem: Fall Injury Risk  Goal: Absence of Fall and Fall-Related Injury  Outcome: Ongoing, Progressing

## 2024-02-24 NOTE — ASSESSMENT & PLAN NOTE
MRI of C-spine / brain is negative for acute changes.    Neurology note appreciated  Patient recent had left axillary bumps the received p.o. doxycycline, I did not palpate any left armpit mass.   We will follow-up MRI of  MRI Brachial Plexus W WO Contrast .

## 2024-02-25 VITALS
WEIGHT: 255 LBS | DIASTOLIC BLOOD PRESSURE: 66 MMHG | TEMPERATURE: 98 F | OXYGEN SATURATION: 96 % | RESPIRATION RATE: 16 BRPM | BODY MASS INDEX: 43.54 KG/M2 | SYSTOLIC BLOOD PRESSURE: 121 MMHG | HEART RATE: 65 BPM | HEIGHT: 64 IN

## 2024-02-25 LAB
POCT GLUCOSE: 89 MG/DL (ref 70–110)
POCT GLUCOSE: 90 MG/DL (ref 70–110)

## 2024-02-25 PROCEDURE — 25000003 PHARM REV CODE 250: Performed by: HOSPITALIST

## 2024-02-25 PROCEDURE — 25000003 PHARM REV CODE 250: Performed by: NURSE PRACTITIONER

## 2024-02-25 PROCEDURE — 94761 N-INVAS EAR/PLS OXIMETRY MLT: CPT

## 2024-02-25 PROCEDURE — 25000242 PHARM REV CODE 250 ALT 637 W/ HCPCS: Performed by: STUDENT IN AN ORGANIZED HEALTH CARE EDUCATION/TRAINING PROGRAM

## 2024-02-25 PROCEDURE — 27000221 HC OXYGEN, UP TO 24 HOURS

## 2024-02-25 PROCEDURE — G0378 HOSPITAL OBSERVATION PER HR: HCPCS

## 2024-02-25 PROCEDURE — 25000003 PHARM REV CODE 250: Performed by: STUDENT IN AN ORGANIZED HEALTH CARE EDUCATION/TRAINING PROGRAM

## 2024-02-25 RX ORDER — LANOLIN ALCOHOL/MO/W.PET/CERES
1 CREAM (GRAM) TOPICAL DAILY
Status: DISCONTINUED | OUTPATIENT
Start: 2024-02-25 | End: 2024-02-25 | Stop reason: HOSPADM

## 2024-02-25 RX ORDER — ALBUTEROL SULFATE 2.5 MG/.5ML
2.5 SOLUTION RESPIRATORY (INHALATION) EVERY 4 HOURS PRN
Status: DISCONTINUED | OUTPATIENT
Start: 2024-02-25 | End: 2024-02-25 | Stop reason: HOSPADM

## 2024-02-25 RX ORDER — IBUPROFEN 400 MG/1
400 TABLET ORAL EVERY 6 HOURS PRN
Qty: 30 TABLET | Refills: 1 | Status: SHIPPED | OUTPATIENT
Start: 2024-02-25

## 2024-02-25 RX ORDER — ARFORMOTEROL TARTRATE 15 UG/2ML
15 SOLUTION RESPIRATORY (INHALATION) 2 TIMES DAILY
Status: DISCONTINUED | OUTPATIENT
Start: 2024-02-25 | End: 2024-02-25 | Stop reason: HOSPADM

## 2024-02-25 RX ORDER — FLUTICASONE PROPIONATE 50 MCG
2 SPRAY, SUSPENSION (ML) NASAL DAILY
Status: DISCONTINUED | OUTPATIENT
Start: 2024-02-25 | End: 2024-02-25 | Stop reason: HOSPADM

## 2024-02-25 RX ORDER — NALOXONE HCL 0.4 MG/ML
0.4 VIAL (ML) INJECTION
Status: DISCONTINUED | OUTPATIENT
Start: 2024-02-25 | End: 2024-02-25 | Stop reason: HOSPADM

## 2024-02-25 RX ORDER — ESTRADIOL 1 MG/1
1 TABLET ORAL
Status: DISCONTINUED | OUTPATIENT
Start: 2024-02-26 | End: 2024-02-25 | Stop reason: HOSPADM

## 2024-02-25 RX ORDER — BUDESONIDE 0.5 MG/2ML
0.5 INHALANT ORAL EVERY 12 HOURS
Status: DISCONTINUED | OUTPATIENT
Start: 2024-02-25 | End: 2024-02-25 | Stop reason: HOSPADM

## 2024-02-25 RX ORDER — ENOXAPARIN SODIUM 100 MG/ML
40 INJECTION SUBCUTANEOUS
Status: DISCONTINUED | OUTPATIENT
Start: 2024-02-25 | End: 2024-02-25 | Stop reason: HOSPADM

## 2024-02-25 RX ORDER — GABAPENTIN 300 MG/1
300 CAPSULE ORAL NIGHTLY
Qty: 30 CAPSULE | Refills: 1 | Status: SHIPPED | OUTPATIENT
Start: 2024-02-25 | End: 2024-03-28 | Stop reason: ALTCHOICE

## 2024-02-25 RX ADMIN — LIDOCAINE 1 PATCH: 700 PATCH TOPICAL at 11:02

## 2024-02-25 RX ADMIN — MONTELUKAST 10 MG: 10 TABLET, FILM COATED ORAL at 08:02

## 2024-02-25 RX ADMIN — ASPIRIN 81 MG CHEWABLE TABLET 81 MG: 81 TABLET CHEWABLE at 08:02

## 2024-02-25 RX ADMIN — FERROUS SULFATE TAB 325 MG (65 MG ELEMENTAL FE) 1 EACH: 325 (65 FE) TAB at 11:02

## 2024-02-25 RX ADMIN — IBUPROFEN 400 MG: 400 TABLET, FILM COATED ORAL at 05:02

## 2024-02-25 RX ADMIN — SPIRONOLACTONE 25 MG: 25 TABLET ORAL at 08:02

## 2024-02-25 RX ADMIN — FLUTICASONE PROPIONATE 100 MCG: 50 SPRAY, METERED NASAL at 11:02

## 2024-02-25 RX ADMIN — IBUPROFEN 400 MG: 400 TABLET, FILM COATED ORAL at 08:02

## 2024-02-25 RX ADMIN — PANTOPRAZOLE SODIUM 40 MG: 40 TABLET, DELAYED RELEASE ORAL at 08:02

## 2024-02-25 NOTE — ASSESSMENT & PLAN NOTE
Body mass index is 43.75 kg/m². Morbid obesity complicates all aspects of disease management from diagnostic modalities to treatment.

## 2024-02-25 NOTE — PROGRESS NOTES
"[facility] Medicine  Progress Note    Patient Name: Ijeoma Issa  MRN: [id number]  Patient Class: OP- Observation   Admission Date: 2/22/2024  Length of Stay: 0 days  Attending Physician: Endy Quiles MD  Primary Care Provider: Allison Adler MD        Subjective:     Principal Problem:Acute pain of left shoulder        HPI:  Ijeoma Issa is a 52 y/o F with a past medical history significant for DM2, GERD, and PCOS who presented to the ED with c/o LUE weakness and numbness which began around 3 pm today after napping on her couch.  She notes having some mild L shoulder pain over the past few days, but this was exacerbated this afternoon.  Denies trauma, headache, vision changes, LE weakness or any other issues.  CTA head and neck performed in the ED without acute findings.  She was evaluated by telemed vascular neurology with recommendations to obtain MRI brain and MRI C-spine to r/o acute central process.  She is placed in observation under the service of hospital medicine for continued workup and treatment.     Overview/Hospital Course:  Ijeoma Issa is a 53 year old female with a past medical history of PCOS, obesity and GERD who presented with acute onset left shoulder pain and weakness. MRI of the shoulder is pending and Neurology has been consulted. MRI of the C-spine is largely unremarkable and x-ray of the shoulder shows no fracture. Orthopedic Surgery was consulted and recommended outpatient follow up. PT/OT has been consulted and recommends low intensity therapy.    Interval History: see "Hospital Course"    Review of Systems   Constitutional:  Positive for activity change.   Musculoskeletal:  Positive for arthralgias.   Neurological:  Positive for weakness.     Objective:     Vital Signs (Most Recent):  Temp: 97.8 °F (36.6 °C) (02/25/24 0731)  Pulse: 62 (02/25/24 0731)  Resp: 16 (02/25/24 0731)  BP: (!) 112/55 (02/25/24 0731)  SpO2: 96 % (02/25/24 " 0731) Vital Signs (24h Range):  Temp:  [97 °F (36.1 °C)-97.9 °F (36.6 °C)] 97.8 °F (36.6 °C)  Pulse:  [60-67] 62  Resp:  [16-18] 16  SpO2:  [94 %-99 %] 96 %  BP: (107-125)/(55-64) 112/55     Weight: 115.7 kg (255 lb)  Body mass index is 43.75 kg/m².    Intake/Output Summary (Last 24 hours) at 2/25/2024 0901  Last data filed at 2/25/2024 0542  Gross per 24 hour   Intake 120 ml   Output --   Net 120 ml         Physical Exam  Vitals and nursing note reviewed.   Constitutional:       Appearance: She is obese.   HENT:      Head: Normocephalic and atraumatic.      Right Ear: External ear normal.      Left Ear: External ear normal.      Nose: Nose normal.      Mouth/Throat:      Mouth: Mucous membranes are moist.      Pharynx: Oropharynx is clear.   Eyes:      Extraocular Movements: Extraocular movements intact.      Conjunctiva/sclera: Conjunctivae normal.   Cardiovascular:      Rate and Rhythm: Normal rate and regular rhythm.      Pulses: Normal pulses.      Heart sounds: Normal heart sounds.   Pulmonary:      Effort: Pulmonary effort is normal.      Breath sounds: Normal breath sounds.   Abdominal:      General: Bowel sounds are normal. There is no distension.      Palpations: Abdomen is soft.      Tenderness: There is no abdominal tenderness.   Musculoskeletal:         General: Tenderness present.      Cervical back: Normal range of motion and neck supple.   Skin:     General: Skin is dry.   Neurological:      Mental Status: She is alert.      Motor: Weakness present.   Psychiatric:         Mood and Affect: Mood normal.         Behavior: Behavior normal.             Significant Labs: All pertinent labs within the past 24 hours have been reviewed.    Significant Imaging: I have reviewed all pertinent imaging results/findings within the past 24 hours.    Assessment/Plan:      * Acute pain of left shoulder  -MRI shoulder  -Neurology consulted  -Outpatient Orthopedic Surgery follow up   -Scheduled and PRN  analgesics  -PT/OT    Left arm weakness  -MRI shoulder  -Neurology consulted  -Outpatient Orthopedic Surgery follow up   -Scheduled and PRN analgesics  -PT/OT          Polycystic ovarian disease        Morbid obesity  Body mass index is 43.75 kg/m². Morbid obesity complicates all aspects of disease management from diagnostic modalities to treatment.         GERD (gastroesophageal reflux disease)  -PPI        VTE Risk Mitigation (From admission, onward)           Ordered     enoxaparin injection 40 mg  Every 12 hours (non-standard times)         02/25/24 0858     IP VTE HIGH RISK PATIENT  Once         02/22/24 2150     Place sequential compression device  Until discontinued         02/22/24 2150                    Discharge Planning   GHASSAN: 2/24/2024     Code Status: Full Code   Is the patient medically ready for discharge?:     Reason for patient still in hospital (select all that apply): Imaging and Consult recommendations  Discharge Plan A: Home with family                  Endy Quiles MD  Department of Hospital Medicine   [facility]/Surg

## 2024-02-25 NOTE — ASSESSMENT & PLAN NOTE
-MRI shoulder  -Neurology consulted  -Outpatient Orthopedic Surgery follow up   -Scheduled and PRN analgesics  -PT/OT

## 2024-02-25 NOTE — PROGRESS NOTES
[facility]  Department of Neurology  Progress Note  Date: 2024 2:13 PM          Patient Name: Ijeoma Issa   MRN: [id number]   : 1970    AGE: 53 y.o.    LOS: 0 days Hospital Day: 4  Admit date: 2024  7:54 PM         HPI per EMR: Ijeoma Issa is a 53 y.o. female with a history of DM2, GERD, and PCOS who presented to the ED with c/o LUE weakness and numbness which began around 3 pm today after napping on her couch. She notes having some mild L shoulder pain over the past few days, but this was exacerbated this afternoon. Denies trauma, headache, vision changes, LE weakness or any other issues. CTA head and neck performed in the ED without acute findings. She was evaluated by telemed vascular neurology with recommendations to obtain MRI brain and MRI C-spine to r/o acute central process. She is placed in observation under the service of hospital medicine for continued workup and treatment      Neurology consult:  Patient was seen and examined by me.  She states that at about 3:00 p.m. after waking up from a nap, she started noticing weakness and numbness in her left upper extremity below her shoulder level.  She states that she has not able to feel anything in her left upper extremity and not able to move it at all.  She also endorses of some pain around the shoulder region.  She denies any weakness or numbness in any other extremities, denies any vision trouble, speech trouble, nausea, vomiting, dizziness.  She denies lying in any weird position with extended arm, denies any fall or trauma.     She states that she was recently diagnosed with a lump in the axillary region on the left which was treated with some antibiotics and the lump has decreased in size now.  Denies any surgical procedure done.    2024: No acute events overnight. Patient was seen and examined by me this morning. Neuro exam is unchanged       Vitals:  Patient Vitals for the past 24 hrs:   BP  "Temp Temp src Pulse Resp SpO2 Height Weight   02/25/24 1300 -- -- -- -- -- 96 % -- --   02/25/24 1201 121/66 98.1 °F (36.7 °C) Axillary 65 16 96 % -- --   02/25/24 0801 -- -- -- -- -- 98 % -- --   02/25/24 0731 (!) 112/55 97.8 °F (36.6 °C) Oral 62 16 96 % -- --   02/25/24 0308 (!) 107/58 97.5 °F (36.4 °C) Tympanic 62 18 (!) 94 % -- --   02/24/24 2312 (!) 118/56 97 °F (36.1 °C) Tympanic 66 16 (!) 94 % -- --   02/24/24 2302 (!) 118/57 97.7 °F (36.5 °C) -- 67 16 96 % 5' 4.02" (1.626 m) 115.7 kg (255 lb)   02/24/24 2001 -- -- -- -- -- 96 % -- --   02/24/24 1920 (!) 118/57 97.7 °F (36.5 °C) Tympanic 67 16 (!) 94 % -- --   02/24/24 1552 (!) 114/58 97.8 °F (36.6 °C) -- 64 16 97 % -- --     PHYSICAL EXAM:     GENERAL APPEARANCE: Well-developed, well-nourished female in no acute distress.  HEENT: Normocephalic and atraumatic. PERRL. Oropharynx unremarkable.  PULM: Comfortable on room air.  CV: RRR.  ABDOMEN: Soft, nontender.  EXTREMITIES: No signs of vascular compromise. Pulses present. No cyanosis, clubbing or edema.  SKIN: Clear; no rashes, lesions or skin breaks in exposed areas.      NEURO:   MENTAL STATUS: Patient awake and oriented to time, place, and person. Affect normal.  CRANIAL NERVES II-XII: Pupils equal, round and reactive to light. Extraocular movements full and intact. No facial asymmetry.  MOTOR: Normal bulk. Tone normal and symmetrical throughout.  No abnormal movements. No tremor.   Strength 5/5 in all extremities.  0/5 in entire left upper extremity except 1/5 proximally .   REFLEXES: DTRs 2+; normal and symmetric throughout. (reflexes preserved in the left upper extremity).   SENSATION: Sensation grossly intact to fine touch except absent in LUE  COORDINATION: Intact on the RUE and bilateral LE  STATION: Romberg deferred.  GAIT: Deferred.    CURRENT SCHEDULED MEDICATIONS:   arformoteroL  15 mcg Nebulization BID    budesonide  0.5 mg Nebulization Q12H    doxepin  25 mg Oral QHS    enoxparin  40 mg " "Subcutaneous Q12H    [START ON 2/26/2024] estradioL  1 mg Oral Every Mon, Wed, Fri    ferrous sulfate  1 tablet Oral Daily    fluticasone propionate  2 spray Each Nostril Daily    gabapentin  300 mg Oral QHS    ibuprofen  400 mg Oral TID    LIDOcaine  1 patch Transdermal Q24H    montelukast  10 mg Oral Daily    pantoprazole  40 mg Oral Daily    spironolactone  25 mg Oral Daily     CURRENT INFUSIONS:    DATA:  Recent Labs   Lab 02/22/24 2000 02/23/24  0545    136   K 3.8 3.9    106   CO2 23 20*   BUN 16 14   CREATININE 0.8 0.8   * 97   CALCIUM 9.2 8.6*   PHOS  --  3.2   MG  --  1.7   AST 26 21   ALT 22 19     Recent Labs   Lab 02/22/24 2000 02/23/24  0545   WBC 8.21 5.91   HGB 12.5 12.2   HCT 39.6 39.5    205     No results found for: "PROTEINCSF", "GLUCCSF", "WBCCSF", "RBCCSF", "PMNCSF"  Hemoglobin A1C   Date Value Ref Range Status   02/12/2024 4.3 4.0 - 5.6 % Final     Comment:     ADA Screening Guidelines:  5.7-6.4%  Consistent with prediabetes  >or=6.5%  Consistent with diabetes    High levels of fetal hemoglobin interfere with the HbA1C  assay. Heterozygous hemoglobin variants (HbS, HgC, etc)do  not significantly interfere with this assay.   However, presence of multiple variants may affect accuracy.     08/28/2021 4.9 4.0 - 5.6 % Final     Comment:     ADA Screening Guidelines:  5.7-6.4%  Consistent with prediabetes  >or=6.5%  Consistent with diabetes    High levels of fetal hemoglobin interfere with the HbA1C  assay. Heterozygous hemoglobin variants (HbS, HgC, etc)do  not significantly interfere with this assay.   However, presence of multiple variants may affect accuracy.     11/16/2019 5.3 4.0 - 5.6 % Final     Comment:     ADA Screening Guidelines:  5.7-6.4%  Consistent with prediabetes  >or=6.5%  Consistent with diabetes  High levels of fetal hemoglobin interfere with the HbA1C  assay. Heterozygous hemoglobin variants (HbS, HgC, etc)do  not significantly interfere with this " assay.   However, presence of multiple variants may affect accuracy.              I have personally reviewed and interpreted the pertinent imaging and lab results.  Imaging Results              X-Ray Shoulder 2 or More Views Left (Final result)  Result time 02/23/24 12:00:31      Final result by Calvin Valdovinos MD (02/23/24 12:00:31)                   Impression:      As above      Electronically signed by: Calvin Valdovinos MD  Date:    02/23/2024  Time:    12:00               Narrative:    EXAMINATION:  XR SHOULDER COMPLETE 2 OR MORE VIEWS LEFT    CLINICAL HISTORY:  left shoulder pain;    TECHNIQUE:  Two or three views of the left shoulder were performed.    COMPARISON:  None    FINDINGS:  No fracture or dislocation is evident.  Some limitation due to unconventional positioning.                                       MRI Cervical Spine Without Contrast (Final result)  Result time 02/23/24 07:50:26      Final result by Endy Musa MD (02/23/24 07:50:26)                   Impression:      1. Motion limited exam without evidence of an acute process/cord impingement.  2. Overall mild degenerative changes most pronounced at C6-C7 where there is a shallow left lateralizing disc protrusion contributing to mild spinal canal narrowing and mild-to-moderate left foraminal narrowing.  The preliminary and final reports are concordant.      Electronically signed by: Endy Musa  Date:    02/23/2024  Time:    07:50               Narrative:    EXAMINATION:  MRI CERVICAL SPINE WITHOUT CONTRAST    CLINICAL HISTORY:  Myelopathy, acute, cervical spine;.    TECHNIQUE:  Multiplanar, multisequence MR images of the cervical spine were acquired without the administration of contrast.    COMPARISON:  CTA 02/22/2024.    FINDINGS:  Motion limited exam    Morphology: Cervical vertebral body heights are preserved marrow signal is within normal limits.  Incidentally noted chronic appearing mild superior endplate compression deformity of T3  and T3 and T4 vertebral body hemangiomas.    Alignment: Cervical spinal alignment is normal.    Cord: The cervical cord shows normal contour and signal content throughout its length.    Craniocervical Junction: Cerebellar tonsils are normally positioned. The visualized portions of the posterior fossa are unremarkable.  Incidentally noted partially empty sella configuration, nonspecific in isolation as described on the concurrently performed brain MRI 02/22/2024.  The regional osseous anatomy is normal.      Disc levels:      C2-C3: Mild right-sided facet arthrosis producing no more than mild right foraminal narrowing.  The spinal canal and left foramen are patent..    C3-C4: Mild bilateral facet arthrosis producing no more than mild bilateral foraminal narrowing.  The spinal canal is patent.    C4-C5: Mild bilateral facet arthrosis and uncovertebral spurring producing no more than mild bilateral foraminal narrowing.  The spinal canal is patent..    C5-C6: Mild right-sided facet arthrosis.  The spinal canal and foramina remain patent..    C6-C7: Broad-based disc protrusion centrally and lateralizing to the left producing mild spinal canal narrowing and suspected mild-to-moderate left foraminal narrowing.  The right foramen is patent..    C7-T1: Within normal limits.    Soft tissues: The visualized paraspinal soft tissues are within normal limits.                                         MRI Brain Without Contrast (Final result)  Result time 02/23/24 07:12:16      Final result by Endy Musa MD (02/23/24 07:12:16)                   Impression:      1. No acute process, specifically no recent infarct on this motion limited noncontrast brain MRI.  2. Variant partially empty sella configuration, nonspecific in isolation.  The preliminary and final reports are concordant.      Electronically signed by: Endy Musa  Date:    02/23/2024  Time:    07:12               Narrative:    EXAMINATION:  MRI BRAIN WITHOUT  CONTRAST    CLINICAL HISTORY:  Stroke, follow up;.    TECHNIQUE:  Multiplanar multisequence MR imaging of the brain was performed without contrast    COMPARISON:  CTA 02/22/2024.    FINDINGS:  Motion degraded exam.    Brain: There are no concerning focal areas of abnormal or restricted diffusion within the brain.  No mass, hemorrhage or acute infarct is present.  Midline Structures: Partially empty sella configuration, nonspecific in isolation.  The midline structures of the brain are normal.  Ventricles: The ventricles, sulci and cisterns are within normal limits.  Vasculature: The vascular flow voids at the base of the brain are within normal limits.  Calvarium: The visualized osseous structures are unremarkable.  Sinuses: Minimal mucosal thickening within the dependent maxillary sinuses.  No obstructive changes or dependent fluid levels.  Minimal ethmoid sinus opacification.  Orbits: The orbits and globes are unremarkable.  Mastoids: The mastoid air cells are adequately aerated.  Extracranial soft tissues: The visualized extracranial soft tissues are unremarkable.                                       CTA Head and Neck (xpd) (Final result)  Result time 02/23/24 08:06:25      Final result by Endy Musa MD (02/23/24 08:06:25)                   Impression:      1. No acute intracranial CT findings.  2. No intracranial proximal large vessel occlusion or significant stenosis.  3. No significant atherosclerotic changes or arterial stenosis within the neck.  4. 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  The major findings between the preliminary and final reports are concordant.      Electronically signed by: Endy Musa  Date:    02/23/2024  Time:    08:06               Narrative:    EXAMINATION:  CTA HEAD AND NECK (XPD)    CLINICAL HISTORY:  Neuro deficit, acute, stroke suspected;    TECHNIQUE:  Non contrast low dose axial images were obtained through the head. CT angiogram was  performed from the level of the merritt to the top of the head following the IV administration of 100mL of Omnipaque 350.   Sagittal and coronal reconstructions and maximum intensity projection reconstructions were performed. Arterial stenosis percentages are based on NASCET measurement criteria.    COMPARISON:  None    FINDINGS:  CTA HEAD:    Vasculature: Mild atherosclerotic changes of the cavernous carotid arteries without significant stenosis.  The intracranial arteries show normal anatomy without evidence of major branch occlusion or focal luminal narrowing.There is no suspicion of vascular malformation or saccular aneurysm.    Variant anatomy: Presumed small right anterior orbital/supra orbital venous varix or potential small AV fistula involving a branch of the ophthalmic artery and facial vein.    Brain: There is no evidence of mass, mass effect, edema, midline shift, intracranial hemorrhage, or space-occupying extra-axial fluid collection. After the administration of contrast there is no abnormal enhancement.    Ventricles/Sulci: The ventricles and sulci are appropriate in size for age.    Osseous Structures: The osseous structures are unremarkable in appearance.    Sinuses and orbits: Mild scattered mucosal thickening.  Visualized orbits are within normal limits.    Other: Visualized portions of the mastoids are unremarkable.    CTA NECK:    Aorta: Conventional branching pattern. The great vessel origins are patent without flow limiting stenosis.    Vertebral Arteries: The origins of the vertebral arteries are patent.  No flow limiting stenosis, occlusion, or dissection.    Right Carotid: No flow limiting stenosis, occlusion, or dissection of the common carotid or internal carotid arteries.  No significant plaque of the proximal ICA.  Right internal carotid artery: 0 % stenosis by and NASCET.    Left Carotid: No flow limiting stenosis, occlusion, or dissection of the common carotid or internal carotid  arteries. No significant plaque of the proximal ICA. Right internal carotid artery: 0 % stenosis by and NASCET.    Extravascular Anatomy: No definite adjacent soft tissue abnormality seen accounting for technique used.  Mild superior endplate compression deformity of T3 which appears chronic.  No significant bony process.                                       RADIOLOGY REPORT (Final result)  Result time 02/23/24 13:10:46      Final result by Unknown User (02/23/24 13:10:46)                                                ASSESSMENT AND PLAN:     Left upper extremity weakness     Plan:   Etiology of left upper extremity weakness and numbness is unknown at this time. Workup for intracranial and C-spine pathology has been negative with MRI brain and C-spine. MRI brachial plexus on the left was unremarkable. Cannot rule out non physiologic causes   X-ray of the left shoulder was negative for fracture or dislocation.  Outpatient EMG/ NCV OF LUE recommended   Pain management per primary team.  Can consider gabapentin  PT OT evaluate and treat  Outpatient neurology follow up.          George Rothman MD  Neurology/vascular Neurology  Date of Service: 02/25/2024  2:13 PM    Please note: This note was transcribed using voice recognition software. Because of this technology there are often uinintended grammatical, spelling, and other transcription errors. Please disregard these errors.

## 2024-02-25 NOTE — PLAN OF CARE
02/24/24 2001   Patient Assessment/Suction   Level of Consciousness (AVPU) alert   Respiratory Effort Unlabored   Rhythm/Pattern, Respiratory pattern regular   Cough Frequency no cough   PRE-TX-O2   Device (Oxygen Therapy) nasal cannula   Flow (L/min) 2   SpO2 96 %   Pulse Oximetry Type Intermittent

## 2024-02-25 NOTE — PLAN OF CARE
[facility] - Med/Surg  Discharge Final Note    Primary Care Provider: Allison Adler MD    Expected Discharge Date: 2/25/2024    CHINMAY reviewed discharge orders. HH was ordered. Patient choice form was signed with patient at bedside and preference was [facility]. Sent referral via [employer] and am awaiting response. CHINMAY informed patient that the company will contact her. 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 CM will follow up. Patient will discharge home this evening and her  will provide transportation. No other case management needs. Cleared.     Final Discharge Note (most recent)       Final Note - 02/25/24 7977          Final Note    Assessment Type Final Discharge Note     Anticipated Discharge Disposition Home-Health Care Svc        Post-Acute Status    Post-Acute Authorization Home Health     Home Health Status Referrals Sent     Discharge Delays None known at this time                     Important Message from Medicare             Contact Info       Allison Adler MD   Specialty: Family Medicine   Relationship: PCP - General    [address] [place] [address]   Phone: [phone]       Next Steps: Follow up    Instructions: 3/4 @ 930 with George Sparrow MD   Specialty: Neurology    [address]  [place] [address]   Phone: [phone]       Next Steps: Follow up in 2 week(s)    Alex Wharton II, MD   Specialty: Orthopedic Surgery    [address] [place] [address] [address] [place] [address]   Phone: [phone]       Next Steps: Follow up in 2 week(s)

## 2024-02-25 NOTE — PLAN OF CARE
Problem: Adult Inpatient Plan of Care  Goal: Plan of Care Review  Outcome: Met  Goal: Patient-Specific Goal (Individualized)  Outcome: Met  Goal: Absence of Hospital-Acquired Illness or Injury  Outcome: Met  Goal: Optimal Comfort and Wellbeing  Outcome: Met  Goal: Readiness for Transition of Care  Outcome: Met     Problem: Bariatric Environmental Safety  Goal: Safety Maintained with Care  Outcome: Met     Problem: Adjustment to Illness (Stroke, Ischemic/Transient Ischemic Attack)  Goal: Optimal Coping  Outcome: Met     Problem: Bowel Elimination Impaired (Stroke, Ischemic/Transient Ischemic Attack)  Goal: Effective Bowel Elimination  Outcome: Met     Problem: Cerebral Tissue Perfusion (Stroke, Ischemic/Transient Ischemic Attack)  Goal: Optimal Cerebral Tissue Perfusion  Outcome: Met     Problem: Cognitive Impairment (Stroke, Ischemic/Transient Ischemic Attack)  Goal: Optimal Cognitive Function  Outcome: Met     Problem: Communication Impairment (Stroke, Ischemic/Transient Ischemic Attack)  Goal: Improved Communication Skills  Outcome: Met     Problem: Functional Ability Impaired (Stroke, Ischemic/Transient Ischemic Attack)  Goal: Optimal Functional Ability  Outcome: Met     Problem: Respiratory Compromise (Stroke, Ischemic/Transient Ischemic Attack)  Goal: Effective Oxygenation and Ventilation  Outcome: Met     Problem: Sensorimotor Impairment (Stroke, Ischemic/Transient Ischemic Attack)  Goal: Improved Sensorimotor Function  Outcome: Met     Problem: Swallowing Impairment (Stroke, Ischemic/Transient Ischemic Attack)  Goal: Optimal Eating and Swallowing without Aspiration  Outcome: Met     Problem: Urinary Elimination Impaired (Stroke, Ischemic/Transient Ischemic Attack)  Goal: Effective Urinary Elimination  Outcome: Met     Problem: Fall Injury Risk  Goal: Absence of Fall and Fall-Related Injury  Outcome: Met     Problem: Infection  Goal: Absence of Infection Signs and Symptoms  Outcome: Met

## 2024-02-25 NOTE — ASSESSMENT & PLAN NOTE
MRI shoulder with no acute pathology.  -Neurology consulted  -Outpatient Orthopedic Surgery follow up  -Outpatient Neurology follow up   -Scheduled and PRN analgesics  -PT/OT       English

## 2024-02-25 NOTE — PLAN OF CARE
Problem: Adult Inpatient Plan of Care  Goal: Plan of Care Review  Outcome: Ongoing, Progressing  Goal: Absence of Hospital-Acquired Illness or Injury  Outcome: Ongoing, Progressing  Goal: Optimal Comfort and Wellbeing  Outcome: Ongoing, Progressing     Problem: Adjustment to Illness (Stroke, Ischemic/Transient Ischemic Attack)  Goal: Optimal Coping  Outcome: Ongoing, Progressing     Problem: Functional Ability Impaired (Stroke, Ischemic/Transient Ischemic Attack)  Goal: Optimal Functional Ability  Outcome: Ongoing, Progressing     Problem: Sensorimotor Impairment (Stroke, Ischemic/Transient Ischemic Attack)  Goal: Improved Sensorimotor Function  Outcome: Ongoing, Progressing

## 2024-02-25 NOTE — SUBJECTIVE & OBJECTIVE
"Interval History: see "Hospital Course"    Review of Systems   Constitutional:  Positive for activity change.   Musculoskeletal:  Positive for arthralgias.   Neurological:  Positive for weakness.     Objective:     Vital Signs (Most Recent):  Temp: 97.8 °F (36.6 °C) (02/25/24 0731)  Pulse: 62 (02/25/24 0731)  Resp: 16 (02/25/24 0731)  BP: (!) 112/55 (02/25/24 0731)  SpO2: 96 % (02/25/24 0731) Vital Signs (24h Range):  Temp:  [97 °F (36.1 °C)-97.9 °F (36.6 °C)] 97.8 °F (36.6 °C)  Pulse:  [60-67] 62  Resp:  [16-18] 16  SpO2:  [94 %-99 %] 96 %  BP: (107-125)/(55-64) 112/55     Weight: 115.7 kg (255 lb)  Body mass index is 43.75 kg/m².    Intake/Output Summary (Last 24 hours) at 2/25/2024 0901  Last data filed at 2/25/2024 0542  Gross per 24 hour   Intake 120 ml   Output --   Net 120 ml         Physical Exam  Vitals and nursing note reviewed.   Constitutional:       Appearance: She is obese.   HENT:      Head: Normocephalic and atraumatic.      Right Ear: External ear normal.      Left Ear: External ear normal.      Nose: Nose normal.      Mouth/Throat:      Mouth: Mucous membranes are moist.      Pharynx: Oropharynx is clear.   Eyes:      Extraocular Movements: Extraocular movements intact.      Conjunctiva/sclera: Conjunctivae normal.   Cardiovascular:      Rate and Rhythm: Normal rate and regular rhythm.      Pulses: Normal pulses.      Heart sounds: Normal heart sounds.   Pulmonary:      Effort: Pulmonary effort is normal.      Breath sounds: Normal breath sounds.   Abdominal:      General: Bowel sounds are normal. There is no distension.      Palpations: Abdomen is soft.      Tenderness: There is no abdominal tenderness.   Musculoskeletal:         General: Tenderness present.      Cervical back: Normal range of motion and neck supple.   Skin:     General: Skin is dry.   Neurological:      Mental Status: She is alert.      Motor: Weakness present.   Psychiatric:         Mood and Affect: Mood normal.         " Behavior: Behavior normal.             Significant Labs: All pertinent labs within the past 24 hours have been reviewed.    Significant Imaging: I have reviewed all pertinent imaging results/findings within the past 24 hours.

## 2024-02-25 NOTE — DISCHARGE SUMMARY
[facility] Medicine  Discharge Summary      Patient Name: Ijeoma Issa  MRN: [id number]  ALLAN: [id number]  Patient Class: OP- Observation  Admission Date: 2/22/2024  Hospital Length of Stay: 0 days  Discharge Date and Time: No discharge date for patient encounter.  Attending Physician: Endy Quiles MD   Discharging Provider: Endy Quiles MD  Primary Care Provider: Allison Adler MD    Primary Care Team: Networked reference to record PCT     HPI:   Ijeoma Issa is a 52 y/o F with a past medical history significant for DM2, GERD, and PCOS who presented to the ED with c/o LUE weakness and numbness which began around 3 pm today after napping on her couch.  She notes having some mild L shoulder pain over the past few days, but this was exacerbated this afternoon.  Denies trauma, headache, vision changes, LE weakness or any other issues.  CTA head and neck performed in the ED without acute findings.  She was evaluated by telemed vascular neurology with recommendations to obtain MRI brain and MRI C-spine to r/o acute central process.  She is placed in observation under the service of hospital medicine for continued workup and treatment.     * No surgery found *      Hospital Course:   Ijeoma Issa is a 53 year old female with a past medical history of PCOS, obesity and GERD who presented with acute onset left shoulder pain and weakness. MRI of the shoulder is pending and Neurology has been consulted. MRI of the C-spine is largely unremarkable and x-ray of the shoulder shows no fracture. Orthopedic Surgery was consulted and recommended outpatient follow up. PT/OT has been consulted and recommends low intensity therapy.  PT/OT was ordered on discharge which took place 2/25. She will follow up with her PCP, Neurology and Orthopedic Surgery. If the patient's pain and weakness remain in the outpatient setting with no discernable acute pathology on imaging; diagnoses  such as a conversion disorder may be considered.     Goals of Care Treatment Preferences:  Code Status: Full Code      Consults:   Consults (From admission, onward)          Status Ordering Provider     Inpatient consult to Neurology  Once        Provider:  George Rothman MD    Acknowledged MARIA ISABEL MACIAS     Inpatient consult to Registered Dietitian/Nutritionist  Once        Provider:  (Not yet assigned)    Completed ANAT CELESTE     IP consult to case management/social work  Once        Provider:  (Not yet assigned)    Completed ANAT CELESTE     Inpatient consult to Neurology  Once        Provider:  George Rothman MD    Completed ANAT CELESTE     Consult to Telemedicine - Acute Stroke  Once        Provider:  (Not yet assigned)    Acknowledged NICKOLAS LILLY            Pulmonary  Asthma with allergic rhinitis  -Continue home medications      Endocrine  Polycystic ovarian disease  Chronic. Stable.      Morbid obesity  Body mass index is 43.75 kg/m². Morbid obesity complicates all aspects of disease management from diagnostic modalities to treatment.         GI  GERD (gastroesophageal reflux disease)  -PPI      Orthopedic  * Acute pain of left shoulder  MRI shoulder with no acute pathology.  -Neurology consulted  -Outpatient Orthopedic Surgery follow up  -Outpatient Neurology follow up   -Scheduled and PRN analgesics  -PT/OT    Left arm weakness  MRI shoulder with no acute pathology.  -Neurology consulted  -Outpatient Orthopedic Surgery follow up  -Outpatient Neurology follow up   -Scheduled and PRN analgesics  -PT/OT          Final Active Diagnoses:    Diagnosis Date Noted POA    PRINCIPAL PROBLEM:  Acute pain of left shoulder [M25.512] 02/23/2024 Yes    Left arm weakness [R29.898] 02/22/2024 Yes    Polycystic ovarian disease [E28.2] 02/05/2014 Yes     Chronic    Morbid obesity [E66.01] 11/14/2013 Yes    GERD (gastroesophageal reflux disease) [K21.9] 07/20/2012 Yes     Chronic     Asthma with allergic rhinitis [J45.909] 07/20/2012 Yes     Chronic      Problems Resolved During this Admission:       Discharged Condition: stable    Disposition: Home or Self Care    Follow Up:   Follow-up Information       Allison Adler MD Follow up.    Specialty: Family Medicine  Why: 3/4 @ 930 with NP  Contact information:  [address]  [place] [address]  [phone]               George Rothman MD Follow up in 2 week(s).    Specialty: Neurology  Contact information:  [address]  [place] [address]  [phone]               Alex Wharton II, MD Follow up in 2 week(s).    Specialty: Orthopedic Surgery  Contact information:  [address] [place] [address] [address] [place] [address]  [phone]                           Patient Instructions:      Ambulatory referral/consult to Home Health   Standing Status: Future   Referral Priority: Routine Referral Type: Home Health   Referral Reason: Specialty Services Required   Requested Specialty: Home Health Services   Number of Visits Requested: 1     Diet Adult Regular     Notify your health care provider if you experience any of the following:  increased confusion or weakness     Notify your health care provider if you experience any of the following:  persistent dizziness, light-headedness, or visual disturbances     Notify your health care provider if you experience any of the following:  severe persistent headache     Notify your health care provider if you experience any of the following:  temperature >100.4     Notify your health care provider if you experience any of the following:  persistent nausea and vomiting or diarrhea     Notify your health care provider if you experience any of the following:  severe uncontrolled pain     Notify your health care provider if you experience any of the following:  difficulty breathing or increased cough     Activity as tolerated       Significant Diagnostic Studies: Labs: All labs within the past 24 hours have been  reviewed    Pending Diagnostic Studies:       None           Medications:  Reconciled Home Medications:      Medication List        START taking these medications      gabapentin 300 MG capsule  Commonly known as: NEURONTIN  Take 1 capsule (300 mg total) by mouth every evening.     ibuprofen 400 MG tablet  Commonly known as: ADVIL,MOTRIN  Take 1 tablet (400 mg total) by mouth every 6 (six) hours as needed (shoulder pain).            CHANGE how you take these medications      DOC-Q-LACE 100 MG capsule  Generic drug: docusate sodium  TAKE ONE CAPSULE BY MOUTH TWICE DAILY  What changed:   how much to take  when to take this  reasons to take this     estradioL 0.5 MG tablet  Commonly known as: ESTRACE  Take 1 tablet (0.5 mg total) by mouth 3 (three) times a week.  What changed: when to take this     ferrous sulfate 325 mg (65 mg iron) Tab tablet  Commonly known as: IRON  Take 1 tablet (325 mg total) by mouth 3 (three) times a week.  What changed: when to take this     fluticasone propionate 50 mcg/actuation nasal spray  Commonly known as: FLONASE  SHAKE LIQUID AND USE 1 SPRAY(50 MCG) IN EACH NOSTRIL TWICE DAILY AS NEEDED FOR RHINITIS  What changed: See the new instructions.            CONTINUE taking these medications      acitretin 25 MG capsule  Commonly known as: SORIATANE  Take 25 mg by mouth once daily.     * albuterol 90 mcg/actuation inhaler  Commonly known as: PROVENTIL/VENTOLIN HFA  Inhale 2 puffs into the lungs every 6 (six) hours as needed for Shortness of Breath.     * albuterol 2.5 mg /3 mL (0.083 %) nebulizer solution  Commonly known as: PROVENTIL  Inhale 2.5 mg into the lungs every 4 (four) hours as needed for Shortness of Breath.     azelastine 137 mcg (0.1 %) nasal spray  Commonly known as: ASTELIN  1 spray (137 mcg total) by Nasal route 2 (two) times daily.     ciclopirox 1 % shampoo  Apply 5 mLs topically Daily.     clindamycin 1 % lotion  Commonly known as: CLEOCIN T  Apply 1 g topically 2 (two)  times daily.     clobetasoL 0.05 % cream  Commonly known as: TEMOVATE  Apply 1 g topically 2 (two) times daily.     doxepin 25 MG capsule  Commonly known as: SINEQUAN  TAKE 1 CAPSULE(25 MG) BY MOUTH EVERY EVENING     EPIPEN 2-LUCIANO 0.3 mg/0.3 mL Atin  Generic drug: EPINEPHrine  Inject 0.3 mLs (0.3 mg total) into the muscle once. for 1 dose     fexofenadine 180 MG tablet  Commonly known as: ALLEGRA  Take 180 mg by mouth once daily.     fluticasone-salmeterol 250-50 mcg/dose 250-50 mcg/dose diskus inhaler  Commonly known as: ADVAIR DISKUS  Inhale 1 puff into the lungs 2 (two) times daily. Controller     ketoconazole 2 % cream  Commonly known as: NIZORAL  Apply 1 application  topically 2 (two) times daily.     LIDOcaine HCL 1 % Lotn  Apply topically.     montelukast 10 mg tablet  Commonly known as: SINGULAIR  Take 1 tablet (10 mg total) by mouth once daily.     multivitamin with minerals tablet  Take 1 tablet by mouth once daily.     spironolactone 25 MG tablet  Commonly known as: ALDACTONE  TAKE 1 TABLET BY MOUTH DAILY           * This list has 2 medication(s) that are the same as other medications prescribed for you. Read the directions carefully, and ask your doctor or other care provider to review them with you.                STOP taking these medications      doxycycline 100 MG Cap  Commonly known as: VIBRAMYCIN     ondansetron 4 MG Tbdl  Commonly known as: ZOFRAN-ODT            ASK your doctor about these medications      chlorhexidine 4 % external liquid  Commonly known as: HIBICLENS  Apply topically daily as needed (dermatitis).              Indwelling Lines/Drains at time of discharge:   Lines/Drains/Airways       None                   Time spent on the discharge of patient: 33 minutes         Endy Quiles MD  Department of Hospital Medicine  [facility]/Surg

## 2024-02-25 NOTE — ASSESSMENT & PLAN NOTE
MRI shoulder with no acute pathology.  -Neurology consulted  -Outpatient Orthopedic Surgery follow up  -Outpatient Neurology follow up   -Scheduled and PRN analgesics  -PT/OT

## 2024-02-25 NOTE — PROGRESS NOTES
Pharmacist Renal Dose Adjustment Note    Ijeoma Issa is a 53 y.o. female being treated with the medication Enoxaparin    Patient Data:    Vital Signs (Most Recent):  Temp: 97.8 °F (36.6 °C) (02/25/24 0731)  Pulse: 62 (02/25/24 0731)  Resp: 16 (02/25/24 0731)  BP: (!) 112/55 (02/25/24 0731)  SpO2: 96 % (02/25/24 0731) Vital Signs (72h Range):  Temp:  [97 °F (36.1 °C)-98.2 °F (36.8 °C)]   Pulse:  [57-98]   Resp:  [16-20]   BP: ()/(55-74)   SpO2:  [87 %-99 %]      Recent Labs   Lab 02/22/24 2000 02/23/24  0545   CREATININE 0.8 0.8     Serum creatinine: 0.8 mg/dL 02/23/24 0545  Estimated creatinine clearance: 102.7 mL/min    The enoxaparin dose has been adjusted for Ijeoma Issa [id number] according to the pharmacy practice protocol.      Based on the patient's Estimated Creatinine Clearance: 102.7 mL/min (based on SCr of 0.8 mg/dL)., Body mass index is 43.75 kg/m²., and weight= 115.7 kg (255 lb), the enoxaparin dose has been adjusted 40 mg every 12 hours.    Thank you,  Jasbir Solis

## 2024-02-26 NOTE — PLAN OF CARE
Pt accepted by [facility] in [facility].  Spoke to June who reports start of care date will be today, 2/26/2024.  No further needs addressed at this time.

## 2024-03-04 ENCOUNTER — TELEPHONE (OUTPATIENT)
Dept: ORTHOPEDICS | Facility: CLINIC | Age: 54
End: 2024-03-04

## 2024-03-04 ENCOUNTER — OFFICE VISIT (OUTPATIENT)
Dept: ORTHOPEDICS | Facility: CLINIC | Age: 54
End: 2024-03-04
Payer: COMMERCIAL

## 2024-03-04 ENCOUNTER — OFFICE VISIT (OUTPATIENT)
Dept: FAMILY MEDICINE | Facility: CLINIC | Age: 54
End: 2024-03-04
Payer: COMMERCIAL

## 2024-03-04 VITALS — WEIGHT: 255 LBS | RESPIRATION RATE: 16 BRPM | BODY MASS INDEX: 43.54 KG/M2 | HEIGHT: 64 IN

## 2024-03-04 VITALS
BODY MASS INDEX: 43.69 KG/M2 | RESPIRATION RATE: 18 BRPM | OXYGEN SATURATION: 99 % | HEIGHT: 64 IN | SYSTOLIC BLOOD PRESSURE: 122 MMHG | WEIGHT: 255.94 LBS | HEART RATE: 73 BPM | DIASTOLIC BLOOD PRESSURE: 74 MMHG

## 2024-03-04 DIAGNOSIS — E66.01 OBESITY, MORBID, BMI 40.0-49.9: ICD-10-CM

## 2024-03-04 DIAGNOSIS — M25.512 ACUTE PAIN OF LEFT SHOULDER: ICD-10-CM

## 2024-03-04 DIAGNOSIS — R29.898 LEFT ARM WEAKNESS: ICD-10-CM

## 2024-03-04 DIAGNOSIS — Z09 HOSPITAL DISCHARGE FOLLOW-UP: Primary | ICD-10-CM

## 2024-03-04 DIAGNOSIS — M54.10 BRACHIAL NEURITIS OF LEFT UPPER EXTREMITY: Primary | ICD-10-CM

## 2024-03-04 PROCEDURE — 1159F MED LIST DOCD IN RCRD: CPT | Mod: CPTII,S$GLB,, | Performed by: ORTHOPAEDIC SURGERY

## 2024-03-04 PROCEDURE — 3044F HG A1C LEVEL LT 7.0%: CPT | Mod: CPTII,S$GLB,, | Performed by: ORTHOPAEDIC SURGERY

## 2024-03-04 PROCEDURE — 3078F DIAST BP <80 MM HG: CPT | Mod: CPTII,S$GLB,, | Performed by: FAMILY MEDICINE

## 2024-03-04 PROCEDURE — 3074F SYST BP LT 130 MM HG: CPT | Mod: CPTII,S$GLB,, | Performed by: FAMILY MEDICINE

## 2024-03-04 PROCEDURE — 1160F RVW MEDS BY RX/DR IN RCRD: CPT | Mod: CPTII,S$GLB,, | Performed by: ORTHOPAEDIC SURGERY

## 2024-03-04 PROCEDURE — 99204 OFFICE O/P NEW MOD 45 MIN: CPT | Mod: S$GLB,,, | Performed by: ORTHOPAEDIC SURGERY

## 2024-03-04 PROCEDURE — 99214 OFFICE O/P EST MOD 30 MIN: CPT | Mod: S$GLB,,, | Performed by: FAMILY MEDICINE

## 2024-03-04 PROCEDURE — 99999 PR PBB SHADOW E&M-EST. PATIENT-LVL V: CPT | Mod: PBBFAC,,, | Performed by: ORTHOPAEDIC SURGERY

## 2024-03-04 PROCEDURE — 99999 PR PBB SHADOW E&M-EST. PATIENT-LVL V: CPT | Mod: PBBFAC,,, | Performed by: FAMILY MEDICINE

## 2024-03-04 PROCEDURE — 3044F HG A1C LEVEL LT 7.0%: CPT | Mod: CPTII,S$GLB,, | Performed by: FAMILY MEDICINE

## 2024-03-04 PROCEDURE — 3008F BODY MASS INDEX DOCD: CPT | Mod: CPTII,S$GLB,, | Performed by: ORTHOPAEDIC SURGERY

## 2024-03-04 RX ORDER — GABAPENTIN 300 MG/1
300 CAPSULE ORAL 3 TIMES DAILY
Qty: 90 CAPSULE | Refills: 11 | Status: SHIPPED | OUTPATIENT
Start: 2024-03-04 | End: 2024-03-28

## 2024-03-04 RX ORDER — LIDOCAINE 50 MG/G
1 PATCH TOPICAL DAILY
Qty: 30 PATCH | Refills: 0 | Status: SHIPPED | OUTPATIENT
Start: 2024-03-04

## 2024-03-04 NOTE — PROGRESS NOTES
CC:  53-year-old male presents for evaluation of her left arm.  The patient states that on 2024 that she was sleeping with a grandchild on her right arm.  She fell asleep on the couch when she woke up her left arm was numb and she had inability to move the left upper extremity.  Those symptoms progressively got worse.  She is currently using a sling.  She reports the arm is numb and tingling and she has a pins and needles type pain.  She is lost function in the arm and can not move her elbow, wrist, or fingers.  She was hospitalized and had an extensive workup with a lot of MRIs and imaging studies which really did not show much of interest.    Past Medical History:   Diagnosis Date    Abnormal mammogram     neg biospy    Allergic asthma     Anxiety     Arthritis     Dermatitis     Dr. Weil, Extremities    Diabetes     Diverticulosis of colon     GERD (gastroesophageal reflux disease)     Mass of right breast     Postmenopausal hormone replacement therapy     Tubal pregnancy     Uterine fibroid        Past Surgical History:   Procedure Laterality Date    BREAST BIOPSY Right 2012    benign     SECTION, LOW TRANSVERSE      CHOLECYSTECTOMY      HYSTERECTOMY      re: benign tumors per the patient    right ankle  10/10/2014    TOTAL ABDOMINAL HYSTERECTOMY W/ BILATERAL SALPINGOOPHORECTOMY      c appendectomy    TUBAL LIGATION         Current Outpatient Medications on File Prior to Visit   Medication Sig Dispense Refill    acitretin (SORIATANE) 25 MG capsule Take 25 mg by mouth once daily.      albuterol (PROVENTIL) 2.5 mg /3 mL (0.083 %) nebulizer solution Inhale 2.5 mg into the lungs every 4 (four) hours as needed for Shortness of Breath.      albuterol (PROVENTIL/VENTOLIN HFA) 90 mcg/actuation inhaler Inhale 2 puffs into the lungs every 6 (six) hours as needed for Shortness of Breath.      chlorhexidine (HIBICLENS) 4 % external liquid Apply topically daily as needed (dermatitis). 236 mL 2     ciclopirox 1 % shampoo Apply 5 mLs topically Daily.      clindamycin (CLEOCIN T) 1 % lotion Apply 1 g topically 2 (two) times daily.      clobetasoL (TEMOVATE) 0.05 % cream Apply 1 g topically 2 (two) times daily.      DOC-Q-LACE 100 mg capsule TAKE ONE CAPSULE BY MOUTH TWICE DAILY (Patient taking differently: Take 100 mg by mouth 2 (two) times daily as needed for Constipation.) 30 capsule 0    doxepin (SINEQUAN) 25 MG capsule TAKE 1 CAPSULE(25 MG) BY MOUTH EVERY EVENING 90 capsule 3    estradioL (ESTRACE) 0.5 MG tablet Take 1 tablet (0.5 mg total) by mouth 3 (three) times a week. (Patient taking differently: Take 0.5 mg by mouth every Mon, Wed, Fri.) 45 tablet 2    ferrous sulfate (IRON) 325 mg (65 mg iron) Tab tablet Take 1 tablet (325 mg total) by mouth 3 (three) times a week. (Patient taking differently: Take 325 mg by mouth every Mon, Wed, Fri.)      fexofenadine (ALLEGRA) 180 MG tablet Take 180 mg by mouth once daily.      fluticasone propionate (FLONASE) 50 mcg/actuation nasal spray SHAKE LIQUID AND USE 1 SPRAY(50 MCG) IN EACH NOSTRIL TWICE DAILY AS NEEDED FOR RHINITIS (Patient taking differently: 1 spray by Each Nostril route 2 (two) times daily as needed for Allergies.) 16 g 2    fluticasone-salmeterol diskus inhaler 250-50 mcg Inhale 1 puff into the lungs 2 (two) times daily. Controller 60 each 11    gabapentin (NEURONTIN) 300 MG capsule Take 1 capsule (300 mg total) by mouth every evening. 30 capsule 1    ibuprofen (ADVIL,MOTRIN) 400 MG tablet Take 1 tablet (400 mg total) by mouth every 6 (six) hours as needed (shoulder pain). 30 tablet 1    ketoconazole (NIZORAL) 2 % cream Apply 1 application  topically 2 (two) times daily.      LIDOcaine (LIDODERM) 5 % Place 1 patch onto the skin once daily. Remove & Discard patch within 12 hours or as directed by MD 30 patch 0    LIDOcaine HCL 1 % Lotn Apply topically.      montelukast (SINGULAIR) 10 mg tablet Take 1 tablet (10 mg total) by mouth once daily. 30 tablet  5    multivitamin with minerals tablet Take 1 tablet by mouth once daily.      spironolactone (ALDACTONE) 25 MG tablet TAKE 1 TABLET BY MOUTH DAILY (Patient taking differently: Take 25 mg by mouth every evening.) 90 tablet 3    azelastine (ASTELIN) 137 mcg (0.1 %) nasal spray 1 spray (137 mcg total) by Nasal route 2 (two) times daily. 30 mL 3    EPIPEN 2-LUCIANO 0.3 mg/0.3 mL AtIn Inject 0.3 mLs (0.3 mg total) into the muscle once. for 1 dose 0.3 mL 11     No current facility-administered medications on file prior to visit.       ROS:    Constitution: Denies chills, fever, and sweats.  HENT: Denies headaches or blurry vision.  Cardiovascular: Denies chest pain or irregular heart beat.  Respiratory: Denies cough or shortness of breath.  Gastrointestinal: Denies abdominal pain, nausea, or vomiting.  Genitourinary:  Denies urinary incontinence, bladder and kidney issues  Musculoskeletal:  Denies muscle cramps.  Neurological: Denies dizziness or focal weakness.  Psychiatric/Behavioral: Normal mental status.  Hematologic/Lymphatic: Denies bleeding problem or easy bruising/bleeding.  Skin: Denies rash or suspicious lesions.    Physical examination     Gen - No acute distress, well nourished, well groomed   Eyes - Extraoccular motions intact, pupils equally round and reactive to light and accommodation   ENT - normocephalic, atruamtic, oropharynx clear   Neck - Supple, no abnormal masses   Cardiovascular - regular rate and rhythm   Pulmonary - clear to auscultation bilaterally, no wheezes, ronchi, or rales   Abdomen - soft, non-tender, non-distended, positive bowel sounds   Psych - The patient is alert and oriented x3 with normal mood and affect    LUE:  The patient is in a sling  She has a flicker of movement in her long finger but otherwise no active movement in any of the other fingers at the wrist, MCP, PIP or D IP joints.  Sensation light touch is diffusely diminished in all nerve distributions  Capillary refill less than  2 seconds    X-ray images were examined and personally interpreted by me.  Three views left shoulder dated 02/23/2024 show the humeral head well reduced in the glenoid with no advanced arthritic changes and no acute fractures.  MRI of the cervical spine dated 02/22/2024 shows a mild disc bulge at C6-C7 but nothing to explain that diffuse weakness and paresthesias in the arm    Dx:  Brachial neuritis left arm    Plan:  The patient is on gabapentin but only 300 mg at night which is a very low dose.  We are going to put her on 900 t.i.d. and hopefully that will not begin to give her some relief.  She already has an appointment with Neurology on the 11th.  That is exactly where she needs to be.  This is not a musculoskeletal problem but a nerve problem.  We will order a nerve conduction study so hopefully with the neurologist going to have that available when they see her on the 11th.

## 2024-03-04 NOTE — LETTER
March 4, 2024      [facility]  [address] [place] [address]  Phone: [phone]  Fax: [phone]       Patient: Ijeoma Issa   YOB: 1970  Date of Visit: 03/04/2024    To Whom It May Concern:    Ronan Issa  was at [facility] on 03/04/2024. 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 The patient will be out for at least 2 weeks from today.     If you have any questions or concerns, or if I can be of further assistance, please do not hesitate to contact me.    Sincerely,     Kenzie Edmondson, NP

## 2024-03-04 NOTE — PROGRESS NOTES
Subjective:       Patient ID: Ijeoma Issa is a 53 y.o. female.    Chief Complaint: Hospital Follow Up    Transitional Care Note    Family and/or Caretaker present at visit?  Yes.  Diagnostic tests reviewed/disposition: No diagnosic tests pending after this hospitalization.  Disease/illness education: numbness and pain to left arm  Home health/community services discussion/referrals: Patient has home health established at [facility] .   Establishment or re-establishment of referral orders for community resources: No other necessary community resources.   Discussion with other health care providers: No discussion with other health care providers necessary.        HPI  Discharge note on 2/25/2024  HPI:   Ijeoma Issa is a 54 y/o F with a past medical history significant for DM2, GERD, and PCOS who presented to the ED with c/o LUE weakness and numbness which began around 3 pm today after napping on her couch.  She notes having some mild L shoulder pain over the past few days, but this was exacerbated this afternoon.  Denies trauma, headache, vision changes, LE weakness or any other issues.  CTA head and neck performed in the ED without acute findings.  She was evaluated by telemed vascular neurology with recommendations to obtain MRI brain and MRI C-spine to r/o acute central process.  She is placed in observation under the service of hospital medicine for continued workup and treatment.      * No surgery found *       Hospital Course:   Ijeoma Issa is a 53 year old female with a past medical history of PCOS, obesity and GERD who presented with acute onset left shoulder pain and weakness. MRI of the shoulder is pending and Neurology has been consulted. MRI of the C-spine is largely unremarkable and x-ray of the shoulder shows no fracture. Orthopedic Surgery was consulted and recommended outpatient follow up. PT/OT has been consulted and recommends low intensity therapy.  PT/OT was  ordered on discharge which took place . She will follow up with her PCP, Neurology and Orthopedic Surgery. If the patient's pain and weakness remain in the outpatient setting with no discernable acute pathology on imaging; diagnoses such as a conversion disorder may be considered.  Review of Systems   Constitutional:  Negative for activity change, appetite change, chills, diaphoresis and fever.   HENT:  Negative for congestion, ear pain, postnasal drip, sinus pressure, sneezing and sore throat.    Eyes:  Negative for pain, discharge, redness and itching.   Respiratory:  Negative for apnea, cough, chest tightness, shortness of breath and wheezing.    Cardiovascular:  Negative for chest pain and leg swelling.   Gastrointestinal:  Negative for abdominal distention, abdominal pain, constipation, diarrhea, nausea and vomiting.   Genitourinary:  Negative for difficulty urinating, dysuria, flank pain and frequency.   Musculoskeletal:  Positive for arthralgias and myalgias.   Skin:  Negative for color change, rash and wound.   Neurological:  Positive for numbness. Negative for dizziness.       Patient Active Problem List   Diagnosis    Mammogram abnormal    OA (osteoarthritis)    Acne, adult    Asthma with allergic rhinitis    GERD (gastroesophageal reflux disease)    Family history of early CAD, brother  with MI, age 60    Migraine headache    Bacterial vaginosis    Morbid obesity    Polycystic ovarian disease    Menopausal symptoms    Vaginal dryness, menopausal    Umbilical hernia    Pruritic dermatitis    Left arm weakness    Acute pain of left shoulder       Objective:      Physical Exam  Vitals reviewed.   Constitutional:       General: She is not in acute distress.     Appearance: Normal appearance. She is well-developed.      Comments: Left arm in sling   HENT:      Head: Normocephalic.      Nose: Nose normal.   Eyes:      Conjunctiva/sclera: Conjunctivae normal.      Pupils: Pupils are equal, round, and  "reactive to light.   Cardiovascular:      Rate and Rhythm: Normal rate.   Pulmonary:      Effort: Pulmonary effort is normal. No respiratory distress.   Musculoskeletal:        Arms:       Cervical back: Normal range of motion and neck supple.      Comments: Numbness noted to left arm. Left pointer finger does have small amount of motion with effort.   Skin:     General: Skin is warm and dry.      Findings: No rash.   Neurological:      Mental Status: She is alert and oriented to person, place, and time.   Psychiatric:         Mood and Affect: Mood normal.         Behavior: Behavior normal.         Lab Results   Component Value Date    WBC 5.91 02/23/2024    HGB 12.2 02/23/2024    HCT 39.5 02/23/2024     02/23/2024    CHOL 141 02/23/2024    TRIG 66 02/23/2024    HDL 48 02/23/2024    ALT 19 02/23/2024    AST 21 02/23/2024     02/23/2024    K 3.9 02/23/2024     02/23/2024    CREATININE 0.8 02/23/2024    BUN 14 02/23/2024    CO2 20 (L) 02/23/2024    TSH 1.521 02/22/2024    INR 1.0 02/23/2024    HGBA1C 4.3 02/12/2024    MICROALBUR 0.32 07/20/2012     The 10-year ASCVD risk score (Joseph ALBERTO, et al., 2019) is: 1%    Values used to calculate the score:      Age: 53 years      Sex: Female      Is Non- : No      Diabetic: No      Tobacco smoker: No      Systolic Blood Pressure: 122 mmHg      Is BP treated: No      HDL Cholesterol: 48 mg/dL      Total Cholesterol: 141 mg/dL  Visit Vitals  /74 (BP Location: Right arm, Patient Position: Sitting, BP Method: Large (Manual))   Pulse 73   Resp 18   Ht 5' 4.02" (1.626 m)   Wt 116.1 kg (255 lb 15.3 oz)   SpO2 99%   BMI 43.91 kg/m²      Assessment:       1. Hospital discharge follow-up    2. Left arm weakness    3. Acute pain of left shoulder    4. Obesity, morbid, BMI 40.0-49.9        Plan:       Ijeoma was seen today for hospital follow up.    Diagnoses and all orders for this visit:    Hospital discharge follow-up    Left arm weakness " / Acute pain of left shoulder  -     Ambulatory referral/consult to Orthopedics; Future  -     Ambulatory referral/consult to Neurology; Future  -     LIDOcaine (LIDODERM) 5 %; Place 1 patch onto the skin once daily. Remove & Discard patch within 12 hours or as directed by MD  Discussed follow up with specialists  Continue medications as prescribed.  Follow up with PCP    Obesity, morbid, BMI 40.0-49.9  Body mass index is 43.91 kg/m².  Continue healthy diet and regular exercise as tolerated.  Continue medications as prescribed.  Follow up with PCP        Follow up if symptoms worsen or fail to improve.      Future Appointments       Date Provider Specialty Appt Notes    3/4/2024 Alex Wharton II, MD Orthopedics NOXRAY//Left arm weakness [R29.898]  Acute pain of left shoulder [M25.512]    3/11/2024 Cindy Kline, FNP Neurology numbness    3/28/2024 Allison Adler MD Family Medicine 3 month f/u               All of your core healthy metrics are met.

## 2024-03-04 NOTE — TELEPHONE ENCOUNTER
EMG order placed for pt. Brachial neuritis started 2/22/2024. Pt seeing neurology 3/11/2024. Please contact pt to schedule.

## 2024-03-11 ENCOUNTER — OFFICE VISIT (OUTPATIENT)
Dept: NEUROLOGY | Facility: CLINIC | Age: 54
End: 2024-03-11
Payer: COMMERCIAL

## 2024-03-11 VITALS
WEIGHT: 255.63 LBS | HEIGHT: 64 IN | BODY MASS INDEX: 43.64 KG/M2 | DIASTOLIC BLOOD PRESSURE: 84 MMHG | SYSTOLIC BLOOD PRESSURE: 129 MMHG | HEART RATE: 80 BPM

## 2024-03-11 DIAGNOSIS — M50.20 PROTRUSION OF CERVICAL INTERVERTEBRAL DISC: ICD-10-CM

## 2024-03-11 DIAGNOSIS — G83.24 MONOPLEGIA OF UPPER EXTREMITY DUE TO NONCEREBROVASCULAR ETIOLOGY AFFECTING LEFT NON-DOMINANT SIDE: Primary | ICD-10-CM

## 2024-03-11 DIAGNOSIS — M79.602 LEFT ARM PAIN: ICD-10-CM

## 2024-03-11 DIAGNOSIS — R29.898 LEFT ARM WEAKNESS: ICD-10-CM

## 2024-03-11 PROCEDURE — 3044F HG A1C LEVEL LT 7.0%: CPT | Mod: CPTII,S$GLB,, | Performed by: NURSE PRACTITIONER

## 2024-03-11 PROCEDURE — 3079F DIAST BP 80-89 MM HG: CPT | Mod: CPTII,S$GLB,, | Performed by: NURSE PRACTITIONER

## 2024-03-11 PROCEDURE — 99999 PR PBB SHADOW E&M-EST. PATIENT-LVL V: CPT | Mod: PBBFAC,,, | Performed by: NURSE PRACTITIONER

## 2024-03-11 PROCEDURE — 3008F BODY MASS INDEX DOCD: CPT | Mod: CPTII,S$GLB,, | Performed by: NURSE PRACTITIONER

## 2024-03-11 PROCEDURE — 99215 OFFICE O/P EST HI 40 MIN: CPT | Mod: S$GLB,,, | Performed by: NURSE PRACTITIONER

## 2024-03-11 PROCEDURE — 1160F RVW MEDS BY RX/DR IN RCRD: CPT | Mod: CPTII,S$GLB,, | Performed by: NURSE PRACTITIONER

## 2024-03-11 PROCEDURE — 3074F SYST BP LT 130 MM HG: CPT | Mod: CPTII,S$GLB,, | Performed by: NURSE PRACTITIONER

## 2024-03-11 PROCEDURE — 1159F MED LIST DOCD IN RCRD: CPT | Mod: CPTII,S$GLB,, | Performed by: NURSE PRACTITIONER

## 2024-03-11 NOTE — LETTER
March 11, 2024      [facility]  [address] [place] [address]  Phone: [phone]  Fax: [phone]       Patient: Ijeoma Issa   YOB: 1970  Date of Visit: 03/11/2024    To Whom It May Concern:    Ronna Issa  was at [facility] on 03/11/2024. The patient should continue to hold from working until testing is completed and she has seen a neurosurgeon and pain medicine MD.  If you have any questions or concerns, or if I can be of further assistance, please do not hesitate to contact me.    Sincerely,        RJ Martinez  [facility]

## 2024-03-11 NOTE — PROGRESS NOTES
"NEUROLOGY  Outpatient Consultation Visit     [facility]  [address] [facility], [place] [address]  [phone] (office) / [phone] (fax)    Patient Name:  Ijeoma Issa  :  1970  MR #:  [id number]  Acct #:  [id number]    Date of Neurology Consult: 2024  Name of Provider: RJ Martinez    Other Physicians:  Allison Adler MD (Primary Care Physician); Kenzie Edmondson NP (Referring)      Chief Complaint: Numbness      History of Present Illness (HPI):  Ijeoma Issa is a right handed  53 y.o. female with a PMHX of DM2, GERD, and PCOS     Patient presents today for numbness. She states on  she fell asleep on the couch with her grandchild in her right arm. She woke up noticing that she had intense tingling to her left arm along with weakness. She went to the ED and stroke workup was unrevealing. Her cervical MRI did report disc protrusion to left C6-7.  She denies lying in any abnormal position with her arm extended, denies any fall or trauma. 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   She is currently participating in home health 2-3 times/week.   She reports pain from the posterior neck to shoulder that radiates down her arm to elbow. She previously did not have sensation or pain to lower arm/hand but is now starting to have mild tingling to her left thumb and index finger.     Prior to this event she had been experiencing several nodules to various places on her body including left arm pit, outer left arm and right knee. She does suffer psoriasis and follows dermatology.          D/C summary 2024  "Ijeoma Issa is a 54 y/o F with a past medical history significant for DM2, GERD, and PCOS who presented to the ED with c/o LUE weakness and numbness which began around 3 pm today after napping on her couch.  She notes having some mild L shoulder pain over the past few days, but this was exacerbated " "this afternoon.  Denies trauma, headache, vision changes, LE weakness or any other issues.  CTA head and neck performed in the ED without acute findings.  She was evaluated by telemed vascular neurology with recommendations to obtain MRI brain and MRI C-spine to r/o acute central process.  She is placed in observation under the service of hospital medicine for continued workup and treatment.     Hospital Course:   Ijeoma Issa is a 53 year old female with a past medical history of PCOS, obesity and GERD who presented with acute onset left shoulder pain and weakness. MRI of the shoulder is pending and Neurology has been consulted. MRI of the C-spine is largely unremarkable and x-ray of the shoulder shows no fracture. Orthopedic Surgery was consulted and recommended outpatient follow up. PT/OT has been consulted and recommends low intensity therapy.  PT/OT was ordered on discharge which took place . She will follow up with her PCP, Neurology and Orthopedic Surgery. If the patient's pain and weakness remain in the outpatient setting with no discernable acute pathology on imaging; diagnoses such as a conversion disorder may be considered."         Past Medical, Surgical, Family & Social History:   Past Medical History:   Diagnosis Date    Abnormal mammogram     neg biospy    Allergic asthma     Anxiety     Arthritis     Dermatitis     Dr. Weil, Extremities    Diabetes     Diverticulosis of colon     GERD (gastroesophageal reflux disease)     Mass of right breast     Postmenopausal hormone replacement therapy     Tubal pregnancy     Uterine fibroid      Past Surgical History:   Procedure Laterality Date    BREAST BIOPSY Right 2012    benign     SECTION, LOW TRANSVERSE      CHOLECYSTECTOMY      HYSTERECTOMY      re: benign tumors per the patient    right ankle  10/10/2014    TOTAL ABDOMINAL HYSTERECTOMY W/ BILATERAL SALPINGOOPHORECTOMY      c appendectomy    TUBAL LIGATION       Family " History   Problem Relation Age of Onset    Cancer Father         colon    Colon cancer Father     Diabetes Sister     Breast cancer Sister 28    Cancer Brother         stomach    Heart disease Brother     Heart disease Mother     Ovarian cancer Neg Hx      Alcohol use:  reports current alcohol use.   (Of note, 0.6 oz = 1 beer or 6 oz = 10 beers).  Tobacco use:  reports that she has never smoked. She has never been exposed to tobacco smoke. She has never used smokeless tobacco.  Street drug use:  reports no history of drug use.  Allergies: Cashew nut, Nut flavor, Latex, Varicella vaccines, Adhesive tape-silicones, and Tapentadol.    Home Medications:     Current Outpatient Medications:     acitretin (SORIATANE) 25 MG capsule, Take 25 mg by mouth once daily., Disp: , Rfl:     albuterol (PROVENTIL) 2.5 mg /3 mL (0.083 %) nebulizer solution, Inhale 2.5 mg into the lungs every 4 (four) hours as needed for Shortness of Breath., Disp: , Rfl:     albuterol (PROVENTIL/VENTOLIN HFA) 90 mcg/actuation inhaler, Inhale 2 puffs into the lungs every 6 (six) hours as needed for Shortness of Breath., Disp: , Rfl:     chlorhexidine (HIBICLENS) 4 % external liquid, Apply topically daily as needed (dermatitis)., Disp: 236 mL, Rfl: 2    ciclopirox 1 % shampoo, Apply 5 mLs topically Daily., Disp: , Rfl:     clindamycin (CLEOCIN T) 1 % lotion, Apply 1 g topically 2 (two) times daily., Disp: , Rfl:     clobetasoL (TEMOVATE) 0.05 % cream, Apply 1 g topically 2 (two) times daily., Disp: , Rfl:     DOC-Q-LACE 100 mg capsule, TAKE ONE CAPSULE BY MOUTH TWICE DAILY (Patient taking differently: Take 100 mg by mouth 2 (two) times daily as needed for Constipation.), Disp: 30 capsule, Rfl: 0    doxepin (SINEQUAN) 25 MG capsule, TAKE 1 CAPSULE(25 MG) BY MOUTH EVERY EVENING, Disp: 90 capsule, Rfl: 3    estradioL (ESTRACE) 0.5 MG tablet, Take 1 tablet (0.5 mg total) by mouth 3 (three) times a week. (Patient taking differently: Take 0.5 mg by mouth every  Mon, Wed, Fri.), Disp: 45 tablet, Rfl: 2    ferrous sulfate (IRON) 325 mg (65 mg iron) Tab tablet, Take 1 tablet (325 mg total) by mouth 3 (three) times a week. (Patient taking differently: Take 325 mg by mouth every Mon, Wed, Fri.), Disp: , Rfl:     fexofenadine (ALLEGRA) 180 MG tablet, Take 180 mg by mouth once daily., Disp: , Rfl:     fluticasone propionate (FLONASE) 50 mcg/actuation nasal spray, SHAKE LIQUID AND USE 1 SPRAY(50 MCG) IN EACH NOSTRIL TWICE DAILY AS NEEDED FOR RHINITIS (Patient taking differently: 1 spray by Each Nostril route 2 (two) times daily as needed for Allergies.), Disp: 16 g, Rfl: 2    fluticasone-salmeterol diskus inhaler 250-50 mcg, Inhale 1 puff into the lungs 2 (two) times daily. Controller, Disp: 60 each, Rfl: 11    gabapentin (NEURONTIN) 300 MG capsule, Take 1 capsule (300 mg total) by mouth every evening., Disp: 30 capsule, Rfl: 1    gabapentin (NEURONTIN) 300 MG capsule, Take 1 capsule (300 mg total) by mouth 3 (three) times daily., Disp: 90 capsule, Rfl: 11    ibuprofen (ADVIL,MOTRIN) 400 MG tablet, Take 1 tablet (400 mg total) by mouth every 6 (six) hours as needed (shoulder pain)., Disp: 30 tablet, Rfl: 1    ketoconazole (NIZORAL) 2 % cream, Apply 1 application  topically 2 (two) times daily., Disp: , Rfl:     LIDOcaine (LIDODERM) 5 %, Place 1 patch onto the skin once daily. Remove & Discard patch within 12 hours or as directed by MD, Disp: 30 patch, Rfl: 0    LIDOcaine HCL 1 % Lotn, Apply topically., Disp: , Rfl:     montelukast (SINGULAIR) 10 mg tablet, Take 1 tablet (10 mg total) by mouth once daily., Disp: 30 tablet, Rfl: 5    multivitamin with minerals tablet, Take 1 tablet by mouth once daily., Disp: , Rfl:     spironolactone (ALDACTONE) 25 MG tablet, TAKE 1 TABLET BY MOUTH DAILY (Patient taking differently: Take 25 mg by mouth every evening.), Disp: 90 tablet, Rfl: 3    azelastine (ASTELIN) 137 mcg (0.1 %) nasal spray, 1 spray (137 mcg total) by Nasal route 2 (two) times  "daily., Disp: 30 mL, Rfl: 3    EPIPEN 2-LUICANO 0.3 mg/0.3 mL AtIn, Inject 0.3 mLs (0.3 mg total) into the muscle once. for 1 dose, Disp: 0.3 mL, Rfl: 11    Physical Examination:  /84 (BP Location: Right arm, Patient Position: Sitting, BP Method: Large (Automatic))   Pulse 80   Ht 5' 4" (1.626 m)   Wt 115.9 kg (255 lb 10 oz)   BMI 43.88 kg/m²     GENERAL:  General appearance: Well, non-toxic appearing.  No apparent distress.  Neck: supple.  .    MENTAL STATUS:  Alertness, attention span & concentration: normal.  Language: normal.  Orientation to self, place & time:  normal.  Memory, recent & remote: normal.  Fund of knowledge: normal.    SPEECH:  Clear and fluent.  Follows complex commands.      CRANIAL NERVES:  Cranial Nerves II-XII were examined.  II - Visual fields: normal.  III, IV, VI: PERRL, EOMI, No ptosis, No nystagmus.  V - Facial sensation: normal.  VII - Face symmetry & mobility: normal.  VIII - Hearing: normal  IX, X - Palate: mobile & midline.  XI - Shoulder shrug: normal.  XII - Tongue protrusion: normal.        GROSS MOTOR:  Gait & station: non focal  Tone: normal.  Abnormal movements: none.  Finger-nose: normal.  Rapid alternating movements: normal.  Pronator drift: normal      MUSCLE STRENGTH:   Hand grasp:   - right:5/5   - left:0/5    RIGHT    LEFT   5 Neck Ext. 5   5 Neck Flex 5   5 Deltoids 0   5 Sh.Ext.Rot. 0   5 Sh.Int.Rot. 0   5 Biceps 0   5 Triceps 0   5 Forearm.Pr. 0        5 Iliopsoas flex    5   5 Hip Abduct 5   5 Hip Adduct 5   5 Quads 5   5 Hams 5   5 Dorsiflex 5   5 Plantar Flex 5   5 Ankle Francis 5   5 Ankle Invert 5     REFLEXES:    RIGHT Reflex   LEFT   2+ Biceps 2+   2+ Brachiorad. 2+        2+ Patellar 2+     SENSORY:  Light touch: Normal throughout.   Sharp touch: hyperintense to left shoulder and proximal left arm; no sensation to left arm below elbow into fingers  Joint Position: Normal throughout.  Proprioception: Intact      Diagnostic Data Reviewed:   I have personally " reviewed provider notes, labs and imaging made available to me today.     Imaging:    MRI C-spine 2/2024:  FINDINGS:  Motion limited exam     Morphology: Cervical vertebral body heights are preserved marrow signal is within normal limits.  Incidentally noted chronic appearing mild superior endplate compression deformity of T3 and T3 and T4 vertebral body hemangiomas.     Alignment: Cervical spinal alignment is normal.     Cord: The cervical cord shows normal contour and signal content throughout its length.     Craniocervical Junction: Cerebellar tonsils are normally positioned. The visualized portions of the posterior fossa are unremarkable.  Incidentally noted partially empty sella configuration, nonspecific in isolation as described on the concurrently performed brain MRI 02/22/2024.  The regional osseous anatomy is normal.        Disc levels:        C2-C3: Mild right-sided facet arthrosis producing no more than mild right foraminal narrowing.  The spinal canal and left foramen are patent..     C3-C4: Mild bilateral facet arthrosis producing no more than mild bilateral foraminal narrowing.  The spinal canal is patent.     C4-C5: Mild bilateral facet arthrosis and uncovertebral spurring producing no more than mild bilateral foraminal narrowing.  The spinal canal is patent..     C5-C6: Mild right-sided facet arthrosis.  The spinal canal and foramina remain patent..     C6-C7: Broad-based disc protrusion centrally and lateralizing to the left producing mild spinal canal narrowing and suspected mild-to-moderate left foraminal narrowing.  The right foramen is patent..     C7-T1: Within normal limits.     Soft tissues: The visualized paraspinal soft tissues are within normal limits.        Impression:     1. Motion limited exam without evidence of an acute process/cord impingement.  2. Overall mild degenerative changes most pronounced at C6-C7 where there is a shallow left lateralizing disc protrusion contributing to  mild spinal canal narrowing and mild-to-moderate left foraminal narrowing.  The preliminary and final reports are concordant.    MRI brachial plexus 2/2024:  Impression:     No evidence of a significant brachial plexus abnormality or acute process.  MRI Brain Without Contrast 2/2024    Narrative  EXAMINATION:  MRI BRAIN WITHOUT CONTRAST    CLINICAL HISTORY:  Stroke, follow up;.    TECHNIQUE:  Multiplanar multisequence MR imaging of the brain was performed without contrast    COMPARISON:  CTA 02/22/2024.    FINDINGS:  Motion degraded exam.    Brain: There are no concerning focal areas of abnormal or restricted diffusion within the brain.  No mass, hemorrhage or acute infarct is present.  Midline Structures: Partially empty sella configuration, nonspecific in isolation.  The midline structures of the brain are normal.  Ventricles: The ventricles, sulci and cisterns are within normal limits.  Vasculature: The vascular flow voids at the base of the brain are within normal limits.  Calvarium: The visualized osseous structures are unremarkable.  Sinuses: Minimal mucosal thickening within the dependent maxillary sinuses.  No obstructive changes or dependent fluid levels.  Minimal ethmoid sinus opacification.  Orbits: The orbits and globes are unremarkable.  Mastoids: The mastoid air cells are adequately aerated.  Extracranial soft tissues: The visualized extracranial soft tissues are unremarkable.    Impression  1. No acute process, specifically no recent infarct on this motion limited noncontrast brain MRI.  2. Variant partially empty sella configuration, nonspecific in isolation.  The preliminary and final reports are concordant.        CTA Head and Neck (xpd) 2/2024    Narrative  EXAMINATION:  CTA HEAD AND NECK (XPD)    CLINICAL HISTORY:  Neuro deficit, acute, stroke suspected;    TECHNIQUE:  Non contrast low dose axial images were obtained through the head. CT angiogram was performed from the level of the merritt to the  top of the head following the IV administration of 100mL of Omnipaque 350.   Sagittal and coronal reconstructions and maximum intensity projection reconstructions were performed. Arterial stenosis percentages are based on NASCET measurement criteria.    COMPARISON:  None    FINDINGS:  CTA HEAD:    Vasculature: Mild atherosclerotic changes of the cavernous carotid arteries without significant stenosis.  The intracranial arteries show normal anatomy without evidence of major branch occlusion or focal luminal narrowing.There is no suspicion of vascular malformation or saccular aneurysm.    Variant anatomy: Presumed small right anterior orbital/supra orbital venous varix or potential small AV fistula involving a branch of the ophthalmic artery and facial vein.    Brain: There is no evidence of mass, mass effect, edema, midline shift, intracranial hemorrhage, or space-occupying extra-axial fluid collection. After the administration of contrast there is no abnormal enhancement.    Ventricles/Sulci: The ventricles and sulci are appropriate in size for age.    Osseous Structures: The osseous structures are unremarkable in appearance.    Sinuses and orbits: Mild scattered mucosal thickening.  Visualized orbits are within normal limits.    Other: Visualized portions of the mastoids are unremarkable.    CTA NECK:    Aorta: Conventional branching pattern. The great vessel origins are patent without flow limiting stenosis.    Vertebral Arteries: The origins of the vertebral arteries are patent.  No flow limiting stenosis, occlusion, or dissection.    Right Carotid: No flow limiting stenosis, occlusion, or dissection of the common carotid or internal carotid arteries.  No significant plaque of the proximal ICA.  Right internal carotid artery: 0 % stenosis by and NASCET.    Left Carotid: No flow limiting stenosis, occlusion, or dissection of the common carotid or internal carotid arteries. No significant plaque of the proximal  "ICA. Right internal carotid artery: 0 % stenosis by and NASCET.    Extravascular Anatomy: No definite adjacent soft tissue abnormality seen accounting for technique used.  Mild superior endplate compression deformity of T3 which appears chronic.  No significant bony process.    Impression  1. No acute intracranial CT findings.  2. No intracranial proximal large vessel occlusion or significant stenosis.  3. No significant atherosclerotic changes or arterial stenosis within the neck.  4. 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  The major findings between the preliminary and final reports are concordant.      Cardiac:    Labs:  Lab Results   Component Value Date    WBC 5.91 02/23/2024    HGB 12.2 02/23/2024    HCT 39.5 02/23/2024     02/23/2024    MCV 96 02/23/2024    RDW 13.4 02/23/2024     Lab Results   Component Value Date     02/23/2024    K 3.9 02/23/2024     02/23/2024    CO2 20 (L) 02/23/2024    BUN 14 02/23/2024    CREATININE 0.8 02/23/2024    GLU 97 02/23/2024    CALCIUM 8.6 (L) 02/23/2024    MG 1.7 02/23/2024    PHOS 3.2 02/23/2024     Lab Results   Component Value Date    PROT 6.7 02/23/2024    ALBUMIN 3.5 02/23/2024    BILITOT 1.1 (H) 02/23/2024    AST 21 02/23/2024    ALKPHOS 81 02/23/2024    ALT 19 02/23/2024     Lab Results   Component Value Date    INR 1.0 02/23/2024     Lab Results   Component Value Date    CHOL 141 02/23/2024    HDL 48 02/23/2024    LDLCALC 79.8 02/23/2024    TRIG 66 02/23/2024    CHOLHDL 34.0 02/23/2024     Lab Results   Component Value Date    HGBA1C 4.3 02/12/2024      No results found for: "KAHEDLGO93"  Lab Results   Component Value Date    FOLATE 13.9 10/10/2013     Lab Results   Component Value Date    TSH 1.521 02/22/2024     No results found for: "VITAMINB1"  Lab Results   Component Value Date    RPR Non-reactive 02/12/2024           Assessment and Plan:  Ijeoma Issa is a 53 y.o. female.    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   - no reported trauma, awkward sleeping position, falls. Pt does endorse a history of repeated overuse of her arms (lifting heavy items etc).   Work up thus far unrevealing   - MRI brain neg acute   - CTA without LVO or stroke   - MRI brachial plexus normal  MRI C-spine did report left lateral disc protrusion at C6-7 that contributes to mild foraminal narrowing and spinal canal narrowing  Neuro exam noted with left arm weakness. She can shrug left shoulder only. No pin prick from left elbow down to fingers; hyperintense to left upper arm/neck  Possible nerve compression?  Pt scheduled for EMG/NCS on 3/12/2024  Continue therapies  Referral placed to Neuro[facility] and PM for input         Protrusion of cervical intervertebral disc       Orthopedic    Left arm pain                       Important to note, also  has a past medical history of Abnormal mammogram, Allergic asthma, Anxiety, Arthritis, Dermatitis, Diabetes, Diverticulosis of colon, GERD (gastroesophageal reflux disease), Mass of right breast, Postmenopausal hormone replacement therapy, Tubal pregnancy, and Uterine fibroid.            The patient will return to clinic in 1-2 months.        All questions were answered and patient is comfortable with the plan.       Thank you very much for the opportunity to assist in this patient's care.    If you have any questions or concerns, please do not hesitate to contact me at any time.    Sincerely,     RJ Martinez  [facility] [place]         CELIA spent a total of 54 minutes on the day of the visit.This includes face to face time and non-face to face time preparing to see the patient (eg,  review of tests), Obtaining and/or reviewing separately obtained history, Documenting clinical information in the electronic or other health record, Independently interpreting resultsand communicating results to the patient/family/caregiver, or Care coordination.

## 2024-03-12 ENCOUNTER — TELEPHONE (OUTPATIENT)
Dept: PHYSICAL MEDICINE AND REHAB | Facility: CLINIC | Age: 54
End: 2024-03-12
Payer: COMMERCIAL

## 2024-03-12 ENCOUNTER — TELEPHONE (OUTPATIENT)
Dept: NEUROSURGERY | Facility: CLINIC | Age: 54
End: 2024-03-12
Payer: COMMERCIAL

## 2024-03-12 PROBLEM — M79.602 LEFT ARM PAIN: Status: ACTIVE | Noted: 2024-03-12

## 2024-03-12 PROBLEM — M25.512 ACUTE PAIN OF LEFT SHOULDER: Status: RESOLVED | Noted: 2024-02-23 | Resolved: 2024-03-12

## 2024-03-12 PROBLEM — M50.20 PROTRUSION OF CERVICAL INTERVERTEBRAL DISC: Status: ACTIVE | Noted: 2024-03-12

## 2024-03-12 PROBLEM — G83.24: Status: ACTIVE | Noted: 2024-02-22

## 2024-03-12 NOTE — TELEPHONE ENCOUNTER
Spoke to Mr. And Gus Jacobsonstevegabesarah regarding the EMG study and for the patient to come in 3/15 at 215pm.  She has an appt today with [facility] and Dr. Onofre for the same test.  Both will be canceled and placed with Dr. Haq.

## 2024-03-12 NOTE — ASSESSMENT & PLAN NOTE
Reports of sudden onset of left arm weakness with associated pain   - woke up with symptoms and paresthesia to only left arm.   - no reported trauma, awkward sleeping position, falls. Pt does endorse a history of repeated overuse of her arms (lifting heavy items etc).   Work up thus far unrevealing   - MRI brain neg acute   - CTA without LVO or stroke   - MRI brachial plexus normal  MRI C-spine did report left lateral disc protrusion at C6-7 that contributes to mild foraminal narrowing and spinal canal narrowing  Neuro exam noted with left arm weakness. She can shrug left shoulder only. No pin prick from left elbow down to fingers; hyperintense to left upper arm/neck  Possible nerve compression?  Pt scheduled for EMG/NCS on 4/1/2024  Continue therapies  Referral placed to [facility] and PM for input

## 2024-03-13 ENCOUNTER — HOSPITAL ENCOUNTER (EMERGENCY)
Facility: HOSPITAL | Age: 54
Discharge: HOME | End: 2024-03-13
Attending: EMERGENCY MEDICINE
Payer: COMMERCIAL

## 2024-03-13 ENCOUNTER — TELEPHONE (OUTPATIENT)
Dept: FAMILY MEDICINE | Facility: CLINIC | Age: 54
End: 2024-03-13
Payer: COMMERCIAL

## 2024-03-13 VITALS
SYSTOLIC BLOOD PRESSURE: 114 MMHG | DIASTOLIC BLOOD PRESSURE: 56 MMHG | TEMPERATURE: 98 F | RESPIRATION RATE: 16 BRPM | WEIGHT: 255 LBS | BODY MASS INDEX: 43.54 KG/M2 | OXYGEN SATURATION: 95 % | HEIGHT: 64 IN | HEART RATE: 72 BPM

## 2024-03-13 DIAGNOSIS — R55 SYNCOPE: Primary | ICD-10-CM

## 2024-03-13 DIAGNOSIS — I95.9 HYPOTENSION, UNSPECIFIED HYPOTENSION TYPE: ICD-10-CM

## 2024-03-13 LAB
ALBUMIN SERPL BCP-MCNC: 3.4 G/DL (ref 3.5–5.2)
ALP SERPL-CCNC: 73 U/L (ref 55–135)
ALT SERPL W/O P-5'-P-CCNC: 20 U/L (ref 10–44)
ANION GAP SERPL CALC-SCNC: 10 MMOL/L (ref 8–16)
AST SERPL-CCNC: 29 U/L (ref 10–40)
BASOPHILS # BLD AUTO: 0.02 K/UL (ref 0–0.2)
BASOPHILS NFR BLD: 0.3 % (ref 0–1.9)
BILIRUB SERPL-MCNC: 0.3 MG/DL (ref 0.1–1)
BILIRUB UR QL STRIP: NEGATIVE
BNP SERPL-MCNC: <10 PG/ML (ref 0–99)
BUN SERPL-MCNC: 15 MG/DL (ref 6–20)
CALCIUM SERPL-MCNC: 8.6 MG/DL (ref 8.7–10.5)
CHLORIDE SERPL-SCNC: 105 MMOL/L (ref 95–110)
CLARITY UR: CLEAR
CO2 SERPL-SCNC: 23 MMOL/L (ref 23–29)
COLOR UR: YELLOW
CREAT SERPL-MCNC: 0.9 MG/DL (ref 0.5–1.4)
DIFFERENTIAL METHOD BLD: ABNORMAL
EOSINOPHIL # BLD AUTO: 0 K/UL (ref 0–0.5)
EOSINOPHIL NFR BLD: 0 % (ref 0–8)
ERYTHROCYTE [DISTWIDTH] IN BLOOD BY AUTOMATED COUNT: 12.7 % (ref 11.5–14.5)
EST. GFR  (NO RACE VARIABLE): >60 ML/MIN/1.73 M^2
GLUCOSE SERPL-MCNC: 115 MG/DL (ref 70–110)
GLUCOSE UR QL STRIP: NEGATIVE
HCT VFR BLD AUTO: 35.9 % (ref 37–48.5)
HGB BLD-MCNC: 11.7 G/DL (ref 12–16)
HGB UR QL STRIP: NEGATIVE
IMM GRANULOCYTES # BLD AUTO: 0.02 K/UL (ref 0–0.04)
IMM GRANULOCYTES NFR BLD AUTO: 0.3 % (ref 0–0.5)
KETONES UR QL STRIP: NEGATIVE
LEUKOCYTE ESTERASE UR QL STRIP: NEGATIVE
LIPASE SERPL-CCNC: 29 U/L (ref 4–60)
LYMPHOCYTES # BLD AUTO: 1.5 K/UL (ref 1–4.8)
LYMPHOCYTES NFR BLD: 21.5 % (ref 18–48)
MAGNESIUM SERPL-MCNC: 1.9 MG/DL (ref 1.6–2.6)
MCH RBC QN AUTO: 29.7 PG (ref 27–31)
MCHC RBC AUTO-ENTMCNC: 32.6 G/DL (ref 32–36)
MCV RBC AUTO: 91 FL (ref 82–98)
MONOCYTES # BLD AUTO: 0.9 K/UL (ref 0.3–1)
MONOCYTES NFR BLD: 13.2 % (ref 4–15)
NEUTROPHILS # BLD AUTO: 4.5 K/UL (ref 1.8–7.7)
NEUTROPHILS NFR BLD: 64.7 % (ref 38–73)
NITRITE UR QL STRIP: NEGATIVE
NRBC BLD-RTO: 0 /100 WBC
PH UR STRIP: 7 [PH] (ref 5–8)
PLATELET # BLD AUTO: 208 K/UL (ref 150–450)
PMV BLD AUTO: 11.3 FL (ref 9.2–12.9)
POTASSIUM SERPL-SCNC: 4 MMOL/L (ref 3.5–5.1)
PROT SERPL-MCNC: 7.1 G/DL (ref 6–8.4)
PROT UR QL STRIP: NEGATIVE
RBC # BLD AUTO: 3.94 M/UL (ref 4–5.4)
SODIUM SERPL-SCNC: 138 MMOL/L (ref 136–145)
SP GR UR STRIP: 1.01 (ref 1–1.03)
TROPONIN I SERPL DL<=0.01 NG/ML-MCNC: <0.006 NG/ML (ref 0–0.03)
TROPONIN I SERPL DL<=0.01 NG/ML-MCNC: <0.006 NG/ML (ref 0–0.03)
URN SPEC COLLECT METH UR: NORMAL
UROBILINOGEN UR STRIP-ACNC: NEGATIVE EU/DL
WBC # BLD AUTO: 6.98 K/UL (ref 3.9–12.7)

## 2024-03-13 PROCEDURE — 83880 ASSAY OF NATRIURETIC PEPTIDE: CPT | Performed by: EMERGENCY MEDICINE

## 2024-03-13 PROCEDURE — 63600175 PHARM REV CODE 636 W HCPCS: Performed by: EMERGENCY MEDICINE

## 2024-03-13 PROCEDURE — 94761 N-INVAS EAR/PLS OXIMETRY MLT: CPT

## 2024-03-13 PROCEDURE — 94760 N-INVAS EAR/PLS OXIMETRY 1: CPT

## 2024-03-13 PROCEDURE — 96374 THER/PROPH/DIAG INJ IV PUSH: CPT

## 2024-03-13 PROCEDURE — 84484 ASSAY OF TROPONIN QUANT: CPT | Mod: 91 | Performed by: EMERGENCY MEDICINE

## 2024-03-13 PROCEDURE — 83735 ASSAY OF MAGNESIUM: CPT | Performed by: EMERGENCY MEDICINE

## 2024-03-13 PROCEDURE — 93005 ELECTROCARDIOGRAM TRACING: CPT

## 2024-03-13 PROCEDURE — 96361 HYDRATE IV INFUSION ADD-ON: CPT

## 2024-03-13 PROCEDURE — 93010 ELECTROCARDIOGRAM REPORT: CPT | Mod: ,,, | Performed by: INTERNAL MEDICINE

## 2024-03-13 PROCEDURE — 85025 COMPLETE CBC W/AUTO DIFF WBC: CPT | Performed by: EMERGENCY MEDICINE

## 2024-03-13 PROCEDURE — 36415 COLL VENOUS BLD VENIPUNCTURE: CPT | Performed by: EMERGENCY MEDICINE

## 2024-03-13 PROCEDURE — 83690 ASSAY OF LIPASE: CPT | Performed by: EMERGENCY MEDICINE

## 2024-03-13 PROCEDURE — 99284 EMERGENCY DEPT VISIT MOD MDM: CPT | Mod: 25

## 2024-03-13 PROCEDURE — 80053 COMPREHEN METABOLIC PANEL: CPT | Performed by: EMERGENCY MEDICINE

## 2024-03-13 PROCEDURE — 81003 URINALYSIS AUTO W/O SCOPE: CPT | Performed by: EMERGENCY MEDICINE

## 2024-03-13 RX ORDER — ONDANSETRON HYDROCHLORIDE 2 MG/ML
4 INJECTION, SOLUTION INTRAVENOUS
Status: COMPLETED | OUTPATIENT
Start: 2024-03-13 | End: 2024-03-13

## 2024-03-13 RX ORDER — GUAIFENESIN AND DEXTROMETHORPHAN HYDROBROMIDE 10; 100 MG/5ML; MG/5ML
5 SYRUP ORAL 4 TIMES DAILY PRN
Qty: 120 ML | Refills: 0 | Status: SHIPPED | OUTPATIENT
Start: 2024-03-13 | End: 2024-03-23

## 2024-03-13 RX ORDER — ONDANSETRON 4 MG/1
4 TABLET, ORALLY DISINTEGRATING ORAL EVERY 8 HOURS PRN
Qty: 12 TABLET | Refills: 0 | Status: SHIPPED | OUTPATIENT
Start: 2024-03-13 | End: 2024-03-28

## 2024-03-13 RX ADMIN — ONDANSETRON 4 MG: 2 INJECTION INTRAMUSCULAR; INTRAVENOUS at 03:03

## 2024-03-13 RX ADMIN — SODIUM CHLORIDE, POTASSIUM CHLORIDE, SODIUM LACTATE AND CALCIUM CHLORIDE 1000 ML: 600; 310; 30; 20 INJECTION, SOLUTION INTRAVENOUS at 03:03

## 2024-03-13 NOTE — ED NOTES
Presents to the ED d/t hypotension. Per patient, she was at home doing PT when her BP dropped to the 80's/30's. States she had a syncopal episode last night, passed out and possibly hit her head. Patient states she was diagnosed with severe weakness/loss of movement in left arm.  is at bedside. Patient is not complaining of any pain or discomfort. NAD noted.

## 2024-03-13 NOTE — TELEPHONE ENCOUNTER
Spoke michael Luo at . States pt collapsed yesterday evening.  took BP @ 84/46 with pt lying on the floor. Hypotensive now at 80/50's. Advised  nurse to direct pt to ED. Puja verbalized understanding

## 2024-03-13 NOTE — TELEPHONE ENCOUNTER
----- Message from Haylie Juarez sent at 3/13/2024  1:07 PM CDT -----  Type: Needs Medical Advice  Who Called:  pt      Best Call Back Number: [phone] SANDRA INMAN    Additional Information: calling in regards to pt passing out last night , pt blood pressure was very low . Says since yesterday she has hypotension please advise

## 2024-03-13 NOTE — ED PROVIDER NOTES
Encounter Date: 3/13/2024       History     Chief Complaint   Patient presents with    [facility]     States [facility] sent pt for BP dropping with standing today. Had syncopal episode last night after standing as well     53-year-old female with a past medical history of asthma, diabetes mellitus, and anxiety presents for evaluation after a syncopal episode.  The patient's  report that the patient had a syncopal episode last night while walking.  He reports that he checked her blood pressure during this episode and it was noted to be 82/38.  He reports that he helped her to the couch after this and noted that her blood pressure came up to the 100s over 50s.  She reports that she has had some nausea and vomiting since yesterday.  She denies any associated diarrhea, abdominal pain, hematochezia, melena, hematuria, or dysuria.  She reports some lower midsternal chest pain that started this afternoon as well.      Review of patient's allergies indicates:   Allergen Reactions    Cashew nut Anaphylaxis     Allergic to Cashew nuts    Nut flavor Anaphylaxis     Allergic to Cashew nuts    Latex Rash     Including in injections    Varicella vaccines      Red swelling/ lump develops at injection site    Adhesive tape-silicones Rash    Tapentadol Rash     Past Medical History:   Diagnosis Date    Abnormal mammogram     neg biospy    Allergic asthma     Anxiety     Arthritis     Dermatitis     Dr. Weil, Extremities    Diabetes     Diverticulosis of colon     GERD (gastroesophageal reflux disease)     Mass of right breast     Postmenopausal hormone replacement therapy     Tubal pregnancy     Uterine fibroid      Past Surgical History:   Procedure Laterality Date    BREAST BIOPSY Right 2012    benign     SECTION, LOW TRANSVERSE      CHOLECYSTECTOMY      HYSTERECTOMY      re: benign tumors per the patient    right ankle  10/10/2014    TOTAL ABDOMINAL HYSTERECTOMY W/ BILATERAL SALPINGOOPHORECTOMY       c appendectomy    TUBAL LIGATION       Family History   Problem Relation Age of Onset    Cancer Father         colon    Colon cancer Father     Diabetes Sister     Breast cancer Sister 28    Cancer Brother         stomach    Heart disease Brother     Heart disease Mother     Ovarian cancer Neg Hx      Social History     Tobacco Use    Smoking status: Never     Passive exposure: Never    Smokeless tobacco: Never   Substance Use Topics    Alcohol use: Yes     Alcohol/week: 0.0 standard drinks of alcohol     Comment: very seldom    Drug use: No     Review of Systems   Constitutional:  Negative for chills and fever.   HENT:  Negative for congestion.    Respiratory:  Negative for cough and shortness of breath.    Cardiovascular:  Negative for chest pain.   Gastrointestinal:  Positive for nausea and vomiting. Negative for abdominal pain.   Genitourinary:  Negative for dysuria.   Musculoskeletal:  Negative for gait problem.   Skin:  Negative for color change.   Neurological:  Positive for syncope. Negative for dizziness and numbness.   Psychiatric/Behavioral:  Negative for agitation.        Physical Exam     Initial Vitals [03/13/24 1520]   BP Pulse Resp Temp SpO2   (!) 105/52 82 17 98.4 °F (36.9 °C) (!) 94 %      MAP       --         Physical Exam    Nursing note and vitals reviewed.  Constitutional: She appears well-developed and well-nourished.   HENT:   Head: Normocephalic and atraumatic.   Eyes: EOM are normal. Pupils are equal, round, and reactive to light.   Neck:   Normal range of motion.  Cardiovascular:  Normal rate and regular rhythm.           Pulmonary/Chest: Breath sounds normal.   Abdominal: Abdomen is soft. Bowel sounds are normal. She exhibits no distension. There is no abdominal tenderness. There is no rebound and no guarding.   Musculoskeletal:         General: Normal range of motion.      Right shoulder: Normal.      Left shoulder: Normal.      Cervical back: Normal range of motion.     Neurological:  She is alert and oriented to person, place, and time.   Unable to move left arm.  Can wiggle left index finger.  (this is baseline for the patient for the last several weeks)   Skin: Skin is warm and dry.   Psychiatric: She has a normal mood and affect.         ED Course   Procedures  Labs Reviewed   COMPREHENSIVE METABOLIC PANEL - Abnormal; Notable for the following components:       Result Value    Glucose 115 (*)     Calcium 8.6 (*)     Albumin 3.4 (*)     All other components within normal limits   CBC W/ AUTO DIFFERENTIAL - Abnormal; Notable for the following components:    RBC 3.94 (*)     Hemoglobin 11.7 (*)     Hematocrit 35.9 (*)     All other components within normal limits   MAGNESIUM   LIPASE   URINALYSIS, REFLEX TO URINE CULTURE    Narrative:     Specimen Source->Urine   TROPONIN I   B-TYPE NATRIURETIC PEPTIDE   TROPONIN I     EKG Readings: (Independently Interpreted)   Initial Reading: No STEMI. Rhythm: Normal Sinus Rhythm. Heart Rate: 76. Ectopy: No Ectopy. Conduction: Normal. ST Segments: Normal ST Segments. T Waves: Normal. Clinical Impression: Normal Sinus Rhythm       Imaging Results    None          Medications   lactated ringers bolus 1,000 mL (0 mLs Intravenous Stopped 3/13/24 0195)   ondansetron injection 4 mg (4 mg Intravenous Given 3/13/24 8604)     Medical Decision Making  53-year-old female presented after a syncopal episode.    Initial differential diagnosis included but not limited to cardiac arrhythmia, dehydration, and hypotension.    Amount and/or Complexity of Data Reviewed  Labs: ordered.    Risk  OTC drugs.  Prescription drug management.  Risk Details: The patient was emergently evaluated in the emergency department, her evaluation was significant for a middle-age female with her normal neurologic exam noted.  The patient's EKG showed no acute abnormalities per my independent interpretation.  The patient was treated here with IV fluids and IV Zofran, with significant improvement in  her symptoms.  The patient's labs showed no acute abnormalities, including multiple serial negative troponins.  The patient's orthostatic vital signs were repeated here and they were noted to be normal.  The etiology of the patient's syncopal episode was likely hypotension from dehydration from her vomiting.  She is stable for discharge to home and does not require further care or workup at this time.  She will be discharged home with ODT Zofran.   She is referred to primary care for follow-up.                                      Clinical Impression:  Final diagnoses:  [R55] Syncope (Primary)  [I95.9] Hypotension, unspecified hypotension type          ED Disposition Condition    Discharge           ED Prescriptions       Medication Sig Dispense Start Date End Date Auth. Provider    ondansetron (ZOFRAN-ODT) 4 MG TbDL Take 1 tablet (4 mg total) by mouth every 8 (eight) hours as needed (nausea/vomiting). 12 tablet 3/13/2024 -- Jak Ross MD    dextromethorphan-guaiFENesin  mg/5 ml (ROBITUSSIN-DM)  mg/5 mL liquid Take 5 mLs by mouth 4 (four) times daily as needed (cough). 120 mL 3/13/2024 3/23/2024 Jak Ross MD          Follow-up Information       Follow up With Specialties Details Why Contact Info    Allison Adler MD Family Medicine Schedule an appointment as soon as possible for a visit   [address] [facility] [address]  [phone]               Jka Ross MD  03/13/24 7526

## 2024-03-14 ENCOUNTER — PROCEDURE VISIT (OUTPATIENT)
Dept: NEUROLOGY | Facility: CLINIC | Age: 54
End: 2024-03-14
Payer: COMMERCIAL

## 2024-03-14 DIAGNOSIS — R29.898 LEFT ARM WEAKNESS: ICD-10-CM

## 2024-03-14 DIAGNOSIS — M54.10 BRACHIAL NEURITIS OF LEFT UPPER EXTREMITY: ICD-10-CM

## 2024-03-14 DIAGNOSIS — M25.512 ACUTE PAIN OF LEFT SHOULDER: ICD-10-CM

## 2024-03-14 PROCEDURE — 95909 NRV CNDJ TST 5-6 STUDIES: CPT | Mod: S$GLB,,, | Performed by: PSYCHIATRY & NEUROLOGY

## 2024-03-14 PROCEDURE — 95886 MUSC TEST DONE W/N TEST COMP: CPT | Mod: S$GLB,,, | Performed by: PSYCHIATRY & NEUROLOGY

## 2024-03-15 LAB
OHS QRS DURATION: 76 MS
OHS QTC CALCULATION: 411 MS

## 2024-03-22 ENCOUNTER — OFFICE VISIT (OUTPATIENT)
Dept: PAIN MEDICINE | Facility: CLINIC | Age: 54
End: 2024-03-22
Payer: COMMERCIAL

## 2024-03-22 ENCOUNTER — PATIENT MESSAGE (OUTPATIENT)
Dept: PAIN MEDICINE | Facility: CLINIC | Age: 54
End: 2024-03-22

## 2024-03-22 ENCOUNTER — TELEPHONE (OUTPATIENT)
Dept: PAIN MEDICINE | Facility: CLINIC | Age: 54
End: 2024-03-22

## 2024-03-22 VITALS — WEIGHT: 255 LBS | BODY MASS INDEX: 43.54 KG/M2 | HEIGHT: 64 IN

## 2024-03-22 DIAGNOSIS — M79.602 LEFT ARM PAIN: Primary | ICD-10-CM

## 2024-03-22 DIAGNOSIS — M50.20 PROTRUSION OF CERVICAL INTERVERTEBRAL DISC: ICD-10-CM

## 2024-03-22 DIAGNOSIS — R29.898 LEFT ARM WEAKNESS: ICD-10-CM

## 2024-03-22 DIAGNOSIS — M54.12 CERVICAL RADICULOPATHY: Primary | ICD-10-CM

## 2024-03-22 DIAGNOSIS — M79.602 LEFT ARM PAIN: ICD-10-CM

## 2024-03-22 PROCEDURE — 3044F HG A1C LEVEL LT 7.0%: CPT | Mod: CPTII,S$GLB,, | Performed by: STUDENT IN AN ORGANIZED HEALTH CARE EDUCATION/TRAINING PROGRAM

## 2024-03-22 PROCEDURE — 99204 OFFICE O/P NEW MOD 45 MIN: CPT | Mod: S$GLB,,, | Performed by: STUDENT IN AN ORGANIZED HEALTH CARE EDUCATION/TRAINING PROGRAM

## 2024-03-22 PROCEDURE — 1159F MED LIST DOCD IN RCRD: CPT | Mod: CPTII,S$GLB,, | Performed by: STUDENT IN AN ORGANIZED HEALTH CARE EDUCATION/TRAINING PROGRAM

## 2024-03-22 PROCEDURE — 3008F BODY MASS INDEX DOCD: CPT | Mod: CPTII,S$GLB,, | Performed by: STUDENT IN AN ORGANIZED HEALTH CARE EDUCATION/TRAINING PROGRAM

## 2024-03-22 PROCEDURE — 99999 PR PBB SHADOW E&M-EST. PATIENT-LVL V: CPT | Mod: PBBFAC,,, | Performed by: STUDENT IN AN ORGANIZED HEALTH CARE EDUCATION/TRAINING PROGRAM

## 2024-03-22 PROCEDURE — 1160F RVW MEDS BY RX/DR IN RCRD: CPT | Mod: CPTII,S$GLB,, | Performed by: STUDENT IN AN ORGANIZED HEALTH CARE EDUCATION/TRAINING PROGRAM

## 2024-03-22 NOTE — TELEPHONE ENCOUNTER
Types of orders made on 03/22/2024: Outpatient Referral, Procedure Request      Order Date:3/22/2024   Ordering User:GARRETT HINOJOSA [[id number]]   Encounter Provider:aGrrett Hinojosa MD [[id number]   Authorizing Provider: Garrett Hinojosa MD [[id number]]   Department:[facility] PAIN MANAGEMENT[[id number]]      Common Order Information   Procedure -> Epidural Injection (specify level) Cmt: ILESI C6-7      Order Specific Information   Order: Procedure Request Order for Pain Management [Custom: [id number]]  Order #:          [id number]: 1 FUTURE     Priority: Routine  Class: Clinic Performed     Future Order Information          Expires on:03/22/2025            Expected by:03/22/2024                   Associated Diagnoses       M54.12 Cervical radiculopathy       Facility Name: -> [facility]          Follow-up: -> 2 weeks              Priority: Routine  Class: Clinic Performed     Future Order Information       Expires on:03/22/2025            Expected by:03/22/2024                   Associated Diagnoses       M54.12 Cervical radic   ulopathy       Procedure -> Epidural Injection (specify level) Cmt: ILESI C6-7          Facility Name: -> [facility]

## 2024-03-22 NOTE — H&P (VIEW-ONLY)
[facility] - Department    Allison Adler MD      First Office Visit: 3/22/24  Today' Date: 3/22/2024  Last Office Visit: None    Chief complaint: L arm pain     HPI: Pt is a pleasant 54 y.o., who presents for evaluation. Referred by BERRY Martinez. Pt complains of L arm pain, weakness, and tinging since Feb. Endorses having sharp, shooting pain that goes down the entire arm to the fingers. States she is unable to move the L arm. States all this happened suddenly in Feb and she was seen in the ED for a r/o stroke. Has seen a neurologist and has had an EMG done. So far the only notable finding has been a C6-7 disc protrusion to the L. Pt planning to see Dr. Grigsby in April. Endorses having headaches, balance issues, and difficulty holding objects with the L arm. Denies BB changes. Has been doing PT and HEP since being evaluated for L arm weakness in Feb.        Pain disability score: 56  Pain score: 6    Relevant Imaging/ Testing:   EMG (?)  MR brachial plexus 2/24  XR L shoulder 2/24  MR C-spine 2/24    Procedures: None    Date of board of pharmacy review:3/22/2024  Date of opioid risk screening/ pain psych: None  Date of opioid agreement and consent: None  Date of urine drug screen: None  Date of random pill count: None     was reviewed today: reviewed, no concerns     Prescribed medications: None    See EHR for  PMH, PSH, FH, SH, Medications and Allergy    ROS:  Positive for pain  ROS     PE:  There were no vitals filed for this visit.  General: Pleasant, no distress  HEENT: NC/ AT. PERRLA  CV: Radial pulses intact  Pulm: No distress  Ext: No edema    Physical Exam     Neuromusculoskeletal:  Head: NC, AT. PERRLA. No occipital tenderness  Neck: Intact range of motions, extension, flexion, rotation. B Facet loading. Neg Spurling. Marked tenderness to light touch.  5/5 Strength, normal tone. Neg Delvalle's  Shoulder: limited range of motion. Min Bicep groove tenderness. 3/5 Strength.   Hip: Intact range of  motion  SI: Level  Knee: Intact range of motion  Reflexes: normal Bicep  Strength: 3/5 LUE, 5/5 globally elsewhere  Sensory: numbness of LUE   Skin: No bruising, erythema  Gait: Normal      Impression:  L arm pain  L arm weakness  C6-7   Relevant History  BMI 43.77  Migraine  Asthma  Osteoarthritis         Plan:  Discussed options  Imaging/ relevant records viewed/ reviewed/ discussed  Imaging results viewed and reviewed (noted above)/ reviewed with patient   reviewed  Cont HEP  Cont PT  ILESI C6-7  Re-eval after  F/u EMG results  Pt planning on seeing Dr. Grigsyb in April        Prescribed medications:  1. None    The impression and plan were discussed and explained in detail. All the questions were answered. Education was provided accordingly.     The procedure was explained in detail, along with risks and potential side effects.        Follow-up:  For procedure     Shaila Lynne MD

## 2024-03-22 NOTE — TELEPHONE ENCOUNTER
Spoke with pt and spouse and scheduled for 04/08 instructions given placed on wait list in case we have cancellations.

## 2024-03-22 NOTE — PROGRESS NOTES
[facility] - Department    Allison Adler MD      First Office Visit: 3/22/24  Today' Date: 3/22/2024  Last Office Visit: None    Chief complaint: L arm pain     HPI: Pt is a pleasant 54 y.o., who presents for evaluation. Referred by BERRY Martinez. Pt complains of L arm pain, weakness, and tinging since Feb. Endorses having sharp, shooting pain that goes down the entire arm to the fingers. States she is unable to move the L arm. States all this happened suddenly in Feb and she was seen in the ED for a r/o stroke. Has seen a neurologist and has had an EMG done. So far the only notable finding has been a C6-7 disc protrusion to the L. Pt planning to see Dr. Grigsby in April. Endorses having headaches, balance issues, and difficulty holding objects with the L arm. Denies BB changes. Has been doing PT and HEP since being evaluated for L arm weakness in Feb.        Pain disability score: 56  Pain score: 6    Relevant Imaging/ Testing:   EMG (?)  MR brachial plexus 2/24  XR L shoulder 2/24  MR C-spine 2/24    Procedures: None    Date of board of pharmacy review:3/22/2024  Date of opioid risk screening/ pain psych: None  Date of opioid agreement and consent: None  Date of urine drug screen: None  Date of random pill count: None     was reviewed today: reviewed, no concerns     Prescribed medications: None    See EHR for  PMH, PSH, FH, SH, Medications and Allergy    ROS:  Positive for pain  ROS     PE:  There were no vitals filed for this visit.  General: Pleasant, no distress  HEENT: NC/ AT. PERRLA  CV: Radial pulses intact  Pulm: No distress  Ext: No edema    Physical Exam     Neuromusculoskeletal:  Head: NC, AT. PERRLA. No occipital tenderness  Neck: Intact range of motions, extension, flexion, rotation. B Facet loading. Neg Spurling. Marked tenderness to light touch.  5/5 Strength, normal tone. Neg Delvalle's  Shoulder: limited range of motion. Min Bicep groove tenderness. 3/5 Strength.   Hip: Intact range of  motion  SI: Level  Knee: Intact range of motion  Reflexes: normal Bicep  Strength: 3/5 LUE, 5/5 globally elsewhere  Sensory: numbness of LUE   Skin: No bruising, erythema  Gait: Normal      Impression:  L arm pain  L arm weakness  C6-7   Relevant History  BMI 43.77  Migraine  Asthma  Osteoarthritis         Plan:  Discussed options  Imaging/ relevant records viewed/ reviewed/ discussed  Imaging results viewed and reviewed (noted above)/ reviewed with patient   reviewed  Cont HEP  Cont PT  ILESI C6-7  Re-eval after  F/u EMG results  Pt planning on seeing Dr. Grigsby in April        Prescribed medications:  1. None    The impression and plan were discussed and explained in detail. All the questions were answered. Education was provided accordingly.     The procedure was explained in detail, along with risks and potential side effects.        Follow-up:  For procedure     Shaila Lynne MD

## 2024-03-25 ENCOUNTER — TELEPHONE (OUTPATIENT)
Dept: PAIN MEDICINE | Facility: CLINIC | Age: 54
End: 2024-03-25
Payer: COMMERCIAL

## 2024-03-25 NOTE — TELEPHONE ENCOUNTER
----- Message from Mando Prado sent at 3/25/2024  1:04 PM CDT -----  Regarding: change RX  Contact:   Type:  Needs Medical Advice    Who Called: Pts  Loc        Would the patient rather a call back or a response via [facility]? Call back    Best Call Back Number: [phone]  or [phone]      Additional Information: Sts she went to the Derm today and is having a reaction and wants to take her off 1 medication and put her on something else and wants to talk to the office to make sure that it won't cause any other problems by changing her.   Please advise -- Thank you

## 2024-03-26 ENCOUNTER — HOSPITAL ENCOUNTER (OUTPATIENT)
Facility: HOSPITAL | Age: 54
Discharge: HOME | End: 2024-03-26
Attending: INTERNAL MEDICINE | Admitting: INTERNAL MEDICINE
Payer: COMMERCIAL

## 2024-03-26 ENCOUNTER — ANESTHESIA EVENT (OUTPATIENT)
Dept: ENDOSCOPY | Facility: HOSPITAL | Age: 54
End: 2024-03-26
Payer: COMMERCIAL

## 2024-03-26 ENCOUNTER — ANESTHESIA (OUTPATIENT)
Dept: ENDOSCOPY | Facility: HOSPITAL | Age: 54
End: 2024-03-26
Payer: COMMERCIAL

## 2024-03-26 VITALS
WEIGHT: 255 LBS | RESPIRATION RATE: 18 BRPM | DIASTOLIC BLOOD PRESSURE: 66 MMHG | HEART RATE: 80 BPM | HEIGHT: 64 IN | TEMPERATURE: 98 F | OXYGEN SATURATION: 97 % | BODY MASS INDEX: 43.54 KG/M2 | SYSTOLIC BLOOD PRESSURE: 104 MMHG

## 2024-03-26 DIAGNOSIS — K64.8 INTERNAL HEMORRHOIDS: Primary | ICD-10-CM

## 2024-03-26 DIAGNOSIS — Z12.11 COLON CANCER SCREENING: ICD-10-CM

## 2024-03-26 DIAGNOSIS — K57.90 DIVERTICULOSIS: ICD-10-CM

## 2024-03-26 DIAGNOSIS — Z80.0 FAMILY HISTORY OF COLON CANCER IN FATHER: ICD-10-CM

## 2024-03-26 DIAGNOSIS — Z86.010 HISTORY OF COLON POLYPS: ICD-10-CM

## 2024-03-26 PROCEDURE — 25000003 PHARM REV CODE 250: Performed by: NURSE ANESTHETIST, CERTIFIED REGISTERED

## 2024-03-26 PROCEDURE — 25000003 PHARM REV CODE 250: Performed by: INTERNAL MEDICINE

## 2024-03-26 PROCEDURE — 37000008 HC ANESTHESIA 1ST 15 MINUTES: Performed by: INTERNAL MEDICINE

## 2024-03-26 PROCEDURE — G0105 COLORECTAL SCRN; HI RISK IND: HCPCS | Performed by: INTERNAL MEDICINE

## 2024-03-26 PROCEDURE — 63600175 PHARM REV CODE 636 W HCPCS: Performed by: NURSE ANESTHETIST, CERTIFIED REGISTERED

## 2024-03-26 PROCEDURE — 37000009 HC ANESTHESIA EA ADD 15 MINS: Performed by: INTERNAL MEDICINE

## 2024-03-26 PROCEDURE — G0105 COLORECTAL SCRN; HI RISK IND: HCPCS | Mod: ,,, | Performed by: INTERNAL MEDICINE

## 2024-03-26 RX ORDER — PROPOFOL 10 MG/ML
VIAL (ML) INTRAVENOUS
Status: DISCONTINUED | OUTPATIENT
Start: 2024-03-26 | End: 2024-03-26

## 2024-03-26 RX ORDER — DEXTROMETHORPHAN/PSEUDOEPHED 2.5-7.5/.8
DROPS ORAL
Status: COMPLETED | OUTPATIENT
Start: 2024-03-26 | End: 2024-03-26

## 2024-03-26 RX ORDER — LIDOCAINE HYDROCHLORIDE 10 MG/ML
INJECTION, SOLUTION EPIDURAL; INFILTRATION; INTRACAUDAL; PERINEURAL
Status: DISCONTINUED | OUTPATIENT
Start: 2024-03-26 | End: 2024-03-26

## 2024-03-26 RX ORDER — SODIUM CHLORIDE 9 MG/ML
INJECTION, SOLUTION INTRAVENOUS CONTINUOUS
Status: DISCONTINUED | OUTPATIENT
Start: 2024-03-26 | End: 2024-03-26 | Stop reason: HOSPADM

## 2024-03-26 RX ADMIN — PROPOFOL 30 MG: 10 INJECTION, EMULSION INTRAVENOUS at 09:03

## 2024-03-26 RX ADMIN — PROPOFOL 120 MG: 10 INJECTION, EMULSION INTRAVENOUS at 09:03

## 2024-03-26 RX ADMIN — SODIUM CHLORIDE: 9 INJECTION, SOLUTION INTRAVENOUS at 08:03

## 2024-03-26 RX ADMIN — LIDOCAINE HYDROCHLORIDE 50 MG: 10 INJECTION, SOLUTION EPIDURAL; INFILTRATION; INTRACAUDAL; PERINEURAL at 09:03

## 2024-03-26 NOTE — ANESTHESIA PREPROCEDURE EVALUATION
03/26/2024  Ijeoma Issa is a 54 y.o., female.      Pre-op Assessment    I have reviewed the Patient Summary Reports.     I have reviewed the Nursing Notes. I have reviewed the NPO Status.   I have reviewed the Medications.     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  (Her L arm is paralyzed and numb except index finger after sleeping on L shoulder)                            Endocrine:  Diabetes, well controlled, type 2         Morbid Obesity / BMI > 40  Psych:  Psychiatric History anxiety               Physical Exam  General: Well nourished, Cooperative, Alert and Oriented    Airway:  Mallampati: II   Mouth Opening: Normal  TM Distance: Normal  Neck ROM: Normal ROM    Anesthesia Plan  Type of Anesthesia, risks & benefits discussed:    Anesthesia Type: Gen ETT, Gen Supraglottic Airway, Gen Natural Airway, MAC  Intra-op Monitoring Plan: Standard ASA Monitors  Post Op Pain Control Plan: multimodal analgesia  Induction:  IV  Airway Plan: Direct, Video and Fiberoptic, Post-Induction  Informed Consent: Informed consent signed with the Patient and all parties understand the risks and agree with anesthesia plan.  All questions answered.   ASA Score: 3  Anesthesia Plan Notes: We will do scope supine with a bump to avoid impinging shoulder further.    Ready For Surgery From Anesthesia Perspective.   .

## 2024-03-26 NOTE — H&P
CC: History of colon polyps - last scope 10 years ago    54 year old female with above. States that symptoms are absent, no alleviating/exacerbating factors. Positive family history of colorectal CA (father). Positive personal history of polyps. No bleeding or weight loss.     ROS:  No headache, no fever/chills, no chest pain/SOB, no nausea/vomiting/diarrhea/constipation/GI bleeding/abdominal pain, no dysuria/hematuria.    VSSAF   Exam:   Alert and oriented x 3; no apparent distress   PERRLA, sclera anicteric  CV: Regular rate/rhythm, normal PMI   Lungs: Clear bilaterally with no wheeze/rales   Abdomen: Soft, NT/ND, normal bowel sounds   Ext: No cyanosis, clubbing     Impression:   As above    Plan:   Proceed with endoscopy. Further recs to follow.

## 2024-03-26 NOTE — TRANSFER OF CARE
"Anesthesia Transfer of Care Note    Patient: Ijeoma Issa    Procedure(s) Performed: Procedure(s) (LRB):  COLONOSCOPY (N/A)    Patient location: GI    Anesthesia Type: general    Transport from OR: Transported from OR on 2-3 L/min O2 by NC with adequate spontaneous ventilation    Post pain: adequate analgesia    Post assessment: no apparent anesthetic complications    Post vital signs: stable    Level of consciousness: awake    Nausea/Vomiting: no nausea/vomiting    Complications: none    Transfer of care protocol was followed      Last vitals: Visit Vitals  /62 (BP Location: Right arm, Patient Position: Lying)   Pulse 98   Temp 36.5 °C (97.7 °F) (Skin)   Resp 18   Ht 5' 4" (1.626 m)   Wt 115.7 kg (255 lb)   SpO2 98%   BMI 43.77 kg/m²     "

## 2024-03-26 NOTE — PROVATION PATIENT INSTRUCTIONS
Discharge Summary/Instructions after an Endoscopic Procedure  Patient Name: Ijeoma Issa  Patient MRN: [id number]  Patient YOB: 1970  Tuesday, March 26, 2024  Karl Parikh MD  Dear patient,  As a result of recent federal legislation (The Federal Cures Act), you may   receive lab or pathology results from your procedure in your [facility]   account before your physician is able to contact you. Your physician or   their representative will relay the results to you with their   recommendations at their soonest availability.  Thank you,  RESTRICTIONS:  During your procedure today, you received medications for sedation.  These   medications may affect your judgment, balance and coordination.  Therefore,   for 24 hours, you have the following restrictions:   - DO NOT drive a car, operate machinery, make legal/financial decisions,   sign important papers or drink alcohol.    ACTIVITY:  Today: no heavy lifting, straining or running due to procedural   sedation/anesthesia.  The following day: return to full activity including work.  DIET:  Eat and drink normally unless instructed otherwise.     TREATMENT FOR COMMON SIDE EFFECTS:  - Mild abdominal pain, nausea, belching, bloating or excessive gas:  rest,   eat lightly and use a heating pad.  - Sore Throat: treat with throat lozenges and/or gargle with warm salt   water.  - Because air was used during the procedure, expelling large amounts of air   from your rectum or belching is normal.  - If a bowel prep was taken, you may not have a bowel movement for 1-3 days.    This is normal.  SYMPTOMS TO WATCH FOR AND REPORT TO YOUR PHYSICIAN:  1. Abdominal pain or bloating, other than gas cramps.  2. Chest pain.  3. Back pain.  4. Signs of infection such as: chills or fever occurring within 24 hours   after the procedure.  5. Rectal bleeding, which would show as bright red, maroon, or black stools.   (A tablespoon of blood from the rectum is not serious, especially  if   hemorrhoids are present.)  6. Vomiting.  7. Weakness or dizziness.  GO DIRECTLY TO THE NEAREST EMERGENCY ROOM IF YOU HAVE ANY OF THE FOLLOWING:      Difficulty breathing              Chills and/or fever over 101 F   Persistent vomiting and/or vomiting blood   Severe abdominal pain   Severe chest pain   Black, tarry stools   Bleeding- more than one tablespoon   Any other symptom or condition that you feel may need urgent attention  Your doctor recommends these additional instructions:  If any biopsies were taken, your doctors clinic will contact you in 1 to 2   weeks with any results.  - Patient has a contact number available for emergencies.  The signs and   symptoms of potential delayed complications were discussed with the   patient.  Return to normal activities tomorrow.  Written discharge   instructions were provided to the patient.   - High fiber diet.   - Continue present medications.   - Repeat colonoscopy in 5 years for surveillance.   - Discharge patient to home (ambulatory).   - Return to GI office PRN.  For questions, problems or results please call your physician - Karl Parikh MD at Work:  [phone].  [facility] PHONE NUMBER: [phone]  IF A COMPLICATION OR EMERGENCY SITUATION ARISES AND YOU ARE UNABLE TO REACH   YOUR PHYSICIAN - GO DIRECTLY TO THE EMERGENCY ROOM.  Karl Parikh MD  3/26/2024 9:47:35 AM  This report has been verified and signed electronically.  Dear patient,  As a result of recent federal legislation (The Federal Cures Act), you may   receive lab or pathology results from your procedure in your [facility]   account before your physician is able to contact you. Your physician or   their representative will relay the results to you with their   recommendations at their soonest availability.  Thank you,  PROVATION

## 2024-03-26 NOTE — ANESTHESIA POSTPROCEDURE EVALUATION
Anesthesia Post Evaluation    Patient: Ijeoma Issa    Procedure(s) Performed: Procedure(s) (LRB):  COLONOSCOPY (N/A)    Final Anesthesia Type: general      Patient location during evaluation: PACU  Patient participation: Yes- Able to Participate  Level of consciousness: awake and alert and oriented  Post-procedure vital signs: reviewed and stable  Pain management: adequate  Airway patency: patent    PONV status at discharge: No PONV  Anesthetic complications: no      Cardiovascular status: blood pressure returned to baseline and stable  Respiratory status: unassisted and spontaneous ventilation  Hydration status: euvolemic  Follow-up not needed.              Vitals Value Taken Time   /66 03/26/24 1010   Temp 36.7 °C (98.1 °F) 03/26/24 0950   Pulse 76 03/26/24 1013   Resp 18 03/26/24 0950   SpO2 98 % 03/26/24 1013   Vitals shown include unvalidated device data.      Event Time   Out of Recovery 10:52:00         Pain/Slime Score: Slime Score: 10 (3/26/2024 10:17 AM)

## 2024-03-28 ENCOUNTER — OFFICE VISIT (OUTPATIENT)
Dept: FAMILY MEDICINE | Facility: CLINIC | Age: 54
End: 2024-03-28
Payer: COMMERCIAL

## 2024-03-28 ENCOUNTER — PATIENT MESSAGE (OUTPATIENT)
Dept: FAMILY MEDICINE | Facility: CLINIC | Age: 54
End: 2024-03-28

## 2024-03-28 VITALS
RESPIRATION RATE: 18 BRPM | HEART RATE: 85 BPM | BODY MASS INDEX: 41.92 KG/M2 | DIASTOLIC BLOOD PRESSURE: 64 MMHG | WEIGHT: 245.56 LBS | OXYGEN SATURATION: 97 % | HEIGHT: 64 IN | SYSTOLIC BLOOD PRESSURE: 102 MMHG

## 2024-03-28 DIAGNOSIS — K21.9 GASTROESOPHAGEAL REFLUX DISEASE WITHOUT ESOPHAGITIS: Primary | ICD-10-CM

## 2024-03-28 DIAGNOSIS — Z09 HOSPITAL DISCHARGE FOLLOW-UP: Primary | ICD-10-CM

## 2024-03-28 DIAGNOSIS — Z79.899 CURRENT USE OF ESTROGEN THERAPY: ICD-10-CM

## 2024-03-28 DIAGNOSIS — M79.622 PAIN IN LEFT AXILLA: ICD-10-CM

## 2024-03-28 DIAGNOSIS — E66.01 OBESITY, MORBID, BMI 40.0-49.9: ICD-10-CM

## 2024-03-28 DIAGNOSIS — R29.898 LEFT ARM WEAKNESS: ICD-10-CM

## 2024-03-28 PROCEDURE — 1159F MED LIST DOCD IN RCRD: CPT | Mod: CPTII,S$GLB,, | Performed by: STUDENT IN AN ORGANIZED HEALTH CARE EDUCATION/TRAINING PROGRAM

## 2024-03-28 PROCEDURE — 99214 OFFICE O/P EST MOD 30 MIN: CPT | Mod: S$GLB,,, | Performed by: STUDENT IN AN ORGANIZED HEALTH CARE EDUCATION/TRAINING PROGRAM

## 2024-03-28 PROCEDURE — 3078F DIAST BP <80 MM HG: CPT | Mod: CPTII,S$GLB,, | Performed by: STUDENT IN AN ORGANIZED HEALTH CARE EDUCATION/TRAINING PROGRAM

## 2024-03-28 PROCEDURE — 99999 PR PBB SHADOW E&M-EST. PATIENT-LVL V: CPT | Mod: PBBFAC,,, | Performed by: STUDENT IN AN ORGANIZED HEALTH CARE EDUCATION/TRAINING PROGRAM

## 2024-03-28 PROCEDURE — 3044F HG A1C LEVEL LT 7.0%: CPT | Mod: CPTII,S$GLB,, | Performed by: STUDENT IN AN ORGANIZED HEALTH CARE EDUCATION/TRAINING PROGRAM

## 2024-03-28 PROCEDURE — 3074F SYST BP LT 130 MM HG: CPT | Mod: CPTII,S$GLB,, | Performed by: STUDENT IN AN ORGANIZED HEALTH CARE EDUCATION/TRAINING PROGRAM

## 2024-03-28 PROCEDURE — 3008F BODY MASS INDEX DOCD: CPT | Mod: CPTII,S$GLB,, | Performed by: STUDENT IN AN ORGANIZED HEALTH CARE EDUCATION/TRAINING PROGRAM

## 2024-03-28 PROCEDURE — 1160F RVW MEDS BY RX/DR IN RCRD: CPT | Mod: CPTII,S$GLB,, | Performed by: STUDENT IN AN ORGANIZED HEALTH CARE EDUCATION/TRAINING PROGRAM

## 2024-03-28 RX ORDER — GABAPENTIN 600 MG/1
600 TABLET ORAL 3 TIMES DAILY
Qty: 90 TABLET | Refills: 2 | Status: SHIPPED | OUTPATIENT
Start: 2024-03-28 | End: 2024-05-28

## 2024-03-28 RX ORDER — HYDROCORTISONE 25 MG/G
CREAM TOPICAL
COMMUNITY
Start: 2024-03-25

## 2024-03-28 RX ORDER — PANTOPRAZOLE SODIUM 40 MG/1
40 TABLET, DELAYED RELEASE ORAL 2 TIMES DAILY
Qty: 60 TABLET | Refills: 1 | Status: SHIPPED | OUTPATIENT
Start: 2024-03-28 | End: 2024-05-30 | Stop reason: SDUPTHER

## 2024-03-28 RX ORDER — TACROLIMUS 1 MG/G
OINTMENT TOPICAL
COMMUNITY

## 2024-03-28 RX ORDER — GENTAMICIN SULFATE 1 MG/G
OINTMENT TOPICAL
COMMUNITY
Start: 2024-03-25

## 2024-03-28 NOTE — PROGRESS NOTES
"[facility]   OFFICE VISIT NOTE    Patient Name: Ijeoma Issa  YOB: 1970    PRESENTING HISTORY     History of Present Illness:  Ms. Ijeoma Issa is a 54 y.o. female here for follow up including ED visit follow up     ED visit (3/13/2024)   Syncopal episode -- low BP 82/38  Risk  OTC drugs.  Prescription drug management.  Risk Details: The patient was emergently evaluated in the emergency department, her evaluation was significant for a middle-age female with her normal neurologic exam noted.  The patient's EKG showed no acute abnormalities per my independent interpretation.  The patient was treated here with IV fluids and IV Zofran, with significant improvement in her symptoms.  The patient's labs showed no acute abnormalities, including multiple serial negative troponins.  The patient's orthostatic vital signs were repeated here and they were noted to be normal.  The etiology of the patient's syncopal episode was likely hypotension from dehydration from her vomiting.  She is stable for discharge to home and does not require further care or workup at this time.  She will be discharged home with ODT Zofran.   She is referred to primary care for follow-up.  --> medication spironolactone 25 mg daily -- is this for hirsutism for PCOS    Patient has been doing well since leaving the ED on 3/13.     Recent colonoscopy (3/26/2024)   Repeat colonoscopy in 5 years     Review of Systems   Constitutional:  Negative for chills and fever.   Respiratory:  Negative for shortness of breath.    Cardiovascular:  Negative for chest pain.   Gastrointestinal:  Negative for nausea and vomiting.   Musculoskeletal:  Positive for neck pain and neck stiffness.   Neurological:         Left UE weakness (forearm)           OBJECTIVE:   Vital Signs:  Vitals:    03/28/24 1005   BP: 102/64   Pulse: 85   Resp: 18   SpO2: 97%   Weight: 111.4 kg (245 lb 9.5 oz)   Height: 5' 4" (1.626 m) "           Physical Exam  Constitutional:       General: She is not in acute distress.     Appearance: She is obese. She is not ill-appearing or toxic-appearing.   HENT:      Head: Normocephalic and atraumatic.      Mouth/Throat:      Mouth: Mucous membranes are moist.      Pharynx: Uvula midline. No pharyngeal swelling.   Cardiovascular:      Rate and Rhythm: Normal rate and regular rhythm.   Pulmonary:      Effort: Pulmonary effort is normal. No tachypnea, bradypnea, accessory muscle usage, prolonged expiration or respiratory distress.      Breath sounds: Normal breath sounds. No stridor. No wheezing, rhonchi or rales.   Musculoskeletal:      Comments: Unable to move her left forearm and hand.   Able to wiggle her left index finger but unable to move other fingers. She needs to manually lift her left UE with right UE for it to move. Able to move her left shoulder but ROM diminished    Neurological:      General: No focal deficit present.      Mental Status: She is alert.   Psychiatric:         Mood and Affect: Mood normal.         Behavior: Behavior normal.         ASSESSMENT & PLAN:     Hospital discharge follow-up  Denies any additional syncope episode  BP stable  Denies any additional nausea or vomiting  Encouraged her to keep up with hydration -- advised against drinking gatorade because her electrolytes are actually wnl     Left arm weakness  Has been present from 2/22/2024 -- which occurred after she woke up from a nap on a couch, holding her 5 month old granddaughter.   Patient already has been evaluated by neurology and pain management   Has an upcoming appt with neurosurgery on 4/11 2/23/2024: MRI brachial plexus: wnl   Pain management appt (3/22/2024): C6-C7 disc protrusion, plan HEP and PT. IL epidural steroidal injection C6-C7   Will follow up with EMG studies, neurosurgery appointment     Obesity, morbid, BMI 40.0-49.9    Pain in left axilla  Her erythematous lesion on left axilla resolved. Patient  finished doxycycline 100 mg BID.   Denies any pain today     Current use of estrogen therapy  Patient cut down on estriol use from daily to 3 times a week  Advised the patient to further cut it down to 2 times a week and then once a week  Reviewed risks vs benefits of estrogen  Advised the patient to further discuss stopping estrogen therapy with ob/gyn and patient voiced understanding    Allison Adler MD  Family Medicine  [facility]     This note was created using NSC voice recognition software that occasionally misinterprets phrases or words

## 2024-04-02 NOTE — PROCEDURES
[facility]  [address] [facility] [address]  [place] [address]  [phone]      Full Name: Ijeoma Shelley Gender: Female  Patient ID: [id number] YOB: 1970      Visit Date: 3/14/2024 2:48 PM  Age: 53 Years  Examining Physician: Aleksandra Haq D.O., ABPN, AOBNP, ABEM   Referring Physician: Cindy Kline NP   Technologist: ZAY Verduzco   Height: 5 feet 4 inch  History: Patient fell asleep and when she woke up she some tingling on the left arm and within a few minutes the arm whent limp.  She has some left sided neck pain. Symptom onset Feb 22, 2024.  She has been referred for an EMG of the left upper extremity.      Sensory NCS      Nerve / Sites Rec. Site Onset Lat Peak Lat NP Amp Segments Distance Velocity Temp.     ms ms µV  cm m/s °C   L Median - Digit II (Antidromic)      Wrist Dig II 2.54 3.46 15.1 Wrist - Dig II 13 51 35.8      Ref.   ?3.60 ?15.0 Ref.  ?50    L Ulnar - Digit V (Antidromic)      Wrist Dig V 2.17 3.08 18.6 Wrist - Dig V 11 51 35.8      Ref.   ?3.10 ?12.0 Ref.  ?50    L Radial - Anatomical snuff box (Forearm)      Forearm Wrist 1.85 2.48 25.0 Forearm - Wrist 10 54 35.8      Ref.   ?2.70 ?14.0 Ref.          Motor NCS      Nerve / Sites Muscle Latency Ref. Amplitude Ref. Amp % Duration Segments Distance Lat Diff Ref. Velocity Ref. Temp.     ms ms mV mV % ms  cm ms ms m/s m/s °C   L Median - APB      Wrist APB 3.58 ?4.00 8.8 ?6.0 100 6.96 Wrist - APB      35.8      Elbow APB 7.38  8.7  99.1 7.02 Elbow - Wrist 20 3.79  53 ?50 35.8   L Ulnar - ADM      Wrist ADM 2.79 ?3.10 7.7 ?7.0 100 6.42 Wrist - ADM      35.8      B.Elbow ADM 5.83  7.3  93.8 6.92 B.Elbow - Wrist 18 3.04  59 ?50 35.8      A.Elbow ADM 7.48  7.0  90.3 6.71 A.Elbow - B.Elbow 11 1.65  67  35.8       F  Wave      Nerve Fmin Ref.    ms ms   L Median - APB 26.98 ?31.00   L Ulnar - ADM 24.69 ?32.00       EMG Summary Table     Spontaneous Recruitment Activation Duration  Amplitude Polyphasia Comment   Muscle Ins Act Fib Fasc Pattern - - - - -   L. First dorsal interosseous Normal 0 0 Normal Poor Normal Normal Normal Normal   L. Abductor pollicis brevis Normal 0 0 None None    No Motor Units   L. Pronator teres Normal 0 0 None None    No Motor Units   L. Extensor digitorum communis Normal 0 0 Normal Poor Normal Normal Normal Normal   L. Biceps brachii Normal 0 0 None None    No Motor Units   L. Triceps brachii Normal 0 0 None None    No Motor Units   L. Deltoid Normal 0 0 None None    No Motor Units   L. Cervical paraspinals Normal 0 0      Normal   L. Infraspinatus Normal 0 0 None None    No Motor Units         Ijeoma Shelley [id number] 3/14/2024 2:48 PM     4 of 4    Summary:  Nerve conduction studies were performed in the left upper extremity.  Left median, ulnar and radial sensory responses were normal in amplitude and latencies.  Left median and ulnar motor responses were normal in amplitude, latency and velocity.  Left median and ulnar minimal F wave latencies were normal.  Needle EMG was performed in the left upper extremity and cervical paraspinal muscles.  No active denervation was present in any muscle tested.  Poor to absent activation was noted in the entirety of the left upper extremity and shoulder girdle.  Of the motor units that were witnessed these were normal in morphology and recruitment.    Impression:  This is a technically abnormal EMG of the left upper extremity.  The activation changes noted throughout the left upper extremity are of central origin.  This can be secondary to pain, lack of volition, orthopedic restraint, or dysfunction of the central nervous system such as stroke.  There is no evidence of peripheral neuropathy, focal neuropathy, plexopathy, or cervical radiculopathy on this study.  In addition, there is no evidence of myopathy on this study.      Thank you for referring to the [facility] in [place]. Please feel  free to contact the clinic if you have any further questions regarding this study or report.       _____________________________  Aleksandra Haq D.O., ABPN, AOBNP, IRVIN Shelley [id number] 3/14/2024 2:48 PM     4 of 4

## 2024-04-08 ENCOUNTER — HOSPITAL ENCOUNTER (OUTPATIENT)
Facility: HOSPITAL | Age: 54
Discharge: HOME | End: 2024-04-08
Attending: STUDENT IN AN ORGANIZED HEALTH CARE EDUCATION/TRAINING PROGRAM | Admitting: STUDENT IN AN ORGANIZED HEALTH CARE EDUCATION/TRAINING PROGRAM
Payer: COMMERCIAL

## 2024-04-08 DIAGNOSIS — M54.12 CERVICAL RADICULOPATHY: Primary | ICD-10-CM

## 2024-04-08 DIAGNOSIS — M54.12 CERVICAL RADICULITIS: ICD-10-CM

## 2024-04-08 LAB — POCT GLUCOSE: 86 MG/DL (ref 70–110)

## 2024-04-08 PROCEDURE — 25000003 PHARM REV CODE 250: Performed by: STUDENT IN AN ORGANIZED HEALTH CARE EDUCATION/TRAINING PROGRAM

## 2024-04-08 PROCEDURE — 63600175 PHARM REV CODE 636 W HCPCS: Performed by: STUDENT IN AN ORGANIZED HEALTH CARE EDUCATION/TRAINING PROGRAM

## 2024-04-08 PROCEDURE — 62321 NJX INTERLAMINAR CRV/THRC: CPT | Performed by: STUDENT IN AN ORGANIZED HEALTH CARE EDUCATION/TRAINING PROGRAM

## 2024-04-08 PROCEDURE — 25500020 PHARM REV CODE 255: Performed by: STUDENT IN AN ORGANIZED HEALTH CARE EDUCATION/TRAINING PROGRAM

## 2024-04-08 PROCEDURE — A4216 STERILE WATER/SALINE, 10 ML: HCPCS | Performed by: STUDENT IN AN ORGANIZED HEALTH CARE EDUCATION/TRAINING PROGRAM

## 2024-04-08 PROCEDURE — 62321 NJX INTERLAMINAR CRV/THRC: CPT | Mod: ,,, | Performed by: STUDENT IN AN ORGANIZED HEALTH CARE EDUCATION/TRAINING PROGRAM

## 2024-04-08 RX ORDER — LIDOCAINE HYDROCHLORIDE 10 MG/ML
1 INJECTION, SOLUTION EPIDURAL; INFILTRATION; INTRACAUDAL; PERINEURAL ONCE
Status: COMPLETED | OUTPATIENT
Start: 2024-04-08 | End: 2024-04-08

## 2024-04-08 RX ORDER — SODIUM CHLORIDE 9 MG/ML
INJECTION, SOLUTION INTRAMUSCULAR; INTRAVENOUS; SUBCUTANEOUS
Status: DISCONTINUED | OUTPATIENT
Start: 2024-04-08 | End: 2024-04-08 | Stop reason: HOSPADM

## 2024-04-08 RX ORDER — LIDOCAINE HYDROCHLORIDE 10 MG/ML
INJECTION, SOLUTION EPIDURAL; INFILTRATION; INTRACAUDAL; PERINEURAL
Status: DISCONTINUED | OUTPATIENT
Start: 2024-04-08 | End: 2024-04-08 | Stop reason: HOSPADM

## 2024-04-08 RX ORDER — DEXAMETHASONE SODIUM PHOSPHATE 10 MG/ML
INJECTION INTRAMUSCULAR; INTRAVENOUS
Status: DISCONTINUED | OUTPATIENT
Start: 2024-04-08 | End: 2024-04-08 | Stop reason: HOSPADM

## 2024-04-08 RX ORDER — SODIUM CHLORIDE, SODIUM LACTATE, POTASSIUM CHLORIDE, CALCIUM CHLORIDE 600; 310; 30; 20 MG/100ML; MG/100ML; MG/100ML; MG/100ML
INJECTION, SOLUTION INTRAVENOUS CONTINUOUS
Status: DISCONTINUED | OUTPATIENT
Start: 2024-04-08 | End: 2024-04-08 | Stop reason: HOSPADM

## 2024-04-08 RX ADMIN — LIDOCAINE HYDROCHLORIDE 5 MG: 10 INJECTION, SOLUTION EPIDURAL; INFILTRATION; INTRACAUDAL; PERINEURAL at 09:04

## 2024-04-08 RX ADMIN — SODIUM CHLORIDE, POTASSIUM CHLORIDE, SODIUM LACTATE AND CALCIUM CHLORIDE: 600; 310; 30; 20 INJECTION, SOLUTION INTRAVENOUS at 09:04

## 2024-04-08 NOTE — OP NOTE
Patient: Ijeoma Issa                                                    MRN: [id number]  : 1970                                              Date of procedure: 2024      Pre Procedure Diagnosis: Cervical, cervicothoracic Radiculopathy    Post Procedure Diagnosis: Same    Procedure: Cervical Epidural Steroid Injection Under Fluoroscopy at C6-7    Attending: Shaila Lynne MD     Local Anesthetic Injected: Lidocaine 1% 3 ml    Sedation Medications: None    Estimated Blood Loss: None    Complication: None    Procedure:  After informed consent was obtained, patient was taken to the fluoroscopy suite and placed in a prone position.  The skin was prepped and draped in the usual sterile fashion using chlorhexidine. The skin and subcutaneous tissue was infiltrated with 3mLs of 1% Lidocaine using a 25 gauge 1.5 inch needle.  The 20-gauge Tuoehy needle was advanced with the aid of fluoroscopy using the water drop loss of resistance technique at the C6-7 interspinous space using an intralaminer approach.  Proper placement into the epidural space was confirmed by the loss of resistance.  There was no CSF, heme or paresthesias.  After negative aspiration, 0.5mL of contrast was injected.  The contrast confirmed the needle placement by spread delineating the epidural space in multiple views.  Then after negative aspiration, an injectate consisting of 4mLs of 0.5mL of 10mg/mL of Dexamethasone, 0.5mL of preservative free lidocaine and 3mL of preservative free normal saline was injected.  Patient tolerated the procedure well and all needles were removed intact.  There were no complications.    Patient was observed in recovery and discharged home under supervision with discharge instructions in stable condition.

## 2024-04-09 VITALS
TEMPERATURE: 98 F | SYSTOLIC BLOOD PRESSURE: 117 MMHG | RESPIRATION RATE: 18 BRPM | DIASTOLIC BLOOD PRESSURE: 67 MMHG | BODY MASS INDEX: 41.92 KG/M2 | OXYGEN SATURATION: 96 % | WEIGHT: 245.56 LBS | HEART RATE: 58 BPM | HEIGHT: 64 IN

## 2024-04-11 ENCOUNTER — OFFICE VISIT (OUTPATIENT)
Dept: NEUROSURGERY | Facility: CLINIC | Age: 54
End: 2024-04-11
Payer: COMMERCIAL

## 2024-04-11 VITALS
WEIGHT: 252.31 LBS | HEART RATE: 65 BPM | SYSTOLIC BLOOD PRESSURE: 148 MMHG | DIASTOLIC BLOOD PRESSURE: 85 MMHG | HEIGHT: 64 IN | BODY MASS INDEX: 43.08 KG/M2

## 2024-04-11 DIAGNOSIS — R29.898 LEFT ARM WEAKNESS: ICD-10-CM

## 2024-04-11 DIAGNOSIS — M50.20 PROTRUSION OF CERVICAL INTERVERTEBRAL DISC: ICD-10-CM

## 2024-04-11 DIAGNOSIS — M79.602 LEFT ARM PAIN: ICD-10-CM

## 2024-04-11 PROCEDURE — 3079F DIAST BP 80-89 MM HG: CPT | Mod: CPTII,S$GLB,, | Performed by: NEUROLOGICAL SURGERY

## 2024-04-11 PROCEDURE — 3008F BODY MASS INDEX DOCD: CPT | Mod: CPTII,S$GLB,, | Performed by: NEUROLOGICAL SURGERY

## 2024-04-11 PROCEDURE — 1159F MED LIST DOCD IN RCRD: CPT | Mod: CPTII,S$GLB,, | Performed by: NEUROLOGICAL SURGERY

## 2024-04-11 PROCEDURE — 99204 OFFICE O/P NEW MOD 45 MIN: CPT | Mod: S$GLB,,, | Performed by: NEUROLOGICAL SURGERY

## 2024-04-11 PROCEDURE — 3044F HG A1C LEVEL LT 7.0%: CPT | Mod: CPTII,S$GLB,, | Performed by: NEUROLOGICAL SURGERY

## 2024-04-11 PROCEDURE — 3077F SYST BP >= 140 MM HG: CPT | Mod: CPTII,S$GLB,, | Performed by: NEUROLOGICAL SURGERY

## 2024-04-11 RX ORDER — DIAZEPAM 5 MG/1
5 TABLET ORAL SEE ADMIN INSTRUCTIONS
Qty: 3 TABLET | Refills: 0 | Status: SHIPPED | OUTPATIENT
Start: 2024-04-11 | End: 2024-05-06

## 2024-04-11 NOTE — PROGRESS NOTES
HPI:  This is a very pleasant 54-year-old woman who presents with abrupt and insidious onset of left upper extremity weakness with sensory loss.  In February 2024 she woke from sleep with diffuse left upper extremity numbness and weakness.  She was evaluated in the hospital by Neurology.  Stroke workup including MRI brain and head CTA was negative.  MRI of the left brachial plexus was also negative.  She was seen by Orthopedic surgery who found no musculoskeletal etiology.  She reports profound weakness involving all muscle groups of the left upper extremity.  With physical therapy, however, she now notes the ability to flex and extend the left index finger and recent ability to slightly abduct the left arm with deltoid.  She reports improved sensation in the arm proximally but persistent decreased sensation to light touch diffusely from the elbow distally.  She reports intermittent left anterior lateral shoulder pain, worse at night.  She denies axial neck pain.  She reports decreased rotation to the left.  She also notes recent onset of hoarse voice after vomiting.  She underwent a C6-7 interlaminar ALEENA on April 8, 2024 and reports no appreciable improvement of her symptoms.  She is taking gabapentin which is helpful for dysesthesias.  She denies right upper extremity symptoms.  EMG nerve conduction study left upper extremity 04/02/2024 showed no radiculopathy, myopathy, neuropathy.  However, active EMG changes throughout the left upper extremity or identified pointing to a possible central origin, including volition or stroke.    Smoking status:  None  Past Medical History:   Diagnosis Date    Abnormal mammogram     neg biospy    Allergic asthma     Anxiety     Arthritis     Dermatitis     Dr. Weil, Extremities    Diabetes     Diverticulosis of colon     GERD (gastroesophageal reflux disease)     Mass of right breast     Postmenopausal hormone replacement therapy     Tubal pregnancy     Uterine fibroid        Past Surgical History:   Procedure Laterality Date    BREAST BIOPSY Right 2012    benign     SECTION, LOW TRANSVERSE      CHOLECYSTECTOMY      COLONOSCOPY N/A 3/26/2024    Procedure: COLONOSCOPY;  Surgeon: Karl Brady MD;  Location: [facility] ENDO;  Service: Endoscopy;  Laterality: N/A;  ppm sent    EPIDURAL STEROID INJECTION INTO CERVICAL SPINE N/A 2024    Procedure: Injection-steroid-epidural-cervical;  Surgeon: Shaila Lynne MD;  Location: [facility] A[facility] OR;  Service: Anesthesiology;  Laterality: N/A;  C6-7    HYSTERECTOMY      re: benign tumors per the patient    right ankle  10/10/2014    TOTAL ABDOMINAL HYSTERECTOMY W/ BILATERAL SALPINGOOPHORECTOMY      c appendectomy    TUBAL LIGATION       Social History     Tobacco Use    Smoking status: Never     Passive exposure: Never    Smokeless tobacco: Never   Substance Use Topics    Alcohol use: Yes     Alcohol/week: 0.0 standard drinks of alcohol     Comment: very seldom    Drug use: No       Review of Systems: All systems reviewed and are as above or otherwise negative.    General: well developed, well nourished, no distress.   Head: normocephalic, atraumatic  Eyes: pupils equal, round, reactive to light with accomodation, EOMI.   Neck: trachea midline. No JVD  Cardiovascular: No LE edema   Pulmonary: normal respirations, no signs of respiratory distress  Abdomen: soft, non-distended, not tender to palpation  Sensory: intact to light touch throughout  Extremities: No bruising, deformity  Skin: Skin is warm, dry and intact.    Motor Strength: Moves all extremities spontaneously with good tone.  Full strength upper and lower extremities. No abnormal movements seen.     Strength  Deltoids Triceps Biceps Wrist Extension Wrist Flexion Hand    Upper: R 5/5 5/5 5/5 5/5 5/5 5/5    L See below          Iliopsoas Quadriceps Knee  Flexion Tibialis  anterior Gastro- cnemius EHL   Lower: R 5/5 5/5 5/5 5/5 5/5 5/5    L 5/5 5/5 5/5 5/5 5/ 5/      Neurologic: Alert and oriented. Thought content appropriate.  GCS: Motor: 6/Verbal: 5/Eyes: 4 GCS Total: 15  Mental Status: Awake, Alert, Oriented x 4  Language: No aphasia  Speech: No dysarthria  Cranial nerves: face symmetric, tongue midline, CN II-XII grossly intact.     Pronator Drift: no drift noted  Finger-to-nose: Intact bilaterally  DTR's: 2 + and symmetric in UE and LE  Delvalle: absent  Clonus: absent  Babinski: absent    Gait: normal    Tandem Gait: No difficulty           Able to walk on heels & toes    Cervical Spine  ROM:  Decreased left rotation, otherwise full range of motion other planes   Nontender to palpation        Significant Exam Findings:  Left deltoid graded 2/5, left 1st digit flexion/extension 3/5,  all other muscle groups left upper extremity 0/5.  Decreased sensation light touch diffuse, non-dermatomal, from elbow to distal.  Preserved sensation to light touch proximal upper extremity.  Diminished biceps, brachioradialis, triceps reflex on the left.    Significant Radiology Findings:  I personally reviewed MRI brain, left brachial plexus, and non contrasted cervical spine.  Pertinent findings include no evidence of infarction, mass lesions, or neuritis.  MRI cervical is motion degraded.  At C6-7 there is disc desiccation with a left paracentral/foraminal disc protrusion which on my review contributes to moderate to severe left foraminal stenosis.  Also question intrinsic cord signal change extending from C4-C7.    Analysis:  The C6-7 disc protrusion with left foraminal stenosis would not explain her diffuse left upper extremity motor and sensory changes.  Likewise, other reasonable etiologies have been excluded up to this point.  I ordered an MRI cervical spine with contrast and mild sedation to further assess the possible intrinsic cord signal change noted from C4-C7.  Recommend continue home PT and OT as she is slowly regaining some function with conservative management.  We will  see her back with the imaging for review.    [unfilled]   Ijeoma was seen today for extremity weakness and neck pain.    Diagnoses and all orders for this visit:    Left arm weakness  -     Ambulatory referral/consult to Neurosurgery    Left arm pain  -     Ambulatory referral/consult to Neurosurgery    Protrusion of cervical intervertebral disc  -     Ambulatory referral/consult to Neurosurgery  -     MRI Cervical Spine W WO Cont; Future    Other orders  The following orders have not been finalized:  -     diazePAM (VALIUM) 5 MG tablet         I spent a total of 45 minutes on the day of the visit.  This includes face to face time and non-face to face time preparing to see the patient (eg, review of tests), obtaining and/or reviewing separately obtained history, documenting clinical information in the electronic or other health record, independently interpreting results and communicating results to the patient/family/caregiver, or care coordinator.

## 2024-04-11 NOTE — LETTER
April 11, 2024      [facility] [address] [place] [address]  Phone: [phone]  Fax: [phone]       Patient: Ijeoma Issa   YOB: 1970  Date of Visit: 04/11/2024    To Whom It May Concern:    Ijeoma Issa  was at [facility] on 04/11/2024. She should continue to remain off work until evaluated again on 5-6-24. If you have any questions or concerns, or if I can be of further assistance, please do not hesitate to contact me.    Sincerely,    Silvestre Grigsby, DO

## 2024-04-18 ENCOUNTER — HOSPITAL ENCOUNTER (OUTPATIENT)
Dept: RADIOLOGY | Facility: HOSPITAL | Age: 54
Discharge: HOME | End: 2024-04-18
Attending: NEUROLOGICAL SURGERY
Payer: COMMERCIAL

## 2024-04-18 DIAGNOSIS — M50.20 PROTRUSION OF CERVICAL INTERVERTEBRAL DISC: ICD-10-CM

## 2024-04-18 PROCEDURE — 72156 MRI NECK SPINE W/O & W/DYE: CPT | Mod: TC

## 2024-04-18 PROCEDURE — A9585 GADOBUTROL INJECTION: HCPCS

## 2024-04-18 PROCEDURE — 72156 MRI NECK SPINE W/O & W/DYE: CPT | Mod: 26,,, | Performed by: RADIOLOGY

## 2024-04-18 PROCEDURE — 25500020 PHARM REV CODE 255

## 2024-04-18 RX ORDER — GADOBUTROL 604.72 MG/ML
INJECTION INTRAVENOUS
Status: COMPLETED
Start: 2024-04-18 | End: 2024-04-18

## 2024-04-18 RX ADMIN — GADOBUTROL 10 ML: 604.72 INJECTION INTRAVENOUS at 08:04

## 2024-04-30 ENCOUNTER — OFFICE VISIT (OUTPATIENT)
Dept: PAIN MEDICINE | Facility: CLINIC | Age: 54
End: 2024-04-30
Payer: COMMERCIAL

## 2024-04-30 VITALS — WEIGHT: 252 LBS | BODY MASS INDEX: 43.02 KG/M2 | HEIGHT: 64 IN

## 2024-04-30 DIAGNOSIS — M79.602 LEFT ARM PAIN: Primary | ICD-10-CM

## 2024-04-30 PROCEDURE — 1160F RVW MEDS BY RX/DR IN RCRD: CPT | Mod: CPTII,S$GLB,, | Performed by: STUDENT IN AN ORGANIZED HEALTH CARE EDUCATION/TRAINING PROGRAM

## 2024-04-30 PROCEDURE — 1159F MED LIST DOCD IN RCRD: CPT | Mod: CPTII,S$GLB,, | Performed by: STUDENT IN AN ORGANIZED HEALTH CARE EDUCATION/TRAINING PROGRAM

## 2024-04-30 PROCEDURE — 99214 OFFICE O/P EST MOD 30 MIN: CPT | Mod: S$GLB,,, | Performed by: STUDENT IN AN ORGANIZED HEALTH CARE EDUCATION/TRAINING PROGRAM

## 2024-04-30 PROCEDURE — 99999 PR PBB SHADOW E&M-EST. PATIENT-LVL IV: CPT | Mod: PBBFAC,,, | Performed by: STUDENT IN AN ORGANIZED HEALTH CARE EDUCATION/TRAINING PROGRAM

## 2024-04-30 PROCEDURE — 3008F BODY MASS INDEX DOCD: CPT | Mod: CPTII,S$GLB,, | Performed by: STUDENT IN AN ORGANIZED HEALTH CARE EDUCATION/TRAINING PROGRAM

## 2024-04-30 PROCEDURE — 3044F HG A1C LEVEL LT 7.0%: CPT | Mod: CPTII,S$GLB,, | Performed by: STUDENT IN AN ORGANIZED HEALTH CARE EDUCATION/TRAINING PROGRAM

## 2024-04-30 NOTE — PROGRESS NOTES
[place] - Department    Allison Adler MD      First Office Visit: 3/22/24  Today' Date: 4/30/2024  Last Office Visit: None    Chief complaint: L arm pain     HPI: Pt is a pleasant 54 y.o., who presents for evaluation. Referred by BERRY Martinez. Pt seen previously for L arm pain, weakness, and tinging since Feb. Pt seen today for f/u from ILESI C6-7 on 4/8/24. States she has had minimal relief from the ALEENA. States her pain currently goes from the neck to the L shoulder. Denies having sharp, shooting pain going down the entire L arm today. Has been taking gabapentin and thinks it is working. States she has noticed more mobility of her arm. Pt is to see Dr. Grigsby tomorrow to f/u on additional imaging. Of note, pt's symptoms of L arm pain happened suddenly in Feb when she was seen in the ED for a r/o stroke. Has seen a neurologist and has had an EMG done. So far the only notable finding has been a C6-7 disc protrusion to the L. Endorses having headaches, balance issues, and difficulty holding objects with the L arm. Denies BB changes. Has been doing PT and HEP since being evaluated for L arm weakness in Feb.        Pain disability score: 56  Pain score: 6    Relevant Imaging/ Testing:   MR C-spine w/ and w/o contrast 4/24  EMG (?)  MR brachial plexus 2/24  XR L shoulder 2/24  MR C-spine 2/24    Procedures: None    Date of board of pharmacy review:4/30/2024  Date of opioid risk screening/ pain psych: None  Date of opioid agreement and consent: None  Date of urine drug screen: None  Date of random pill count: None     was reviewed today: reviewed, no concerns     Prescribed medications: None    See EHR for  PMH, PSH, FH, SH, Medications and Allergy    ROS:  Positive for pain  ROS     PE:  There were no vitals filed for this visit.  General: Pleasant, no distress  HEENT: NC/ AT. PERRLA  CV: Radial pulses intact  Pulm: No distress  Ext: No edema    Physical Exam     Neuromusculoskeletal:  Head: NC, AT. PERRLA.  No occipital tenderness  Neck: Intact range of motions, extension, flexion, rotation. B Facet loading. Neg Spurling. Marked tenderness to light touch.  5/5 Strength, normal tone. Neg Delvalle's  Shoulder: limited range of motion. Min Bicep groove tenderness. 3/5 Strength.   Hip: Intact range of motion  SI: Level  Knee: Intact range of motion  Reflexes: normal Bicep  Strength: 3/5 LUE, 5/5 globally elsewhere  Sensory: numbness of LUE   Skin: No bruising, erythema  Gait: Normal      Impression:  L arm pain  L arm weakness  C6-7 disc protrusion  Relevant History  BMI 43.77  Migraine  Asthma  Osteoarthritis         Plan:  Discussed options  Imaging/ relevant records viewed/ reviewed/ discussed  Imaging results viewed and reviewed (noted above)/ reviewed with patient   reviewed  Cont HEP  Cont PT  Consider L MBB C5-6 and C6-7 for axial neck pain vs L TFESI C6-7 - will f/u after appt with Dr. Grigsby  Cont care with Dr. Grigsby      Prescribed medications:  1. None    The impression and plan were discussed and explained in detail. All the questions were answered. Education was provided accordingly.         Follow-up:  Pt to schedule     Shaila Lynne MD

## 2024-05-01 ENCOUNTER — OFFICE VISIT (OUTPATIENT)
Dept: NEUROSURGERY | Facility: CLINIC | Age: 54
End: 2024-05-01
Payer: COMMERCIAL

## 2024-05-01 VITALS
SYSTOLIC BLOOD PRESSURE: 120 MMHG | BODY MASS INDEX: 43.02 KG/M2 | HEART RATE: 70 BPM | WEIGHT: 252 LBS | DIASTOLIC BLOOD PRESSURE: 71 MMHG | HEIGHT: 64 IN

## 2024-05-01 DIAGNOSIS — R29.898 LEFT ARM WEAKNESS: ICD-10-CM

## 2024-05-01 DIAGNOSIS — M54.12 CERVICAL RADICULOPATHY: Primary | ICD-10-CM

## 2024-05-01 PROCEDURE — 3008F BODY MASS INDEX DOCD: CPT | Mod: CPTII,S$GLB,, | Performed by: NEUROLOGICAL SURGERY

## 2024-05-01 PROCEDURE — 3074F SYST BP LT 130 MM HG: CPT | Mod: CPTII,S$GLB,, | Performed by: NEUROLOGICAL SURGERY

## 2024-05-01 PROCEDURE — 1159F MED LIST DOCD IN RCRD: CPT | Mod: CPTII,S$GLB,, | Performed by: NEUROLOGICAL SURGERY

## 2024-05-01 PROCEDURE — 3044F HG A1C LEVEL LT 7.0%: CPT | Mod: CPTII,S$GLB,, | Performed by: NEUROLOGICAL SURGERY

## 2024-05-01 PROCEDURE — 3078F DIAST BP <80 MM HG: CPT | Mod: CPTII,S$GLB,, | Performed by: NEUROLOGICAL SURGERY

## 2024-05-01 PROCEDURE — 99214 OFFICE O/P EST MOD 30 MIN: CPT | Mod: S$GLB,,, | Performed by: NEUROLOGICAL SURGERY

## 2024-05-01 NOTE — LETTER
May 1, 2024      [facility] [address] [place] [address]  Phone: [phone]  Fax: [phone]       Patient: Ijeoma Issa   YOB: 1970  Date of Visit: 05/01/2024    To Whom It May Concern:    Ijeoma Issa was at [facility] on 05/01/2024. Patient should remain off work due to limited mobility until evaluated again after further testing. If you have any questions or concerns, or if I can be of further assistance, please do not hesitate to contact me.    Sincerely,    Silvestre Grigsby, DO

## 2024-05-01 NOTE — PROGRESS NOTES
Ijeoma returns regarding insidious onset left upper extremity monoplegia.  MRI brain and brachial plexus were negative.  EMG nerve conduction of the left upper extremity 04/2024 showed no abnormalities.  She was evaluated by Neurology who ordered MRI cervical spine and subsequently referred her to us.  MRI cervical spine shows a C6-7 disc protrusion with left foraminal stenosis.  No other areas of canal or foraminal stenosis.  No cord signal change.  She notes that a C6-7 interlaminar ALEENA improved my strength by 10%.  She also notes that with physical therapy she is now able to slightly abducting arm, extend the elbow and flex the elbow though she notes that she can not feel that she is moving the arm.    She is undergoing home physical therapy twice daily.  She reports headaches with left arm movement.  She denies neck or arm pain.  She denies right upper extremity weakness or numbness.      She is taking dapsone for psoriasis.  She was also on multiple other medications.  She notes a medication change involving the dapsone near the time of onset of symptoms.    All recent imaging again reviewed with the patient.  EMG 04/02/2024 again reviewed with the patient.    Exam:  No distress  Awake, alert, oriented to person place and time.    Cranial nerves 2-12 grossly intact.    Strength is graded 2/5 left deltoid/triceps/biceps, 3/5 index finger extension and flexion, left /wrist extension/wrist flexion 0/5.  Right upper extremity 5/5.  Bilateral lower extremities 5/5.  Decreasing sensation diffuse left upper extremity, non-dermatomal.  Gait and stance are normal    Analysis:  Again the C6-7 left foraminal stenosis involving a single nerve root does not explain her presentation and examination findings.  As all other structural etiologies have been excluded, I did explain that she could consider an ACDF C6-7, though she should have low expectations of this procedure resolving her symptoms.  Alternatively, we can  repeat EMG/nerve conduction study 6 weeks removed from the initial study to assess whether or not new changes have evolved.  She can also continue physical therapy as she has had some incremental improvement in strength.  It may also be worth reviewing her medications and consider a medication holiday for some medications that may be a potential cause of her symptoms, ?  Dapsone.  She voiced understanding of all the above options.  She prefers to repeat the EMG, discuss her medications with her rheumatologist/PCP, and continue PT.  I will see her back after the EMG nerve conduction study for review.      Diagnosis:  Left upper extremity monoparesis    I spent a total of 30 minutes on the day of the visit.  This includes face to face time and non-face to face time preparing to see the patient (eg, review of tests), obtaining and/or reviewing separately obtained history, documenting clinical information in the electronic or other health record, independently interpreting results and communicating results to the patient/family/caregiver, or care coordinator.

## 2024-05-02 ENCOUNTER — TELEPHONE (OUTPATIENT)
Dept: NEUROLOGY | Facility: CLINIC | Age: 54
End: 2024-05-02
Payer: COMMERCIAL

## 2024-05-02 NOTE — TELEPHONE ENCOUNTER
----- Message from Jovani Dean sent at 5/2/2024  9:29 AM CDT -----  Regarding: ret call  Contact: IJEOMA ALMANZAR [[id number]]  Type:  Patient Returning Call    Who Called:  Ijeoma    Who Left Message for Patient:  Katie    Does the patient know what this is regarding?:  Yes-EMG    Best Call Back Number:  [phone]    Additional Information:  Please call to advise.

## 2024-05-06 ENCOUNTER — TELEPHONE (OUTPATIENT)
Dept: NEUROSURGERY | Facility: CLINIC | Age: 54
End: 2024-05-06
Payer: COMMERCIAL

## 2024-05-06 ENCOUNTER — HOSPITAL ENCOUNTER (OUTPATIENT)
Dept: RADIOLOGY | Facility: HOSPITAL | Age: 54
Discharge: HOME | End: 2024-05-06
Attending: STUDENT IN AN ORGANIZED HEALTH CARE EDUCATION/TRAINING PROGRAM
Payer: COMMERCIAL

## 2024-05-06 ENCOUNTER — LAB VISIT (OUTPATIENT)
Dept: LAB | Facility: HOSPITAL | Age: 54
End: 2024-05-06
Attending: STUDENT IN AN ORGANIZED HEALTH CARE EDUCATION/TRAINING PROGRAM
Payer: COMMERCIAL

## 2024-05-06 ENCOUNTER — OFFICE VISIT (OUTPATIENT)
Dept: FAMILY MEDICINE | Facility: CLINIC | Age: 54
End: 2024-05-06
Payer: COMMERCIAL

## 2024-05-06 VITALS
SYSTOLIC BLOOD PRESSURE: 116 MMHG | HEART RATE: 73 BPM | WEIGHT: 267.19 LBS | HEIGHT: 64 IN | OXYGEN SATURATION: 95 % | DIASTOLIC BLOOD PRESSURE: 62 MMHG | RESPIRATION RATE: 18 BRPM | BODY MASS INDEX: 45.62 KG/M2

## 2024-05-06 DIAGNOSIS — R60.1 GENERALIZED EDEMA: Primary | ICD-10-CM

## 2024-05-06 DIAGNOSIS — R60.1 GENERALIZED EDEMA: ICD-10-CM

## 2024-05-06 LAB
BACTERIA #/AREA URNS HPF: NORMAL /HPF
CREAT UR-MCNC: 46.4 MG/DL (ref 15–325)
MICROSCOPIC COMMENT: NORMAL
PROT UR-MCNC: <7 MG/DL (ref 0–15)
PROT/CREAT UR: NORMAL MG/G{CREAT} (ref 0–0.2)
RBC #/AREA URNS HPF: 1 /HPF (ref 0–4)
SQUAMOUS #/AREA URNS HPF: 5 /HPF
WBC #/AREA URNS HPF: 1 /HPF (ref 0–5)

## 2024-05-06 PROCEDURE — 3078F DIAST BP <80 MM HG: CPT | Mod: CPTII,S$GLB,, | Performed by: STUDENT IN AN ORGANIZED HEALTH CARE EDUCATION/TRAINING PROGRAM

## 2024-05-06 PROCEDURE — 71046 X-RAY EXAM CHEST 2 VIEWS: CPT | Mod: TC

## 2024-05-06 PROCEDURE — 3074F SYST BP LT 130 MM HG: CPT | Mod: CPTII,S$GLB,, | Performed by: STUDENT IN AN ORGANIZED HEALTH CARE EDUCATION/TRAINING PROGRAM

## 2024-05-06 PROCEDURE — 3044F HG A1C LEVEL LT 7.0%: CPT | Mod: CPTII,S$GLB,, | Performed by: STUDENT IN AN ORGANIZED HEALTH CARE EDUCATION/TRAINING PROGRAM

## 2024-05-06 PROCEDURE — 3008F BODY MASS INDEX DOCD: CPT | Mod: CPTII,S$GLB,, | Performed by: STUDENT IN AN ORGANIZED HEALTH CARE EDUCATION/TRAINING PROGRAM

## 2024-05-06 PROCEDURE — 93005 ELECTROCARDIOGRAM TRACING: CPT | Mod: S$GLB,,, | Performed by: STUDENT IN AN ORGANIZED HEALTH CARE EDUCATION/TRAINING PROGRAM

## 2024-05-06 PROCEDURE — 99999 PR PBB SHADOW E&M-EST. PATIENT-LVL V: CPT | Mod: PBBFAC,,, | Performed by: STUDENT IN AN ORGANIZED HEALTH CARE EDUCATION/TRAINING PROGRAM

## 2024-05-06 PROCEDURE — 1160F RVW MEDS BY RX/DR IN RCRD: CPT | Mod: CPTII,S$GLB,, | Performed by: STUDENT IN AN ORGANIZED HEALTH CARE EDUCATION/TRAINING PROGRAM

## 2024-05-06 PROCEDURE — 81000 URINALYSIS NONAUTO W/SCOPE: CPT | Performed by: STUDENT IN AN ORGANIZED HEALTH CARE EDUCATION/TRAINING PROGRAM

## 2024-05-06 PROCEDURE — 99214 OFFICE O/P EST MOD 30 MIN: CPT | Mod: S$GLB,,, | Performed by: STUDENT IN AN ORGANIZED HEALTH CARE EDUCATION/TRAINING PROGRAM

## 2024-05-06 PROCEDURE — 71046 X-RAY EXAM CHEST 2 VIEWS: CPT | Mod: 26,,, | Performed by: RADIOLOGY

## 2024-05-06 PROCEDURE — 93010 ELECTROCARDIOGRAM REPORT: CPT | Mod: S$GLB,,, | Performed by: INTERNAL MEDICINE

## 2024-05-06 PROCEDURE — 84156 ASSAY OF PROTEIN URINE: CPT | Performed by: STUDENT IN AN ORGANIZED HEALTH CARE EDUCATION/TRAINING PROGRAM

## 2024-05-06 PROCEDURE — 1159F MED LIST DOCD IN RCRD: CPT | Mod: CPTII,S$GLB,, | Performed by: STUDENT IN AN ORGANIZED HEALTH CARE EDUCATION/TRAINING PROGRAM

## 2024-05-06 RX ORDER — DOXYCYCLINE HYCLATE 100 MG
TABLET ORAL
COMMUNITY
Start: 2024-04-22 | End: 2024-05-28 | Stop reason: ALTCHOICE

## 2024-05-06 RX ORDER — DAPSONE 25 MG/1
50 TABLET ORAL 2 TIMES DAILY
COMMUNITY

## 2024-05-06 NOTE — TELEPHONE ENCOUNTER
----- Message from Margarita Noriega RN sent at 5/6/2024  9:43 AM CDT -----  Please contact pt and schedule follow up appt with Dr. Grigsby after EMG. Thank you.

## 2024-05-06 NOTE — PROGRESS NOTES
"[facility]   OFFICE VISIT NOTE    Patient Name: Ijeoma Issa  YOB: 1970    PRESENTING HISTORY     History of Present Illness:  Ms. Ijeoma Issa is a 54 y.o. female coming in for fluid weight gain   She just woke up this morning and felt very swollen throughout her body including her bilateral lower extremities, her chest, and abdomen.  Denies starting any new medication.  Denies any recent immobilization or surgery.  She believes she is getting more easily out of breath.  Feels uncomfortable laying flat on her back.  She also feels more pressure on across her chest.     She still has limited range of motion on her left hand.    Now able to wiggle her thumb and index finger still very limited    Review of Systems   Constitutional:  Negative for chills and fever.   Respiratory:  Positive for shortness of breath.    Cardiovascular:  Positive for palpitations and leg swelling.   Gastrointestinal:  Negative for nausea and vomiting.          OBJECTIVE:   Vital Signs:  Vitals:    05/06/24 1400   BP: 116/62   Pulse: 73   Resp: 18   SpO2: 95%   Weight: 121.2 kg (267 lb 3.2 oz)   Height: 5' 4" (1.626 m)           Physical Exam  Constitutional:       General: She is not in acute distress.     Appearance: She is obese. She is not ill-appearing or toxic-appearing.   HENT:      Head: Normocephalic and atraumatic.      Mouth/Throat:      Mouth: Mucous membranes are moist.      Pharynx: Uvula midline. No pharyngeal swelling.   Cardiovascular:      Rate and Rhythm: Normal rate and regular rhythm.      Comments: Able to lay down in supine position   Pulmonary:      Effort: Pulmonary effort is normal. No tachypnea, bradypnea, accessory muscle usage, prolonged expiration or respiratory distress.      Breath sounds: Normal breath sounds. No stridor. No wheezing, rhonchi or rales.   Abdominal:      General: Bowel sounds are normal.      Palpations: Abdomen is soft.      Tenderness: " There is no abdominal tenderness. There is no guarding or rebound.   Musculoskeletal:      Right lower le+ Edema present.      Left lower le+ Edema present.   Feet:      Comments: Bilateral feet swelling   Neurological:      General: No focal deficit present.      Mental Status: She is alert.   Psychiatric:         Mood and Affect: Mood normal.         Behavior: Behavior normal.         ASSESSMENT & PLAN:     Generalized edema  -     B-TYPE NATRIURETIC PEPTIDE; Future; Expected date: 2024  -     COMPREHENSIVE METABOLIC PANEL; Future; Expected date: 2024  -     LIPID PANEL; Future; Expected date: 2024  -     Protein / creatinine ratio, urine; Future; Expected date: 2024  -     Urinalysis Microscopic; Future; Expected date: 2024  -     TROPONIN I; Future; Expected date: 2024  -     D-DIMER, QUANTITATIVE; Future; Expected date: 2024  -     X-Ray Chest PA And Lateral; Future; Expected date: 2024  -     IN OFFICE EKG 12-LEAD (to Muse)  -     Sedimentation rate; Future; Expected date: 2024  -     C-REACTIVE PROTEIN; Future; Expected date: 2024    Differentials:   Acute generalized edema: nephrotic syndrome, acute renal failure, acute heart failure   Chronic generalized edema: chronic cardiac insufficiency, chronic renal insufficiency, liver cirrhosis, malnutrition     In office EKG showed heart rate of 68, normal sinus rhythm.  No obvious ST segment elevation or T-wave changes   This is acute generalized edema so I will obtain basic lab work looking for nephrotic syndrome, acute renal failure as well as acute heart failure    For nephrotic syndrome, I will obtain protein/creatinine ratio.  If there is any concern for proteinuria, I will consider getting 24 hour protein.  She also endorses having increased pressure on her chest and getting more short of breath easily.  We will obtain x-ray.    We will also obtain lab work including troponin to rule out any  MI.  D-dimer for any pulmonary embolism.  BNP to look for any acute heart failure  May consider echocardiogram for further evaluation    Gave ED precautions for worsening chest discomfort, difficulty breathing and she verbalized understanding    I spent a total of 35 minutes on the day of the visit.  This includes face-to-face time and non face-to-face time preparing to see the patient (e.g., review of tests) , obtaining and/or reviewing separately obtain history, documenting clinical information in the electronic or other health record, independently interpreting results and communicating results to the patient/family/caregiver, or care coordinator.      Allison Adler MD  Family Medicine  [facility]     This note was created using [employer] voice recognition software that occasionally misinterprets phrases or words

## 2024-05-07 LAB
OHS QRS DURATION: 72 MS
OHS QTC CALCULATION: 406 MS

## 2024-05-08 ENCOUNTER — LAB VISIT (OUTPATIENT)
Dept: LAB | Facility: HOSPITAL | Age: 54
End: 2024-05-08
Attending: STUDENT IN AN ORGANIZED HEALTH CARE EDUCATION/TRAINING PROGRAM
Payer: COMMERCIAL

## 2024-05-08 DIAGNOSIS — R60.1 GENERALIZED EDEMA: ICD-10-CM

## 2024-05-08 LAB
T4 FREE SERPL-MCNC: 0.97 NG/DL (ref 0.71–1.51)
TSH SERPL DL<=0.005 MIU/L-ACNC: 2.22 UIU/ML (ref 0.4–4)

## 2024-05-08 PROCEDURE — 84443 ASSAY THYROID STIM HORMONE: CPT | Performed by: STUDENT IN AN ORGANIZED HEALTH CARE EDUCATION/TRAINING PROGRAM

## 2024-05-08 PROCEDURE — 36415 COLL VENOUS BLD VENIPUNCTURE: CPT | Mod: PO | Performed by: STUDENT IN AN ORGANIZED HEALTH CARE EDUCATION/TRAINING PROGRAM

## 2024-05-08 PROCEDURE — 84439 ASSAY OF FREE THYROXINE: CPT | Performed by: STUDENT IN AN ORGANIZED HEALTH CARE EDUCATION/TRAINING PROGRAM

## 2024-05-10 ENCOUNTER — HOSPITAL ENCOUNTER (OUTPATIENT)
Dept: CARDIOLOGY | Facility: HOSPITAL | Age: 54
Discharge: HOME | End: 2024-05-10
Attending: STUDENT IN AN ORGANIZED HEALTH CARE EDUCATION/TRAINING PROGRAM
Payer: COMMERCIAL

## 2024-05-10 VITALS — HEIGHT: 64 IN | BODY MASS INDEX: 45.58 KG/M2 | WEIGHT: 267 LBS

## 2024-05-10 DIAGNOSIS — R60.1 GENERALIZED EDEMA: ICD-10-CM

## 2024-05-10 PROCEDURE — 93306 TTE W/DOPPLER COMPLETE: CPT | Mod: 26,,, | Performed by: INTERNAL MEDICINE

## 2024-05-10 PROCEDURE — 93306 TTE W/DOPPLER COMPLETE: CPT | Mod: PO

## 2024-05-13 LAB
ASCENDING AORTA: 3.33 CM
AV INDEX (PROSTH): 0.91
AV MEAN GRADIENT: 5 MMHG
AV PEAK GRADIENT: 9 MMHG
AV VALVE AREA BY VELOCITY RATIO: 2.71 CM²
AV VALVE AREA: 3.09 CM²
AV VELOCITY RATIO: 0.8
BSA FOR ECHO PROCEDURE: 2.34 M2
CV ECHO LV RWT: 0.37 CM
DOP CALC AO PEAK VEL: 1.49 M/S
DOP CALC AO VTI: 27 CM
DOP CALC LVOT AREA: 3.4 CM2
DOP CALC LVOT DIAMETER: 2.08 CM
DOP CALC LVOT PEAK VEL: 1.19 M/S
DOP CALC LVOT STROKE VOLUME: 83.55 CM3
DOP CALCLVOT PEAK VEL VTI: 24.6 CM
E WAVE DECELERATION TIME: 160.51 MSEC
E/A RATIO: 1.4
E/E' RATIO: 6.35 M/S
ECHO LV POSTERIOR WALL: 0.89 CM (ref 0.6–1.1)
FRACTIONAL SHORTENING: 33 % (ref 28–44)
INTERVENTRICULAR SEPTUM: 0.89 CM (ref 0.6–1.1)
LEFT ATRIUM SIZE: 3.91 CM
LEFT ATRIUM VOLUME INDEX MOD: 21.4 ML/M2
LEFT ATRIUM VOLUME MOD: 47.38 CM3
LEFT INTERNAL DIMENSION IN SYSTOLE: 3.22 CM (ref 2.1–4)
LEFT VENTRICLE DIASTOLIC VOLUME INDEX: 49.71 ML/M2
LEFT VENTRICLE DIASTOLIC VOLUME: 109.87 ML
LEFT VENTRICLE MASS INDEX: 67 G/M2
LEFT VENTRICLE SYSTOLIC VOLUME INDEX: 18.8 ML/M2
LEFT VENTRICLE SYSTOLIC VOLUME: 41.54 ML
LEFT VENTRICULAR INTERNAL DIMENSION IN DIASTOLE: 4.84 CM (ref 3.5–6)
LEFT VENTRICULAR MASS: 147.65 G
LV LATERAL E/E' RATIO: 5.21 M/S
LV SEPTAL E/E' RATIO: 8.11 M/S
LVOT MG: 2.91 MMHG
LVOT MV: 0.8 CM/S
MV PEAK A VEL: 0.52 M/S
MV PEAK E VEL: 0.73 M/S
PULM VEIN S/D RATIO: 1.46
PV PEAK D VEL: 0.35 M/S
PV PEAK S VEL: 0.51 M/S
RA PRESSURE ESTIMATED: 3 MMHG
RV TISSUE DOPPLER FREE WALL SYSTOLIC VELOCITY 1 (APICAL 4 CHAMBER VIEW): 14.27 CM/S
SINUS: 2.89 CM
STJ: 2.89 CM
TDI LATERAL: 0.14 M/S
TDI SEPTAL: 0.09 M/S
TDI: 0.12 M/S
TRICUSPID ANNULAR PLANE SYSTOLIC EXCURSION: 2.14 CM
Z-SCORE OF LEFT VENTRICULAR DIMENSION IN END DIASTOLE: -4.55
Z-SCORE OF LEFT VENTRICULAR DIMENSION IN END SYSTOLE: -2.88

## 2024-05-20 ENCOUNTER — EXTERNAL HOME HEALTH (OUTPATIENT)
Dept: HOME HEALTH SERVICES | Facility: HOSPITAL | Age: 54
End: 2024-05-20
Payer: COMMERCIAL

## 2024-05-27 NOTE — PROGRESS NOTES
"[facility]   OFFICE VISIT NOTE    Patient Name: Ijeoma Issa  YOB: 1970    PRESENTING HISTORY     History of Present Illness:  Ms. Ijeoma Issa is a 54 y.o. female following up on her generalized swelling     Last follow up 5/6/2024 for generalized edema  Everything was within normal limits including TSH, T4, CRP, ESR, UA, protein/creatinine ratio, D-dimer, troponin  Lipid panel showed elevated triglycerides 170  CMP within normal limits  BNP within normal limits  Echocardiogram was also normal  Heart failure, nephrotic syndrome ruled out     Here for follow up and most concerned about her rash     Review of Systems   Constitutional:  Negative for chills and fever.   Respiratory:  Negative for shortness of breath.    Cardiovascular:  Negative for chest pain.   Gastrointestinal:  Negative for nausea and vomiting.   Genitourinary:  Negative for vaginal discharge and vaginal dryness.   Integumentary:  Positive for rash and mole/lesion.          OBJECTIVE:   Vital Signs:  Vitals:    05/28/24 0953   BP: 118/66   Pulse: 84   Resp: 18   SpO2: 96%   Weight: 122 kg (268 lb 15.4 oz)   Height: 5' 4" (1.626 m)           Physical Exam  Exam conducted with a chaperone present.   Constitutional:       General: She is not in acute distress.     Appearance: She is morbidly obese. She is not ill-appearing or toxic-appearing.   HENT:      Head: Normocephalic and atraumatic.      Mouth/Throat:      Mouth: Mucous membranes are moist.      Pharynx: Uvula midline. No pharyngeal swelling.   Cardiovascular:      Rate and Rhythm: Normal rate and regular rhythm.   Pulmonary:      Effort: Pulmonary effort is normal. No tachypnea, bradypnea, accessory muscle usage, prolonged expiration or respiratory distress.      Breath sounds: Normal breath sounds. No stridor. No wheezing, rhonchi or rales.   Genitourinary:     Labia:         Left: Rash present.       Comments: A small pustule on left " side of the labia that appears raised and erythematous. No active discharge present.   Skin:     Comments: Diffuse papular rash covering her bilateral upper and lower extremities. In different stages of healing. Also presenting on her ventral abdomen and in her groin.   Neurological:      General: No focal deficit present.      Mental Status: She is alert.   Psychiatric:         Mood and Affect: Mood normal.         Behavior: Behavior normal.         ASSESSMENT & PLAN:     Papular urticaria  -     doxycycline (VIBRAMYCIN) 100 MG Cap; Take 1 capsule (100 mg total) by mouth every 12 (twelve) hours. for 10 days  Dispense: 20 capsule; Refill: 0  -     clobetasoL (TEMOVATE) 0.05 % cream; Apply topically 2 (two) times daily. Apply to affected areas of rash on the trunk, arms and legs for 2 weeks at a time, as needed for new flares. Not for face, neck or groin.  Dispense: 60 g; Refill: 0  Discussed the risk of hypopigmentation and skin thinning with prolonged use of topical steroids.  Advised the patient to follow up with her dermatologist   Zyrtec or claritin to help with pruritus     Generalized edema  Last follow up 5/6/2024 for generalized edema  Everything was within normal limits including TSH, T4, CRP, ESR, UA, protein/creatinine ratio, D-dimer, troponin  Lipid panel showed elevated triglycerides 170  CMP within normal limits  BNP within normal limits  Echocardiogram was also normal  Heart failure, nephrotic syndrome ruled out      Folliculitis  Advised her to do warm towel compresses to her left labia   Advised against any hair picking or shaving     Sun Delmer Adler MD  Family Medicine  [facility] [place]     This note was created using [employer] voice recognition software that occasionally misinterprets phrases or words

## 2024-05-28 ENCOUNTER — OFFICE VISIT (OUTPATIENT)
Dept: FAMILY MEDICINE | Facility: CLINIC | Age: 54
End: 2024-05-28
Payer: COMMERCIAL

## 2024-05-28 ENCOUNTER — PATIENT MESSAGE (OUTPATIENT)
Dept: FAMILY MEDICINE | Facility: CLINIC | Age: 54
End: 2024-05-28

## 2024-05-28 VITALS
HEIGHT: 64 IN | WEIGHT: 268.94 LBS | OXYGEN SATURATION: 96 % | RESPIRATION RATE: 18 BRPM | HEART RATE: 84 BPM | BODY MASS INDEX: 45.91 KG/M2 | DIASTOLIC BLOOD PRESSURE: 66 MMHG | SYSTOLIC BLOOD PRESSURE: 118 MMHG

## 2024-05-28 DIAGNOSIS — R60.1 GENERALIZED EDEMA: ICD-10-CM

## 2024-05-28 DIAGNOSIS — L28.2 PAPULAR URTICARIA: Primary | ICD-10-CM

## 2024-05-28 DIAGNOSIS — L73.9 FOLLICULITIS: ICD-10-CM

## 2024-05-28 PROCEDURE — 3078F DIAST BP <80 MM HG: CPT | Mod: CPTII,S$GLB,, | Performed by: STUDENT IN AN ORGANIZED HEALTH CARE EDUCATION/TRAINING PROGRAM

## 2024-05-28 PROCEDURE — 3044F HG A1C LEVEL LT 7.0%: CPT | Mod: CPTII,S$GLB,, | Performed by: STUDENT IN AN ORGANIZED HEALTH CARE EDUCATION/TRAINING PROGRAM

## 2024-05-28 PROCEDURE — 3074F SYST BP LT 130 MM HG: CPT | Mod: CPTII,S$GLB,, | Performed by: STUDENT IN AN ORGANIZED HEALTH CARE EDUCATION/TRAINING PROGRAM

## 2024-05-28 PROCEDURE — 99999 PR PBB SHADOW E&M-EST. PATIENT-LVL V: CPT | Mod: PBBFAC,,, | Performed by: STUDENT IN AN ORGANIZED HEALTH CARE EDUCATION/TRAINING PROGRAM

## 2024-05-28 PROCEDURE — 1159F MED LIST DOCD IN RCRD: CPT | Mod: CPTII,S$GLB,, | Performed by: STUDENT IN AN ORGANIZED HEALTH CARE EDUCATION/TRAINING PROGRAM

## 2024-05-28 PROCEDURE — 99214 OFFICE O/P EST MOD 30 MIN: CPT | Mod: S$GLB,,, | Performed by: STUDENT IN AN ORGANIZED HEALTH CARE EDUCATION/TRAINING PROGRAM

## 2024-05-28 PROCEDURE — 3008F BODY MASS INDEX DOCD: CPT | Mod: CPTII,S$GLB,, | Performed by: STUDENT IN AN ORGANIZED HEALTH CARE EDUCATION/TRAINING PROGRAM

## 2024-05-28 PROCEDURE — 1160F RVW MEDS BY RX/DR IN RCRD: CPT | Mod: CPTII,S$GLB,, | Performed by: STUDENT IN AN ORGANIZED HEALTH CARE EDUCATION/TRAINING PROGRAM

## 2024-05-28 RX ORDER — DOXYCYCLINE 100 MG/1
100 CAPSULE ORAL EVERY 12 HOURS
Qty: 20 CAPSULE | Refills: 0 | Status: SHIPPED | OUTPATIENT
Start: 2024-05-28 | End: 2024-06-07

## 2024-05-28 RX ORDER — BUDESONIDE AND FORMOTEROL FUMARATE DIHYDRATE 160; 4.5 UG/1; UG/1
2 AEROSOL RESPIRATORY (INHALATION) 2 TIMES DAILY
COMMUNITY
Start: 2024-05-13 | End: 2025-05-13

## 2024-05-28 RX ORDER — CLOBETASOL PROPIONATE 0.5 MG/G
CREAM TOPICAL 2 TIMES DAILY
Qty: 60 G | Refills: 0 | Status: SHIPPED | OUTPATIENT
Start: 2024-05-28

## 2024-05-30 DIAGNOSIS — K21.9 GASTROESOPHAGEAL REFLUX DISEASE WITHOUT ESOPHAGITIS: ICD-10-CM

## 2024-05-30 RX ORDER — PANTOPRAZOLE SODIUM 40 MG/1
40 TABLET, DELAYED RELEASE ORAL 2 TIMES DAILY
Qty: 60 TABLET | Refills: 1 | Status: SHIPPED | OUTPATIENT
Start: 2024-05-30 | End: 2024-06-07 | Stop reason: SDUPTHER

## 2024-06-07 ENCOUNTER — TELEPHONE (OUTPATIENT)
Dept: SPINE | Facility: CLINIC | Age: 54
End: 2024-06-07
Payer: COMMERCIAL

## 2024-06-07 ENCOUNTER — PATIENT MESSAGE (OUTPATIENT)
Dept: FAMILY MEDICINE | Facility: CLINIC | Age: 54
End: 2024-06-07
Payer: COMMERCIAL

## 2024-06-07 DIAGNOSIS — K21.9 GASTROESOPHAGEAL REFLUX DISEASE WITHOUT ESOPHAGITIS: ICD-10-CM

## 2024-06-07 RX ORDER — PANTOPRAZOLE SODIUM 40 MG/1
40 TABLET, DELAYED RELEASE ORAL 2 TIMES DAILY
Qty: 60 TABLET | Refills: 1 | OUTPATIENT
Start: 2024-06-07 | End: 2025-06-07

## 2024-06-07 RX ORDER — PANTOPRAZOLE SODIUM 40 MG/1
40 TABLET, DELAYED RELEASE ORAL 2 TIMES DAILY
Qty: 60 TABLET | Refills: 1 | Status: SHIPPED | OUTPATIENT
Start: 2024-06-07 | End: 2025-06-07

## 2024-06-10 ENCOUNTER — TELEPHONE (OUTPATIENT)
Dept: PAIN MEDICINE | Facility: CLINIC | Age: 54
End: 2024-06-10
Payer: COMMERCIAL

## 2024-06-24 ENCOUNTER — OFFICE VISIT (OUTPATIENT)
Dept: PHYSICAL MEDICINE AND REHAB | Facility: CLINIC | Age: 54
End: 2024-06-24
Payer: COMMERCIAL

## 2024-06-24 VITALS — WEIGHT: 251 LBS | HEIGHT: 64 IN | BODY MASS INDEX: 42.85 KG/M2

## 2024-06-24 DIAGNOSIS — M54.12 CERVICAL RADICULOPATHY: ICD-10-CM

## 2024-06-24 DIAGNOSIS — G83.24 MONOPLEGIA OF UPPER EXTREMITY DUE TO NONCEREBROVASCULAR ETIOLOGY AFFECTING LEFT NON-DOMINANT SIDE: Primary | ICD-10-CM

## 2024-06-24 PROCEDURE — 99999 PR PBB SHADOW E&M-EST. PATIENT-LVL II: CPT | Mod: PBBFAC,,, | Performed by: STUDENT IN AN ORGANIZED HEALTH CARE EDUCATION/TRAINING PROGRAM

## 2024-06-24 PROCEDURE — 95909 NRV CNDJ TST 5-6 STUDIES: CPT | Mod: S$GLB,,, | Performed by: STUDENT IN AN ORGANIZED HEALTH CARE EDUCATION/TRAINING PROGRAM

## 2024-06-24 PROCEDURE — 95886 MUSC TEST DONE W/N TEST COMP: CPT | Mod: S$GLB,,, | Performed by: STUDENT IN AN ORGANIZED HEALTH CARE EDUCATION/TRAINING PROGRAM

## 2024-06-24 PROCEDURE — 99499 UNLISTED E&M SERVICE: CPT | Mod: S$GLB,,, | Performed by: STUDENT IN AN ORGANIZED HEALTH CARE EDUCATION/TRAINING PROGRAM

## 2024-06-24 NOTE — PROGRESS NOTES
[facility]  Physical Medicine and Rehabilitation   [address] [place] [address], [address]  [place] [address]        Full Name: Ijeoma Issa Gender: Female  Patient ID: [id number] YOB: 1970  Visit Date: 6/24/2024 08:27  Age: 54 Years 3 Months Old  Examining Physician: Rodriguez Onofre MD  Referring Physician:  Silvestre Grigsby DO  Height: 5 feet 4 inch  Weight: 268 lbs  Conclusion: Pain in neck that radiates through shoulder and LUE with and  Numbness, mobility issues.       Sensory NCS      Nerve / Sites Rec. Site Onset Lat Peak Lat NP Amp PP Amp Segments Distance Peak Diff Velocity     ms ms µV µV  cm ms m/s   L Median - Digit II (Antidromic)      Wrist Dig II 2.50 3.39 22.1 34.4 Wrist - Dig II 13  52      Ref.   ?3.40 ?15.0 ?20.0 Ref.      L Ulnar - Digit V (Antidromic)      Wrist Dig V 1.93 2.55 11.5 29.8 Wrist - Dig V 11  57      Ref.   ?3.10 ?10.0 ?15.0 Ref.      L Radial - Anatomical snuff box (Forearm)      Forearm Wrist 1.56 2.34 14.2 19.5 Forearm - Wrist 10  64      Ref.   ?2.70 ?14.0 ?15.0 Ref.      L Median, Ulnar - Transcarpal comparison      Median Palm Wrist 1.51 2.03 23.1 25.3 Median Palm - Wrist 8  53      Ulnar Palm Wrist 1.30 1.82 6.1 9.9 Ulnar Palm - Wrist 8  61         Median Palm - Ulnar Palm  0.21          Ref.  ?0.40        Motor NCS      Nerve / Sites Muscle Latency Ref. Amplitude Ref. Amp % Duration Segments Distance Lat Diff Velocity Ref.     ms ms mV mV % ms  cm ms m/s m/s   L Ulnar - ADM      Wrist ADM 2.45 ?3.60 9.2 ?5.0 100 6.67 Wrist - ADM 7         B.Elbow ADM 6.82  9.0  98.2 6.51 B.Elbow - Wrist 24 4.38 55 ?49      A.Elbow ADM 8.65  8.2  88.6 6.51 A.Elbow - B.Elbow 11 1.82 60 ?49   L Median - APB      Wrist APB 3.75 ?4.40 9.3 ?4.0 100 6.41 Wrist - APB 7         Elbow APB 7.81  9.2  98.9 6.41 Elbow - Wrist 20 4.06 49 ?49       EMG Summary Table     Spontaneous MUAP Recruitment   Muscle IA Fib PSW Fasc Other Amp Dur. PPP Pattern   L. Deltoid N None None  None .    No Activity   L. Cervical paraspinals N None None None .       L. Infraspinatus N None None None . N N N Discrete   L. Biceps brachii N None None None .    No Activity   L. Triceps brachii N None None None .    No Activity   L. Pronator teres N None None None .    No Activity   L. First dorsal interosseous N None None None . N N N Discrete   L. Abductor pollicis brevis N None None None . N N N Discrete       Summary    The motor conduction test was normal in all 2 of the tested nerves: L Ulnar - ADM, L Median - APB.    The sensory conduction test was performed on 4 nerve(s). The results were normal in 3 nerve(s): L Median - Digit II (Antidromic), L Ulnar - Digit V (Antidromic), L Radial - Anatomical snuff box (Forearm). Findings were unremarkable in 1 nerve(s): L Median, Ulnar - Transcarpal comparison. There were no results outside the specified normal range.    The needle EMG examination was performed in 8 muscles. It was normal in 1 muscle(s): L. Cervical paraspinals. The study was abnormal in 7 muscle(s), with the following distribution:  Abnormal interference pattern was found in L. Deltoid, L. Infraspinatus, L. Biceps brachii, L. Triceps brachii, L. Pronator teres, L. First dorsal interosseous, L. Abductor pollicis brevis.     Conclusion:     Abnormal study    This study was compared to EMG/NCS dated 3/14/24.     There was no activation and only discrete motor units noted throughout the entire left upper extremity and shoulder girdle suggesting dysfunction that is central in origin. This can be due to pain, volitional effort or dysfunction of the central nervous system.  When compared to the previous study dated 03/14/2024, motor units in the left infraspinatus, first dorsal interosseous and abductor pollicis brevis were seen suggesting some mild improvement.  There is no evidence of myopathy, peripheral neuropathy, focal neuropathy, plexopathy, or cervical radiculopathy on this study.     Please feel  free to contact the clinic if you have any further questions regarding this study or report.   ____________________________  Rodriguez Onofre MD

## 2024-06-27 ENCOUNTER — OFFICE VISIT (OUTPATIENT)
Dept: NEUROSURGERY | Facility: CLINIC | Age: 54
End: 2024-06-27
Payer: COMMERCIAL

## 2024-06-27 VITALS — HEIGHT: 64 IN | BODY MASS INDEX: 42.87 KG/M2 | WEIGHT: 251.13 LBS

## 2024-06-27 DIAGNOSIS — R29.898 LEFT ARM WEAKNESS: Primary | ICD-10-CM

## 2024-06-27 PROCEDURE — 3008F BODY MASS INDEX DOCD: CPT | Mod: CPTII,S$GLB,, | Performed by: NEUROLOGICAL SURGERY

## 2024-06-27 PROCEDURE — 3044F HG A1C LEVEL LT 7.0%: CPT | Mod: CPTII,S$GLB,, | Performed by: NEUROLOGICAL SURGERY

## 2024-06-27 PROCEDURE — 99213 OFFICE O/P EST LOW 20 MIN: CPT | Mod: S$GLB,,, | Performed by: NEUROLOGICAL SURGERY

## 2024-06-27 PROCEDURE — 1159F MED LIST DOCD IN RCRD: CPT | Mod: CPTII,S$GLB,, | Performed by: NEUROLOGICAL SURGERY

## 2024-06-27 NOTE — PROGRESS NOTES
HPI 05/01/2024:  Ijeoma returns regarding insidious onset left upper extremity monoplegia.  MRI brain and brachial plexus were negative.  EMG nerve conduction of the left upper extremity 04/2024 showed no abnormalities.  She was evaluated by Neurology who ordered MRI cervical spine and subsequently referred her to us.  MRI cervical spine shows a C6-7 disc protrusion with left foraminal stenosis.  No other areas of canal or foraminal stenosis.  No cord signal change.  She notes that a C6-7 interlaminar ALEENA improved my strength by 10%.  She also notes that with physical therapy she is now able to slightly abducting arm, extend the elbow and flex the elbow though she notes that she can not feel that she is moving the arm.    She is undergoing home physical therapy twice daily.  She reports headaches with left arm movement.  She denies neck or arm pain.  She denies right upper extremity weakness or numbness.       She is taking dapsone for psoriasis.  She was also on multiple other medications.  She notes a medication change involving the dapsone near the time of onset of symptoms.     All recent imaging again reviewed with the patient.  EMG 04/02/2024 again reviewed with the patient.    Interval history June 27, 2024: She returns following updated EMG left upper extremity.  She reports improved strength in the left upper extremity since last evaluation.  She states the strength waxes and wanes.  She denies new or worsened symptoms.  She was completed home physical therapy, home therapist recommends outpatient therapy to be continued    She underwent a repeat EMG which was reviewed and shows likely central etiology, perhaps volitional for findings.  No radiculopathy myopathy plexopathy neuropathy.        Exam:  No acute distress, well-groomed  Awake, alert, oriented to person place and time.    Cranial nerves 2-12 grossly intact.    Strength is graded 4/5 left deltoid, 3/5 left biceps triceps, 3/5 left   otherwise 5/5 in all muscle groups.    Left arm in sling  Sensation is grossly intact throughout.    Gait and stance are normal    Analysis:  She has had an extensive workup which does not demonstrate a clear structural etiology for her left arm weakness; fortunately it is improving with physical therapy.  Recommend continuing with outpatient physical therapy.  There are no indications for neurosurgical intervention.    Diagnosis:  Improving left upper extremity paresis of unclear etiology    I spent a total of 20 minutes on the day of the visit.  This includes face to face time and non-face to face time preparing to see the patient (eg, review of tests), obtaining and/or reviewing separately obtained history, documenting clinical information in the electronic or other health record, independently interpreting results and communicating results to the patient/family/caregiver, or care coordinator.

## 2024-06-27 NOTE — LETTER
June 27, 2024      [facility] [address] [place] [address]  Phone: [phone]  Fax: [phone]       Patient: Ijeoma Issa   YOB: 1970  Date of Visit: 06/27/2024    To Whom It May Concern:    Ronan Issa  was at [facility] on 06/27/2024. The patient should remain off work until 07/17/2024. If you have any questions or concerns, or if I can be of further assistance, please do not hesitate to contact me.    Sincerely,    Margarita Noriega RN

## 2024-07-04 DIAGNOSIS — M79.602 LEFT ARM PAIN: ICD-10-CM

## 2024-07-05 RX ORDER — GABAPENTIN 600 MG/1
600 TABLET ORAL 3 TIMES DAILY
Qty: 90 TABLET | Refills: 2 | Status: SHIPPED | OUTPATIENT
Start: 2024-07-05

## 2024-07-15 ENCOUNTER — PATIENT MESSAGE (OUTPATIENT)
Dept: ADMINISTRATIVE | Facility: HOSPITAL | Age: 54
End: 2024-07-15
Payer: COMMERCIAL

## 2024-07-15 ENCOUNTER — PATIENT OUTREACH (OUTPATIENT)
Dept: ADMINISTRATIVE | Facility: HOSPITAL | Age: 54
End: 2024-07-15
Payer: COMMERCIAL

## 2024-07-15 DIAGNOSIS — Z12.31 OTHER SCREENING MAMMOGRAM: ICD-10-CM

## 2024-07-15 NOTE — PROGRESS NOTES
Population Health Chart Review & Patient Outreach Details      Additional [facility] Notes:               Updates Requested / Reviewed:        Health Maintenance Topics Overdue:      VB Score: 1     Mammogram                       Health Maintenance Topic(s) Outreach Outcomes & Actions Taken:    Breast Cancer Screening - Outreach Outcomes & Actions Taken  : Mammogram Order Placed

## 2024-07-17 ENCOUNTER — OFFICE VISIT (OUTPATIENT)
Dept: FAMILY MEDICINE | Facility: CLINIC | Age: 54
End: 2024-07-17
Payer: COMMERCIAL

## 2024-07-17 VITALS
WEIGHT: 267.19 LBS | HEIGHT: 64 IN | BODY MASS INDEX: 45.62 KG/M2 | DIASTOLIC BLOOD PRESSURE: 76 MMHG | OXYGEN SATURATION: 98 % | SYSTOLIC BLOOD PRESSURE: 110 MMHG | RESPIRATION RATE: 18 BRPM | HEART RATE: 62 BPM

## 2024-07-17 DIAGNOSIS — R29.898 LEFT ARM WEAKNESS: Primary | ICD-10-CM

## 2024-07-17 DIAGNOSIS — L28.2 PAPULAR URTICARIA: ICD-10-CM

## 2024-07-17 PROCEDURE — 99999 PR PBB SHADOW E&M-EST. PATIENT-LVL IV: CPT | Mod: PBBFAC,,, | Performed by: STUDENT IN AN ORGANIZED HEALTH CARE EDUCATION/TRAINING PROGRAM

## 2024-07-17 PROCEDURE — 3044F HG A1C LEVEL LT 7.0%: CPT | Mod: CPTII,S$GLB,, | Performed by: STUDENT IN AN ORGANIZED HEALTH CARE EDUCATION/TRAINING PROGRAM

## 2024-07-17 PROCEDURE — 3078F DIAST BP <80 MM HG: CPT | Mod: CPTII,S$GLB,, | Performed by: STUDENT IN AN ORGANIZED HEALTH CARE EDUCATION/TRAINING PROGRAM

## 2024-07-17 PROCEDURE — 3074F SYST BP LT 130 MM HG: CPT | Mod: CPTII,S$GLB,, | Performed by: STUDENT IN AN ORGANIZED HEALTH CARE EDUCATION/TRAINING PROGRAM

## 2024-07-17 PROCEDURE — 1159F MED LIST DOCD IN RCRD: CPT | Mod: CPTII,S$GLB,, | Performed by: STUDENT IN AN ORGANIZED HEALTH CARE EDUCATION/TRAINING PROGRAM

## 2024-07-17 PROCEDURE — 99214 OFFICE O/P EST MOD 30 MIN: CPT | Mod: S$GLB,,, | Performed by: STUDENT IN AN ORGANIZED HEALTH CARE EDUCATION/TRAINING PROGRAM

## 2024-07-17 PROCEDURE — 3008F BODY MASS INDEX DOCD: CPT | Mod: CPTII,S$GLB,, | Performed by: STUDENT IN AN ORGANIZED HEALTH CARE EDUCATION/TRAINING PROGRAM

## 2024-07-17 PROCEDURE — 1160F RVW MEDS BY RX/DR IN RCRD: CPT | Mod: CPTII,S$GLB,, | Performed by: STUDENT IN AN ORGANIZED HEALTH CARE EDUCATION/TRAINING PROGRAM

## 2024-07-17 RX ORDER — CHLORHEXIDINE GLUCONATE ORAL RINSE 1.2 MG/ML
SOLUTION DENTAL
COMMUNITY
Start: 2024-06-19

## 2024-07-17 RX ORDER — DOXYCYCLINE 100 MG/1
100 CAPSULE ORAL EVERY 12 HOURS
Qty: 20 CAPSULE | Refills: 0 | Status: SHIPPED | OUTPATIENT
Start: 2024-07-17 | End: 2024-07-27

## 2024-07-17 NOTE — PROGRESS NOTES
"[facility]   OFFICE VISIT NOTE    Patient Name: Ijeoma Issa  YOB: 1970    PRESENTING HISTORY     History of Present Illness:  Ms. Ijeoma Issa is a 54 y.o. female     Last follow up May 2024  Following up for her left upper extremity weakness and skin lesion on her face       Review of Systems   Constitutional:  Negative for chills and fever.   Respiratory:  Negative for shortness of breath.    Cardiovascular:  Negative for chest pain.   Gastrointestinal:  Negative for abdominal pain.   Musculoskeletal:         Left upper extremity weakness    Integumentary:  Positive for mole/lesion.          OBJECTIVE:   Vital Signs:  Vitals:    07/17/24 0945   BP: 110/76   Pulse: 62   Resp: 18   SpO2: 98%   Weight: 121.2 kg (267 lb 3.2 oz)   Height: 5' 4" (1.626 m)           Physical Exam  Constitutional:       General: She is not in acute distress.     Appearance: She is obese. She is not ill-appearing or toxic-appearing.   HENT:      Head: Normocephalic and atraumatic.      Right Ear: Tympanic membrane, ear canal and external ear normal.      Left Ear: Tympanic membrane, ear canal and external ear normal.      Mouth/Throat:      Mouth: Mucous membranes are moist.      Pharynx: Uvula midline. No pharyngeal swelling.   Cardiovascular:      Rate and Rhythm: Normal rate and regular rhythm.   Pulmonary:      Effort: Pulmonary effort is normal. No tachypnea, bradypnea, accessory muscle usage, prolonged expiration or respiratory distress.      Breath sounds: Normal breath sounds. No stridor. No wheezing, rhonchi or rales.   Musculoskeletal:      Comments: Left upper extremity -- still very limited range of motion but now she can lift up her extremity, wiggle all five fingers of left hand. She has  strength 2/5 on left hand    Skin:     Comments: New skin lesion on her right side of the face - preauricular to right ear. No active discharge -- appears erythematous and " indurated    Neurological:      General: No focal deficit present.      Mental Status: She is alert.   Psychiatric:         Mood and Affect: Mood normal.         Behavior: Behavior normal.         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   Advised her to continue to stay out of work -- advised her to follow up if FMLA needs to be filled out     Papular urticaria  -     doxycycline (VIBRAMYCIN) 100 MG Cap; Take 1 capsule (100 mg total) by mouth every 12 (twelve) hours. for 10 days  Dispense: 20 capsule; Refill: 0  New skin lesion on her right side of the face -- preauricular to right ear. No active discharge but appears erythematous and indurated.  Advised the patient to start doxycycline  Patient has an appointment with derm on 7/22        Allison Adler MD  Family Medicine  [facility]     This note was created using [employer] voice recognition software that occasionally misinterprets phrases or words

## 2024-09-26 ENCOUNTER — OFFICE VISIT (OUTPATIENT)
Dept: FAMILY MEDICINE | Facility: CLINIC | Age: 54
End: 2024-09-26
Payer: COMMERCIAL

## 2024-09-26 VITALS
OXYGEN SATURATION: 96 % | TEMPERATURE: 98 F | RESPIRATION RATE: 16 BRPM | HEART RATE: 71 BPM | WEIGHT: 274.69 LBS | SYSTOLIC BLOOD PRESSURE: 134 MMHG | DIASTOLIC BLOOD PRESSURE: 80 MMHG | HEIGHT: 64 IN | BODY MASS INDEX: 46.9 KG/M2

## 2024-09-26 DIAGNOSIS — L73.9 FOLLICULITIS: Primary | ICD-10-CM

## 2024-09-26 DIAGNOSIS — M79.602 LEFT ARM PAIN: ICD-10-CM

## 2024-09-26 PROCEDURE — 99999 PR PBB SHADOW E&M-EST. PATIENT-LVL V: CPT | Mod: PBBFAC,,,

## 2024-09-26 RX ORDER — CLINDAMYCIN HYDROCHLORIDE 300 MG/1
300 CAPSULE ORAL EVERY 6 HOURS
Qty: 28 CAPSULE | Refills: 0 | Status: SHIPPED | OUTPATIENT
Start: 2024-09-26 | End: 2024-10-03

## 2024-09-26 RX ORDER — SULFAMETHOXAZOLE AND TRIMETHOPRIM 800; 160 MG/1; MG/1
1 TABLET ORAL 2 TIMES DAILY
Qty: 14 TABLET | Refills: 0 | Status: CANCELLED | OUTPATIENT
Start: 2024-09-26 | End: 2024-10-03

## 2024-09-26 RX ORDER — GABAPENTIN 600 MG/1
600 TABLET ORAL 3 TIMES DAILY
Qty: 90 TABLET | Refills: 2 | Status: SHIPPED | OUTPATIENT
Start: 2024-09-26

## 2024-09-26 RX ORDER — CLINDAMYCIN HYDROCHLORIDE 150 MG/1
150 CAPSULE ORAL EVERY 6 HOURS
Status: CANCELLED | OUTPATIENT
Start: 2024-09-26

## 2024-09-26 NOTE — PROGRESS NOTES
Subjective:       Patient ID:  [id number]     Chief Complaint: Rash      Ijeoma Laboy a 54 y.o. female who presents to the clinic for rash.      Patient reports that she has been experiencing a rash located under her right arm for the last week.  Patient reports that this is a reoccurring rash as previously been prescribed doxycycline multiple times with mild improvement.  Patient reports that she is currently taking topical clindamycin without any improvement.  She reports that she sees dermatology for history of adult acne and psoriasis.  She reports that her next appointment is in November.  She denies associated fever, chills, body aches.      Patient reports that rash under her arm usually starts as knots under her skin and comes to the surface and causes purulent discharge when expressed.  Patient reports the area is fairly tender to touch.  No other concerns today.      Past Medical History:   Diagnosis Date    Abnormal mammogram     neg biospy    Allergic asthma     Anxiety     Arthritis     Dermatitis     Dr. 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 02/22/2024    Pruritic dermatitis 06/30/2023    Umbilical hernia 06/18/2015    Menopausal symptoms 04/09/2015    Vaginal dryness, menopausal 04/09/2015    Polycystic ovarian disease 02/05/2014    Morbid obesity 11/14/2013    Bacterial vaginosis 10/25/2013    Migraine headache 08/10/2012    OA (osteoarthritis) 07/20/2012    Acne, adult 07/20/2012    Asthma with allergic rhinitis 07/20/2012    GERD (gastroesophageal reflux disease)  2012    Family history of early CAD, brother  with MI, age 60 2012    Mammogram abnormal 2012      Review of patient's allergies indicates:   Allergen Reactions    Cashew nut Anaphylaxis     Allergic to Cashew nuts    Nut flavor Anaphylaxis     Allergic to Cashew nuts    Latex Rash     Including in injections    Acitretin Itching and Blisters    Varicella vaccines      Red swelling/ lump develops at injection site    Adhesive tape-silicones Rash    Tapentadol Rash         Current Outpatient Medications:     budesonide-formoterol 160-4.5 mcg (SYMBICORT) 160-4.5 mcg/actuation HFAA, Inhale 2 puffs into the lungs 2 (two) times daily., Disp: , Rfl:     chlorhexidine (HIBICLENS) 4 % external liquid, Apply topically daily as needed (dermatitis)., Disp: 236 mL, Rfl: 2    chlorhexidine (PERIDEX) 0.12 % solution, , Disp: , Rfl:     ciclopirox 1 % shampoo, Apply 5 mLs topically Daily., Disp: , Rfl:     clindamycin (CLEOCIN T) 1 % lotion, Apply 1 g topically 2 (two) times daily., Disp: , Rfl:     clobetasoL (TEMOVATE) 0.05 % cream, Apply topically 2 (two) times daily. Apply to affected areas of rash on the trunk, arms and legs for 2 weeks at a time, as needed for new flares. Not for face, neck or groin., Disp: 60 g, Rfl: 0    dapsone 25 MG Tab, Take 50 mg by mouth 2 (two) times daily., Disp: , Rfl:     doxepin (SINEQUAN) 25 MG capsule, TAKE 1 CAPSULE(25 MG) BY MOUTH EVERY EVENING, Disp: 90 capsule, Rfl: 3    ferrous sulfate (IRON) 325 mg (65 mg iron) Tab tablet, Take 1 tablet (325 mg total) by mouth 3 (three) times a week. (Patient taking differently: Take 325 mg by mouth every Mon, Wed, Fri.), Disp: , Rfl:     fexofenadine (ALLEGRA) 180 MG tablet, Take 180 mg by mouth once daily., Disp: , Rfl:     fluticasone-salmeterol diskus inhaler 250-50 mcg, Inhale 1 puff into the lungs 2 (two) times daily. Controller, Disp: 60 each, Rfl: 11    gabapentin (NEURONTIN) 600 MG tablet, TAKE 1 TABLET(600 MG) BY MOUTH  THREE TIMES DAILY, Disp: 90 tablet, Rfl: 2    gentamicin (GARAMYCIN) 0.1 % ointment, Apply twice daily to AA of face, Disp: , Rfl:     hydrocortisone 2.5 % cream, Apply to Affected areas on face and neck twice a day for up to 2 weeks as needed for flares, Disp: , Rfl:     ibuprofen (ADVIL,MOTRIN) 400 MG tablet, Take 1 tablet (400 mg total) by mouth every 6 (six) hours as needed (shoulder pain)., Disp: 30 tablet, Rfl: 1    LIDOcaine (LIDODERM) 5 %, Place 1 patch onto the skin once daily. Remove & Discard patch within 12 hours or as directed by MD, Disp: 30 patch, Rfl: 0    montelukast (SINGULAIR) 10 mg tablet, Take 1 tablet (10 mg total) by mouth once daily., Disp: 30 tablet, Rfl: 5    multivitamin with minerals tablet, Take 1 tablet by mouth once daily., Disp: , Rfl:     pantoprazole (PROTONIX) 40 MG tablet, Take 1 tablet (40 mg total) by mouth 2 (two) times daily., Disp: 60 tablet, Rfl: 1    spironolactone (ALDACTONE) 25 MG tablet, TAKE 1 TABLET BY MOUTH DAILY (Patient taking differently: Take 25 mg by mouth every evening.), Disp: 90 tablet, Rfl: 3    tacrolimus (PROTOPIC) 0.1 % ointment, Apply topically., Disp: , Rfl:     albuterol (PROVENTIL) 2.5 mg /3 mL (0.083 %) nebulizer solution, Inhale 2.5 mg into the lungs every 4 (four) hours as needed for Shortness of Breath. (Patient not taking: Reported on 9/26/2024), Disp: , Rfl:     albuterol (PROVENTIL/VENTOLIN HFA) 90 mcg/actuation inhaler, Inhale 2 puffs into the lungs every 6 (six) hours as needed for Shortness of Breath. (Patient not taking: Reported on 9/26/2024), Disp: , Rfl:     azelastine (ASTELIN) 137 mcg (0.1 %) nasal spray, 1 spray (137 mcg total) by Nasal route 2 (two) times daily., Disp: 30 mL, Rfl: 3    clindamycin (CLEOCIN) 300 MG capsule, Take 1 capsule (300 mg total) by mouth every 6 (six) hours. for 7 days, Disp: 28 capsule, Rfl: 0    EPIPEN 2-LUCIANO 0.3 mg/0.3 mL AtIn, Inject 0.3 mLs (0.3 mg total) into the muscle once. for 1 dose, Disp: 0.3 mL, Rfl:  11    estradioL (ESTRACE) 0.5 MG tablet, Take 1 tablet (0.5 mg total) by mouth 3 (three) times a week. (Patient not taking: Reported on 7/17/2024), Disp: 45 tablet, Rfl: 2    fluticasone propionate (FLONASE) 50 mcg/actuation nasal spray, SHAKE LIQUID AND USE 1 SPRAY(50 MCG) IN EACH NOSTRIL TWICE DAILY AS NEEDED FOR RHINITIS (Patient not taking: Reported on 9/26/2024), Disp: 16 g, Rfl: 2    ketoconazole (NIZORAL) 2 % cream, Apply 1 application  topically 2 (two) times daily. (Patient not taking: Reported on 9/26/2024), Disp: , Rfl:     Lab Results   Component Value Date    WBC 6.98 03/13/2024    HGB 11.7 (L) 03/13/2024    HCT 35.9 (L) 03/13/2024     03/13/2024    CHOL 164 05/06/2024    TRIG 170 (H) 05/06/2024    HDL 47 05/06/2024    ALT 24 05/06/2024    AST 17 05/06/2024     05/06/2024    K 4.3 05/06/2024     05/06/2024    CREATININE 0.8 05/06/2024    BUN 13 05/06/2024    CO2 30 (H) 05/06/2024    TSH 2.216 05/08/2024    INR 1.0 02/23/2024    HGBA1C 4.3 02/12/2024    MICROALBUR 0.32 07/20/2012       Review of Systems   Constitutional:  Negative for fatigue and fever.   HENT: Negative.  Negative for congestion, sneezing and sore throat.    Eyes: Negative.    Respiratory:  Negative for cough, shortness of breath and wheezing.    Cardiovascular:  Negative for chest pain, palpitations and leg swelling.   Gastrointestinal:  Negative for abdominal pain, nausea and vomiting.   Genitourinary: Negative.    Musculoskeletal: Negative.  Negative for arthralgias.   Skin:  Positive for rash.   Neurological:  Negative for dizziness, weakness, light-headedness, numbness and headaches.   Hematological: Negative.    Psychiatric/Behavioral: Negative.     All other systems reviewed and are negative.      Objective:      Physical Exam  Constitutional:       Appearance: Normal appearance.   HENT:      Head: Normocephalic and atraumatic.      Nose: Nose normal.   Eyes:      Extraocular Movements: Extraocular movements  intact.   Cardiovascular:      Rate and Rhythm: Normal rate.   Pulmonary:      Effort: Pulmonary effort is normal.   Musculoskeletal:         General: Normal range of motion.      Cervical back: Normal range of motion.   Skin:     General: Skin is warm and dry.      Findings: Rash present. Rash is not purpuric.   Neurological:      General: No focal deficit present.      Mental Status: She is alert and oriented to person, place, and time.   Psychiatric:         Mood and Affect: Mood normal.         Assessment:       1. Folliculitis        Plan:       Ijeoma was seen today for rash.    Diagnoses and all orders for this visit:    Folliculitis  - patient currently on dapsone. Unable to prescribe bactrim.  -     clindamycin (CLEOCIN) 300 MG capsule; Take 1 capsule (300 mg total) by mouth every 6 (six) hours. for 7 days  - warm compresses  - keep area clean and dry.  - Advised follow up with Dermatology.    I spent a total of 29 minutes on the day of the visit.This includes face to face time and non-face to face time preparing to see the patient (eg, review of tests), obtaining and/or reviewing separately obtained history, documenting clinical information in the electronic or other health record, independently interpreting results and communicating results to the patient/family/caregiver, or care coordinator.         Portions of this note were dictated using voice recognition software and may contain dictation related errors in spelling / grammar / syntax not discovered on text review.     Carolyne Gonzales PA-C

## 2024-10-28 DIAGNOSIS — K21.9 GASTROESOPHAGEAL REFLUX DISEASE WITHOUT ESOPHAGITIS: ICD-10-CM

## 2024-10-28 RX ORDER — PANTOPRAZOLE SODIUM 40 MG/1
40 TABLET, DELAYED RELEASE ORAL 2 TIMES DAILY
Qty: 60 TABLET | Refills: 1 | Status: SHIPPED | OUTPATIENT
Start: 2024-10-28 | End: 2025-10-28

## 2024-11-05 NOTE — PROGRESS NOTES
[facility]   OFFICE VISIT NOTE    Patient Name: Ijeoma Issa  YOB: 1970    PRESENTING HISTORY     History of Present Illness:  Ms. Ijeoma Issa is a 54 y.o. female   Last follow up July 2024    History of Present Illness    CHIEF COMPLAINT:  Patient presents today for follow-up on arm weakness and skin issues.    SKIN ISSUES:  She reports improvement in skin condition since starting Dupixent for nodules and skin changes. She acknowledges a history of more severe skin issues, including open and draining spots, as well as involvement of the axilla area, which are now healing and doing better than before.    ARM WEAKNESS:  She reports ongoing arm weakness with fluctuating movement, ranging from 5% to 10% on some days on the left side. She experiences diminished function, heaviness, and loss of sensation. She uses a lidocaine patch on days when her arm feels particularly heavy and difficult to move.     PHYSICAL THERAPY:  She attends physical therapy twice weekly, with hour-long sessions on Mondays and Thursdays.   Currently working on strengthening and mobility training     HOME CARE:  She performs stretching and massaging of the affected arm at night before bed.    EMPLOYMENT STATUS:  She is currently not working due to her arm issue, which prevents her from continuing her work as a . She expresses frustration with the situation and notes the financial strain of being down to one income in her household.    LAB RESULTS:  She reports low hemoglobin at 12.1 (previously 12.7 a few months prior) on recent lab work. She denies experiencing any blood in her stool or urine, and denies having black, sticky stools or fresh blood in her stool.    LUMP ON ARM:  She reports a painful lump on her left arm, initially noticed by her .     Review of Systems   Constitutional:  Negative for chills and fever.   Respiratory:  Negative for shortness of  "breath.    Cardiovascular:  Negative for chest pain.   Gastrointestinal:  Negative for nausea and vomiting.   Integumentary:  Positive for mole/lesion.        Lump on left arm    Neurological:         Left arm weakness           OBJECTIVE:   Vital Signs:  Vitals:    11/07/24 1253   BP: 118/72   Pulse: 65   Resp: 18   SpO2: 95%   Weight: 124 kg (273 lb 5.9 oz)   Height: 5' 4" (1.626 m)           Physical Exam  Constitutional:       General: She is not in acute distress.     Appearance: She is not ill-appearing or toxic-appearing.   HENT:      Head: Normocephalic and atraumatic.      Mouth/Throat:      Mouth: Mucous membranes are moist.      Pharynx: Uvula midline. No pharyngeal swelling.   Cardiovascular:      Rate and Rhythm: Normal rate and regular rhythm.   Pulmonary:      Effort: Pulmonary effort is normal. No tachypnea, bradypnea, accessory muscle usage, prolonged expiration or respiratory distress.      Breath sounds: Normal breath sounds. No stridor. No wheezing, rhonchi or rales.   Skin:     Comments: Multiple excoriations covering bilateral arms (R>L) in different stages of healing    Neurological:      General: No focal deficit present.      Mental Status: She is alert.   Psychiatric:         Mood and Affect: Mood normal.         Behavior: Behavior normal.         ASSESSMENT & PLAN:     Left arm weakness  - Continued monitoring of arm weakness and limited mobility, noting improvement with physical therapy.  - Discussed importance of consistent physical therapy for arm rehabilitation.  - Patient to continue physical therapy exercises at home as directed by therapists.  - Advised that some discomfort and pain after physical therapy sessions is expected.    Prurigo nodularis  Multiple excoriations  - Continue with ciclopirox shampoo, clobetasol cream, dapsone tablets, and tacrolimus ointment  - Dermatology also started her on Dupixent   - Improving     Mass of left upper extremity  -     US Extremity Non " Vascular Limited Left; Future; Expected date: 11/07/2024  - Considered ultrasound for circular lump on left upperextremity causing pain, to determine composition and rule out muscle tear.  - Explained circular lump on shoulder could be a cyst or lipoma (fat deposit).  - Ultrasound of shoulder lump ordered    Gastroesophageal reflux disease without esophagitis  - Stable. Continue the current therapy     Mild intermittent asthma with acute exacerbation  - Stable  - Continue the current therapy     Mixed hyperlipidemia  - Will monitor her lipid level at the next visit     FOLLOW UP:  - Follow up with physician assistant in 4 months and with me in 8 months  - Sooner f/u as needed/indicated         Allison Adler MD  Family Medicine  [facility] [place]     This note was created using [employer] voice recognition software that occasionally misinterprets phrases or words

## 2024-11-07 ENCOUNTER — OFFICE VISIT (OUTPATIENT)
Dept: FAMILY MEDICINE | Facility: CLINIC | Age: 54
End: 2024-11-07
Payer: COMMERCIAL

## 2024-11-07 VITALS
RESPIRATION RATE: 18 BRPM | HEIGHT: 64 IN | BODY MASS INDEX: 46.67 KG/M2 | WEIGHT: 273.38 LBS | HEART RATE: 65 BPM | DIASTOLIC BLOOD PRESSURE: 72 MMHG | SYSTOLIC BLOOD PRESSURE: 118 MMHG | OXYGEN SATURATION: 95 %

## 2024-11-07 DIAGNOSIS — K21.9 GASTROESOPHAGEAL REFLUX DISEASE WITHOUT ESOPHAGITIS: ICD-10-CM

## 2024-11-07 DIAGNOSIS — R29.898 LEFT ARM WEAKNESS: Primary | ICD-10-CM

## 2024-11-07 DIAGNOSIS — T07.XXXA MULTIPLE EXCORIATIONS: ICD-10-CM

## 2024-11-07 DIAGNOSIS — R22.32 MASS OF LEFT UPPER EXTREMITY: ICD-10-CM

## 2024-11-07 DIAGNOSIS — E78.2 MIXED HYPERLIPIDEMIA: ICD-10-CM

## 2024-11-07 DIAGNOSIS — J45.21 MILD INTERMITTENT ASTHMA WITH ACUTE EXACERBATION: ICD-10-CM

## 2024-11-07 DIAGNOSIS — L28.1 PRURIGO NODULARIS: ICD-10-CM

## 2024-11-07 PROCEDURE — 1160F RVW MEDS BY RX/DR IN RCRD: CPT | Mod: CPTII,S$GLB,, | Performed by: STUDENT IN AN ORGANIZED HEALTH CARE EDUCATION/TRAINING PROGRAM

## 2024-11-07 PROCEDURE — G2211 COMPLEX E/M VISIT ADD ON: HCPCS | Mod: S$GLB,,, | Performed by: STUDENT IN AN ORGANIZED HEALTH CARE EDUCATION/TRAINING PROGRAM

## 2024-11-07 PROCEDURE — 3074F SYST BP LT 130 MM HG: CPT | Mod: CPTII,S$GLB,, | Performed by: STUDENT IN AN ORGANIZED HEALTH CARE EDUCATION/TRAINING PROGRAM

## 2024-11-07 PROCEDURE — 3078F DIAST BP <80 MM HG: CPT | Mod: CPTII,S$GLB,, | Performed by: STUDENT IN AN ORGANIZED HEALTH CARE EDUCATION/TRAINING PROGRAM

## 2024-11-07 PROCEDURE — 3008F BODY MASS INDEX DOCD: CPT | Mod: CPTII,S$GLB,, | Performed by: STUDENT IN AN ORGANIZED HEALTH CARE EDUCATION/TRAINING PROGRAM

## 2024-11-07 PROCEDURE — 99214 OFFICE O/P EST MOD 30 MIN: CPT | Mod: S$GLB,,, | Performed by: STUDENT IN AN ORGANIZED HEALTH CARE EDUCATION/TRAINING PROGRAM

## 2024-11-07 PROCEDURE — 3044F HG A1C LEVEL LT 7.0%: CPT | Mod: CPTII,S$GLB,, | Performed by: STUDENT IN AN ORGANIZED HEALTH CARE EDUCATION/TRAINING PROGRAM

## 2024-11-07 PROCEDURE — 1159F MED LIST DOCD IN RCRD: CPT | Mod: CPTII,S$GLB,, | Performed by: STUDENT IN AN ORGANIZED HEALTH CARE EDUCATION/TRAINING PROGRAM

## 2024-11-07 PROCEDURE — 99999 PR PBB SHADOW E&M-EST. PATIENT-LVL V: CPT | Mod: PBBFAC,,, | Performed by: STUDENT IN AN ORGANIZED HEALTH CARE EDUCATION/TRAINING PROGRAM

## 2024-11-07 RX ORDER — DUPILUMAB 300 MG/2ML
300 INJECTION, SOLUTION SUBCUTANEOUS
COMMUNITY
Start: 2024-11-05

## 2024-11-12 ENCOUNTER — HOSPITAL ENCOUNTER (OUTPATIENT)
Dept: RADIOLOGY | Facility: HOSPITAL | Age: 54
Discharge: HOME | End: 2024-11-12
Attending: STUDENT IN AN ORGANIZED HEALTH CARE EDUCATION/TRAINING PROGRAM
Payer: COMMERCIAL

## 2024-11-12 DIAGNOSIS — R22.32 MASS OF LEFT UPPER EXTREMITY: ICD-10-CM

## 2024-11-12 DIAGNOSIS — Z12.31 OTHER SCREENING MAMMOGRAM: ICD-10-CM

## 2024-11-12 PROCEDURE — 77063 BREAST TOMOSYNTHESIS BI: CPT | Mod: 26,,, | Performed by: RADIOLOGY

## 2024-11-12 PROCEDURE — 76882 US LMTD JT/FCL EVL NVASC XTR: CPT | Mod: 26,LT,, | Performed by: RADIOLOGY

## 2024-11-12 PROCEDURE — 77067 SCR MAMMO BI INCL CAD: CPT | Mod: TC,PO

## 2024-11-12 PROCEDURE — 77067 SCR MAMMO BI INCL CAD: CPT | Mod: 26,,, | Performed by: RADIOLOGY

## 2024-11-12 PROCEDURE — 76882 US LMTD JT/FCL EVL NVASC XTR: CPT | Mod: TC,PO,LT

## 2024-12-09 ENCOUNTER — HOSPITAL ENCOUNTER (OUTPATIENT)
Dept: RADIOLOGY | Facility: HOSPITAL | Age: 54
Discharge: HOME | End: 2024-12-09
Attending: STUDENT IN AN ORGANIZED HEALTH CARE EDUCATION/TRAINING PROGRAM
Payer: COMMERCIAL

## 2024-12-09 ENCOUNTER — OFFICE VISIT (OUTPATIENT)
Dept: FAMILY MEDICINE | Facility: CLINIC | Age: 54
End: 2024-12-09
Payer: COMMERCIAL

## 2024-12-09 ENCOUNTER — LAB VISIT (OUTPATIENT)
Dept: LAB | Facility: HOSPITAL | Age: 54
End: 2024-12-09
Attending: STUDENT IN AN ORGANIZED HEALTH CARE EDUCATION/TRAINING PROGRAM
Payer: COMMERCIAL

## 2024-12-09 VITALS
BODY MASS INDEX: 46.86 KG/M2 | WEIGHT: 274.5 LBS | HEIGHT: 64 IN | HEART RATE: 78 BPM | OXYGEN SATURATION: 97 % | SYSTOLIC BLOOD PRESSURE: 124 MMHG | DIASTOLIC BLOOD PRESSURE: 82 MMHG | RESPIRATION RATE: 18 BRPM

## 2024-12-09 DIAGNOSIS — R60.1 GENERALIZED EDEMA: ICD-10-CM

## 2024-12-09 DIAGNOSIS — M25.512 ACUTE PAIN OF LEFT SHOULDER: Primary | ICD-10-CM

## 2024-12-09 DIAGNOSIS — M25.512 ACUTE PAIN OF LEFT SHOULDER: ICD-10-CM

## 2024-12-09 DIAGNOSIS — M79.602 LEFT ARM PAIN: ICD-10-CM

## 2024-12-09 DIAGNOSIS — R79.89 POSITIVE D DIMER: ICD-10-CM

## 2024-12-09 LAB
BILIRUB UR QL STRIP: NEGATIVE
CLARITY UR: CLEAR
COLOR UR: YELLOW
CREAT UR-MCNC: 75.9 MG/DL (ref 15–325)
GLUCOSE UR QL STRIP: NEGATIVE
HGB UR QL STRIP: NEGATIVE
KETONES UR QL STRIP: NEGATIVE
LEUKOCYTE ESTERASE UR QL STRIP: NEGATIVE
NITRITE UR QL STRIP: NEGATIVE
PH UR STRIP: 7 [PH] (ref 5–8)
PROT UR QL STRIP: NEGATIVE
PROT UR-MCNC: <7 MG/DL (ref 0–15)
PROT/CREAT UR: NORMAL MG/G{CREAT} (ref 0–0.2)
SP GR UR STRIP: 1.01 (ref 1–1.03)
URN SPEC COLLECT METH UR: NORMAL
UROBILINOGEN UR STRIP-ACNC: NEGATIVE EU/DL

## 2024-12-09 PROCEDURE — 99214 OFFICE O/P EST MOD 30 MIN: CPT | Mod: S$GLB,,, | Performed by: STUDENT IN AN ORGANIZED HEALTH CARE EDUCATION/TRAINING PROGRAM

## 2024-12-09 PROCEDURE — 93010 ELECTROCARDIOGRAM REPORT: CPT | Mod: S$GLB,,, | Performed by: INTERNAL MEDICINE

## 2024-12-09 PROCEDURE — 71046 X-RAY EXAM CHEST 2 VIEWS: CPT | Mod: 26,,, | Performed by: RADIOLOGY

## 2024-12-09 PROCEDURE — 73060 X-RAY EXAM OF HUMERUS: CPT | Mod: 26,LT,, | Performed by: RADIOLOGY

## 2024-12-09 PROCEDURE — 73060 X-RAY EXAM OF HUMERUS: CPT | Mod: TC,LT

## 2024-12-09 PROCEDURE — 93005 ELECTROCARDIOGRAM TRACING: CPT | Mod: S$GLB,,, | Performed by: STUDENT IN AN ORGANIZED HEALTH CARE EDUCATION/TRAINING PROGRAM

## 2024-12-09 PROCEDURE — 3044F HG A1C LEVEL LT 7.0%: CPT | Mod: CPTII,S$GLB,, | Performed by: STUDENT IN AN ORGANIZED HEALTH CARE EDUCATION/TRAINING PROGRAM

## 2024-12-09 PROCEDURE — 82570 ASSAY OF URINE CREATININE: CPT | Performed by: STUDENT IN AN ORGANIZED HEALTH CARE EDUCATION/TRAINING PROGRAM

## 2024-12-09 PROCEDURE — 73030 X-RAY EXAM OF SHOULDER: CPT | Mod: 26,LT,, | Performed by: RADIOLOGY

## 2024-12-09 PROCEDURE — G2211 COMPLEX E/M VISIT ADD ON: HCPCS | Mod: S$GLB,,, | Performed by: STUDENT IN AN ORGANIZED HEALTH CARE EDUCATION/TRAINING PROGRAM

## 2024-12-09 PROCEDURE — 99999 PR PBB SHADOW E&M-EST. PATIENT-LVL V: CPT | Mod: PBBFAC,,, | Performed by: STUDENT IN AN ORGANIZED HEALTH CARE EDUCATION/TRAINING PROGRAM

## 2024-12-09 PROCEDURE — 3079F DIAST BP 80-89 MM HG: CPT | Mod: CPTII,S$GLB,, | Performed by: STUDENT IN AN ORGANIZED HEALTH CARE EDUCATION/TRAINING PROGRAM

## 2024-12-09 PROCEDURE — 81003 URINALYSIS AUTO W/O SCOPE: CPT | Performed by: STUDENT IN AN ORGANIZED HEALTH CARE EDUCATION/TRAINING PROGRAM

## 2024-12-09 PROCEDURE — 73030 X-RAY EXAM OF SHOULDER: CPT | Mod: TC,LT

## 2024-12-09 PROCEDURE — 3074F SYST BP LT 130 MM HG: CPT | Mod: CPTII,S$GLB,, | Performed by: STUDENT IN AN ORGANIZED HEALTH CARE EDUCATION/TRAINING PROGRAM

## 2024-12-09 PROCEDURE — 1159F MED LIST DOCD IN RCRD: CPT | Mod: CPTII,S$GLB,, | Performed by: STUDENT IN AN ORGANIZED HEALTH CARE EDUCATION/TRAINING PROGRAM

## 2024-12-09 PROCEDURE — 3008F BODY MASS INDEX DOCD: CPT | Mod: CPTII,S$GLB,, | Performed by: STUDENT IN AN ORGANIZED HEALTH CARE EDUCATION/TRAINING PROGRAM

## 2024-12-09 PROCEDURE — 71046 X-RAY EXAM CHEST 2 VIEWS: CPT | Mod: TC

## 2024-12-09 PROCEDURE — 1160F RVW MEDS BY RX/DR IN RCRD: CPT | Mod: CPTII,S$GLB,, | Performed by: STUDENT IN AN ORGANIZED HEALTH CARE EDUCATION/TRAINING PROGRAM

## 2024-12-09 RX ORDER — IPRATROPIUM BROMIDE 42 UG/1
1-2 SPRAY, METERED NASAL
COMMUNITY
Start: 2024-11-20 | End: 2025-11-20

## 2024-12-09 RX ORDER — FLUTICASONE PROPIONATE 50 MCG
2 SPRAY, SUSPENSION (ML) NASAL
COMMUNITY
Start: 2024-11-20 | End: 2025-11-20

## 2024-12-09 NOTE — PROGRESS NOTES
"  [facility] [place]   OFFICE VISIT NOTE    Patient Name: Ijeoma Issa  YOB: 1970    PRESENTING HISTORY     History of Present Illness:  Ms. Ijeoma Issa is a 54 y.o. female     History of Present Illness    CHIEF COMPLAINT:  Patient presents today for follow-up of left shoulder and arm pain with swelling.    LEFT SHOULDER AND ARM PAIN:  She reports left shoulder pain and swelling, extending to the left forearm. She has difficulty lifting her left arm due to pain. She describes an ant-like sensation when the left forearm is touched, particularly at the elbow level, and notes numbness in parts of the left forearm. The swelling was significant enough to interfere with wearing a bra comfortably. She denies any known trauma to the shoulder during physical therapy sessions. She is currently undergoing physical therapy for this condition.    She also reports some discomfort discomfort on right side of the chest -- lateral to her sternum but on the right side     She also notices worsening leg swelling in bilateral lower extremities     CURRENT MEDICATIONS:  Her current medications include Gabapentin and as-needed use of Ibuprofen (400 mg) or Tylenol for pain management, depending on pain severity.       Review of Systems   Constitutional:  Negative for chills and fever.   Respiratory:  Negative for shortness of breath.    Cardiovascular:  Positive for chest pain and leg swelling.   Gastrointestinal:  Negative for nausea and vomiting.   Musculoskeletal:         Right shoulder and arm pain. Difficulty with moving           OBJECTIVE:   Vital Signs:  Vitals:    12/09/24 1500   BP: 124/82   Pulse: 78   Resp: 18   SpO2: 97%   Weight: 124.5 kg (274 lb 7.6 oz)   Height: 5' 4" (1.626 m)           Physical Exam  Constitutional:       General: She is not in acute distress.     Appearance: She is obese. She is not ill-appearing or toxic-appearing.   HENT:      Head: Normocephalic and " atraumatic.      Mouth/Throat:      Mouth: Mucous membranes are moist.      Pharynx: Uvula midline. No pharyngeal swelling.   Cardiovascular:      Rate and Rhythm: Normal rate and regular rhythm.   Pulmonary:      Effort: Pulmonary effort is normal. No tachypnea, bradypnea, accessory muscle usage, prolonged expiration or respiratory distress.      Breath sounds: Normal breath sounds. No stridor. No wheezing, rhonchi or rales.   Musculoskeletal:      Right shoulder: Tenderness present. Decreased range of motion. Decreased strength.      Right upper arm: Tenderness present. No deformity, lacerations or bony tenderness.   Neurological:      General: No focal deficit present.      Mental Status: She is alert.   Psychiatric:         Mood and Affect: Mood normal.         Behavior: Behavior normal.         ASSESSMENT & PLAN:     Acute pain of left shoulder  -     X-Ray Shoulder 2 or More Views Left; Future; Expected date: 12/09/2024    Generalized edema  -     B-TYPE NATRIURETIC PEPTIDE; Future; Expected date: 12/09/2024  -     C-REACTIVE PROTEIN; Future; Expected date: 12/09/2024  -     Sedimentation rate; Future; Expected date: 12/09/2024  -     X-Ray Chest PA And Lateral; Future; Expected date: 12/09/2024  -     Urinalysis; Future; Expected date: 12/09/2024  -     Protein / creatinine ratio, urine; Future; Expected date: 12/09/2024  -     TROPONIN I; Future; Expected date: 12/09/2024  -     D-DIMER, QUANTITATIVE; Future; Expected date: 12/09/2024  -     IN OFFICE EKG 12-LEAD (to Muse)  -     CBC Auto Differential; Future; Expected date: 12/09/2024  Differentials:   Acute generalized edema: nephrotic syndrome, acute renal failure, acute heart failure   Chronic generalized edema: chronic cardiac insufficiency, chronic renal insufficiency, liver cirrhosis, malnutrition     In office EKG I office showed normal sinus rhythm without any concerning findings such as ST elevation or T wave inversion     For nephrotic syndrome, I  will obtain protein/creatinine ratio.  If there is any concern for proteinuria, I will consider getting 24 hour protein.  We will also obtain lab work including troponin to rule out any MI.  D-dimer for any pulmonary embolism.  BNP to look for any acute heart failure  May consider echocardiogram for further evaluation    Left arm pain  -     X-Ray Humerus 2 View Left; Future; Expected date: 12/09/2024  -     US Upper Extremity Veins Left; Future; Expected date: 12/09/2024    Positive D dimer  -     US Upper Extremity Veins Left; Future; Expected date: 12/09/2024    Addendum placed 8:03 PM  Patient has elevated D-dimer   Ordered US upper extremity left to rule out DVT considering immobility of LUE, swelling, pain.   Attempted calling the patient 3 times but patient did not . Also left a VM.   Left a message via portal.  Will call the patient again tomorrow morning.    I spent a total of 30 minutes on the day of the visit.  This includes face-to-face time and non face-to-face time preparing to see the patient (e.g., review of tests) , obtaining and/or reviewing separately obtain history, documenting clinical information in the electronic or other health record, independently interpreting results and communicating results to the patient/family/caregiver, or care coordinator.         Allison Adler MD  Family Medicine  [facility] [place]     This note was created using [employer] voice recognition software that occasionally misinterprets phrases or words

## 2024-12-10 ENCOUNTER — PATIENT MESSAGE (OUTPATIENT)
Dept: FAMILY MEDICINE | Facility: CLINIC | Age: 54
End: 2024-12-10
Payer: COMMERCIAL

## 2024-12-10 ENCOUNTER — HOSPITAL ENCOUNTER (OUTPATIENT)
Dept: RADIOLOGY | Facility: HOSPITAL | Age: 54
Discharge: HOME | End: 2024-12-10
Attending: STUDENT IN AN ORGANIZED HEALTH CARE EDUCATION/TRAINING PROGRAM
Payer: COMMERCIAL

## 2024-12-10 DIAGNOSIS — R79.89 POSITIVE D DIMER: ICD-10-CM

## 2024-12-10 DIAGNOSIS — M79.602 LEFT ARM PAIN: ICD-10-CM

## 2024-12-10 DIAGNOSIS — R60.1 GENERALIZED EDEMA: ICD-10-CM

## 2024-12-10 LAB
OHS QRS DURATION: 76 MS
OHS QTC CALCULATION: 414 MS

## 2024-12-10 PROCEDURE — 93971 EXTREMITY STUDY: CPT | Mod: TC,LT

## 2024-12-10 PROCEDURE — 93970 EXTREMITY STUDY: CPT | Mod: TC

## 2024-12-10 PROCEDURE — 93971 EXTREMITY STUDY: CPT | Mod: 26,LT,, | Performed by: RADIOLOGY

## 2024-12-10 PROCEDURE — 93970 EXTREMITY STUDY: CPT | Mod: 26,,, | Performed by: RADIOLOGY

## 2024-12-31 DIAGNOSIS — K21.9 GASTROESOPHAGEAL REFLUX DISEASE WITHOUT ESOPHAGITIS: ICD-10-CM

## 2024-12-31 NOTE — TELEPHONE ENCOUNTER
Refill Routing Note   Medication(s) are not appropriate for processing by [facility] for the following reason(s):        Non-participating provider    [facility] action(s):  Route             Appointments  past 12m or future 3m with PCP    Date Provider   Last Visit   12/9/2024 Allison Adler MD   Next Visit   Visit date not found Allison Adler MD   ED visits in past 90 days: 0        Note composed:4:20 PM 12/31/2024

## 2025-01-02 ENCOUNTER — OFFICE VISIT (OUTPATIENT)
Dept: PAIN MEDICINE | Facility: CLINIC | Age: 55
End: 2025-01-02
Payer: COMMERCIAL

## 2025-01-02 DIAGNOSIS — M79.602 LEFT ARM PAIN: Primary | ICD-10-CM

## 2025-01-02 PROCEDURE — 1159F MED LIST DOCD IN RCRD: CPT | Mod: CPTII,S$GLB,, | Performed by: PHYSICIAN ASSISTANT

## 2025-01-02 PROCEDURE — 99214 OFFICE O/P EST MOD 30 MIN: CPT | Mod: S$GLB,,, | Performed by: PHYSICIAN ASSISTANT

## 2025-01-02 PROCEDURE — 99999 PR PBB SHADOW E&M-EST. PATIENT-LVL I: CPT | Mod: PBBFAC,,, | Performed by: PHYSICIAN ASSISTANT

## 2025-01-02 RX ORDER — GABAPENTIN 600 MG/1
600 TABLET ORAL 3 TIMES DAILY
Qty: 360 TABLET | Refills: 1 | Status: SHIPPED | OUTPATIENT
Start: 2025-01-02 | End: 2025-08-30

## 2025-01-02 NOTE — PROGRESS NOTES
[facility] - Department    Allison Adler MD      First Office Visit: 3/22/24  Today' Date: 1/2/2025  Last Office Visit: None    Chief complaint: L arm pain     HPI: Pt is a pleasant 54 y.o., who presents for f/u. Referred by BERRY Martinez. Pt seen previously for L arm pain, weakness, and tinging since Feb. Pt had ILESI C6-7 on 4/8/24 with minimal relief. Weakness has worsened. Initially Gabapentin 600 mg TID was effective but it is not as helpful. Reports difficulty sleeping. She has consulted multiple specialists. Of note, pt's symptoms of L arm pain happened suddenly in Feb when she was seen in the ED for a r/o stroke. Has seen a neurologist and has had an EMG done. So far the only notable finding has been a C6-7 disc protrusion to the L. Endorses having headaches, balance issues, and difficulty holding objects with the L arm. Denies BB changes. Has been doing PT and HEP since being evaluated for L arm weakness in Feb.  She has not been able to do PT for 2 weeks due to worsening weakness.      Patient new to me, established with Dr. Lynne.     Pain disability score: 56  Pain score: 6    Relevant Imaging/ Testing:   MR C-spine w/ and w/o contrast 4/24  EMG (?)  MR brachial plexus 2/24  XR L shoulder 2/24  MR C-spine 2/24    Procedures: None    Date of board of pharmacy review:1/2/2025  Date of opioid risk screening/ pain psych: None  Date of opioid agreement and consent: None  Date of urine drug screen: None  Date of random pill count: None     was reviewed today: reviewed, no concerns     Prescribed medications: None    See EHR for  PMH, PSH, FH, SH, Medications and Allergy    ROS:  Positive for pain  ROS     PE:  Vitals:    01/02/25 0801   PainSc: 10-Worst pain ever   PainLoc: Arm       General: Pleasant, no distress  HEENT: NC/ AT. PERRLA  CV: Radial pulses intact  Pulm: No distress  Ext: No edema    Physical Exam     Neuromusculoskeletal:  Head: NC, AT. PERRLA. No occipital tenderness  Neck: Intact  range of motions, extension, flexion, rotation. B Facet loading. Neg Spurling. Marked tenderness to light touch.  5/5 Strength, normal tone. Neg Delvalle's  Shoulder: limited range of motion. Min Bicep groove tenderness. 3/5 Strength.   Hip: Intact range of motion  SI: Level  Knee: Intact range of motion  Reflexes: normal Bicep  Strength: 3/5 LUE, 5/5 globally elsewhere  Sensory: numbness of LUE   Skin: No bruising, erythema  Gait: Normal      Impression:  L arm pain  L arm weakness  C6-7 disc protrusion  Relevant History  BMI 43.77  Migraine  Asthma  Osteoarthritis         Plan:  Discussed options  Imaging/ relevant records viewed/ reviewed/ discussed  Imaging results viewed and reviewed (noted above)/ reviewed with patient   reviewed  Cont HEP  Cont PT      Prescribed medications:  Gabapentin 600 mg am and midday, 1200 mg nightly    The impression and plan were discussed and explained in detail. All the questions were answered. Education was provided accordingly.       Follow-up:  F/u 6 months    Mikki Talamantes PA-C

## 2025-01-07 RX ORDER — PANTOPRAZOLE SODIUM 40 MG/1
40 TABLET, DELAYED RELEASE ORAL 2 TIMES DAILY
Qty: 180 TABLET | Refills: 3 | Status: SHIPPED | OUTPATIENT
Start: 2025-01-07 | End: 2026-01-07

## 2025-03-07 ENCOUNTER — LAB VISIT (OUTPATIENT)
Dept: LAB | Facility: HOSPITAL | Age: 55
End: 2025-03-07
Payer: COMMERCIAL

## 2025-03-07 ENCOUNTER — TELEPHONE (OUTPATIENT)
Dept: FAMILY MEDICINE | Facility: CLINIC | Age: 55
End: 2025-03-07

## 2025-03-07 ENCOUNTER — OFFICE VISIT (OUTPATIENT)
Dept: FAMILY MEDICINE | Facility: CLINIC | Age: 55
End: 2025-03-07
Payer: COMMERCIAL

## 2025-03-07 VITALS
RESPIRATION RATE: 16 BRPM | BODY MASS INDEX: 47.01 KG/M2 | TEMPERATURE: 98 F | WEIGHT: 275.38 LBS | SYSTOLIC BLOOD PRESSURE: 122 MMHG | HEIGHT: 64 IN | OXYGEN SATURATION: 97 % | HEART RATE: 70 BPM | DIASTOLIC BLOOD PRESSURE: 78 MMHG

## 2025-03-07 DIAGNOSIS — D72.819 LEUKOPENIA, UNSPECIFIED TYPE: Primary | ICD-10-CM

## 2025-03-07 DIAGNOSIS — D72.819 LEUKOPENIA, UNSPECIFIED TYPE: ICD-10-CM

## 2025-03-07 DIAGNOSIS — R21 RASH: ICD-10-CM

## 2025-03-07 DIAGNOSIS — R29.898 LEFT ARM WEAKNESS: ICD-10-CM

## 2025-03-07 PROCEDURE — 85025 COMPLETE CBC W/AUTO DIFF WBC: CPT

## 2025-03-07 PROCEDURE — 36415 COLL VENOUS BLD VENIPUNCTURE: CPT | Mod: PO

## 2025-03-07 PROCEDURE — 99999 PR PBB SHADOW E&M-EST. PATIENT-LVL V: CPT | Mod: PBBFAC,,,

## 2025-03-07 NOTE — PROGRESS NOTES
"Subjective:       Patient ID:  [id number]     Chief Complaint: Follow-up      History of Present Illness    Ms. Issa presents today for evaluation of ongoing right arm dysfunction and to obtain a referral to neurology. She reports complete loss of right arm function since February 2023. She experiences difficulty with finger mobility, requiring assistance from the left hand to facilitate movement. She reports shock-like sensations radiating through the arm with associated swelling. Pain is exacerbated by vehicle motion and disrupts sleep, causing frequent nighttime awakenings. Physical therapy has been discontinued due to lack of progress. She was evaluated by a neurologist and neurosurgeon during hospitalization in February 2024. CT, MRI, and nerve conduction studies were performed. Dr. Grigsby, neurosurgeon, noted improvement with return of sensation to fingers and determined surgical intervention was not indicated. She continues Gabapentin 600mg twice daily (morning and night) with 300mg during the day without symptomatic improvement.     She has a red, bumpy rash around her nose that is cracking, described as "beet red" without makeup. She also notes peeling on the fingertips bilaterally. She receives Dupixent injections and Dapsone for management. Her dermatologist avoids prescribing steroids due to her skin condition. 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      No other concerns today.     Past Medical History:   Diagnosis Date    Abnormal mammogram     neg biospy    Allergic asthma     Anxiety     Arthritis     Dermatitis     Dr. 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    Pruritic dermatitis 2023    Umbilical hernia 2015    Menopausal symptoms 2015    Vaginal dryness, menopausal 2015    Polycystic ovarian disease 2014    Morbid obesity 2013    Bacterial vaginosis 10/25/2013    Migraine headache 08/10/2012    OA (osteoarthritis) 2012    Acne, adult 2012    Asthma with allergic rhinitis 2012    GERD (gastroesophageal reflux disease) 2012    Family history of early CAD, brother  with MI, age 60 2012    Mammogram abnormal 2012      Review of patient's allergies indicates:   Allergen Reactions    Cashew nut Anaphylaxis     Allergic to Cashew nuts    Nut flavor Anaphylaxis     Allergic to Cashew nuts    Latex Rash     Including in injections    Acitretin Itching and Blisters    Varicella vaccines      Red swelling/ lump develops at injection site    Adhesive tape-silicones Rash    Tapentadol Rash       Current Medications[1]    Lab Results   Component Value Date    WBC 6.65 2024    HGB 12.1 2024    HCT 37.7 2024     2024    CHOL 164 2024    TRIG 170 (H) 2024    HDL 47 2024    ALT 24 2024    AST 17 2024     2024    K 4.3 2024     2024    CREATININE 0.8 2024    BUN 13 2024    CO2 30 (H) 2024    TSH 2.216 2024    INR 1.0 2024    HGBA1C 4.3 2024    MICROALBUR 0.32 2012       Review of Systems   Constitutional:  Negative for fatigue and fever.   HENT: Negative.  Negative for congestion, sneezing and sore throat.    Eyes: Negative.    Respiratory:  Negative for cough, shortness of breath and wheezing.    Cardiovascular:  Negative for chest pain, palpitations and leg swelling.   Gastrointestinal:  Negative  for abdominal pain, nausea and vomiting.   Genitourinary: Negative.    Musculoskeletal: Negative.  Negative for arthralgias.   Skin:  Positive for rash.   Neurological:  Positive for weakness and numbness. Negative for dizziness, light-headedness and headaches.   Hematological: Negative.    Psychiatric/Behavioral: Negative.         Objective:      Physical Exam  Constitutional:       Appearance: Normal appearance.   HENT:      Head: Normocephalic and atraumatic.        Comments: Mildly erythematous and scaling rash.      Nose: Nose normal.   Eyes:      Extraocular Movements: Extraocular movements intact.   Cardiovascular:      Rate and Rhythm: Normal rate and regular rhythm.   Pulmonary:      Effort: Pulmonary effort is normal.      Breath sounds: Normal breath sounds.   Musculoskeletal:      Left shoulder: Decreased range of motion. Decreased strength.      Cervical back: Normal range of motion.   Skin:     General: Skin is warm and dry.      Findings: Rash present.   Neurological:      General: No focal deficit present.      Mental Status: She is alert and oriented to person, place, and time.   Psychiatric:         Mood and Affect: Mood normal.         Assessment:       1. Leukopenia, unspecified type    2. Left arm weakness    3. Rash        Plan:       Ijeoma was seen today for follow-up.    Diagnoses and all orders for this visit:    Leukopenia, unspecified type  -     CBC Auto Differential; Future    Left arm weakness  -     Ambulatory referral/consult to Neurology; Future  - Explained that persistent symptoms without improvement after conservative management warrant further neurological evaluation.  - Referred the patient to neurology at [facility] (Dr. Eliu Oliver) for further evaluation of persistent arm pain and mobility issues.  - Instructed the patient to contact [facility] on Monday to confirm receipt of neurology referral.  - Advised the patient to message the office if referral is  not received within a few days.  - Ms. Issa reports ongoing pain and limited use of the right shoulder since February of last year.  - Reviewed previous tests showing normal function from shoulder down, suggesting an issue between shoulder and head.  - Acknowledged that pain management treatments have been ineffective in controlling the pain.  - Referred the patient to neurology for further evaluation and management of chronic pain.    Rash  - Discussed that facial rash is likely due to irritation from frequent nose blowing and dryness.  - Instructed the patient to apply Aquaphor or petroleum jelly to facial rash area.  - Advised the patient to use soft tissues (e.g., Puffs Plus with lotion) when blowing nose.                Future Appointments       Date Provider Specialty Appt Notes    7/2/2025 Mikki Talamantes PA-C Pain Medicine 6 month f/u    7/7/2025 Allison Adler MD Family Medicine 8 month f/u               Portions of this note were dictated using voice recognition software and may contain dictation related errors in spelling / grammar / syntax not discovered on text review.     Carolyne Gonzales PA-C         [1]   Current Outpatient Medications:     budesonide-formoterol 160-4.5 mcg (SYMBICORT) 160-4.5 mcg/actuation HFAA, Inhale 2 puffs into the lungs 2 (two) times daily., Disp: , Rfl:     chlorhexidine (HIBICLENS) 4 % external liquid, Apply topically daily as needed (dermatitis)., Disp: 236 mL, Rfl: 2    chlorhexidine (PERIDEX) 0.12 % solution, , Disp: , Rfl:     ciclopirox 1 % shampoo, Apply 5 mLs topically Daily., Disp: , Rfl:     clobetasoL (TEMOVATE) 0.05 % cream, Apply topically 2 (two) times daily. Apply to affected areas of rash on the trunk, arms and legs for 2 weeks at a time, as needed for new flares. Not for face, neck or groin., Disp: 60 g, Rfl: 0    dapsone 25 MG Tab, Take 50 mg by mouth 2 (two) times daily., Disp: , Rfl:     doxepin (SINEQUAN) 25 MG capsule, TAKE 1 CAPSULE(25 MG) BY  MOUTH EVERY EVENING, Disp: 90 capsule, Rfl: 3    dupilumab (DUPIXENT PEN) 300 mg/2 mL PnIj, Inject 300 mg into the skin., Disp: , Rfl:     ferrous sulfate (IRON) 325 mg (65 mg iron) Tab tablet, Take 1 tablet (325 mg total) by mouth 3 (three) times a week., Disp: , Rfl:     fexofenadine (ALLEGRA) 180 MG tablet, Take 180 mg by mouth once daily., Disp: , Rfl:     fluticasone propionate (FLONASE) 50 mcg/actuation nasal spray, 2 sprays by Nasal route., Disp: , Rfl:     gabapentin (NEURONTIN) 600 MG tablet, Take 1 tablet (600 mg total) by mouth 3 (three) times daily., Disp: 360 tablet, Rfl: 1    gentamicin (GARAMYCIN) 0.1 % ointment, Apply twice daily to AA of face, Disp: , Rfl:     hydrocortisone 2.5 % cream, Apply to Affected areas on face and neck twice a day for up to 2 weeks as needed for flares, Disp: , Rfl:     ibuprofen (ADVIL,MOTRIN) 400 MG tablet, Take 1 tablet (400 mg total) by mouth every 6 (six) hours as needed (shoulder pain)., Disp: 30 tablet, Rfl: 1    ipratropium (ATROVENT) 42 mcg (0.06 %) nasal spray, 1-2 sprays by Nasal route., Disp: , Rfl:     LIDOcaine (LIDODERM) 5 %, Place 1 patch onto the skin once daily. Remove & Discard patch within 12 hours or as directed by MD, Disp: 30 patch, Rfl: 0    montelukast (SINGULAIR) 10 mg tablet, Take 1 tablet (10 mg total) by mouth once daily., Disp: 30 tablet, Rfl: 5    multivitamin with minerals tablet, Take 1 tablet by mouth once daily., Disp: , Rfl:     pantoprazole (PROTONIX) 40 MG tablet, Take 1 tablet (40 mg total) by mouth 2 (two) times daily., Disp: 180 tablet, Rfl: 3    spironolactone (ALDACTONE) 25 MG tablet, TAKE 1 TABLET BY MOUTH DAILY, Disp: 90 tablet, Rfl: 3    tacrolimus (PROTOPIC) 0.1 % ointment, Apply topically., Disp: , Rfl:     azelastine (ASTELIN) 137 mcg (0.1 %) nasal spray, 1 spray (137 mcg total) by Nasal route 2 (two) times daily. (Patient not taking: Reported on 12/9/2024), Disp: 30 mL, Rfl: 3    EPIPEN 2-LUCIANO 0.3 mg/0.3 mL AtIn, Inject 0.3  mLs (0.3 mg total) into the muscle once. for 1 dose, Disp: 0.3 mL, Rfl: 11    gabapentin (NEURONTIN) 600 MG tablet, TAKE 1 TABLET(600 MG) BY MOUTH THREE TIMES DAILY (Patient not taking: Reported on 3/7/2025), Disp: 90 tablet, Rfl: 2

## 2025-03-07 NOTE — TELEPHONE ENCOUNTER
----- Message from Carolyne Gonzales PA-C sent at 3/7/2025 12:02 PM CST -----  Please fax neurology ref to   Eliu Escobar with [employer]   Thanks

## 2025-03-08 LAB
BASOPHILS # BLD AUTO: 0.07 K/UL (ref 0–0.2)
BASOPHILS NFR BLD: 1.1 % (ref 0–1.9)
DIFFERENTIAL METHOD BLD: ABNORMAL
EOSINOPHIL # BLD AUTO: 0.1 K/UL (ref 0–0.5)
EOSINOPHIL NFR BLD: 1.6 % (ref 0–8)
ERYTHROCYTE [DISTWIDTH] IN BLOOD BY AUTOMATED COUNT: 13.7 % (ref 11.5–14.5)
HCT VFR BLD AUTO: 39.9 % (ref 37–48.5)
HGB BLD-MCNC: 12.1 G/DL (ref 12–16)
IMM GRANULOCYTES # BLD AUTO: 0.02 K/UL (ref 0–0.04)
IMM GRANULOCYTES NFR BLD AUTO: 0.3 % (ref 0–0.5)
LYMPHOCYTES # BLD AUTO: 2.1 K/UL (ref 1–4.8)
LYMPHOCYTES NFR BLD: 32.9 % (ref 18–48)
MCH RBC QN AUTO: 30 PG (ref 27–31)
MCHC RBC AUTO-ENTMCNC: 30.3 G/DL (ref 32–36)
MCV RBC AUTO: 99 FL (ref 82–98)
MONOCYTES # BLD AUTO: 0.5 K/UL (ref 0.3–1)
MONOCYTES NFR BLD: 8.3 % (ref 4–15)
NEUTROPHILS # BLD AUTO: 3.5 K/UL (ref 1.8–7.7)
NEUTROPHILS NFR BLD: 55.8 % (ref 38–73)
NRBC BLD-RTO: 0 /100 WBC
PLATELET # BLD AUTO: 258 K/UL (ref 150–450)
PMV BLD AUTO: 11.4 FL (ref 9.2–12.9)
RBC # BLD AUTO: 4.04 M/UL (ref 4–5.4)
WBC # BLD AUTO: 6.23 K/UL (ref 3.9–12.7)

## 2025-03-13 ENCOUNTER — RESULTS FOLLOW-UP (OUTPATIENT)
Dept: FAMILY MEDICINE | Facility: CLINIC | Age: 55
End: 2025-03-13

## 2025-04-01 ENCOUNTER — PATIENT MESSAGE (OUTPATIENT)
Dept: FAMILY MEDICINE | Facility: CLINIC | Age: 55
End: 2025-04-01
Payer: COMMERCIAL

## 2025-04-01 DIAGNOSIS — L70.9 ACNE, UNSPECIFIED ACNE TYPE: Chronic | ICD-10-CM

## 2025-04-01 DIAGNOSIS — L30.8 PRURITIC DERMATITIS: ICD-10-CM

## 2025-04-01 DIAGNOSIS — M79.89 LEG SWELLING: ICD-10-CM

## 2025-04-01 RX ORDER — SPIRONOLACTONE 25 MG/1
25 TABLET ORAL DAILY
Qty: 90 TABLET | Refills: 3 | Status: SHIPPED | OUTPATIENT
Start: 2025-04-01

## 2025-04-03 ENCOUNTER — PATIENT MESSAGE (OUTPATIENT)
Dept: FAMILY MEDICINE | Facility: CLINIC | Age: 55
End: 2025-04-03
Payer: COMMERCIAL

## 2025-04-07 ENCOUNTER — OFFICE VISIT (OUTPATIENT)
Dept: FAMILY MEDICINE | Facility: CLINIC | Age: 55
End: 2025-04-07
Payer: COMMERCIAL

## 2025-04-07 VITALS
OXYGEN SATURATION: 95 % | SYSTOLIC BLOOD PRESSURE: 122 MMHG | DIASTOLIC BLOOD PRESSURE: 68 MMHG | BODY MASS INDEX: 48.18 KG/M2 | WEIGHT: 282.19 LBS | HEART RATE: 89 BPM | TEMPERATURE: 98 F | HEIGHT: 64 IN

## 2025-04-07 DIAGNOSIS — L08.9 SKIN INFECTION: Primary | ICD-10-CM

## 2025-04-07 PROCEDURE — 3078F DIAST BP <80 MM HG: CPT | Mod: CPTII,S$GLB,, | Performed by: PHYSICIAN ASSISTANT

## 2025-04-07 PROCEDURE — 99213 OFFICE O/P EST LOW 20 MIN: CPT | Mod: S$GLB,,, | Performed by: PHYSICIAN ASSISTANT

## 2025-04-07 PROCEDURE — 1159F MED LIST DOCD IN RCRD: CPT | Mod: CPTII,S$GLB,, | Performed by: PHYSICIAN ASSISTANT

## 2025-04-07 PROCEDURE — 99999 PR PBB SHADOW E&M-EST. PATIENT-LVL V: CPT | Mod: PBBFAC,,, | Performed by: PHYSICIAN ASSISTANT

## 2025-04-07 PROCEDURE — 3074F SYST BP LT 130 MM HG: CPT | Mod: CPTII,S$GLB,, | Performed by: PHYSICIAN ASSISTANT

## 2025-04-07 PROCEDURE — 3008F BODY MASS INDEX DOCD: CPT | Mod: CPTII,S$GLB,, | Performed by: PHYSICIAN ASSISTANT

## 2025-04-07 PROCEDURE — 1160F RVW MEDS BY RX/DR IN RCRD: CPT | Mod: CPTII,S$GLB,, | Performed by: PHYSICIAN ASSISTANT

## 2025-04-07 RX ORDER — MUPIROCIN 20 MG/G
OINTMENT TOPICAL 3 TIMES DAILY
Qty: 30 G | Refills: 0 | Status: SHIPPED | OUTPATIENT
Start: 2025-04-07

## 2025-04-07 RX ORDER — DOXYCYCLINE 100 MG/1
100 CAPSULE ORAL 2 TIMES DAILY
Qty: 14 CAPSULE | Refills: 0 | Status: SHIPPED | OUTPATIENT
Start: 2025-04-07

## 2025-04-07 NOTE — PROGRESS NOTES
OFFICE VISIT   4/7/2025    Patient ID: Ijeoma Issa is a 55 y.o. female    SUBJECTIVE:  No chief complaint on file.      HPI/ROS:  History of Present Illness    CHIEF COMPLAINT:  Patient presents today for evaluation of a bite on her arm.    BITE EVALUATION:  She reports a suspected spider or other insect bite on her right arm that occurred on March 31st. Initially, the redness extended almost to the wrist area and was warm to the touch. The redness has been decreasing since then. She denies pain, numbness, or tingling in her arm or hand, except for tenderness when pressed. She notes a small firm nodule adjacent to the bite site.    DERMATOLOGIC:  She has scheduled a dermatology appointment for the end of April.    MEDICATIONS:  She receives Dupixent injection every two weeks, takes Dapazone, and uses a topical antibiotic ointment at the site of current issue (name starting with 'M').    ALLERGIES:  She has an allergy to adhesive bandages, particularly those containing latex, which causes a blistering reaction. She can only use bandages for short periods and better tolerates paper tape.         Review of Systems   Constitutional:  Negative for chills, fatigue and fever.   Respiratory:  Negative for shortness of breath.    Cardiovascular:  Negative for chest pain.   Gastrointestinal:  Negative for nausea and vomiting.   Musculoskeletal:  Negative for myalgias.   Skin:  Positive for color change and wound.   Neurological:  Negative for weakness and numbness.       Past Medical History:   Diagnosis Date    Abnormal mammogram     neg biospy    Allergic asthma     Anxiety     Arthritis     Dermatitis     Dr. Weil, Extremities    Diabetes     Diverticulosis of colon     GERD (gastroesophageal reflux disease)     Mass of right breast     Postmenopausal hormone replacement therapy     Tubal pregnancy     Uterine fibroid        Social History     Tobacco Use    Smoking status: Never     Passive exposure: Never     "Smokeless tobacco: Never   Substance Use Topics    Alcohol use: Yes     Alcohol/week: 0.0 standard drinks of alcohol     Comment: very seldom    Drug use: No       Past Surgical History:   Procedure Laterality Date    BREAST BIOPSY Right 2012    benign     SECTION, LOW TRANSVERSE      CHOLECYSTECTOMY      COLONOSCOPY N/A 3/26/2024    Procedure: COLONOSCOPY;  Surgeon: Karl Brady MD;  Location: [facility] END[facility];  Service: Endoscopy;  Laterality: N/A;  ppm sent    EPIDURAL STEROID INJECTION INTO CERVICAL SPINE N/A 2024    Procedure: Injection-steroid-epidural-cervical;  Surgeon: Shaila Lynne MD;  Location: [facility] OR;  Service: Anesthesiology;  Laterality: N/A;  C6-7    HYSTERECTOMY      re: benign tumors per the patient    right ankle  10/10/2014    TOTAL ABDOMINAL HYSTERECTOMY W/ BILATERAL SALPINGOOPHORECTOMY      c appendectomy    TUBAL LIGATION         OBJECTIVE:    /68 (BP Location: Right arm, Patient Position: Sitting)   Pulse 89   Temp 97.6 °F (36.4 °C) (Oral)   Ht 5' 4" (1.626 m)   Wt 128 kg (282 lb 3 oz)   SpO2 95%   BMI 48.44 kg/m²       Current Outpatient Medications:     budesonide-formoterol 160-4.5 mcg (SYMBICORT) 160-4.5 mcg/actuation HFAA, Inhale 2 puffs into the lungs 2 (two) times daily., Disp: , Rfl:     chlorhexidine (HIBICLENS) 4 % external liquid, Apply topically daily as needed (dermatitis)., Disp: 236 mL, Rfl: 2    chlorhexidine (PERIDEX) 0.12 % solution, , Disp: , Rfl:     ciclopirox 1 % shampoo, Apply 5 mLs topically Daily., Disp: , Rfl:     clobetasoL (TEMOVATE) 0.05 % cream, Apply topically 2 (two) times daily. Apply to affected areas of rash on the trunk, arms and legs for 2 weeks at a time, as needed for new flares. Not for face, neck or groin., Disp: 60 g, Rfl: 0    dapsone 25 MG Tab, Take 50 mg by mouth 2 (two) times daily., Disp: , Rfl:     doxepin (SINEQUAN) 25 MG capsule, TAKE 1 CAPSULE(25 MG) BY MOUTH EVERY EVENING, Disp: 90 capsule, Rfl: 3    " dupilumab (DUPIXENT PEN) 300 mg/2 mL PnIj, Inject 300 mg into the skin., Disp: , Rfl:     ferrous sulfate (IRON) 325 mg (65 mg iron) Tab tablet, Take 1 tablet (325 mg total) by mouth 3 (three) times a week., Disp: , Rfl:     fexofenadine (ALLEGRA) 180 MG tablet, Take 180 mg by mouth once daily., Disp: , Rfl:     fluticasone propionate (FLONASE) 50 mcg/actuation nasal spray, 2 sprays by Nasal route., Disp: , Rfl:     gabapentin (NEURONTIN) 600 MG tablet, TAKE 1 TABLET(600 MG) BY MOUTH THREE TIMES DAILY, Disp: 90 tablet, Rfl: 2    gabapentin (NEURONTIN) 600 MG tablet, Take 1 tablet (600 mg total) by mouth 3 (three) times daily., Disp: 360 tablet, Rfl: 1    gentamicin (GARAMYCIN) 0.1 % ointment, Apply twice daily to AA of face, Disp: , Rfl:     hydrocortisone 2.5 % cream, Apply to Affected areas on face and neck twice a day for up to 2 weeks as needed for flares, Disp: , Rfl:     ibuprofen (ADVIL,MOTRIN) 400 MG tablet, Take 1 tablet (400 mg total) by mouth every 6 (six) hours as needed (shoulder pain)., Disp: 30 tablet, Rfl: 1    ipratropium (ATROVENT) 42 mcg (0.06 %) nasal spray, 1-2 sprays by Nasal route., Disp: , Rfl:     LIDOcaine (LIDODERM) 5 %, Place 1 patch onto the skin once daily. Remove & Discard patch within 12 hours or as directed by MD, Disp: 30 patch, Rfl: 0    montelukast (SINGULAIR) 10 mg tablet, Take 1 tablet (10 mg total) by mouth once daily., Disp: 30 tablet, Rfl: 5    multivitamin with minerals tablet, Take 1 tablet by mouth once daily., Disp: , Rfl:     pantoprazole (PROTONIX) 40 MG tablet, Take 1 tablet (40 mg total) by mouth 2 (two) times daily., Disp: 180 tablet, Rfl: 3    spironolactone (ALDACTONE) 25 MG tablet, Take 1 tablet (25 mg total) by mouth once daily., Disp: 90 tablet, Rfl: 3    tacrolimus (PROTOPIC) 0.1 % ointment, Apply topically., Disp: , Rfl:     azelastine (ASTELIN) 137 mcg (0.1 %) nasal spray, 1 spray (137 mcg total) by Nasal route 2 (two) times daily. (Patient not taking:  Reported on 12/9/2024), Disp: 30 mL, Rfl: 3    doxycycline (MONODOX) 100 MG capsule, Take 1 capsule (100 mg total) by mouth 2 (two) times daily., Disp: 14 capsule, Rfl: 0    EPIPEN 2-LUCIANO 0.3 mg/0.3 mL AtIn, Inject 0.3 mLs (0.3 mg total) into the muscle once. for 1 dose, Disp: 0.3 mL, Rfl: 11    mupirocin (BACTROBAN) 2 % ointment, Apply topically 3 (three) times daily., Disp: 30 g, Rfl: 0    Physical Exam  Constitutional:       General: She is not in acute distress.     Appearance: Normal appearance. She is normal weight. She is not ill-appearing.   HENT:      Head: Normocephalic and atraumatic.      Right Ear: External ear normal.      Left Ear: External ear normal.      Nose: Nose normal.   Eyes:      Extraocular Movements: Extraocular movements intact.      Conjunctiva/sclera: Conjunctivae normal.   Cardiovascular:      Rate and Rhythm: Normal rate and regular rhythm.      Pulses: Normal pulses.      Heart sounds: Normal heart sounds.   Pulmonary:      Effort: Pulmonary effort is normal.      Breath sounds: Normal breath sounds.   Musculoskeletal:         General: No swelling. Normal range of motion.      Cervical back: Normal range of motion and neck supple.   Skin:     General: Skin is warm and dry.      Findings: Wound present.             Comments: Mildly tender erythematous wound present to right mid-forearm. No underlying induration or fluctuance, no current drainage.   Neurological:      General: No focal deficit present.      Mental Status: She is alert and oriented to person, place, and time. Mental status is at baseline.   Psychiatric:         Mood and Affect: Mood normal.         Behavior: Behavior normal.         Thought Content: Thought content normal.         Judgment: Judgment normal.           Assessment/Plan:  Assessment & Plan    W57.XXXA Bitten or stung by nonvenomous insect and other nonvenomous arthropods, initial encounter  L23.1 Allergic contact dermatitis due to adhesives  Z91.040 Latex  allergy status    IMPRESSION:  - Assessed site of possible spider bite on arm, noting improvement in redness and tenderness.  - Considered potential for skin infection given immunosuppressive therapy (Dupixent).  - Prescribed oral antibiotics as precautionary measure despite some improvement.    INSECT BITE MANAGEMENT:  - Assessed the bite area, noting a small hard nodule and some residual redness and tenderness.  - Evaluated the progression of the bite, observing decreased redness since initial occurrence on March 31st.  - Prescribed doxycycline 100mg twice daily for 1 week, to be taken with food to mitigate potential GI side effects.  - Instructed the patient to keep the bite area clean and dry, leaving it uncovered when possible to promote healing.  - Recommend continuation of topical mupirocin ointment application to the bite area.  - Patient to avoid excessive sun exposure while on doxycycline.    ALLERGIC CONTACT DERMATITIS:  - Noted the patient's history of allergic reactions to adhesive bandages, resulting in skin blistering.  - Recommend using paper tape if needing to cover instead of adhesive bandages to avoid allergic reactions.    LATEX ALLERGY:  - Documented the patient's reported latex allergy for future reference and precautionary measures.    FOLLOW-UP:  - Scheduled a follow-up visit for Friday, April 11th to reassess the bite's progress.  - Instructed the patient to contact the office if the condition worsens in the next few days.          Problem List Items Addressed This Visit    None  Visit Diagnoses         Skin infection    -  Primary    Relevant Medications    doxycycline (MONODOX) 100 MG capsule    mupirocin (BACTROBAN) 2 % ointment            Patient verbalizes understanding of instructions and healthcare topics reviewed. All of patient's questions were answered.  Patient encouraged to contact office if other questions arise.    Health Maintenance Due   Topic Date Due    Shingles Vaccine (1  of 2) Never done    COVID-19 Vaccine (4 - 2024-25 season) 09/01/2024       Follow up if symptoms worsen or fail to improve.    This note was generated with the assistance of ambient listening technology. Verbal consent was obtained by the patient and accompanying visitor(s) for the recording of patient appointment to facilitate this note. I attest to having reviewed and edited the generated note for accuracy, though some syntax or spelling errors may persist. Please contact the author of this note for any clarification.     Yesica Gonzales PA-C  Family Medicine Physician Assistant

## 2025-06-12 ENCOUNTER — HOSPITAL ENCOUNTER (OUTPATIENT)
Dept: RADIOLOGY | Facility: HOSPITAL | Age: 55
Discharge: HOME | End: 2025-06-12
Attending: NURSE PRACTITIONER
Payer: COMMERCIAL

## 2025-06-12 ENCOUNTER — OFFICE VISIT (OUTPATIENT)
Dept: FAMILY MEDICINE | Facility: CLINIC | Age: 55
End: 2025-06-12
Payer: COMMERCIAL

## 2025-06-12 VITALS
SYSTOLIC BLOOD PRESSURE: 102 MMHG | TEMPERATURE: 99 F | HEART RATE: 74 BPM | WEIGHT: 277.31 LBS | OXYGEN SATURATION: 96 % | HEIGHT: 64 IN | BODY MASS INDEX: 47.34 KG/M2 | DIASTOLIC BLOOD PRESSURE: 80 MMHG

## 2025-06-12 DIAGNOSIS — H92.01 POSTERIOR AURICULAR PAIN OF RIGHT EAR: ICD-10-CM

## 2025-06-12 DIAGNOSIS — H66.91 RIGHT OTITIS MEDIA, UNSPECIFIED OTITIS MEDIA TYPE: Primary | ICD-10-CM

## 2025-06-12 DIAGNOSIS — H66.91 RIGHT OTITIS MEDIA, UNSPECIFIED OTITIS MEDIA TYPE: ICD-10-CM

## 2025-06-12 DIAGNOSIS — F41.9 ANXIETY: ICD-10-CM

## 2025-06-12 PROCEDURE — 99214 OFFICE O/P EST MOD 30 MIN: CPT | Mod: S$GLB,,, | Performed by: NURSE PRACTITIONER

## 2025-06-12 PROCEDURE — 1159F MED LIST DOCD IN RCRD: CPT | Mod: CPTII,S$GLB,, | Performed by: NURSE PRACTITIONER

## 2025-06-12 PROCEDURE — 70480 CT ORBIT/EAR/FOSSA W/O DYE: CPT | Mod: TC

## 2025-06-12 PROCEDURE — 1160F RVW MEDS BY RX/DR IN RCRD: CPT | Mod: CPTII,S$GLB,, | Performed by: NURSE PRACTITIONER

## 2025-06-12 PROCEDURE — 99999 PR PBB SHADOW E&M-EST. PATIENT-LVL IV: CPT | Mod: PBBFAC,,, | Performed by: NURSE PRACTITIONER

## 2025-06-12 PROCEDURE — 3074F SYST BP LT 130 MM HG: CPT | Mod: CPTII,S$GLB,, | Performed by: NURSE PRACTITIONER

## 2025-06-12 PROCEDURE — 3008F BODY MASS INDEX DOCD: CPT | Mod: CPTII,S$GLB,, | Performed by: NURSE PRACTITIONER

## 2025-06-12 PROCEDURE — 3079F DIAST BP 80-89 MM HG: CPT | Mod: CPTII,S$GLB,, | Performed by: NURSE PRACTITIONER

## 2025-06-12 PROCEDURE — 70480 CT ORBIT/EAR/FOSSA W/O DYE: CPT | Mod: 26,,, | Performed by: RADIOLOGY

## 2025-06-12 RX ORDER — AMOXICILLIN AND CLAVULANATE POTASSIUM 875; 125 MG/1; MG/1
1 TABLET, FILM COATED ORAL EVERY 12 HOURS
Qty: 14 TABLET | Refills: 0 | Status: SHIPPED | OUTPATIENT
Start: 2025-06-12 | End: 2025-06-19

## 2025-06-12 RX ORDER — HYDROXYZINE HYDROCHLORIDE 25 MG/1
25 TABLET, FILM COATED ORAL 3 TIMES DAILY PRN
Qty: 90 TABLET | Refills: 1 | Status: SHIPPED | OUTPATIENT
Start: 2025-06-12

## 2025-06-12 RX ORDER — AMOXICILLIN AND CLAVULANATE POTASSIUM 875; 125 MG/1; MG/1
1 TABLET, FILM COATED ORAL EVERY 12 HOURS
Qty: 14 TABLET | Refills: 0 | Status: CANCELLED | OUTPATIENT
Start: 2025-06-12 | End: 2025-06-19

## 2025-06-12 NOTE — PROGRESS NOTES
Subjective:       Patient ID: Ijeoma Issa is a 55 y.o. female.    Chief Complaint: right ear pain, swelling in the face, and Cough     HPI   54 y/o female patient with medical problems listed below presents for right ear pain, postnasal dripping, preauricular and postauricular pain for 2 days. She states seen by a dentist and was explained that the pain is not dental in origin. Denies recent trauma to the affected area. She states has chronic swimmer's ear and last flare up was 2 years ago. Denies ear discharges, fever, chills. She states used over the counter ear drops but no relief. She states was seen by dermatology today for intrinsic atopic dermatitis and was recommended to try medication for anxiety.     Problem List[1]     Review of patient's allergies indicates:   Allergen Reactions    Cashew nut Anaphylaxis     Allergic to Cashew nuts    Nut flavor Anaphylaxis     Allergic to Cashew nuts    Latex Rash     Including in injections    Acitretin Itching and Blisters    Varicella vaccines      Red swelling/ lump develops at injection site    Adhesive tape-silicones Rash    Tapentadol Rash     Past Surgical History:   Procedure Laterality Date    BREAST BIOPSY Right 2012    benign     SECTION, LOW TRANSVERSE      CHOLECYSTECTOMY      COLONOSCOPY N/A 3/26/2024    Procedure: COLONOSCOPY;  Surgeon: Karl Brady MD;  Location: [facility] ENDO;  Service: Endoscopy;  Laterality: N/A;  ppm sent    EPIDURAL STEROID INJECTION INTO CERVICAL SPINE N/A 2024    Procedure: Injection-steroid-epidural-cervical;  Surgeon: Shaila Lynne MD;  Location: [facility] OR;  Service: Anesthesiology;  Laterality: N/A;  C6-7    HYSTERECTOMY      re: benign tumors per the patient    right ankle  10/10/2014    TOTAL ABDOMINAL HYSTERECTOMY W/ BILATERAL SALPINGOOPHORECTOMY      c appendectomy    TUBAL LIGATION        Current Medications[2]    Review of Systems   Constitutional:  Negative for chills and fever.  "  HENT:  Positive for ear pain and facial swelling.    Respiratory:  Negative for cough and shortness of breath.    Cardiovascular:  Negative for chest pain and palpitations.   Gastrointestinal:  Negative for abdominal pain.   Neurological:  Negative for dizziness and headaches.       Objective:   /80 (BP Location: Right arm, Patient Position: Sitting)   Pulse 74   Temp 98.8 °F (37.1 °C) (Oral)   Ht 5' 4" (1.626 m)   Wt 125.8 kg (277 lb 5.4 oz)   SpO2 96%   BMI 47.61 kg/m²         Physical Exam  Constitutional:       General: She is not in acute distress.     Appearance: Normal appearance.   HENT:      Head: Atraumatic.      Right Ear: Tenderness present. Tympanic membrane is erythematous.      Ears:      Comments: +Tenderness in right preauricular and postauricular  Cardiovascular:      Rate and Rhythm: Normal rate and regular rhythm.      Pulses: Normal pulses.      Heart sounds: Normal heart sounds.   Pulmonary:      Effort: Pulmonary effort is normal.      Breath sounds: Normal breath sounds.   Abdominal:      General: Abdomen is flat. Bowel sounds are normal.      Palpations: Abdomen is soft.   Neurological:      Mental Status: She is oriented to person, place, and time.         Assessment:       1. Right otitis media, unspecified otitis media type    2. Posterior auricular pain of right ear    3. Anxiety        Plan:       1. Right otitis media, unspecified otitis media type (Primary)  - amoxicillin-clavulanate 875-125mg (AUGMENTIN) 875-125 mg per tablet; Take 1 tablet by mouth every 12 (twelve) hours. for 7 days  Dispense: 14 tablet; Refill: 0    2. Posterior auricular pain of right ear  - amoxicillin-clavulanate 875-125mg (AUGMENTIN) 875-125 mg per tablet; Take 1 tablet by mouth every 12 (twelve) hours. for 7 days  Dispense: 14 tablet; Refill: 0  - CT Temporal Bone without contrast; Future    3. Anxiety  - hydrOXYzine HCL (ATARAX) 25 MG tablet; Take 1 tablet (25 mg total) by mouth 3 (three) times " daily as needed for Anxiety.  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 Apply to affected areas of rash on the trunk, arms and legs for 2 weeks at a time, as needed for new flares. Not for face, neck or groin., Disp: 60 g, Rfl: 0    dapsone 25 MG Tab, Take 50 mg by mouth 2 (two) times daily., Disp: , Rfl:     doxepin (SINEQUAN) 25 MG capsule, TAKE 1 CAPSULE(25 MG) BY MOUTH EVERY EVENING, Disp: 90 capsule, Rfl: 3    doxycycline (MONODOX) 100 MG capsule, Take 1 capsule (100 mg total) by mouth 2 (two) times daily., Disp: 14 capsule, Rfl: 0    dupilumab (DUPIXENT PEN) 300 mg/2 mL PnIj, Inject 300 mg into the skin., Disp: , Rfl:     ferrous sulfate (IRON) 325 mg (65 mg iron) Tab tablet, Take 1 tablet (325 mg total) by mouth 3 (three) times a week., Disp: , Rfl:     fexofenadine (ALLEGRA) 180 MG tablet, Take 180 mg by mouth once daily., Disp: , Rfl:      fluticasone propionate (FLONASE) 50 mcg/actuation nasal spray, 2 sprays by Nasal route., Disp: , Rfl:     gabapentin (NEURONTIN) 600 MG tablet, TAKE 1 TABLET(600 MG) BY MOUTH THREE TIMES DAILY, Disp: 90 tablet, Rfl: 2    gabapentin (NEURONTIN) 600 MG tablet, Take 1 tablet (600 mg total) by mouth 3 (three) times daily., Disp: 360 tablet, Rfl: 1    gentamicin (GARAMYCIN) 0.1 % ointment, Apply twice daily to AA of face, Disp: , Rfl:     hydrocortisone 2.5 % cream, Apply to Affected areas on face and neck twice a day for up to 2 weeks as needed for flares, Disp: , Rfl:     ibuprofen (ADVIL,MOTRIN) 400 MG tablet, Take 1 tablet (400 mg total) by mouth every 6 (six) hours as needed (shoulder pain)., Disp: 30 tablet, Rfl: 1    ipratropium (ATROVENT) 42 mcg (0.06 %) nasal spray, 1-2 sprays by Nasal route., Disp: , Rfl:     LIDOcaine (LIDODERM) 5 %, Place 1 patch onto the skin once daily. Remove & Discard patch within 12 hours or as directed by MD, Disp: 30 patch, Rfl: 0    montelukast (SINGULAIR) 10 mg tablet, Take 1 tablet (10 mg total) by mouth once daily., Disp: 30 tablet, Rfl: 5    multivitamin with minerals tablet, Take 1 tablet by mouth once daily., Disp: , Rfl:     mupirocin (BACTROBAN) 2 % ointment, Apply topically 3 (three) times daily., Disp: 30 g, Rfl: 0    pantoprazole (PROTONIX) 40 MG tablet, Take 1 tablet (40 mg total) by mouth 2 (two) times daily., Disp: 180 tablet, Rfl: 3    spironolactone (ALDACTONE) 25 MG tablet, Take 1 tablet (25 mg total) by mouth once daily., Disp: 90 tablet, Rfl: 3    tacrolimus (PROTOPIC) 0.1 % ointment, Apply topically., Disp: , Rfl:     amoxicillin-clavulanate 875-125mg (AUGMENTIN) 875-125 mg per tablet, Take 1 tablet by mouth every 12 (twelve) hours. for 7 days, Disp: 14 tablet, Rfl: 0    azelastine (ASTELIN) 137 mcg (0.1 %) nasal spray, 1 spray (137 mcg total) by Nasal route 2 (two) times daily. (Patient not taking: Reported on 12/9/2024), Disp: 30 mL, Rfl: 3     budesonide-formoterol 160-4.5 mcg (SYMBICORT) 160-4.5 mcg/actuation HFAA, Inhale 2 puffs into the lungs 2 (two) times daily., Disp: , Rfl:     EPIPEN 2-LUCIANO 0.3 mg/0.3 mL AtIn, Inject 0.3 mLs (0.3 mg total) into the muscle once. for 1 dose, Disp: 0.3 mL, Rfl: 11    hydrOXYzine HCL (ATARAX) 25 MG tablet, Take 1 tablet (25 mg total) by mouth 3 (three) times daily as needed for Anxiety., Disp: 90 tablet, Rfl: 1

## 2025-06-13 ENCOUNTER — RESULTS FOLLOW-UP (OUTPATIENT)
Dept: FAMILY MEDICINE | Facility: CLINIC | Age: 55
End: 2025-06-13

## 2025-06-23 ENCOUNTER — PATIENT MESSAGE (OUTPATIENT)
Dept: FAMILY MEDICINE | Facility: CLINIC | Age: 55
End: 2025-06-23
Payer: COMMERCIAL

## 2025-06-24 ENCOUNTER — TELEPHONE (OUTPATIENT)
Dept: FAMILY MEDICINE | Facility: CLINIC | Age: 55
End: 2025-06-24
Payer: COMMERCIAL

## 2025-06-24 DIAGNOSIS — H66.91 RIGHT OTITIS MEDIA, UNSPECIFIED OTITIS MEDIA TYPE: Primary | ICD-10-CM

## 2025-06-24 NOTE — TELEPHONE ENCOUNTER
I spoke with pt via phone. ENT appt booked. Please see appt encounters. No further questions. Pt voiced understanding of date, time and location.

## 2025-06-26 ENCOUNTER — OFFICE VISIT (OUTPATIENT)
Dept: OTOLARYNGOLOGY | Facility: CLINIC | Age: 55
End: 2025-06-26
Payer: COMMERCIAL

## 2025-06-26 VITALS — BODY MASS INDEX: 48.21 KG/M2 | WEIGHT: 280.88 LBS

## 2025-06-26 DIAGNOSIS — H91.91 HEARING LOSS OF RIGHT EAR, UNSPECIFIED HEARING LOSS TYPE: Primary | ICD-10-CM

## 2025-06-26 DIAGNOSIS — B96.89 ACUTE BACTERIAL BRONCHITIS: ICD-10-CM

## 2025-06-26 DIAGNOSIS — J20.8 ACUTE BACTERIAL BRONCHITIS: ICD-10-CM

## 2025-06-26 DIAGNOSIS — H66.91 RIGHT OTITIS MEDIA, UNSPECIFIED OTITIS MEDIA TYPE: ICD-10-CM

## 2025-06-26 PROCEDURE — 1160F RVW MEDS BY RX/DR IN RCRD: CPT | Mod: CPTII,S$GLB,, | Performed by: PHYSICIAN ASSISTANT

## 2025-06-26 PROCEDURE — 99204 OFFICE O/P NEW MOD 45 MIN: CPT | Mod: S$GLB,,, | Performed by: PHYSICIAN ASSISTANT

## 2025-06-26 PROCEDURE — 99999 PR PBB SHADOW E&M-EST. PATIENT-LVL III: CPT | Mod: PBBFAC,,, | Performed by: PHYSICIAN ASSISTANT

## 2025-06-26 PROCEDURE — 3008F BODY MASS INDEX DOCD: CPT | Mod: CPTII,S$GLB,, | Performed by: PHYSICIAN ASSISTANT

## 2025-06-26 PROCEDURE — 1159F MED LIST DOCD IN RCRD: CPT | Mod: CPTII,S$GLB,, | Performed by: PHYSICIAN ASSISTANT

## 2025-06-26 RX ORDER — DOXYCYCLINE 100 MG/1
100 CAPSULE ORAL EVERY 12 HOURS
Qty: 20 CAPSULE | Refills: 0 | Status: SHIPPED | OUTPATIENT
Start: 2025-06-26 | End: 2025-07-06

## 2025-06-26 NOTE — PATIENT INSTRUCTIONS
Use flonase and astelin as prescribed. Point up and slightly outward toward ear when using medicated nasal sprays as to avoid irritating nasal septum. Pop ears gently for 5 seconds 6 times a day. Stop popping ears if it causes ear pain. Follow up in 4 weeks with comprehensive audiogram.

## 2025-06-26 NOTE — PROGRESS NOTES
[facility]    Subjective:      Patient: Ijeoma sIsa Patient PCP: Allison Adler MD         :  1970     Sex:  female      MRN:  [id number]          Date of Visit: 2025      Chief Complaint: Right OM    Patient ID: Ijeoma Issa is a 55 y.o. female who presents to office for evaluation of right OM. Pt presented to family medicine 2025 with complaints of right-sided ear pain and right-sided pre/post-auricular pain. Pt was started on Augmentin 875mg BID x 7 days. She had CT temporal bone WO 2025 showed normal left externa, middle and inner ear. Pt had trace right mastoid effusion with right TM retraction and with scattered fluid vs inflammation of middle ear involving prussak space and near the footplate/oval window. CT indicates likely resolving right OM. She reports ear infection started 2-3 weeks ago with current symptoms of muffled hearing and residual ear pressure. Her ear pain has resolved. She does have some discomfort now. She completed Augmentin twice daily for seven days, finishing last Thursday. She experienced a popping sensation in the ear last night while lying down. She denies ringing in the ears. She has a history of frequent outer ear infections occurring multiple times per year despite not swimming. She denies history of ear surgery or tubes. She has issues with allergies and use astelin 1 spray to each nostril twice daily and flonase 2 sprays to each nostril daily. She reports a one-week history of cough with light greenish mucus and frequent nasal congestion. She denies fever, chills, or significant mucus discoloration. She reports adverse reaction to topical, injectable and oral steroids resulting in severe skin rebound effect. Her dermatologist has advised avoiding steroid medications.         Past Medical History  She has a past medical history of Abnormal mammogram, Allergic asthma, Anxiety, Arthritis, Dermatitis, Diabetes, Diverticulosis of colon, GERD  (gastroesophageal reflux disease), Mass of right breast, Postmenopausal hormone replacement therapy, Tubal pregnancy, and Uterine fibroid.    Family History  Her family history includes Breast cancer (age of onset: 28) in her sister; Cancer in her brother and father; Colon cancer in her father; Diabetes in her sister; Heart disease in her brother and mother.    Past Surgical History:   Procedure Laterality Date    BREAST BIOPSY Right 2012    benign     SECTION, LOW TRANSVERSE      CHOLECYSTECTOMY      COLONOSCOPY N/A 3/26/2024    Procedure: COLONOSCOPY;  Surgeon: Karl Brady MD;  Location: [facility] ENDO;  Service: Endoscopy;  Laterality: N/A;  ppm sent    EPIDURAL STEROID INJECTION INTO CERVICAL SPINE N/A 2024    Procedure: Injection-steroid-epidural-cervical;  Surgeon: Shaila Lynne MD;  Location: [facility] OR;  Service: Anesthesiology;  Laterality: N/A;  C6-7    HYSTERECTOMY      re: benign tumors per the patient    right ankle  10/10/2014    TOTAL ABDOMINAL HYSTERECTOMY W/ BILATERAL SALPINGOOPHORECTOMY      c appendectomy    TUBAL LIGATION       Social History     Tobacco Use    Smoking status: Never     Passive exposure: Never    Smokeless tobacco: Never   Substance and Sexual Activity    Alcohol use: Yes     Alcohol/week: 0.0 standard drinks of alcohol     Comment: very seldom    Drug use: No    Sexual activity: Yes     Partners: Male     Birth control/protection: Surgical, See Surgical Hx     Comment: hyst     Medications  She has a current medication list which includes the following prescription(s): azelastine, budesonide-formoterol 160-4.5 mcg, chlorhexidine, chlorhexidine, ciclopirox, clobetasol, dapsone, doxepin, dupixent pen, epipen 2-brennen, ferrous sulfate, fexofenadine, fluticasone propionate, gabapentin, gabapentin, gentamicin, hydrocortisone, hydroxyzine hcl, ibuprofen, ipratropium, lidocaine, montelukast, multivitamin with minerals, mupirocin, pantoprazole, spironolactone,  "tacrolimus, and doxycycline.    Review of patient's allergies indicates:   Allergen Reactions    Cashew nut Anaphylaxis     Allergic to Cashew nuts    Nut flavor Anaphylaxis     Allergic to Cashew nuts    Latex Rash     Including in injections    Acitretin Itching and Blisters    Varicella vaccines      Red swelling/ lump develops at injection site    Adhesive tape-silicones Rash    Tapentadol Rash     All medications, allergies, and past history have been reviewed.    Objective:      Vitals:      4/7/2025    12:59 PM 6/12/2025     1:16 PM 6/26/2025     9:03 AM   Vitals - 1 value per visit   SYSTOLIC 122 102    DIASTOLIC 68 80    Pulse 89 74    Temp 97.6 °F (36.4 °C) 98.8 °F (37.1 °C)    SPO2 95 % 96 %    Weight (lb) 282.19 277.34 280.87   Weight (kg) 128 125.8 127.4   Height 5' 4" (1.626 m) 5' 4" (1.626 m)    BMI (Calculated) 48.4 47.6    Pain Score Zero Nine        Body surface area is 2.4 meters squared.    Physical Exam  Constitutional:       General: She is not in acute distress.     Appearance: Normal appearance. She is not ill-appearing.   HENT:      Head: Normocephalic and atraumatic.      Right Ear: Ear canal and external ear normal. A middle ear effusion (serous) is present. Tympanic membrane is retracted. Tympanic membrane is not scarred, perforated, erythematous or bulging.      Left Ear: Tympanic membrane, ear canal and external ear normal.      Nose: Nose normal.      Mouth/Throat:      Lips: Pink. No lesions.      Mouth: Mucous membranes are moist. No oral lesions.      Tongue: No lesions.      Palate: No lesions.      Pharynx: Oropharynx is clear. Uvula midline. No pharyngeal swelling, oropharyngeal exudate, posterior oropharyngeal erythema or uvula swelling.   Eyes:      General:         Right eye: No discharge.         Left eye: No discharge.      Extraocular Movements: Extraocular movements intact.      Conjunctiva/sclera: Conjunctivae normal.   Pulmonary:      Effort: Pulmonary effort is normal. "   Neurological:      General: No focal deficit present.      Mental Status: She is alert and oriented to person, place, and time. Mental status is at baseline.   Psychiatric:         Mood and Affect: Mood normal.         Behavior: Behavior normal.         Thought Content: Thought content normal.         Judgment: Judgment normal.       Labs:  WBC   Date Value Ref Range Status   03/07/2025 6.23 3.90 - 12.70 K/uL Final     Platelets   Date Value Ref Range Status   03/07/2025 258 150 - 450 K/uL Final     Creatinine   Date Value Ref Range Status   05/06/2024 0.8 0.5 - 1.4 mg/dL Final     TSH   Date Value Ref Range Status   05/08/2024 2.216 0.400 - 4.000 uIU/mL Final     Glucose   Date Value Ref Range Status   05/06/2024 94 70 - 110 mg/dL Final     Hemoglobin A1C   Date Value Ref Range Status   02/12/2024 4.3 4.0 - 5.6 % Final     Comment:     ADA Screening Guidelines:  5.7-6.4%  Consistent with prediabetes  >or=6.5%  Consistent with diabetes    High levels of fetal hemoglobin interfere with the HbA1C  assay. Heterozygous hemoglobin variants (HbS, HgC, etc)do  not significantly interfere with this assay.   However, presence of multiple variants may affect accuracy.       CT Temporal Bone without contrast Results for orders placed during the hospital encounter of 06/12/25    CT Temporal Bone without contrast    Narrative  EXAMINATION:  CT TEMPORAL BONE WITHOUT CONTRAST    CLINICAL HISTORY:  Mastoiditis;Otitis media, unspecified, right ear    TECHNIQUE:  Low dose axial images were acquired through the temporal bones and skull base without contrast administration.  Coronal and sagittal reconstructions were performed.    COMPARISON:  Brain MRI 02/22/2024.  CTA 02/22/2024.    FINDINGS:  RIGHT TEMPORAL BONE:    External ear: Minimal presumed cerumen within the external auditory canal.  Otherwise within normal limits.  Mild thickening of the tympanic membrane which is slightly medially retracted.    Middle ear: Scattered  opacification within the middle ear which is overall minimal most pronounced at the level of the scutum involving Prussak space and near the footplate/oval window.  The middle ear cavity is otherwise clear.  No bony erosive changes.  The ossicles are within normal limits.  Trace mastoid effusion.  No bony coalescent or destructive changes.  The bony facial nerve canal and sigmoid plate are within normal limits.  Normal appearance of the tegmen.    Inner ear: The cochlea, semicircular canals, bony internal auditory canal, vestibular and cochlear aqueduct are unremarkable.    LEFT TEMPORAL BONE:    External ear: The external auditory canal and tympanic membrane are unremarkable.    Middle ear: The ossicles, middle ear cavity, Prussak's space, oval/round window niche, mastoid air cells, tegmen, sigmoid plate, and bony facial nerve canal are unremarkable.    Inner ear: The cochlea, semicircular canals, bony internal auditory canal, vestibular and cochlear aqueduct are unremarkable.    VASCULATURE: There is no evidence of an aberrant internal carotid artery or high-riding/dehiscent jugular bulb.    ADJACENT STRUCTURES: Scattered paranasal sinus mucosal thickening.  No acute findings intracranially.  Empty sella which appears somewhat expanded is unchanged from prior exam, incidentally noted.  Surrounding soft tissues are within normal limits.    Impression  1. Trace right mastoid effusion and mild inflammatory changes within the right middle ear cavity as described above.  No bony erosive changes or other acute process.  Normal heart resolution CT imaging of the left temporal bone.      Electronically signed by: Endy Musa  Date:    06/12/2025  Time:    15:32    Audiogram Summary: None at present.    All lab results and imaging results have been reviewed.    Assessment:        ICD-10-CM ICD-9-CM   1. Hearing loss of right ear, unspecified hearing loss type  H91.91 389.9   2. Right otitis media, unspecified otitis media  type  H66.91 382.9   3. Acute bacterial bronchitis  J20.8 466.0    B96.89 041.9            Plan:      ACUTE SEROUS OTITIS MEDIA, RIGHT EAR/ETD, RIGHT:  - CT Right Ear ruled out mastoiditis; shows resolving right ear infection with trace mastoid fluid and mild middle ear inflammation.  - Diagnosed serous otitis media s/p resolution of suppurative otitis media.  - Considered oral steroids but decided against due to history of skin reactions.  - Recommend continuing nasal sprays to help open Eustachian tube dysfunction and resolve remaining fluid. Astelin 1 spray to each nostril twice daily and flonase 2 sprays to each nostril once daily as pt has already started doing.   - Explained difference between outer ear infections (swimmer's ear) and current middle ear infection.  - Use flonase and astelin as prescribed. Point up and slightly outward toward ear when using medicated nasal sprays as to avoid irritating nasal septum. Pop ears gently for 5 seconds 6 times a day. Stop popping ears if it causes ear pain. Follow up in 4 weeks with comprehensive audiogram.    ACUTE BACTERIAL BRONCHITIS:  - Started doxycycline 100 mg twice daily for 10 days.       This note was generated with the assistance of ambient listening technology. Verbal consent was obtained by the patient and accompanying visitor(s) for the recording of patient appointment to facilitate this note. I attest to having reviewed and edited the generated note for accuracy, though some syntax or spelling errors may persist. Please contact the author of this note for any clarification.

## 2025-07-02 ENCOUNTER — OFFICE VISIT (OUTPATIENT)
Dept: PAIN MEDICINE | Facility: CLINIC | Age: 55
End: 2025-07-02

## 2025-07-02 VITALS — HEIGHT: 64 IN | BODY MASS INDEX: 47.95 KG/M2 | WEIGHT: 280.88 LBS

## 2025-07-02 DIAGNOSIS — M54.12 CERVICAL RADICULOPATHY: Primary | ICD-10-CM

## 2025-07-02 DIAGNOSIS — R29.898 LEFT ARM WEAKNESS: ICD-10-CM

## 2025-07-02 DIAGNOSIS — M79.18 MYOFASCIAL PAIN: ICD-10-CM

## 2025-07-02 PROCEDURE — 99212 OFFICE O/P EST SF 10 MIN: CPT | Mod: PBBFAC,PN | Performed by: PHYSICIAN ASSISTANT

## 2025-07-02 PROCEDURE — 99999 PR PBB SHADOW E&M-EST. PATIENT-LVL II: CPT | Mod: PBBFAC,,, | Performed by: PHYSICIAN ASSISTANT

## 2025-07-02 PROCEDURE — 99214 OFFICE O/P EST MOD 30 MIN: CPT | Mod: S$PBB,,, | Performed by: PHYSICIAN ASSISTANT

## 2025-07-02 RX ORDER — METHOCARBAMOL 500 MG/1
500 TABLET, FILM COATED ORAL 3 TIMES DAILY PRN
Qty: 45 TABLET | Refills: 0 | Status: SHIPPED | OUTPATIENT
Start: 2025-07-02 | End: 2025-07-17

## 2025-07-02 NOTE — PROGRESS NOTES
"  [place] - Department    Allison Adler MD      First Office Visit: 3/22/24  Today' Date: 7/2/2025  Last Office Visit: None    Chief complaint: L arm pain     HPI: Pt is a pleasant 55 y.o., who presents for f/u. Referred by BERRY Martinez. Pt seen previously for L arm pain, weakness, and tinging since Feb. Pt had ILESI C6-7 on 4/8/24 with minimal relief. Weakness has worsened. Initially Gabapentin 600 mg TID was effective but it is not as helpful. Reports difficulty sleeping. She is weaning down because she does not think its effective. She has consulted multiple specialists. Of note, pt's symptoms of L arm pain happened suddenly in Feb when she was seen in the ED for a r/o stroke. Has seen a neurologist and has had an EMG done. So far the only notable finding has been a C6-7 disc protrusion to the L. Endorses having headaches, balance issues, and difficulty holding objects with the L arm. Denies BB changes. She did PT and HEP when evaluated for L arm weakness in Feb.  She stopped PT  due to worsening weakness.        Pain disability score: 56  Pain score: 6    Relevant Imaging/ Testing:   MR C-spine w/ and w/o contrast 4/24  EMG (?)  MR brachial plexus 2/24  XR L shoulder 2/24  MR C-spine 2/24    Procedures: None    Date of board of pharmacy review:7/2/2025  Date of opioid risk screening/ pain psych: None  Date of opioid agreement and consent: None  Date of urine drug screen: None  Date of random pill count: None     was reviewed today: reviewed, no concerns     Prescribed medications: None    See EHR for  PMH, PSH, FH, SH, Medications and Allergy    ROS:  Positive for pain  ROS     PE:  Vitals:    07/02/25 0851   Weight: 127.4 kg (280 lb 13.9 oz)   Height: 5' 4" (1.626 m)   PainSc:   4   PainLoc: Arm       General: Pleasant, no distress  HEENT: NC/ AT. PERRLA  CV: Radial pulses intact  Pulm: No distress  Ext: No edema    Physical Exam     Neuromusculoskeletal:  Head: NC, AT. PERRLA. No occipital " tenderness  Neck: Intact range of motions, extension, flexion, rotation. B Facet loading. Neg Spurling. Marked tenderness to light touch.  5/5 TTP traps Strength, normal tone. Neg Delvalle's  Shoulder: limited range of motion. Min Bicep groove tenderness. 3/5 Strength.   Hip: Intact range of motion  SI: Level  Knee: Intact range of motion  Reflexes: normal Bicep  Strength: 3/5 LUE, 5/5 globally elsewhere  Sensory: numbness of LUE   Skin: No bruising, erythema  Gait: Normal      Impression:  L arm pain  L arm weakness  C6-7 disc protrusion  Relevant History  BMI 43.77  Migraine  Asthma  Osteoarthritis         Plan:  Discussed options  Imaging/ relevant records viewed/ reviewed/ discussed  Imaging results viewed and reviewed (noted above)/ reviewed with patient   reviewed  Cont HEP  Cont PT      Prescribed medications:  Titrate off Gabapentin 600 mg   2. Start RObaxin 500 mg q 8 h prn.   The impression and plan were discussed and explained in detail. All the questions were answered. Education was provided accordingly.       Follow-up:  If robaxin is effective will send in rx    Mikki Talamantes PA-C

## 2025-07-07 ENCOUNTER — PATIENT MESSAGE (OUTPATIENT)
Dept: FAMILY MEDICINE | Facility: CLINIC | Age: 55
End: 2025-07-07

## 2025-07-21 ENCOUNTER — OFFICE VISIT (OUTPATIENT)
Dept: FAMILY MEDICINE | Facility: CLINIC | Age: 55
End: 2025-07-21
Payer: COMMERCIAL

## 2025-07-21 ENCOUNTER — LAB VISIT (OUTPATIENT)
Dept: LAB | Facility: HOSPITAL | Age: 55
End: 2025-07-21
Attending: STUDENT IN AN ORGANIZED HEALTH CARE EDUCATION/TRAINING PROGRAM
Payer: COMMERCIAL

## 2025-07-21 VITALS
SYSTOLIC BLOOD PRESSURE: 122 MMHG | OXYGEN SATURATION: 95 % | HEIGHT: 64 IN | WEIGHT: 282.19 LBS | BODY MASS INDEX: 48.18 KG/M2 | HEART RATE: 84 BPM | DIASTOLIC BLOOD PRESSURE: 70 MMHG

## 2025-07-21 DIAGNOSIS — K21.9 GASTROESOPHAGEAL REFLUX DISEASE WITHOUT ESOPHAGITIS: Chronic | ICD-10-CM

## 2025-07-21 DIAGNOSIS — Z00.00 ROUTINE GENERAL MEDICAL EXAMINATION AT A HEALTH CARE FACILITY: ICD-10-CM

## 2025-07-21 DIAGNOSIS — Z13.1 SCREENING FOR DIABETES MELLITUS (DM): ICD-10-CM

## 2025-07-21 DIAGNOSIS — E78.2 MIXED HYPERLIPIDEMIA: ICD-10-CM

## 2025-07-21 DIAGNOSIS — Z13.29 SCREENING FOR THYROID DISORDER: ICD-10-CM

## 2025-07-21 DIAGNOSIS — E66.01 MORBID OBESITY: ICD-10-CM

## 2025-07-21 DIAGNOSIS — F41.9 ANXIETY: ICD-10-CM

## 2025-07-21 DIAGNOSIS — R29.898 LEFT ARM WEAKNESS: ICD-10-CM

## 2025-07-21 DIAGNOSIS — Z00.00 ROUTINE GENERAL MEDICAL EXAMINATION AT A HEALTH CARE FACILITY: Primary | ICD-10-CM

## 2025-07-21 DIAGNOSIS — H66.91 RIGHT OTITIS MEDIA, UNSPECIFIED OTITIS MEDIA TYPE: ICD-10-CM

## 2025-07-21 DIAGNOSIS — Z12.31 ENCOUNTER FOR SCREENING MAMMOGRAM FOR MALIGNANT NEOPLASM OF BREAST: ICD-10-CM

## 2025-07-21 DIAGNOSIS — L28.1 PRURIGO NODULARIS: ICD-10-CM

## 2025-07-21 LAB
ABSOLUTE EOSINOPHIL (OHS): 0.15 K/UL
ABSOLUTE MONOCYTE (OHS): 0.75 K/UL (ref 0.3–1)
ABSOLUTE NEUTROPHIL COUNT (OHS): 3.55 K/UL (ref 1.8–7.7)
ALBUMIN SERPL BCP-MCNC: 4.2 G/DL (ref 3.5–5.2)
ALP SERPL-CCNC: 105 UNIT/L (ref 40–150)
ALT SERPL W/O P-5'-P-CCNC: 39 UNIT/L (ref 10–44)
ANION GAP (OHS): 9 MMOL/L (ref 8–16)
AST SERPL-CCNC: 33 UNIT/L (ref 11–45)
BASOPHILS # BLD AUTO: 0.05 K/UL
BASOPHILS NFR BLD AUTO: 0.7 %
BILIRUB SERPL-MCNC: 1 MG/DL (ref 0.1–1)
BUN SERPL-MCNC: 19 MG/DL (ref 6–20)
CALCIUM SERPL-MCNC: 9.6 MG/DL (ref 8.7–10.5)
CHLORIDE SERPL-SCNC: 104 MMOL/L (ref 95–110)
CHOLEST SERPL-MCNC: 178 MG/DL (ref 120–199)
CHOLEST/HDLC SERPL: 2.8 {RATIO} (ref 2–5)
CO2 SERPL-SCNC: 25 MMOL/L (ref 23–29)
CREAT SERPL-MCNC: 0.9 MG/DL (ref 0.5–1.4)
EAG (OHS): 71 MG/DL (ref 68–131)
ERYTHROCYTE [DISTWIDTH] IN BLOOD BY AUTOMATED COUNT: 14.1 % (ref 11.5–14.5)
GFR SERPLBLD CREATININE-BSD FMLA CKD-EPI: >60 ML/MIN/1.73/M2
GLUCOSE SERPL-MCNC: 90 MG/DL (ref 70–110)
HBA1C MFR BLD: 4.1 % (ref 4–5.6)
HCT VFR BLD AUTO: 36.8 % (ref 37–48.5)
HDLC SERPL-MCNC: 63 MG/DL (ref 40–75)
HDLC SERPL: 35.4 % (ref 20–50)
HGB BLD-MCNC: 11.6 GM/DL (ref 12–16)
IMM GRANULOCYTES # BLD AUTO: 0.02 K/UL (ref 0–0.04)
IMM GRANULOCYTES NFR BLD AUTO: 0.3 % (ref 0–0.5)
LDLC SERPL CALC-MCNC: 90.4 MG/DL (ref 63–159)
LYMPHOCYTES # BLD AUTO: 2.99 K/UL (ref 1–4.8)
MCH RBC QN AUTO: 30.2 PG (ref 27–31)
MCHC RBC AUTO-ENTMCNC: 31.5 G/DL (ref 32–36)
MCV RBC AUTO: 96 FL (ref 82–98)
NONHDLC SERPL-MCNC: 115 MG/DL
NUCLEATED RBC (/100WBC) (OHS): 0 /100 WBC
PLATELET # BLD AUTO: 291 K/UL (ref 150–450)
PMV BLD AUTO: 11.4 FL (ref 9.2–12.9)
POTASSIUM SERPL-SCNC: 4.2 MMOL/L (ref 3.5–5.1)
PROT SERPL-MCNC: 7.4 GM/DL (ref 6–8.4)
RBC # BLD AUTO: 3.84 M/UL (ref 4–5.4)
RELATIVE EOSINOPHIL (OHS): 2 %
RELATIVE LYMPHOCYTE (OHS): 39.8 % (ref 18–48)
RELATIVE MONOCYTE (OHS): 10 % (ref 4–15)
RELATIVE NEUTROPHIL (OHS): 47.2 % (ref 38–73)
SODIUM SERPL-SCNC: 138 MMOL/L (ref 136–145)
T4 FREE SERPL-MCNC: 1.08 NG/DL (ref 0.71–1.51)
TRIGL SERPL-MCNC: 123 MG/DL (ref 30–150)
TSH SERPL-ACNC: 5.81 UIU/ML (ref 0.4–4)
WBC # BLD AUTO: 7.51 K/UL (ref 3.9–12.7)

## 2025-07-21 PROCEDURE — 36415 COLL VENOUS BLD VENIPUNCTURE: CPT | Mod: PO

## 2025-07-21 PROCEDURE — G2211 COMPLEX E/M VISIT ADD ON: HCPCS | Mod: S$GLB,,, | Performed by: STUDENT IN AN ORGANIZED HEALTH CARE EDUCATION/TRAINING PROGRAM

## 2025-07-21 PROCEDURE — 83036 HEMOGLOBIN GLYCOSYLATED A1C: CPT

## 2025-07-21 PROCEDURE — 84439 ASSAY OF FREE THYROXINE: CPT

## 2025-07-21 PROCEDURE — 84443 ASSAY THYROID STIM HORMONE: CPT

## 2025-07-21 PROCEDURE — 3074F SYST BP LT 130 MM HG: CPT | Mod: CPTII,S$GLB,, | Performed by: STUDENT IN AN ORGANIZED HEALTH CARE EDUCATION/TRAINING PROGRAM

## 2025-07-21 PROCEDURE — 1159F MED LIST DOCD IN RCRD: CPT | Mod: CPTII,S$GLB,, | Performed by: STUDENT IN AN ORGANIZED HEALTH CARE EDUCATION/TRAINING PROGRAM

## 2025-07-21 PROCEDURE — 80061 LIPID PANEL: CPT

## 2025-07-21 PROCEDURE — 3078F DIAST BP <80 MM HG: CPT | Mod: CPTII,S$GLB,, | Performed by: STUDENT IN AN ORGANIZED HEALTH CARE EDUCATION/TRAINING PROGRAM

## 2025-07-21 PROCEDURE — 1160F RVW MEDS BY RX/DR IN RCRD: CPT | Mod: CPTII,S$GLB,, | Performed by: STUDENT IN AN ORGANIZED HEALTH CARE EDUCATION/TRAINING PROGRAM

## 2025-07-21 PROCEDURE — 99214 OFFICE O/P EST MOD 30 MIN: CPT | Mod: S$GLB,,, | Performed by: STUDENT IN AN ORGANIZED HEALTH CARE EDUCATION/TRAINING PROGRAM

## 2025-07-21 PROCEDURE — 85025 COMPLETE CBC W/AUTO DIFF WBC: CPT

## 2025-07-21 PROCEDURE — 3008F BODY MASS INDEX DOCD: CPT | Mod: CPTII,S$GLB,, | Performed by: STUDENT IN AN ORGANIZED HEALTH CARE EDUCATION/TRAINING PROGRAM

## 2025-07-21 PROCEDURE — 82040 ASSAY OF SERUM ALBUMIN: CPT

## 2025-07-21 PROCEDURE — 99999 PR PBB SHADOW E&M-EST. PATIENT-LVL V: CPT | Mod: PBBFAC,,, | Performed by: STUDENT IN AN ORGANIZED HEALTH CARE EDUCATION/TRAINING PROGRAM

## 2025-07-21 RX ORDER — BUSPIRONE HYDROCHLORIDE 5 MG/1
5 TABLET ORAL 2 TIMES DAILY
Qty: 60 TABLET | Refills: 11 | Status: SHIPPED | OUTPATIENT
Start: 2025-07-21 | End: 2026-07-21

## 2025-07-21 NOTE — PROGRESS NOTES
"[facility] - [place]   OFFICE VISIT NOTE    Patient Name: Ijeoma Issa  YOB: 1970    PRESENTING HISTORY     History of Present Illness:  Ms. Ijeoma Issa is a 55 y.o. female     Last appointment Dec 2024     History of Present Illness    CHIEF COMPLAINT:  Patient presents today for follow up     ENT:  She reports ongoing right ear symptoms with occasional whooshing sound but denies ear pain. She notes overall improvement in ear condition despite intermittent auditory sensations described as whooshing.     MUSCULOSKELETAL:  She reports sudden loss of hand movement that began in February 2024 after napping. Her hand symptoms are variable with recent improvement in finger mobility. Her  strength is inconsistent with limited finger movement. Cervical spine (C5-C7) has been identified as potential underlying cause. She performs home physical therapy, uses a stress ball for exercises, and wears a wrist splint when out. She continues to work on hand mobility despite pain during movement.    She has ongoing shoulder pain that impacts functionality and daily activities. She is following with pain specialist and taking Gabapentin, which she has been self-weaning from two doses daily (morning and evening), down from three doses. She uses rotating Tylenol and ibuprofen for additional pain management, particularly when shoulder feels overworked.    DERMATOLOGIC:  She is currently under care of dermatologist Dr. Sherman with follow-up frequency changed from monthly to every 3-6 months. Her current medication regimen includes ciclopirox shampoo, clobetasol cream, dapsone, Tacrolimus ointment, and Dupixent injections. She rotates topical treatments and applies medications to affected areas like the wrist during flare-ups.    ANXIETY:  She reports feeling "always on edge" with increased irritability and lower frustration threshold noted by family. She takes Hydroxyzine as needed for " "management. She experiences heightened emotional reactivity, including becoming agitated during activities. Her dermatologist recommended exploring anxiety medication, which she reports is helping at a low dose.    Review of Systems   Constitutional:  Negative for chills and fever.   Respiratory:  Negative for shortness of breath.    Cardiovascular:  Negative for chest pain.   Gastrointestinal:  Negative for blood in stool, nausea and vomiting.   Genitourinary:  Negative for hematuria.          OBJECTIVE:   Vital Signs:  Vitals:    07/21/25 0727   BP: 122/70   Pulse: 84   SpO2: 95%   Weight: 128 kg (282 lb 3 oz)   Height: 5' 4" (1.626 m)       Physical Exam  Constitutional:       General: She is not in acute distress.     Appearance: She is obese. She is not ill-appearing or toxic-appearing.   HENT:      Head: Normocephalic and atraumatic.      Right Ear: Tympanic membrane, ear canal and external ear normal. There is no impacted cerumen.      Left Ear: Tympanic membrane, ear canal and external ear normal. There is no impacted cerumen.      Mouth/Throat:      Mouth: Mucous membranes are moist.      Pharynx: Uvula midline. No pharyngeal swelling.   Cardiovascular:      Rate and Rhythm: Normal rate and regular rhythm.   Pulmonary:      Effort: Pulmonary effort is normal. No tachypnea, bradypnea, accessory muscle usage, prolonged expiration or respiratory distress.      Breath sounds: Normal breath sounds. No stridor. No wheezing, rhonchi or rales.   Abdominal:      General: Bowel sounds are normal. There is no distension.      Palpations: Abdomen is soft.      Tenderness: There is no abdominal tenderness. There is no guarding or rebound.   Skin:     Comments: Diffuse excoriations on her skin including her bilateral upper extremities    Neurological:      General: No focal deficit present.      Mental Status: She is alert.      Comments: Wearing wrist splint on the left side   Reduced  strength on LUE, about 2/5  "   Psychiatric:         Mood and Affect: Mood normal.         Behavior: Behavior normal.         ASSESSMENT & PLAN:     Routine general medical examination at a health care facility  -     CBC Auto Differential; Future; Expected date: 07/21/2025  -     Comprehensive Metabolic Panel; Future; Expected date: 07/21/2025  - Recommend resuming efforts for weight loss.  - Ordered CBC, CMP, cholesterol, and A1C.     Anxiety  -     busPIRone (BUSPAR) 5 MG Tab; Take 1 tablet (5 mg total) by mouth 2 (two) times daily.  Dispense: 60 tablet; Refill: 11  - Patient reports feeling snappy and agitated due to anxiety.  - Initiated escitalopram 5 mg twice daily and prescribed hydroxyzine as needed for anxiety management.  - Cautioned against driving when taking hydroxyzine due to potential sedation.    Mixed hyperlipidemia  -     Lipid Panel; Future; Expected date: 07/21/2025    Left arm weakness  - Noted improvement in finger movement but persistent shakiness and pain in the left upper limb.  - Continue Gabapentin at reduced dosage of once in the morning and once in the evening.  - Balance wrist splint use with free movement - remove splint when at home to prevent stiffness.  - Continue home physical therapy exercises.  - Some improvement in finger movement but ongoing shakiness and pain persist, especially during lifting and when using a stress ball.  - Continue daily home physical therapy exercises for management.    Prurigo nodularis  - Continue with ciclopirox shampoo, clobetasol cream, dapsone tablets, and tacrolimus ointment  - Dermatology also started her on Dupixent   - Improving   - Continue to follow up with derm    Right otitis media, unspecified otitis media type  - Resolved  - Has a follow up with ENT in a few days for check up    Gastroesophageal reflux disease without esophagitis  - Stable  - Continue with PPI     Morbid obesity  - Need to work on lifestyle modifications including diet and exercise for weight  loss    Encounter for screening mammogram for malignant neoplasm of breast  -     Mammo Digital Screening Bilat w/ Sharath (XPD); Future; Expected date: 11/13/2025    Screening for diabetes mellitus (DM)  -     Hemoglobin A1C; Future; Expected date: 07/21/2025    Screening for thyroid disorder  -     TSH; Future; Expected date: 07/21/2025    CERVICAL RADICULOPATHY:  - Identified cervical spine issues at C5, C6, and C7 levels as potential cause of arm symptoms, including inability to move fingers properly and shakiness when lifting.  - Pain may be related to these cervical spine issues.  - Continue home physical therapy exercises for management.    RIGHT SHOULDER PAIN:  - Patient is following up with pain specialist for shoulder pain.  - Continue Gabapentin 600 mg 3 times daily as prescribed by pain specialist.    Allison Adler MD  Family Medicine  [facility] [place]     This note was created using [employer] voice recognition software that occasionally misinterprets phrases or words

## 2025-07-25 ENCOUNTER — OFFICE VISIT (OUTPATIENT)
Dept: OTOLARYNGOLOGY | Facility: CLINIC | Age: 55
End: 2025-07-25
Payer: COMMERCIAL

## 2025-07-25 ENCOUNTER — CLINICAL SUPPORT (OUTPATIENT)
Dept: AUDIOLOGY | Facility: CLINIC | Age: 55
End: 2025-07-25
Payer: COMMERCIAL

## 2025-07-25 VITALS — BODY MASS INDEX: 47.88 KG/M2 | HEIGHT: 64 IN | WEIGHT: 280.44 LBS

## 2025-07-25 DIAGNOSIS — J35.8 CRYPTIC TONSIL: Primary | ICD-10-CM

## 2025-07-25 DIAGNOSIS — H90.3 SENSORINEURAL HEARING LOSS (SNHL) OF BOTH EARS: ICD-10-CM

## 2025-07-25 DIAGNOSIS — H90.3 SENSORINEURAL HEARING LOSS, BILATERAL: Primary | ICD-10-CM

## 2025-07-25 PROCEDURE — 99999 PR PBB SHADOW E&M-EST. PATIENT-LVL II: CPT | Mod: PBBFAC,,, | Performed by: AUDIOLOGIST

## 2025-07-25 PROCEDURE — 99999 PR PBB SHADOW E&M-EST. PATIENT-LVL V: CPT | Mod: PBBFAC,,, | Performed by: PHYSICIAN ASSISTANT

## 2025-07-25 NOTE — PROGRESS NOTES
[facility]    Subjective:      Patient: Ijeoma Issa Patient PCP: Allison Adler MD         :  1970     Sex:  female      MRN:  [id number]          Date of Visit: 2025      Chief Complaint: Right serous OM follow up    Interval History 2025: She reports recent history of right-sided ear infection initially treated with Augmentin, which required a second course of antibiotics from her family doctor due to incomplete resolution. She was seen at last OV and advised to use flonase and astelin nasal spray. She has been using flonase and astelin. Her ear symptoms are resolved. She has issues with recurrent tonsil stones.     Patient ID: Ijeoma Issa is a 55 y.o. female who presents to office for evaluation of right OM. Pt presented to family medicine 2025 with complaints of right-sided ear pain and right-sided pre/post-auricular pain. Pt was started on Augmentin 875mg BID x 7 days. She had CT temporal bone WO 2025 showed normal left externa, middle and inner ear. Pt had trace right mastoid effusion with right TM retraction and with scattered fluid vs inflammation of middle ear involving prussak space and near the footplate/oval window. CT indicates likely resolving right OM. She reports ear infection started 2-3 weeks ago with current symptoms of muffled hearing and residual ear pressure. Her ear pain has resolved. She does have some discomfort now. She completed Augmentin twice daily for seven days, finishing last Thursday. She experienced a popping sensation in the ear last night while lying down. She denies ringing in the ears. She has a history of frequent outer ear infections occurring multiple times per year despite not swimming. She denies history of ear surgery or tubes. She has issues with allergies and use astelin 1 spray to each nostril twice daily and flonase 2 sprays to each nostril daily. She reports a one-week history of cough with light greenish mucus and  frequent nasal congestion. She denies fever, chills, or significant mucus discoloration. She reports adverse reaction to topical, injectable and oral steroids resulting in severe skin rebound effect. Her dermatologist has advised avoiding steroid medications.         Past Medical History  She has a past medical history of Abnormal mammogram, Allergic asthma, Anxiety, Arthritis, Dermatitis, Diabetes, Diverticulosis of colon, GERD (gastroesophageal reflux disease), Mass of right breast, Postmenopausal hormone replacement therapy, Tubal pregnancy, and Uterine fibroid.    Family History  Her family history includes Breast cancer (age of onset: 28) in her sister; Cancer in her brother and father; Colon cancer in her father; Diabetes in her sister; Heart disease in her brother and mother.    Past Surgical History:   Procedure Laterality Date    BREAST BIOPSY Right 2012    benign     SECTION, LOW TRANSVERSE      CHOLECYSTECTOMY      COLONOSCOPY N/A 3/26/2024    Procedure: COLONOSCOPY;  Surgeon: Karl Brady MD;  Location: [facility] ENDO;  Service: Endoscopy;  Laterality: N/A;  ppm sent    EPIDURAL STEROID INJECTION INTO CERVICAL SPINE N/A 2024    Procedure: Injection-steroid-epidural-cervical;  Surgeon: Shaila Lynne MD;  Location: [facility] A[facility] OR;  Service: Anesthesiology;  Laterality: N/A;  C6-7    HYSTERECTOMY      re: benign tumors per the patient    right ankle  10/10/2014    TOTAL ABDOMINAL HYSTERECTOMY W/ BILATERAL SALPINGOOPHORECTOMY      c appendectomy    TUBAL LIGATION       Social History     Tobacco Use    Smoking status: Never     Passive exposure: Never    Smokeless tobacco: Never   Substance and Sexual Activity    Alcohol use: Yes     Alcohol/week: 0.0 standard drinks of alcohol     Comment: very seldom    Drug use: No    Sexual activity: Yes     Partners: Male     Birth control/protection: Surgical, See Surgical Hx     Comment: hyst     Medications  She has a current medication list  "which includes the following prescription(s): buspirone, chlorhexidine, chlorhexidine, ciclopirox, clobetasol, dapsone, doxepin, dupixent pen, ferrous sulfate, fexofenadine, fluticasone propionate, gabapentin, gentamicin, hydrocortisone, hydroxyzine hcl, ibuprofen, ipratropium, lidocaine, montelukast, multivitamin with minerals, mupirocin, pantoprazole, spironolactone, tacrolimus, azelastine, budesonide-formoterol 160-4.5 mcg, and epipen 2-brennen.    Review of patient's allergies indicates:   Allergen Reactions    Cashew nut Anaphylaxis     Allergic to Cashew nuts    Nut flavor Anaphylaxis     Allergic to Cashew nuts    Latex Rash     Including in injections    Acitretin Itching and Blisters    Varicella vaccines      Red swelling/ lump develops at injection site    Adhesive tape-silicones Rash    Tapentadol Rash     All medications, allergies, and past history have been reviewed.    Objective:      Vitals:      7/2/2025     8:51 AM 7/21/2025     7:27 AM 7/25/2025     8:46 AM   Vitals - 1 value per visit   SYSTOLIC  122    DIASTOLIC  70    Pulse  84    SPO2  95 %    Weight (lb) 280.87 282.19 280.43   Weight (kg) 127.4 128 127.2   Height 5' 4" (1.626 m) 5' 4" (1.626 m) 5' 4" (1.626 m)   BMI (Calculated) 48.2 48.4 48.1   Pain Score Four Zero        Body surface area is 2.4 meters squared.    Physical Exam  Constitutional:       General: She is not in acute distress.     Appearance: Normal appearance. She is not ill-appearing.   HENT:      Head: Normocephalic and atraumatic.      Right Ear: Tympanic membrane, ear canal and external ear normal.      Left Ear: Tympanic membrane, ear canal and external ear normal.      Nose: Nose normal.      Mouth/Throat:      Lips: Pink. No lesions.      Mouth: Mucous membranes are moist. No oral lesions.      Tongue: No lesions.      Palate: No lesions.      Pharynx: Oropharynx is clear. Uvula midline. No pharyngeal swelling, oropharyngeal exudate, posterior oropharyngeal erythema or " uvula swelling.      Tonsils: No tonsillar exudate or tonsillar abscesses. 2+ on the right. 2+ on the left.      Comments: Tonsillar crypts bilaterally without tonsilliths present.   Eyes:      General:         Right eye: No discharge.         Left eye: No discharge.      Extraocular Movements: Extraocular movements intact.      Conjunctiva/sclera: Conjunctivae normal.   Pulmonary:      Effort: Pulmonary effort is normal.   Neurological:      General: No focal deficit present.      Mental Status: She is alert and oriented to person, place, and time. Mental status is at baseline.   Psychiatric:         Mood and Affect: Mood normal.         Behavior: Behavior normal.         Thought Content: Thought content normal.         Judgment: Judgment normal.       Labs:  WBC   Date Value Ref Range Status   07/21/2025 7.51 3.90 - 12.70 K/uL Final     Platelet Count   Date Value Ref Range Status   07/21/2025 291 150 - 450 K/uL Final     Creatinine   Date Value Ref Range Status   07/21/2025 0.9 0.5 - 1.4 mg/dL Final     TSH   Date Value Ref Range Status   07/21/2025 5.812 (H) 0.400 - 4.000 uIU/mL Final     Glucose   Date Value Ref Range Status   07/21/2025 90 70 - 110 mg/dL Final     Hemoglobin A1c   Date Value Ref Range Status   07/21/2025 4.1 4.0 - 5.6 % Final     Comment:     ADA Screening Guidelines:  5.7-6.4%  Consistent with prediabetes  >=6.5%  Consistent with diabetes    High levels of fetal hemoglobin interfere with the HbA1C  assay. Heterozygous hemoglobin variants (HbS, HgC, etc)do  not significantly interfere with this assay.   However, presence of multiple variants may affect accuracy.     CT Temporal Bone without contrast Results for orders placed during the hospital encounter of 06/12/25    CT Temporal Bone without contrast    Narrative  EXAMINATION:  CT TEMPORAL BONE WITHOUT CONTRAST    CLINICAL HISTORY:  Mastoiditis;Otitis media, unspecified, right ear    TECHNIQUE:  Low dose axial images were acquired through  the temporal bones and skull base without contrast administration.  Coronal and sagittal reconstructions were performed.    COMPARISON:  Brain MRI 02/22/2024.  CTA 02/22/2024.    FINDINGS:  RIGHT TEMPORAL BONE:    External ear: Minimal presumed cerumen within the external auditory canal.  Otherwise within normal limits.  Mild thickening of the tympanic membrane which is slightly medially retracted.    Middle ear: Scattered opacification within the middle ear which is overall minimal most pronounced at the level of the scutum involving Prussak space and near the footplate/oval window.  The middle ear cavity is otherwise clear.  No bony erosive changes.  The ossicles are within normal limits.  Trace mastoid effusion.  No bony coalescent or destructive changes.  The bony facial nerve canal and sigmoid plate are within normal limits.  Normal appearance of the tegmen.    Inner ear: The cochlea, semicircular canals, bony internal auditory canal, vestibular and cochlear aqueduct are unremarkable.    LEFT TEMPORAL BONE:    External ear: The external auditory canal and tympanic membrane are unremarkable.    Middle ear: The ossicles, middle ear cavity, Prussak's space, oval/round window niche, mastoid air cells, tegmen, sigmoid plate, and bony facial nerve canal are unremarkable.    Inner ear: The cochlea, semicircular canals, bony internal auditory canal, vestibular and cochlear aqueduct are unremarkable.    VASCULATURE: There is no evidence of an aberrant internal carotid artery or high-riding/dehiscent jugular bulb.    ADJACENT STRUCTURES: Scattered paranasal sinus mucosal thickening.  No acute findings intracranially.  Empty sella which appears somewhat expanded is unchanged from prior exam, incidentally noted.  Surrounding soft tissues are within normal limits.    Impression  1. Trace right mastoid effusion and mild inflammatory changes within the right middle ear cavity as described above.  No bony erosive changes or  other acute process.  Normal heart resolution CT imaging of the left temporal bone.      Electronically signed by: Endy Musa  Date:    06/12/2025  Time:    15:32    Audiogram Summary:      All lab results and imaging results have been reviewed.    Assessment:        ICD-10-CM ICD-9-CM   1. Cryptic tonsil  J35.8 474.8   2. Sensorineural hearing loss (SNHL) of both ears  H90.3 389.18            Plan:      Pt advised that she can use dental waterpick as needed for tonsil stones. Right serous OM resolved. Continue flonase and astelin 1 SEN BID due to issues with perennial allergies. Audiogram reviewed with pt in detail. Pt has mild SNHL AU. Type A tymp AD and type A tymp AS. SRTs 30dB AD and 30dB AS. %AD at 70dB and 100%AS at 70dB. We will monitor hearing loss with annual audiograms.    Pt is to follow up as needed for ENT issues/concerns prior to annual audiograms.

## 2025-07-25 NOTE — PROGRESS NOTES
Ijeoma Issa was seen on 07/25/2025 for an audiological evaluation. Pt was alone during today's visit. Pertinent complaints today include hearing loss. Pt denies history of loud noise exposure and denies early onset of genetic family history of hearing loss. Otoscopy revealed no cerumen in both ears. The tympanic membrane was visualized AU prior to proceeding with the hearing testing.     Pure tone threshold test results reveal a mild sensorineural hearing loss for the right ear and  mild sensorineural hearing loss for the left ear.    Speech Reception Thresholds were  30 dBHL for the right ear and 30 dBHL for the left ear.    Word recognition scores were excellent for the right ear and excellent for the left ear.   Tympanograms were Type A for the right ear and Type A for the left ear.    Recommendations: 1) Follow up with ENT     2) Annual hearing evaluation     3) Wear hearing protection around noise    Audiogram results were reviewed in detail with patient and all questions were answered. Results will be reviewed by the referring provider at the completion of this note. All complaints were addressed during this visit to the patient's satisfaction.

## 2025-08-01 ENCOUNTER — PATIENT MESSAGE (OUTPATIENT)
Dept: PAIN MEDICINE | Facility: CLINIC | Age: 55
End: 2025-08-01
Payer: COMMERCIAL

## 2025-08-05 ENCOUNTER — TELEPHONE (OUTPATIENT)
Dept: FAMILY MEDICINE | Facility: CLINIC | Age: 55
End: 2025-08-05
Payer: COMMERCIAL

## 2025-08-05 NOTE — TELEPHONE ENCOUNTER
Spoke with patient C/o cough and runny nose. Scheduled to see Carolyne 08/06/25 at 9 AM. Verbalized understanding

## 2025-08-05 NOTE — TELEPHONE ENCOUNTER
Copied from CRM #[id number]. Topic: Appointments - Appointment Access  >> Aug 5, 2025  2:11 PM Lisa wrote:  Type:  Sooner Apoointment Request    Caller is requesting a sooner appointment. Caller is requesting a message be sent to doctor.    Name of Caller: pt     When is the first available appointment?08/08/2025    Symptoms:Terrible cough, runny nose; sinus inflammed    Would the patient rather a call back or a response via [facility]? Call     Best Call Back Number:[phone]     Additional Information: pt is requesting  a soon appt for above issues. Please advise

## 2025-08-06 ENCOUNTER — OFFICE VISIT (OUTPATIENT)
Dept: FAMILY MEDICINE | Facility: CLINIC | Age: 55
End: 2025-08-06
Payer: COMMERCIAL

## 2025-08-06 VITALS
WEIGHT: 275.81 LBS | DIASTOLIC BLOOD PRESSURE: 78 MMHG | TEMPERATURE: 98 F | SYSTOLIC BLOOD PRESSURE: 120 MMHG | HEIGHT: 64 IN | BODY MASS INDEX: 47.09 KG/M2 | HEART RATE: 82 BPM | OXYGEN SATURATION: 96 % | RESPIRATION RATE: 12 BRPM

## 2025-08-06 DIAGNOSIS — J02.0 STREP PHARYNGITIS: Primary | ICD-10-CM

## 2025-08-06 LAB
CTP QC/QA: YES
S PYO RRNA THROAT QL PROBE: POSITIVE

## 2025-08-06 PROCEDURE — 99999 PR PBB SHADOW E&M-EST. PATIENT-LVL IV: CPT | Mod: PBBFAC,,,

## 2025-08-06 PROCEDURE — 87880 STREP A ASSAY W/OPTIC: CPT | Mod: QW,S$GLB,,

## 2025-08-06 RX ORDER — AMOXICILLIN 500 MG/1
500 TABLET, FILM COATED ORAL EVERY 12 HOURS
Qty: 20 TABLET | Refills: 0 | Status: SHIPPED | OUTPATIENT
Start: 2025-08-06 | End: 2025-08-16

## 2025-08-06 RX ORDER — AMOXICILLIN AND CLAVULANATE POTASSIUM 875; 125 MG/1; MG/1
1 TABLET, FILM COATED ORAL 2 TIMES DAILY
Qty: 10 TABLET | Refills: 0 | Status: CANCELLED | OUTPATIENT
Start: 2025-08-06 | End: 2025-08-11

## 2025-08-06 NOTE — PROGRESS NOTES
Subjective:       Patient ID:  [id number]     Chief Complaint: Cough, Nasal Congestion, and Sinus Problem    History of Present Illness   Ms. Issa presents today for a sore throat (L>R), chills, productive cough, rhinorrhea, post-nasal drip, loss of appetite,  sinus pressure and pain, and vomiting that started 5 days ago. Her sore throat, post-nasal drip, sinus pressure, cough, chills, and rhinorrhea have been constantly present for the last 5 days, and she had one episode of vomiting yesterday. She says the vomiting was not secondary to nausea, it was due to throat soreness. She has tried Astelin and Flonase nasal sprays BID and cough drops with minimal relief of her symptoms. She has been experiencing loss of appetite since yesterday, and has only had one serving of soup in the last 24 hours. She denies fever, nausea, and chest pain.        Past Medical History:   Diagnosis Date    Abnormal mammogram     neg biospy    Allergic asthma     Anxiety     Arthritis     Dermatitis     Dr. 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    Pruritic dermatitis 06/30/2023    Umbilical hernia 06/18/2015    Menopausal symptoms 04/09/2015    Vaginal dryness, menopausal 04/09/2015    Polycystic ovarian disease 02/05/2014    Morbid obesity 11/14/2013    Bacterial vaginosis 10/25/2013    Migraine headache 08/10/2012    OA (osteoarthritis) 07/20/2012    Acne, adult 07/20/2012    Asthma with allergic rhinitis 07/20/2012    GERD  (gastroesophageal reflux disease) 2012    Family history of early CAD, brother  with MI, age 60 2012    Mammogram abnormal 2012      Review of patient's allergies indicates:   Allergen Reactions    Cashew nut Anaphylaxis     Allergic to Cashew nuts    Nut flavor Anaphylaxis     Allergic to Cashew nuts    Latex Rash     Including in injections    Acitretin Itching and Blisters    Varicella vaccines      Red swelling/ lump develops at injection site    Adhesive tape-silicones Rash    Tapentadol Rash       Current Medications[1]    Lab Results   Component Value Date    WBC 7.51 2025    HGB 11.6 (L) 2025    HCT 36.8 (L) 2025     2025    CHOL 178 2025    TRIG 123 2025    HDL 63 2025    ALT 39 2025    AST 33 2025     2025    K 4.2 2025     2025    CREATININE 0.9 2025    BUN 19 2025    CO2 25 2025    TSH 5.812 (H) 2025    INR 1.0 2024    HGBA1C 4.1 2025    MICROALBUR 0.32 2012       Review of Systems   Constitutional:  Positive for appetite change, chills and diaphoresis. Negative for fever.   HENT:  Positive for congestion, postnasal drip, rhinorrhea, sinus pressure, sinus pain, sore throat, trouble swallowing and voice change. Negative for ear pain.    Respiratory:  Positive for cough and shortness of breath.    Cardiovascular:  Negative for chest pain.   Gastrointestinal:  Positive for vomiting. Negative for nausea.       Objective:      Physical Exam  Vitals reviewed.   Constitutional:       General: She is not in acute distress.     Appearance: Normal appearance. She is obese. She is ill-appearing.   HENT:      Head: Normocephalic and atraumatic.      Right Ear: Tympanic membrane, ear canal and external ear normal.      Left Ear: Tympanic membrane, ear canal and external ear normal.      Mouth/Throat:      Pharynx: Posterior oropharyngeal erythema present. No  oropharyngeal exudate.   Cardiovascular:      Rate and Rhythm: Normal rate and regular rhythm.      Pulses: Normal pulses.      Heart sounds: Normal heart sounds.   Pulmonary:      Effort: Pulmonary effort is normal.      Breath sounds: Normal breath sounds.   Musculoskeletal:      Cervical back: Tenderness present.   Lymphadenopathy:      Cervical: No cervical adenopathy.   Skin:     General: Skin is warm and dry.   Neurological:      General: No focal deficit present.      Mental Status: She is alert and oriented to person, place, and time.   Psychiatric:         Mood and Affect: Mood normal.         Behavior: Behavior normal.       Labs:    POCT Completed this Visit:  POCT RAPID STREP A  - positive result     Assessment:       1. Strep pharyngitis    2. Nausea    3. Sore throat        Plan:       IMPRESSION:   Performed a brief physical exam   Performed a POCT rapid strep A test   Reviewed current medications    Ijeoma was seen today for cough, nasal congestion and sinus problem.    Diagnoses and all orders for this visit:    Strep pharyngitis  -     POCT rapid strep A  -     amoxicillin (AMOXIL) 500 MG Tab; Take 1 tablet (500 mg total) by mouth every 12 (twelve) hours. for 10 days  -     We discussed that her rapid strep A test resulted as positive, so her symptoms are secondary to an upper respiratory infection caused by group A streptococcus. I prescribed amoxicillin 500 mg 1 PO q12h for 10 days and emphasized the importance of medication compliance. We discussed common side effects of the medication including diarrhea, nausea, and vomiting. We also discussed more serious side effects like a blistering skin rash, and I advised that if she develops rash she should proceed to the ER. She may continue Flonase and Astelin as prescribed. If her symptoms have not resolved after completing the course of antibiotics, she should follow up with me.       Patient evaluated with DORIAN. I agree with physical exam findings  and documentation of DORIAN.       Future Appointments       Date Provider Specialty Appt Notes    10/10/2025 Carolyne Gonzales PA-C Family Medicine follow up anxiety    11/20/2025  Radiology screne    12/29/2025 Mikki Talamantes PA-C Pain Medicine 6 months    7/24/2026 Allison Adler MD Family Medicine annual               Portions of this note may have been dictated using voice recognition software and may contain dictation related errors in spelling / grammar / syntax not discovered on text review.     This note was generated with the assistance of ambient listening technology. Verbal consent was obtained by the patient and accompanying visitor(s) for the recording of patient appointment to facilitate this note. I attest to having reviewed and edited the generated note for accuracy, though some syntax or spelling errors may persist. Please contact the author of this note for any clarification.       DORIAN Segal          [1]   Current Outpatient Medications:     busPIRone (BUSPAR) 5 MG Tab, Take 1 tablet (5 mg total) by mouth 2 (two) times daily., Disp: 60 tablet, Rfl: 11    chlorhexidine (HIBICLENS) 4 % external liquid, Apply topically daily as needed (dermatitis)., Disp: 236 mL, Rfl: 2    chlorhexidine (PERIDEX) 0.12 % solution, , Disp: , Rfl:     ciclopirox 1 % shampoo, Apply 5 mLs topically Daily., Disp: , Rfl:     clobetasoL (TEMOVATE) 0.05 % cream, Apply topically 2 (two) times daily. Apply to affected areas of rash on the trunk, arms and legs for 2 weeks at a time, as needed for new flares. Not for face, neck or groin., Disp: 60 g, Rfl: 0    doxepin (SINEQUAN) 25 MG capsule, TAKE 1 CAPSULE(25 MG) BY MOUTH EVERY EVENING, Disp: 90 capsule, Rfl: 3    dupilumab (DUPIXENT PEN) 300 mg/2 mL PnIj, Inject 300 mg into the skin., Disp: , Rfl:     ferrous sulfate (IRON) 325 mg (65 mg iron) Tab tablet, Take 1 tablet (325 mg total) by mouth 3 (three) times a week., Disp: , Rfl:     fexofenadine (ALLEGRA) 180  MG tablet, Take 180 mg by mouth once daily., Disp: , Rfl:     fluticasone propionate (FLONASE) 50 mcg/actuation nasal spray, 2 sprays by Nasal route., Disp: , Rfl:     gabapentin (NEURONTIN) 600 MG tablet, TAKE 1 TABLET(600 MG) BY MOUTH THREE TIMES DAILY, Disp: 90 tablet, Rfl: 2    gentamicin (GARAMYCIN) 0.1 % ointment, Apply twice daily to AA of face, Disp: , Rfl:     hydrocortisone 2.5 % cream, Apply to Affected areas on face and neck twice a day for up to 2 weeks as needed for flares, Disp: , Rfl:     hydrOXYzine HCL (ATARAX) 25 MG tablet, Take 1 tablet (25 mg total) by mouth 3 (three) times daily as needed for Anxiety., Disp: 90 tablet, Rfl: 1    ibuprofen (ADVIL,MOTRIN) 400 MG tablet, Take 1 tablet (400 mg total) by mouth every 6 (six) hours as needed (shoulder pain)., Disp: 30 tablet, Rfl: 1    ipratropium (ATROVENT) 42 mcg (0.06 %) nasal spray, 1-2 sprays by Nasal route., Disp: , Rfl:     LIDOcaine (LIDODERM) 5 %, Place 1 patch onto the skin once daily. Remove & Discard patch within 12 hours or as directed by MD, Disp: 30 patch, Rfl: 0    montelukast (SINGULAIR) 10 mg tablet, Take 1 tablet (10 mg total) by mouth once daily., Disp: 30 tablet, Rfl: 5    multivitamin with minerals tablet, Take 1 tablet by mouth once daily., Disp: , Rfl:     mupirocin (BACTROBAN) 2 % ointment, Apply topically 3 (three) times daily., Disp: 30 g, Rfl: 0    pantoprazole (PROTONIX) 40 MG tablet, Take 1 tablet (40 mg total) by mouth 2 (two) times daily., Disp: 180 tablet, Rfl: 3    spironolactone (ALDACTONE) 25 MG tablet, Take 1 tablet (25 mg total) by mouth once daily., Disp: 90 tablet, Rfl: 3    tacrolimus (PROTOPIC) 0.1 % ointment, Apply topically., Disp: , Rfl:     amoxicillin (AMOXIL) 500 MG Tab, Take 1 tablet (500 mg total) by mouth every 12 (twelve) hours. for 10 days, Disp: 20 tablet, Rfl: 0    azelastine (ASTELIN) 137 mcg (0.1 %) nasal spray, 1 spray (137 mcg total) by Nasal route 2 (two) times daily., Disp: 30 mL, Rfl: 3     budesonide-formoterol 160-4.5 mcg (SYMBICORT) 160-4.5 mcg/actuation HFAA, Inhale 2 puffs into the lungs 2 (two) times daily., Disp: , Rfl:     dapsone 25 MG Tab, Take 50 mg by mouth 2 (two) times daily. (Patient not taking: Reported on 8/6/2025), Disp: , Rfl:     EPIPEN 2-LUCIANO 0.3 mg/0.3 mL AtIn, Inject 0.3 mLs (0.3 mg total) into the muscle once. for 1 dose, Disp: 0.3 mL, Rfl: 11

## (undated) DEVICE — TUBING MINIBORE EXTENSION

## (undated) DEVICE — SYS LABEL CORRECT MED

## (undated) DEVICE — NDL TUOHY EPIDURAL 20G X 3.5

## (undated) DEVICE — CHLORAPREP 10.5 ML APPLICATOR

## (undated) DEVICE — SYR GLASS 5CC LUER LOK

## (undated) DEVICE — GLOVE SENSICARE PI ALOE 7.5